# Patient Record
Sex: FEMALE | Race: WHITE | Employment: OTHER | URBAN - METROPOLITAN AREA
[De-identification: names, ages, dates, MRNs, and addresses within clinical notes are randomized per-mention and may not be internally consistent; named-entity substitution may affect disease eponyms.]

---

## 2017-01-19 ENCOUNTER — OFFICE VISIT (OUTPATIENT)
Dept: FAMILY MEDICINE CLINIC | Age: 82
End: 2017-01-19

## 2017-01-19 VITALS
BODY MASS INDEX: 26.98 KG/M2 | RESPIRATION RATE: 18 BRPM | HEART RATE: 60 BPM | HEIGHT: 64 IN | SYSTOLIC BLOOD PRESSURE: 137 MMHG | TEMPERATURE: 98.1 F | OXYGEN SATURATION: 94 % | WEIGHT: 158 LBS | DIASTOLIC BLOOD PRESSURE: 70 MMHG

## 2017-01-19 DIAGNOSIS — Z59.9 FINANCIAL PROBLEMS: ICD-10-CM

## 2017-01-19 DIAGNOSIS — E78.5 DYSLIPIDEMIA: ICD-10-CM

## 2017-01-19 DIAGNOSIS — I10 ESSENTIAL HYPERTENSION, BENIGN: Primary | ICD-10-CM

## 2017-01-19 DIAGNOSIS — J44.9 CHRONIC OBSTRUCTIVE PULMONARY DISEASE, UNSPECIFIED COPD TYPE (HCC): ICD-10-CM

## 2017-01-19 DIAGNOSIS — F43.9 STRESS AT HOME: ICD-10-CM

## 2017-01-19 SDOH — ECONOMIC STABILITY - INCOME SECURITY: PROBLEM RELATED TO HOUSING AND ECONOMIC CIRCUMSTANCES, UNSPECIFIED: Z59.9

## 2017-01-19 NOTE — PROGRESS NOTES
Angie Villar 80 y.o. female   Chief Complaint   Patient presents with    Follow-up    Hypertension    COPD    Cholesterol Problem         1. Have you been to the ER, urgent care clinic since your last visit? Hospitalized since your last visit? No    2. Have you seen or consulted any other health care providers outside of the [address] [place] [address] since your last visit? Include any pap smears or colon screening.  No

## 2017-01-19 NOTE — MR AVS SNAPSHOT
Visit Information Date & Time Provider Department Dept. Phone Encounter #  
 1/19/2017 12:30 PM Rocio Ca MD [address] [phone] [id number] Your Appointments 4/24/2017  1:15 PM  
Follow Up with Rocio Ca MD  
[address] (--) Appt Note: Follow Up  
 [address] [address]  
  
    
 10/10/2017  9:00 AM  
Follow Up with Ashley Sky MD  
[facility] ([facility]) Appt Note: 1 year follow up with EKG  
 [address] [facility] [address] [phone] [address] Upcoming Health Maintenance Date Due DTaP/Tdap/Td series (1 - Tdap) 11/23/1952 ZOSTER VACCINE AGE 60> 11/23/1991 GLAUCOMA SCREENING Q2Y 11/23/1996 Pneumococcal 65+ Low/Medium Risk (1 of 2 - PCV13) 11/23/1996 MEDICARE YEARLY EXAM 4/20/2016 INFLUENZA AGE 9 TO ADULT 8/1/2016 BREAST CANCER SCRN MAMMOGRAM 9/30/2016 Allergies as of 1/19/2017  Review Complete On: 1/19/2017 By: Rocio Ca MD  
  
 Severity Noted Reaction Type Reactions Iodine  10/02/2012   Intolerance Hives Current Immunizations  Reviewed on 4/20/2015 No immunizations on file. Not reviewed this visit You Were Diagnosed With   
  
 Codes Comments Essential hypertension, benign    -  Primary ICD-10-CM: I10 
ICD-9-CM: 401.1 Dyslipidemia     ICD-10-CM: E78.5 ICD-9-CM: 272.4 Chronic obstructive pulmonary disease, unspecified COPD type ([facility]     ICD-10-CM: J44.9 ICD-9-CM: 670 Vitals BP Pulse Temp Resp Height(growth percentile) Weight(growth percentile)  
 137/70 (BP 1 Location: Left arm, BP Patient Position: Sitting) 60 98.1 °F (36.7 °C) (Oral) 18 5' 4\" (1.626 m) 158 lb (71.7 kg) SpO2 BMI OB Status Smoking Status 94% 27.12 kg/m2 Postmenopausal Current Every Day Smoker BMI and BSA Data Body Mass Index Body Surface Area  
 27.12 kg/m 2 1.8 m 2 [facility] Your Updated Medication List  
  
   
This list is accurate as of: 1/19/17  3:15 PM.  Always use your most recent med list.  
  
  
  
  
 albuterol 90 mcg/actuation inhaler Commonly known as:  PROAIR HFA Take 2 Puffs by inhalation every four (4) hours as needed for Wheezing or Shortness of Breath. ALPRAZolam 0.5 mg tablet Commonly known as:  XANAX  
  
 amLODIPine 10 mg tablet Commonly known as:  Suzon Salle Take 1 Tab by mouth daily. aspirin 81 mg tablet Take 81 mg by mouth daily. atorvastatin 10 mg tablet Commonly known as:  LIPITOR Take 1 Tab by mouth daily. bipap machine kit 1 each by Does Not Apply route. Started using BiPap Machine 01-15-15 CALCIUM CITRATE + PO Take  by mouth once over twenty-four (24) hours. cholecalciferol (VITAMIN D3) 5,000 unit Tab tablet Commonly known as:  VITAMIN D3 Take 5,000 Int'l Units by mouth daily. Indications: VITAMIN D DEFICIENCY  
  
 COQ10 (LIPOSOMAL UBIQUINOL) PO Take 10 mg by mouth daily. FISH OIL PO Take 4,000 mg by mouth daily. fludrocortisone 0.1 mg tablet Commonly known as:  FLORINEF Take 1 tablet by mouth daily. furosemide 20 mg tablet Commonly known as:  LASIX Take 1 Tab by mouth daily as needed for Other (Leg Swelling). hydroCHLOROthiazide 12.5 mg capsule Commonly known as:  Wolm Drilling Take 1 Cap by mouth daily. HYDROcodone-acetaminophen 5-325 mg per tablet Commonly known as:  Reymundo Songster Take 1 Tab by mouth every four (4) hours as needed for Pain. Max Daily Amount: 6 Tabs. hydrocortisone 10 mg tablet Commonly known as:  CORTEF  
  
 L-Methylfolate-X49-Qejenlgdbf tablet Commonly known as:  Laveda Alu Take 1 tablet by mouth daily. lisinopril 20 mg tablet Commonly known as:  Aundra Yakelin Take 1 Tab by mouth daily. magnesium oxide 400 mg tablet Commonly known as:  MAG-OX Take 400 mg by mouth daily. metoprolol tartrate 50 mg tablet Commonly known as:  LOPRESSOR Take 1 Tab by mouth daily. Indications: HYPERTENSION  
  
 nystatin powder Commonly known as:  MYCOSTATIN Apply  to affected area four (4) times daily. OXcarbazepine 300 mg tablet Commonly known as:  TRILEPTAL Take 300 mg by mouth two (2) times a day. PARoxetine 30 mg tablet Commonly known as:  PAXIL Take 1 Tab by mouth daily. potassium chloride 20 mEq tablet Commonly known as:  KLOR-CON M20 Take 1 Tab by mouth two (2) times a day. SYNTHROID 112 mcg tablet Generic drug:  levothyroxine  
  
 umeclidinium-vilanterol 62.5-25 mcg/actuation inhaler Commonly known as:  Nupur Pander Take 1 Puff by inhalation daily. VOLTAREN 1 % Gel Generic drug:  diclofenac Elisa Petrin Commonly known as:  BARNEY ROLLING WALKER  
1 Device by Does Not Apply route daily. To-Do List   
 01/19/2017 Lab:  LIPID PANEL   
  
 01/19/2017 Lab:  METABOLIC PANEL, COMPREHENSIVE Patient Instructions Please contact our office if you have any questions about your visit today. Introducing [facility]! Dear Mert Oden: 
Thank you for requesting a Access Network account. Our records indicate that you already have an active Access Network account. You can access your account anytime at [email or web address] Did you know that you can access your hospital and ER discharge instructions at any time in Access Network? You can also review all of your test results from your hospital stay or ER visit. Additional Information If you have questions, please visit the Frequently Asked Questions section of the Monitor website at [email or web address] Remember, Monitor is NOT to be used for urgent needs. For medical emergencies, dial 911. Now available from your iPhone and Android! Please provide this summary of care documentation to your next provider. Your primary care clinician is listed as Stanley Coreas. If you have any questions after today's visit, please call [phone].
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
## 2017-03-29 ENCOUNTER — OFFICE VISIT (OUTPATIENT)
Dept: FAMILY MEDICINE CLINIC | Age: 82
End: 2017-03-29

## 2017-03-29 VITALS
SYSTOLIC BLOOD PRESSURE: 120 MMHG | OXYGEN SATURATION: 93 % | DIASTOLIC BLOOD PRESSURE: 61 MMHG | RESPIRATION RATE: 18 BRPM | BODY MASS INDEX: 28 KG/M2 | HEIGHT: 64 IN | WEIGHT: 164 LBS | HEART RATE: 67 BPM | TEMPERATURE: 98.5 F

## 2017-03-29 DIAGNOSIS — Z12.31 ENCOUNTER FOR SCREENING MAMMOGRAM FOR MALIGNANT NEOPLASM OF BREAST: ICD-10-CM

## 2017-03-29 DIAGNOSIS — Z00.00 MEDICARE ANNUAL WELLNESS VISIT, SUBSEQUENT: Primary | ICD-10-CM

## 2017-03-29 DIAGNOSIS — Z71.89 ACP (ADVANCE CARE PLANNING): ICD-10-CM

## 2017-03-29 DIAGNOSIS — Z13.39 SCREENING FOR ALCOHOLISM: ICD-10-CM

## 2017-03-29 DIAGNOSIS — J44.1 COPD EXACERBATION (HCC): ICD-10-CM

## 2017-03-29 RX ORDER — IPRATROPIUM BROMIDE AND ALBUTEROL SULFATE 2.5; .5 MG/3ML; MG/3ML
3 SOLUTION RESPIRATORY (INHALATION)
Qty: 3 ML | Refills: 0
Start: 2017-03-29 | End: 2017-03-29

## 2017-03-29 NOTE — ACP (ADVANCE CARE PLANNING)
Advance Care Planning (ACP) Provider Note - Comprehensive     Date of ACP Conversation: 03/29/17  Persons included in Conversation:  patient and family  Length of ACP Conversation in minutes:  16 minutes    Authorized Decision Maker (if patient is incapable of making informed decisions): This person is: Other Legally Authorized Decision Maker (e.g. Next of Kin)          General ACP for ALL Patients with Decision Making Capacity:   Importance of advance care planning, including choosing a healthcare agent to communicate patient's healthcare decisions if patient lost the ability to make decisions, such as after a sudden illness or accident  Understanding of the healthcare agent role was assessed and information provided  Exploration of values, goals, and preferences if recovery is not expected, even with continued medical treatment in the event of: Imminent death  Severe, permanent brain injury  \"In these circumstances, what matters most to you? \"  Care focused more on comfort or quality of life. \"What, if any, treatments would you want to avoid? \" mechanical ventilation, tube feeding    Review of Existing Advance Directive:  What is your understanding of your agent's willingness to honor your wishes, even if he/she may not agree with them? pt feels comfortable that they will honor her wishes    For Serious or Chronic Illness:  Understanding of medical condition      Interventions Provided:  Recommended completion of Advance Directive form after review of ACP materials and conversation with prospective healthcare agent

## 2017-03-29 NOTE — PROGRESS NOTES
Leonardo Speaker Jian 80 y.o. female   Chief Complaint   Patient presents with    Annual Wellness Visit         1. Have you been to the ER, urgent care clinic since your last visit? Hospitalized since your last visit? No    2. Have you seen or consulted any other health care providers outside of the [address] [place] [address] since your last visit? Include any pap smears or colon screening. No  This is a Subsequent Medicare Annual Wellness Visit providing Personalized Prevention Plan Services (PPPS) (Performed 12 months after initial AWV and PPPS )    I have reviewed the patient's medical history in detail and updated the computerized patient record. History     Past Medical History:   Diagnosis Date    Abuse 1998    Alcohol    Anxiety     Calculus of kidney 1988    Cardiac echocardiogram 07/31/2012    EF 60-65%. No WMA. Mild conc LVH. Gr 1 DDfx. RVSP 20-25 mmHg. Mild SAGAR. Mild TR,. Mild PAE.  Cardiovascular LE venous duplex 10/08/2012    No venous thrombosis or venous insufficiency in bilateral lower extremities.  Carotid duplex 10/09/2014    Mild < 50% bilateral ICA stenosis.       Contact dermatitis and other eczema, due to unspecified cause     COPD (chronic obstructive pulmonary disease) (HCC)     Degenerative joint disease     Depression     Hematuria     Hypercholesteremia     Hypertension     Hypothyroidism 1998    Kidney stone     Neuropathy 3/2007    Orthostatic hypotension     Pituitary adenoma ([facility] 2/1998    Seizures (HCC)     Severe obstructive sleep apnea 01-15-15     started using Bipap Machine    Stress     Stroke [facility]) 2/2006    TIA (transient ischemic attack)     Trauma       Past Surgical History:   Procedure Laterality Date    CYSTOSCOPY,INSERT URETERAL STENT  9/14/15    Dr. Pratibha Regan HX LITHOTRIPSY  9/14/15    Dr. Manoj Hernandez HX WISDOM TEETH EXTRACTION      x4     Current Outpatient Prescriptions Medication Sig Dispense Refill    PARoxetine (PAXIL) 30 mg tablet TAKE ONE TABLET BY MOUTH EVERY DAY 30 Tab 5    HYDROcodone-acetaminophen (NORCO) 5-325 mg per tablet Take 1 Tab by mouth every four (4) hours as needed for Pain. Max Daily Amount: 6 Tabs. 12 Tab 0    SYNTHROID 112 mcg tablet       ALPRAZolam (XANAX) 0.5 mg tablet       hydrochlorothiazide (MICROZIDE) 12.5 mg capsule Take 1 Cap by mouth daily. 30 Cap 3    furosemide (LASIX) 20 mg tablet Take 1 Tab by mouth daily as needed for Other (Leg Swelling). 90 Tab 3    potassium chloride (KLOR-CON M20) 20 mEq tablet Take 1 Tab by mouth two (2) times a day. 180 Tab 3    atorvastatin (LIPITOR) 10 mg tablet Take 1 Tab by mouth daily. 90 Tab 3    amLODIPine (NORVASC) 10 mg tablet Take 1 Tab by mouth daily. 90 Tab 3    metoprolol (LOPRESSOR) 50 mg tablet Take 1 Tab by mouth daily. Indications: HYPERTENSION (Patient taking differently: Take 25 mg by mouth daily. Indications: HYPERTENSION) 90 Tab 3    lisinopril (PRINIVIL, ZESTRIL) 20 mg tablet Take 1 Tab by mouth daily. 90 Tab 3    hydrocortisone (CORTEF) 10 mg tablet       VOLTAREN 1 % topical gel       umeclidinium-vilanterol (ANORO ELLIPTA) 62.5-25 mcg/actuation dsdv Take 1 Puff by inhalation daily. 2 Inhaler 0    albuterol (PROAIR HFA) 90 mcg/actuation inhaler Take 2 Puffs by inhalation every four (4) hours as needed for Wheezing or Shortness of Breath. 1 Inhaler 5    Walker (BARNEY ROLLING WALKER) misc 1 Device by Does Not Apply route daily. 1 Each 0    nystatin (MYCOSTATIN) powder Apply  to affected area four (4) times daily. 1 Bottle 0    bipap machine kit 1 each by Does Not Apply route. Started using BiPap Machine 01-15-15      L-Methylfolate-M47-Pjhhvnwhwq (CEREFOLIN NAC) tablet Take 1 tablet by mouth daily. 30 tablet 5    fludrocortisone (FLORINEF) 0.1 mg tablet Take 1 tablet by mouth daily. 30 tablet 5    oxcarbazepine (TRILEPTAL) 300 mg tablet Take 300 mg by mouth two (2) times a day.  Cholecalciferol, Vitamin D3, 5,000 unit Tab Take 5,000 Int'l Units by mouth daily. Indications: VITAMIN D DEFICIENCY      magnesium oxide (MAG-OX) 400 mg tablet Take 400 mg by mouth daily.  COQ10, LIPOSOMAL UBIQUINOL, PO Take 10 mg by mouth daily.  aspirin 81 mg tablet Take 81 mg by mouth daily.  DOCOSAHEXANOIC ACID/EPA (FISH OIL PO) Take 4,000 mg by mouth daily.  CALCIUM CITRATE/VITAMIN D3 (CALCIUM CITRATE + PO) Take  by mouth once over twenty-four (24) hours.        Allergies   Allergen Reactions    Iodine Hives     Family History   Problem Relation Age of Onset    Heart Disease Father     Hypertension Father     Hypertension Mother     Cancer Mother      skin    Elevated Lipids Sister     Heart Disease Sister     Cancer Maternal Grandmother      colon and stomach    Heart Disease Paternal Grandmother     Heart Attack Paternal Grandmother     Heart Attack Paternal Grandfather     Heart Disease Paternal Grandfather      Social History   Substance Use Topics    Smoking status: Current Every Day Smoker     Packs/day: 1.50     Years: 65.00     Types: Cigarettes    Smokeless tobacco: Never Used    Alcohol use No      Comment: quit 1998 due to Alcohol Abuse     Patient Active Problem List   Diagnosis Code    Essential hypertension, benign I10    Dyslipidemia E78.5    Tobacco abuse Z72.0    Hypothyroidism E03.9    COPD (chronic obstructive pulmonary disease) ([facility]) J44.9    Pituitary adenoma ([facility] D35.2    Nausea and vomiting R11.2    Acute upper respiratory infection J06.9    Nausea alone R11.0    Vasovagal reaction R55    Pancreatitis K85.90    XUAN (obstructive sleep apnea) G47.33    Flank pain R10.9    Kidney stone N20.0       Depression Risk Factor Screening:     PHQ 2 / 9, over the last two weeks 3/29/2017   Little interest or pleasure in doing things Not at all   Feeling down, depressed or hopeless Not at all   Total Score PHQ 2 0   Trouble falling or staying asleep, or sleeping too much -   Feeling tired or having little energy -   Poor appetite or overeating -   Feeling bad about yourself - or that you are a failure or have let yourself or your family down -   Trouble concentrating on things such as school, work, reading or watching TV -   Moving or speaking so slowly that other people could have noticed; or the opposite being so fidgety that others notice -   Thoughts of being better off dead, or hurting yourself in some way -   How difficult have these problems made it for you to do your work, take care of your home and get along with others -     Alcohol Risk Factor Screening: On any occasion during the past 3 months, have you had more than 3 drinks containing alcohol? No    Do you average more than 7 drinks per week? No      Functional Ability and Level of Safety:     Hearing Loss   mild    Activities of Daily Living   Self-care. Requires assistance with: ambulation and no ADLs    Fall Risk     Fall Risk Assessment, last 12 mths 3/29/2017   Able to walk? Yes   Fall in past 12 months? Yes   Fall with injury? Yes   Number of falls in past 12 months 2   Fall Risk Score 3     Abuse Screen   Patient is not abused    Review of Systems   A comprehensive review of systems was negative except for: Respiratory: positive for dyspnea on exertion    Physical Examination     Evaluation of Cognitive Function:  Mood/affect:  happy  Appearance: age appropriate and casually dressed  Family member/caregiver input: minimal    General:  Alert, cooperative, no distress, appears stated age. Head:  Normocephalic, without obvious abnormality, atraumatic. Eyes:  Conjunctivae/corneas clear. PERRL, EOMs intact. Fundi benign. Ears:  Normal TMs and external ear canals both ears. Nose: Nares normal. Septum midline. Mucosa normal. No drainage or sinus tenderness.    Throat: Lips, mucosa, and tongue normal. Teeth and gums normal.   Neck: Supple, symmetrical, trachea midline, no adenopathy, thyroid: no enlargement/tenderness/nodules, no carotid bruit and no JVD. Back:   Symmetric, no curvature. ROM normal. No CVA tenderness. Lungs:    B wheezes; improved with nebulizer tx. Chest wall:  No tenderness or deformity. Heart:  Regular rate and rhythm, S1, S2 normal, no murmur, click, rub or gallop. Abdomen:   Soft, non-tender. Bowel sounds normal. No masses,  No organomegaly. Extremities: Extremities normal, atraumatic, no cyanosis or edema. Pulses: 2+ and symmetric all extremities. Skin: Skin color, texture, turgor normal. No rashes or lesions. Lymph nodes: Cervical and supraclavicular nodes normal.   Neurologic: CNII-XII grossly intact. Normal reflexes throughout. Patient Care Team:  Bruce Eng MD as PCP - General (Family Practice)  Haider Suresh MD (Neurology)  Ander Dubon MD (Endocrinology)  Delfino Odom MD (Nephrology)  Dexter Castillo MD (Pulmonary Disease)  Sima Wheeler MD (Cardiology)    Advice/Referrals/Counseling   Education and counseling provided:  End-of-Life planning (with patient's consent)  Pneumococcal Vaccine  Influenza Vaccine  Screening Mammography      Assessment/Plan   Miguel Quintanilla was seen today for annual wellness visit. Diagnoses and all orders for this visit:    Medicare annual wellness visit, subsequent    Screening for alcoholism    Encounter for screening mammogram for malignant neoplasm of breast  -     FE MAMMO BI SCREENING INCL CAD; Future    ACP (advance care planning)    COPD exacerbation (HCC)  -     ALBUTEROL IPRATROP NON-COMP  -     ND PRESSURIZED/NONPRESSURIZED INHALATION TREATMENT  -     albuterol-ipratropium (DUO-NEB) 2.5 mg-0.5 mg/3 ml nebu; 3 mL by Nebulization route now for 1 dose. Rhett Garcia

## 2017-03-30 ENCOUNTER — HOSPITAL ENCOUNTER (OUTPATIENT)
Age: 82
Discharge: HOME | End: 2017-03-30
Attending: ORTHOPAEDIC SURGERY
Payer: MEDICARE

## 2017-03-30 DIAGNOSIS — M54.32 BILATERAL SCIATICA: ICD-10-CM

## 2017-03-30 DIAGNOSIS — M54.31 BILATERAL SCIATICA: ICD-10-CM

## 2017-03-30 PROCEDURE — 72148 MRI LUMBAR SPINE W/O DYE: CPT

## 2017-04-05 ENCOUNTER — HOSPITAL ENCOUNTER (OUTPATIENT)
Dept: MAMMOGRAPHY | Age: 82
Discharge: HOME | End: 2017-04-05
Attending: FAMILY MEDICINE
Payer: MEDICARE

## 2017-04-05 DIAGNOSIS — Z12.31 ENCOUNTER FOR SCREENING MAMMOGRAM FOR MALIGNANT NEOPLASM OF BREAST: ICD-10-CM

## 2017-04-05 PROCEDURE — 77067 SCR MAMMO BI INCL CAD: CPT

## 2017-04-07 ENCOUNTER — TELEPHONE (OUTPATIENT)
Dept: FAMILY MEDICINE CLINIC | Age: 82
End: 2017-04-07

## 2017-04-07 DIAGNOSIS — R92.8 ABNORMAL MAMMOGRAM: Primary | ICD-10-CM

## 2017-04-07 NOTE — TELEPHONE ENCOUNTER
----- Message from Ml Fan MD sent at 4/7/2017  2:44 PM EDT -----  Please ensure additional views are ordered for f/u of recent mammogram.

## 2017-04-11 ENCOUNTER — HOSPITAL ENCOUNTER (OUTPATIENT)
Dept: LAB | Age: 82
Discharge: HOME | End: 2017-04-11
Payer: MEDICARE

## 2017-04-11 DIAGNOSIS — E78.5 DYSLIPIDEMIA: ICD-10-CM

## 2017-04-11 DIAGNOSIS — I10 ESSENTIAL HYPERTENSION, BENIGN: ICD-10-CM

## 2017-04-11 LAB
ALBUMIN SERPL BCP-MCNC: 3.6 G/DL (ref 3.4–5)
ALBUMIN/GLOB SERPL: 1.2 {RATIO} (ref 0.8–1.7)
ALP SERPL-CCNC: 56 U/L (ref 45–117)
ALT SERPL-CCNC: 22 U/L (ref 13–56)
ANION GAP BLD CALC-SCNC: 4 MMOL/L (ref 3–18)
AST SERPL W P-5'-P-CCNC: 13 U/L (ref 15–37)
BILIRUB SERPL-MCNC: 0.3 MG/DL (ref 0.2–1)
BUN SERPL-MCNC: 18 MG/DL (ref 7–18)
BUN/CREAT SERPL: 15 (ref 12–20)
CALCIUM SERPL-MCNC: 9.3 MG/DL (ref 8.5–10.1)
CHLORIDE SERPL-SCNC: 101 MMOL/L (ref 100–108)
CHOLEST SERPL-MCNC: 164 MG/DL
CO2 SERPL-SCNC: 32 MMOL/L (ref 21–32)
CREAT SERPL-MCNC: 1.24 MG/DL (ref 0.6–1.3)
GLOBULIN SER CALC-MCNC: 3.1 G/DL (ref 2–4)
GLUCOSE SERPL-MCNC: 84 MG/DL (ref 74–99)
HDLC SERPL-MCNC: 48 MG/DL (ref 40–60)
HDLC SERPL: 3.4 {RATIO} (ref 0–5)
LDLC SERPL CALC-MCNC: 78.8 MG/DL (ref 0–100)
LIPID PROFILE,FLP: ABNORMAL
POTASSIUM SERPL-SCNC: 4.3 MMOL/L (ref 3.5–5.5)
PROT SERPL-MCNC: 6.7 G/DL (ref 6.4–8.2)
SODIUM SERPL-SCNC: 137 MMOL/L (ref 136–145)
TRIGL SERPL-MCNC: 186 MG/DL (ref ?–150)
VLDLC SERPL CALC-MCNC: 37.2 MG/DL

## 2017-04-11 PROCEDURE — 80053 COMPREHEN METABOLIC PANEL: CPT | Performed by: FAMILY MEDICINE

## 2017-04-11 PROCEDURE — 80061 LIPID PANEL: CPT | Performed by: FAMILY MEDICINE

## 2017-04-11 PROCEDURE — 36415 COLL VENOUS BLD VENIPUNCTURE: CPT | Performed by: FAMILY MEDICINE

## 2017-04-13 ENCOUNTER — HOSPITAL ENCOUNTER (OUTPATIENT)
Dept: ULTRASOUND IMAGING | Age: 82
Discharge: HOME | End: 2017-04-13
Attending: FAMILY MEDICINE

## 2017-04-13 ENCOUNTER — HOSPITAL ENCOUNTER (OUTPATIENT)
Dept: MAMMOGRAPHY | Age: 82
Discharge: HOME | End: 2017-04-13
Attending: FAMILY MEDICINE
Payer: MEDICARE

## 2017-04-13 DIAGNOSIS — N64.89 BREAST ASYMMETRY: ICD-10-CM

## 2017-04-13 DIAGNOSIS — R92.8 ABNORMAL MAMMOGRAM: ICD-10-CM

## 2017-04-13 PROCEDURE — 77065 DX MAMMO INCL CAD UNI: CPT

## 2017-04-24 ENCOUNTER — OFFICE VISIT (OUTPATIENT)
Dept: FAMILY MEDICINE CLINIC | Age: 82
End: 2017-04-24

## 2017-04-24 VITALS
BODY MASS INDEX: 28 KG/M2 | WEIGHT: 164 LBS | SYSTOLIC BLOOD PRESSURE: 122 MMHG | HEART RATE: 61 BPM | OXYGEN SATURATION: 96 % | HEIGHT: 64 IN | RESPIRATION RATE: 20 BRPM | TEMPERATURE: 97.5 F | DIASTOLIC BLOOD PRESSURE: 65 MMHG

## 2017-04-24 DIAGNOSIS — I10 ESSENTIAL HYPERTENSION, BENIGN: Primary | ICD-10-CM

## 2017-04-24 DIAGNOSIS — N28.1 BILATERAL RENAL CYSTS: ICD-10-CM

## 2017-04-24 DIAGNOSIS — E78.5 DYSLIPIDEMIA: ICD-10-CM

## 2017-04-24 DIAGNOSIS — J44.9 CHRONIC OBSTRUCTIVE PULMONARY DISEASE, UNSPECIFIED COPD TYPE (HCC): ICD-10-CM

## 2017-04-24 NOTE — MR AVS SNAPSHOT
Visit Information Date & Time Provider Department Dept. Phone Encounter #  
 4/24/2017  1:15 PM Dafne Hawkins MD [facility] [phone] [id number] Your Appointments 5/19/2017  8:30 AM  
SURGERY CONSULT with Xavier Greenwood MD  
[facility] ONE [address] [place] [address] Road) Appt Note: LOW  
 [address] [place] [address]  
[phone]  
  
   
 [address] [facility] [address]  
  
    
 7/25/2017  9:00 AM  
Follow Up with Dafne Hawkins MD  
[facility] (--) Appt Note: hany Gorman [address] [address]  
  
    
 10/10/2017  9:00 AM  
Follow Up with Juanita Li MD  
[facility] 1 ([facility]) Appt Note: 1 year follow up with EKG  
 [address] [address] [place] [id number] [phone] [address] [place] [address] [address] Upcoming Health Maintenance Date Due  
 GLAUCOMA SCREENING Q2Y 11/23/1996 Pneumococcal 65+ Low/Medium Risk (2 of 2 - PPSV23) 3/29/2018 MEDICARE YEARLY EXAM 3/30/2018 BREAST CANCER SCRN MAMMOGRAM 4/13/2018 DTaP/Tdap/Td series (2 - Td) 3/29/2027 Allergies as of 4/24/2017  Review Complete On: 4/24/2017 By: Dafne Hawkins MD  
  
 Severity Noted Reaction Type Reactions Iodine  10/02/2012   Intolerance Hives Current Immunizations  Reviewed on 4/20/2015 No immunizations on file. Not reviewed this visit Vitals BP Pulse Temp Resp Height(growth percentile) Weight(growth percentile) 122/65 61 97.5 °F (36.4 °C) (Oral) 20 5' 4\" (1.626 m) 164 lb (74.4 kg) SpO2 BMI OB Status Smoking Status 96% 28.15 kg/m2 Postmenopausal Current Every Day Smoker BMI and BSA Data  Body Mass Index Body Surface Area  
 28.15 kg/m 2 1.83 m 2  
  
  
 Preferred Pharmacy Pharmacy Name Phone Chuckie Flores [facility] Your Updated Medication List  
  
   
This list is accurate as of: 4/24/17  3:26 PM.  Always use your most recent med list.  
  
  
  
  
 albuterol 90 mcg/actuation inhaler Commonly known as:  PROAIR HFA Take 2 Puffs by inhalation every four (4) hours as needed for Wheezing or Shortness of Breath. ALPRAZolam 0.5 mg tablet Commonly known as:  XANAX  
  
 amLODIPine 10 mg tablet Commonly known as:  Jim Tammy Take 1 Tab by mouth daily. aspirin 81 mg tablet Take 81 mg by mouth daily. atorvastatin 10 mg tablet Commonly known as:  LIPITOR Take 1 Tab by mouth daily. bipap machine kit 1 each by Does Not Apply route. Started using BiPap Machine 01-15-15 CALCIUM CITRATE + PO Take  by mouth once over twenty-four (24) hours. cholecalciferol (VITAMIN D3) 5,000 unit Tab tablet Commonly known as:  VITAMIN D3 Take 5,000 Int'l Units by mouth daily. Indications: VITAMIN D DEFICIENCY  
  
 COQ10 (LIPOSOMAL UBIQUINOL) PO Take 10 mg by mouth daily. FISH OIL PO Take 4,000 mg by mouth daily. fludrocortisone 0.1 mg tablet Commonly known as:  FLORINEF Take 1 tablet by mouth daily. furosemide 20 mg tablet Commonly known as:  LASIX Take 1 Tab by mouth daily as needed for Other (Leg Swelling). hydroCHLOROthiazide 12.5 mg capsule Commonly known as:  Pat Ben Take 1 Cap by mouth daily. HYDROcodone-acetaminophen 5-325 mg per tablet Commonly known as:  Marce Schlichter Take 1 Tab by mouth every four (4) hours as needed for Pain. Max Daily Amount: 6 Tabs. hydrocortisone 10 mg tablet Commonly known as:  CORTEF  
  
 L-Methylfolate-S17-Ujhheglscv tablet Commonly known as:  Matthew Charles Take 1 tablet by mouth daily. lisinopril 20 mg tablet Commonly known as:  Dominique Chamorro Take 1 Tab by mouth daily. magnesium oxide 400 mg tablet Commonly known as:  MAG-OX Take 400 mg by mouth daily. metoprolol tartrate 50 mg tablet Commonly known as:  LOPRESSOR Take 1 Tab by mouth daily. Indications: HYPERTENSION  
  
 nystatin powder Commonly known as:  MYCOSTATIN Apply  to affected area four (4) times daily. OXcarbazepine 300 mg tablet Commonly known as:  TRILEPTAL Take 300 mg by mouth two (2) times a day. PARoxetine 30 mg tablet Commonly known as:  PAXIL TAKE ONE TABLET BY MOUTH EVERY DAY  
  
 potassium chloride 20 mEq tablet Commonly known as:  KLOR-CON M20 Take 1 Tab by mouth two (2) times a day. SYNTHROID 112 mcg tablet Generic drug:  levothyroxine  
  
 umeclidinium-vilanterol 62.5-25 mcg/actuation inhaler Commonly known as:  Maninder Ham Take 1 Puff by inhalation daily. VOLTAREN 1 % Gel Generic drug:  diclofenac Paulino Georgette Commonly known as:  BARNEY ROLLING WALKER  
1 Device by Does Not Apply route daily. Patient Instructions Please contact our office if you have any questions about your visit today. Introducing [facility] & HEALTH SERVICES! Dear Mirian Kramer: 
Thank you for requesting a [employer] account. Our records indicate that you already have an active [employer] account. You can access your account anytime at [email or web address] Did you know that you can access your hospital and ER discharge instructions at any time in [employer])? You can also review all of your test results from your hospital stay or ER visit. Additional Information If you have questions, please visit the Frequently Asked Questions section of the [employer] website at [email or web address] Remember, [employer] is NOT to be used for urgent needs. For medical emergencies, dial 911. Now available from your iPhone and Android! Please provide this summary of care documentation to your next provider. Your primary care clinician is listed as Albina Ennis. If you have any questions after today's visit, please call [phone].

## 2017-04-24 NOTE — PROGRESS NOTES
Marifer Villar 80 y.o. female   Chief Complaint   Patient presents with    Follow-up     3 month f/u    Hypertension    Cholesterol Problem    COPD    Labs     completed on 4-11-17         1. Have you been to the ER, urgent care clinic since your last visit? Hospitalized since your last visit? No    2. Have you seen or consulted any other health care providers outside of the [facility] since your last visit? Include any pap smears or colon screening.  No

## 2017-04-24 NOTE — PROGRESS NOTES
HISTORY OF PRESENT ILLNESS  Tawnya Tracy is a 80 y.o. female. Chief Complaint   Patient presents with    Follow-up     3 month f/u    Hypertension    Cholesterol Problem    COPD    Labs     completed on 4-11-17       HPI  Pt is here for follow up of Hypertension, COPD, and Cholesterol. Pt had labs on 4-11-17. Labs reviewed in detail with pt. Pt does not need medication refills today. New concerns today: pt has had an mri with her orthopedic surgeon. She was noted to have degen change in her lumbar spine and was referred to spine surg. Review of that imaging report reveals that pt did have renal cysts; a f/u ultrasound was recommended. ROS  Review of Systems   Constitutional: Negative. HENT: Negative. Respiratory: Negative. Cardiovascular: Negative. All other systems reviewed and are negative. Physical Exam  Physical Exam   Nursing note and vitals reviewed. Constitutional: She is oriented to person, place, and time. She appears well-developed and well-nourished. HENT:   Head: Normocephalic and atraumatic. Right Ear: External ear normal.   Left Ear: External ear normal.   Nose: Nose normal.   Eyes: Conjunctivae and EOM are normal.   Neck: Normal range of motion. Neck supple. No JVD present. Carotid bruit is not present. No thyromegaly present. Cardiovascular: Normal rate, regular rhythm, normal heart sounds and intact distal pulses. Exam reveals no gallop and no friction rub. No murmur heard. Pulmonary/Chest: Effort normal and breath sounds normal. She has no wheezes. She has no rhonchi. She has no rales. Abdominal: Soft. Bowel sounds are normal.   Musculoskeletal: Normal range of motion. Neurological: She is alert and oriented to person, place, and time. Coordination normal.   Skin: Skin is warm and dry. Psychiatric: She has a normal mood and affect.  Her behavior is normal. Judgment and thought content normal.     ASSESSMENT and Luciana Chen was seen today for follow-up, hypertension, cholesterol problem, copd and labs. Diagnoses and all orders for this visit:    Essential hypertension, benign  Stable, cont pres tx plan. Chronic obstructive pulmonary disease, unspecified [facility] type [facility]  Stable, cont pres tx plan. Dyslipidemia  Stable, cont pres tx plan. Bilateral renal cysts  -     US [employer];  Future    Follow-up Disposition: 3 months; sooner prn

## 2017-05-08 ENCOUNTER — HOSPITAL ENCOUNTER (OUTPATIENT)
Dept: ULTRASOUND IMAGING | Age: 82
Discharge: HOME | End: 2017-05-08
Attending: FAMILY MEDICINE
Payer: MEDICARE

## 2017-05-08 DIAGNOSIS — N28.1 BILATERAL RENAL CYSTS: ICD-10-CM

## 2017-05-08 DIAGNOSIS — I10 ESSENTIAL HYPERTENSION, BENIGN: ICD-10-CM

## 2017-05-08 PROCEDURE — 76770 US EXAM ABDO BACK WALL COMP: CPT

## 2017-05-09 RX ORDER — LISINOPRIL 20 MG/1
TABLET ORAL
Qty: 90 TAB | Refills: 0 | Status: SHIPPED | OUTPATIENT
Start: 2017-05-09 | End: 2017-07-25 | Stop reason: SDUPTHER

## 2017-05-16 NOTE — PROGRESS NOTES
Please notify pt: cysts were still seen in the ultrasound imaging. Please refer to urology for further discussion.

## 2017-05-19 ENCOUNTER — OFFICE VISIT (OUTPATIENT)
Dept: ORTHOPEDIC SURGERY | Age: 82
End: 2017-05-19

## 2017-05-19 VITALS
BODY MASS INDEX: 28.13 KG/M2 | DIASTOLIC BLOOD PRESSURE: 58 MMHG | SYSTOLIC BLOOD PRESSURE: 146 MMHG | WEIGHT: 164.8 LBS | RESPIRATION RATE: 16 BRPM | TEMPERATURE: 98.4 F | HEART RATE: 64 BPM | HEIGHT: 64 IN

## 2017-05-19 DIAGNOSIS — M48.061 LUMBAR SPINAL STENOSIS: Primary | ICD-10-CM

## 2017-05-19 RX ORDER — FLUTICASONE PROPIONATE 50 MCG
2 SPRAY, SUSPENSION (ML) NASAL DAILY
COMMUNITY
Start: 2017-04-10 | End: 2018-04-18

## 2017-05-19 NOTE — MR AVS SNAPSHOT
Visit Information Date & Time Provider Department Dept. Phone Encounter #  
 5/19/2017  8:30 AM Yvette Cisneros  [facility], [address] and [facility] [phone] [id number] Your Appointments 5/26/2017  7:30 AM  
PRE OP with Norberto Paz NP [address] (--) Appt Note: Pre op clearance for Dr. Mani Gorman 57 [facility] [address]  
[phone]  
  
   
 [address] [facility] [address] 6/12/2017 11:45 AM  
POST OP with Radha Flores NP  
[facility] ONE ([facility]) Appt Note: 2WK FU; SURG 5/31/17  
 [address] [facility] [address]  
[phone]  
  
   
 [address] [facility] [address] 7/18/2017 12:50 PM  
POST OP with Yvette Cisneros MD  
[facility]) Appt Note: 6WK FU; SURG 5/31/17  
 [address] [facility] [address]  
[phone]  
  
   
 [address]  
  
    
 7/25/2017  9:00 AM  
Follow Up with Deanna Oreilly MD  
[address] [place] (--) Appt Note: [address] [place] [address]  
  
    
 10/10/2017  9:00 AM  
Follow Up with Serena Perez MD  
[facility] ([facility]) Appt Note: 1 year follow up with EKG  
 [address] [facility] [address]  
[phone] [address] [facility] 111 [facility] [address] Upcoming Health Maintenance Date Due  
 GLAUCOMA SCREENING Q2Y 11/23/1996 INFLUENZA AGE 9 TO ADULT 8/1/2017 Pneumococcal 65+ Low/Medium Risk (2 of 2 - PPSV23) 3/29/2018 MEDICARE YEARLY EXAM 3/30/2018 BREAST CANCER SCRN MAMMOGRAM 4/13/2018 DTaP/Tdap/Td series (2 - Td) 3/29/2027 Allergies as of 5/19/2017  Review Complete On: 5/19/2017 By: Xavier Greenwood MD  
  
 Severity Noted Reaction Type Reactions Iodine  10/02/2012   Intolerance Hives Current Immunizations  Reviewed on 4/20/2015 No immunizations on file. Not reviewed this visit You Were Diagnosed With   
  
 Codes Comments Lumbar spinal stenosis    -  Primary ICD-10-CM: M48.06 
ICD-9-CM: 724.02 Vitals BP Pulse Temp Resp Height(growth percentile) Weight(growth percentile) 146/58 64 98.4 °F (36.9 °C) (Oral) 16 5' 4\" (1.626 m) 164 lb 12.8 oz (74.8 kg) BMI OB Status Smoking Status 28.29 kg/m2 Postmenopausal Current Every Day Smoker BMI and BSA Data Body Mass Index Body Surface Area  
 28.29 kg/m 2 1.84 m 2 Preferred Pharmacy Pharmacy Name Phone [facility] [id number] [facility] Your Updated Medication List  
  
   
This list is accurate as of: 5/19/17 10:04 AM.  Always use your most recent med list.  
  
  
  
  
 albuterol 90 mcg/actuation inhaler Commonly known as:  PROAIR HFA Take 2 Puffs by inhalation every four (4) hours as needed for Wheezing or Shortness of Breath. ALPRAZolam 0.5 mg tablet Commonly known as:  XANAX  
  
 amLODIPine 10 mg tablet Commonly known as:  Claudell Hesselbach Take 1 Tab by mouth daily. aspirin 81 mg tablet Take 81 mg by mouth daily. atorvastatin 10 mg tablet Commonly known as:  LIPITOR Take 1 Tab by mouth daily. bipap machine kit 1 each by Does Not Apply route. Started using BiPap Machine 01-15-15 CALCIUM CITRATE + PO Take  by mouth once over twenty-four (24) hours. cholecalciferol (VITAMIN D3) 5,000 unit Tab tablet Commonly known as:  VITAMIN D3 Take 5,000 Int'l Units by mouth daily. Indications: VITAMIN D DEFICIENCY  
  
 COQ10 (LIPOSOMAL UBIQUINOL) PO Take 10 mg by mouth daily. FISH OIL PO Take 4,000 mg by mouth daily. fludrocortisone 0.1 mg tablet Commonly known as:  FLORINEF Take 1 tablet by mouth daily. fluticasone 50 mcg/actuation nasal spray Commonly known as:  FLONASE  
  
 furosemide 20 mg tablet Commonly known as:  LASIX Take 1 Tab by mouth daily as needed for Other (Leg Swelling). hydroCHLOROthiazide 12.5 mg capsule Commonly known as:  Mandie Mata Take 1 Cap by mouth daily. HYDROcodone-acetaminophen 5-325 mg per tablet Commonly known as:  Fitzgerald Fryer Take 1 Tab by mouth every four (4) hours as needed for Pain. Max Daily Amount: 6 Tabs. hydrocortisone 10 mg tablet Commonly known as:  CORTEF  
  
 L-Methylfolate-Y19-Kgoinvjdyl tablet Commonly known as:  Vertie Gouty Take 1 tablet by mouth daily. lisinopril 20 mg tablet Commonly known as:  PRINIVIL, ZESTRIL  
TAKE ONE TABLET BY MOUTH EVERY DAY  
  
 magnesium oxide 400 mg tablet Commonly known as:  MAG-OX Take 400 mg by mouth daily. metoprolol tartrate 50 mg tablet Commonly known as:  LOPRESSOR Take 1 Tab by mouth daily. Indications: HYPERTENSION  
  
 nystatin powder Commonly known as:  MYCOSTATIN Apply  to affected area four (4) times daily. OXcarbazepine 300 mg tablet Commonly known as:  TRILEPTAL Take 300 mg by mouth two (2) times a day. PARoxetine 30 mg tablet Commonly known as:  PAXIL TAKE ONE TABLET BY MOUTH EVERY DAY  
  
 potassium chloride 20 mEq tablet Commonly known as:  KLOR-CON M20 Take 1 Tab by mouth two (2) times a day. SYNTHROID 112 mcg tablet Generic drug:  levothyroxine  
  
 umeclidinium-vilanterol 62.5-25 mcg/actuation inhaler Commonly known as:  Arpita Cumber Take 1 Puff by inhalation daily. VOLTAREN 1 % Gel Generic drug:  diclofenac Clay Martinezlstone Commonly known as:  BARNEY ROLLING WALKER  
1 Device by Does Not Apply route daily. Introducing [facility] HEALTH SERVICES! Dear Melanie Mcgill: Thank you for requesting a [employer] account. Our records indicate that you already have an active [employer] account. You can access your account anytime at [email or web address] Did you know that you can access your hospital and ER discharge instructions at any time in [employer]? You can also review all of your test results from your hospital stay or ER visit. Additional Information If you have questions, please visit the Frequently Asked Questions section of the [employer] website at [email or web address] Remember, [employer] is NOT to be used for urgent needs. For medical emergencies, dial 911. Now available from your iPhone and Android! Please provide this summary of care documentation to your next provider. Your primary care clinician is listed as eHrson Cornejo. If you have any questions after today's visit, please call [phone].

## 2017-05-19 NOTE — PROGRESS NOTES
[address] [facility]   Pre-Surgical Worksheet    Patient: Sara Cervantes                         MRN: [id number]     Age:  80 y.o.,      Sex: female    YOB: 1931           MONY: May 19, 2017  PCP: Maureen Choi MD    Allergies   Allergen Reactions    Iodine Hives         ICD-10-CM ICD-9-CM    1. Lumbar spinal stenosis M48.06 724.02        Surgery: Left L4/5 Lami Disc. Pain Assessment   Pain Assessment  5/19/2017   Location of Pain Back   Severity of Pain 7   Quality of Pain Aching   Frequency of Pain Constant   Aggravating Factors Standing;Walking;Bending   Aggravating Factors Comment sometimes sitting   Relieving Factors Nothing       Visit Vitals    /58    Pulse 64    Temp 98.4 °F (36.9 °C) (Oral)    Resp 16    Ht 5' 4\" (1.626 m)    Wt 164 lb 12.8 oz (74.8 kg)    BMI 28.29 kg/m2       ADL Limits: Patient states pain can get up to a 9. Needs a assistance getting dressed at times, walks with assistive device. Spine Surgery?: No: ? When ? Lorenso Frankel Where? .    Spinal Injections?: Yes  When 2017. Where [facility]. Physical Therapy?: Yes  When 2015. Where? .    NSAID's?: No: ? Pain Medications?: Yes  Type: Norco.    In Pain Management: NO, Where: ?    Current Outpatient Prescriptions   Medication Sig    lisinopril (PRINIVIL, ZESTRIL) 20 mg tablet TAKE ONE TABLET BY MOUTH EVERY DAY    PARoxetine (PAXIL) 30 mg tablet TAKE ONE TABLET BY MOUTH EVERY DAY    HYDROcodone-acetaminophen (NORCO) 5-325 mg per tablet Take 1 Tab by mouth every four (4) hours as needed for Pain. Max Daily Amount: 6 Tabs.  SYNTHROID 112 mcg tablet     hydrochlorothiazide (MICROZIDE) 12.5 mg capsule Take 1 Cap by mouth daily.  potassium chloride (KLOR-CON M20) 20 mEq tablet Take 1 Tab by mouth two (2) times a day.  atorvastatin (LIPITOR) 10 mg tablet Take 1 Tab by mouth daily.  amLODIPine (NORVASC) 10 mg tablet Take 1 Tab by mouth daily.     metoprolol (LOPRESSOR) 50 mg tablet Take 1 Tab by mouth daily. Indications: HYPERTENSION (Patient taking differently: Take 25 mg by mouth daily. Indications: HYPERTENSION)    hydrocortisone (CORTEF) 10 mg tablet     albuterol (PROAIR HFA) 90 mcg/actuation inhaler Take 2 Puffs by inhalation every four (4) hours as needed for Wheezing or Shortness of Breath.  Walker (BARNEY ROLLING WALKER) misc 1 Device by Does Not Apply route daily.  L-Methylfolate-N42-Qzxxctfzgx (CEREFOLIN NAC) tablet Take 1 tablet by mouth daily.  fludrocortisone (FLORINEF) 0.1 mg tablet Take 1 tablet by mouth daily.  oxcarbazepine (TRILEPTAL) 300 mg tablet Take 300 mg by mouth two (2) times a day.  Cholecalciferol, Vitamin D3, 5,000 unit Tab Take 5,000 Int'l Units by mouth daily. Indications: VITAMIN D DEFICIENCY    magnesium oxide (MAG-OX) 400 mg tablet Take 400 mg by mouth daily.  COQ10, LIPOSOMAL UBIQUINOL, PO Take 10 mg by mouth daily.  aspirin 81 mg tablet Take 81 mg by mouth daily.  DOCOSAHEXANOIC ACID/EPA (FISH OIL PO) Take 4,000 mg by mouth daily.  CALCIUM CITRATE/VITAMIN D3 (CALCIUM CITRATE + PO) Take  by mouth once over twenty-four (24) hours.  fluticasone (FLONASE) 50 mcg/actuation nasal spray     ALPRAZolam (XANAX) 0.5 mg tablet     furosemide (LASIX) 20 mg tablet Take 1 Tab by mouth daily as needed for Other (Leg Swelling).  VOLTAREN 1 % topical gel     umeclidinium-vilanterol (ANORO ELLIPTA) 62.5-25 mcg/actuation dsdv Take 1 Puff by inhalation daily.  nystatin (MYCOSTATIN) powder Apply  to affected area four (4) times daily.  bipap machine kit 1 each by Does Not Apply route. Started using BiPap Machine 01-15-15     No current facility-administered medications for this visit. Past Medical History:   Diagnosis Date    Abuse 1998    Alcohol    Anxiety     Calculus of kidney 1988    Cardiac echocardiogram 07/31/2012    EF 60-65%. No WMA. Mild conc LVH. Gr 1 DDfx. RVSP 20-25 mmHg. Mild SAGAR. Mild TR,. Mild PAE.      Cardiovascular LE venous duplex 10/08/2012    No venous thrombosis or venous insufficiency in bilateral lower extremities.  Carotid duplex 10/09/2014    Mild < 50% bilateral ICA stenosis.       Contact dermatitis and other eczema, due to unspecified cause     COPD (chronic obstructive pulmonary disease) [facility]     Degenerative joint disease     Depression     Hematuria     Hypercholesteremia     Hypertension     Hypothyroidism 1998    Kidney stone     Neuropathy 3/2007    Orthostatic hypotension     Pituitary adenoma ([facility]) 2/1998    Seizures ([facility])     Severe obstructive sleep apnea 01-15-15     started using Bipap Machine    Stress     Stroke [facility]) 2/2006    TIA (transient ischemic attack)     Trauma        Past Surgical History:   Procedure Laterality Date    CYSTOSCOPY,INSERT URETERAL STENT  9/14/15    Dr. Yenny Hood HX LITHOTRIPSY  9/14/15    Dr. Fannie Dotson HX WISDOM TEETH EXTRACTION      x4       Social History     Social History    Marital status:      Spouse name: N/A    Number of children: N/A    Years of education: N/A     Social History Main Topics    Smoking status: Current Every Day Smoker     Packs/day: 1.50     Years: 65.00     Types: Cigarettes    Smokeless tobacco: Never Used    Alcohol use No      Comment: quit 1998 due to Alcohol Abuse    Drug use: No    Sexual activity: No     Other Topics Concern    None     Social History Narrative

## 2017-05-19 NOTE — PROGRESS NOTES
[address]  [address], [address]  [address]  Phone: [phone]  Fax: [phone]  INITIAL CONSULTATION  Patient: Tawnya Tracy                MRN: [id number]       SSN: [id number]  YOB: 1931        AGE: 80 y.o. SEX: female  Body mass index is 28.29 kg/(m^2). PCP: Maninder Martinez MD  17    Chief Complaint   Patient presents with    Back Pain     back pain eval         HISTORY OF PRESENT ILLNESS, RADIOGRAPHS, and PLAN:         HISTORY OF PRESENT ILLNESS:  Ms. Mckenna Melchor is seen today at the request of Dr. Julee Burton and Dr. Donivan Dubin. Ms. Mckenna Melchor is an 51-year-old female in generally good health with a history of hypertension, hypercholesterolemia, and thyroid disease. She has had a year of progressive pain in her left leg unresponsive to multiple conservative treatments, injections, therapy, medications, and activity modification. She has an increasing poor quality of life. She presents looking for surgical intervention on referral from Dr. Antonio Meyer. She gets pain in her left leg and buttock radiating down her left lateral to anterior leg down to the foot. Her physical exam is significant for a positive straight leg raise. She has worse pain with standing. RADIOGRAPHS:  MRI demonstrates left side lateral recess foraminal stenosis with a combination of facet hypertrophy at what appears to be a focal foraminal disc herniation at L4-5 on the left. The patient has degenerative changes at other segments and no gross instability. Her exam is otherwise benign without long tract signs and normal strength, sensation, and reflexes. ASSESSMENT/PLAN: I discussed the matter at length with the patient. The patient is actually quite vibrant and healthy for 85. Her mother recently  at 8 years of age.   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 She has failed conservative treatments. I would recommend a unilateral decompression of this segment. I do not think any stabilization is necessary. I discussed the risks, benefits, complications, and alternatives to surgery. To her and her daughters, they wish to proceed with a simple decompression once the appropriate approvals and clearances take place. cc: Asif Maciel M.D. Lory Olivares M.D. Past Medical History:   Diagnosis Date    Abuse 1998    Alcohol    Anxiety     Calculus of kidney 1988    Cardiac echocardiogram 07/31/2012    EF 60-65%. No WMA. Mild conc LVH. Gr 1 DDfx. RVSP 20-25 mmHg. Mild SAGAR. Mild TR,. Mild PAE.  Cardiovascular LE venous duplex 10/08/2012    No venous thrombosis or venous insufficiency in bilateral lower extremities.  Carotid duplex 10/09/2014    Mild < 50% bilateral ICA stenosis.       Contact dermatitis and other eczema, due to unspecified cause     COPD [facility] obstructive [facility] disease[facility])     Degenerative joint disease     Depression     Hematuria     Hypercholesteremia     Hypertension     Hypothyroidism 1998    Kidney stone     Neuropathy 3/2007    Orthostatic hypotension     Pituitary adenoma ([facility]) 2/1998    Seizures ([facility])     Severe obstructive sleep apnea 01-15-15     started using Bipap Machine    Stress     Stroke [facility]) 2/2006    TIA (transient ischemic attack)     Trauma        Family History   Problem Relation Age of Onset    Heart Disease Father     Hypertension Father     Hypertension Mother     Cancer Mother      skin    Elevated Lipids Sister     Heart Disease Sister     Cancer Maternal Grandmother      colon and stomach    Heart Disease Paternal Grandmother     Heart Attack Paternal Grandmother     Heart Attack Paternal Grandfather     Heart Disease Paternal Grandfather        Current Outpatient Prescriptions   Medication Sig Dispense Refill    lisinopril (PRINIVIL, ZESTRIL) 20 mg tablet TAKE ONE TABLET BY MOUTH EVERY DAY 90 Tab 0    PARoxetine (PAXIL) 30 mg tablet TAKE ONE TABLET BY MOUTH EVERY DAY 30 Tab 5    HYDROcodone-acetaminophen (NORCO) 5-325 mg per tablet Take 1 Tab by mouth every four (4) hours as needed for Pain. Max Daily Amount: 6 Tabs. 12 Tab 0    SYNTHROID 112 mcg tablet       hydrochlorothiazide (MICROZIDE) 12.5 mg capsule Take 1 Cap by mouth daily. 30 Cap 3    potassium chloride (KLOR-CON M20) 20 mEq tablet Take 1 Tab by mouth two (2) times a day. 180 Tab 3    atorvastatin (LIPITOR) 10 mg tablet Take 1 Tab by mouth daily. 90 Tab 3    amLODIPine (NORVASC) 10 mg tablet Take 1 Tab by mouth daily. 90 Tab 3    metoprolol (LOPRESSOR) 50 mg tablet Take 1 Tab by mouth daily. Indications: HYPERTENSION (Patient taking differently: Take 25 mg by mouth daily. Indications: HYPERTENSION) 90 Tab 3    hydrocortisone (CORTEF) 10 mg tablet       albuterol (PROAIR HFA) 90 mcg/actuation inhaler Take 2 Puffs by inhalation every four (4) hours as needed for Wheezing or Shortness of Breath. 1 Inhaler 5    Walker (BARNEY ROLLING WALKER) misc 1 Device by Does Not Apply route daily. 1 Each 0    L-Methylfolate-X03-Cooowsvskp (CEREFOLIN NAC) tablet Take 1 tablet by mouth daily. 30 tablet 5    fludrocortisone (FLORINEF) 0.1 mg tablet Take 1 tablet by mouth daily. 30 tablet 5    oxcarbazepine (TRILEPTAL) 300 mg tablet Take 300 mg by mouth two (2) times a day.  Cholecalciferol, Vitamin D3, 5,000 unit Tab Take 5,000 Int'l Units by mouth daily. Indications: VITAMIN D DEFICIENCY      magnesium oxide (MAG-OX) 400 mg tablet Take 400 mg by mouth daily.  COQ10, LIPOSOMAL UBIQUINOL, PO Take 10 mg by mouth daily.  aspirin 81 mg tablet Take 81 mg by mouth daily.  DOCOSAHEXANOIC ACID/EPA (FISH OIL PO) Take 4,000 mg by mouth daily.       CALCIUM CITRATE/VITAMIN D3 (CALCIUM CITRATE + PO) Take  by mouth once over twenty-four (24) hours.  fluticasone (FLONASE) 50 mcg/actuation nasal spray       ALPRAZolam (XANAX) 0.5 mg tablet       furosemide (LASIX) 20 mg tablet Take 1 Tab by mouth daily as needed for Other (Leg Swelling). 90 Tab 3    VOLTAREN 1 % topical gel       umeclidinium-vilanterol (ANORO ELLIPTA) 62.5-25 mcg/actuation dsdv Take 1 Puff by inhalation daily. 2 Inhaler 0    nystatin (MYCOSTATIN) powder Apply  to affected area four (4) times daily. 1 Bottle 0    bipap machine kit 1 each by Does Not Apply route. Started using BiPap Machine 01-15-15         Allergies   Allergen Reactions    Iodine Hives       Past Surgical History:   Procedure Laterality Date    CYSTOSCOPY,INSERT URETERAL STENT  9/14/15    Dr. Courtney Vee HX LITHOTRIPSY  9/14/15    Dr. Sloan Valdez HX WISDOM TEETH EXTRACTION      x4       Past Medical History:   Diagnosis Date    Abuse 1998    Alcohol    Anxiety     Calculus of kidney 1988    Cardiac echocardiogram 07/31/2012    EF 60-65%. No WMA. Mild conc LVH. Gr 1 DDfx. RVSP 20-25 mmHg. Mild SAGAR. Mild TR,. Mild PAE.  Cardiovascular LE venous duplex 10/08/2012    No venous thrombosis or venous insufficiency in bilateral lower extremities.  Carotid duplex 10/09/2014    Mild < 50% bilateral ICA stenosis.       Contact dermatitis and other eczema, due to unspecified cause     COPD (chronic obstructive pulmonary disease) (HCC)     Degenerative joint disease     Depression     Hematuria     Hypercholesteremia     Hypertension     Hypothyroidism 1998    Kidney stone     Neuropathy 3/2007    Orthostatic hypotension     Pituitary adenoma ([facility] 2/1998    Seizures ([facility])     Severe obstructive sleep apnea 01-15-15     started using Bipap Machine    Stress     Stroke [facility]) 2/2006    TIA (transient ischemic attack)     Trauma        Social History     Social History    Marital status:  Spouse name: N/A    Number of children: N/A    Years of education: N/A     Occupational History    Not on file. Social History Main Topics    Smoking status: Current Every Day Smoker     Packs/day: 1.50     Years: 65.00     Types: Cigarettes    Smokeless tobacco: Never Used    Alcohol use No      Comment: quit 1998 due to Alcohol Abuse    Drug use: No    Sexual activity: No     Other Topics Concern    Not on file     Social History Narrative       WORK STATUS: retired. REVIEW OF SYSTEMS:   CONSTITUTIONAL SYMPTOMS:  Negative. EYES:  Negative. EARS, NOSE, THROAT AND MOUTH:  Negative. CARDIOVASCULAR:  Negative. RESPIRATORY:  Negative. GENITOURINARY: Negative. GASTROINTESTINAL:  Negative. INTEGUMENTARY (SKIN AND/OR BREAST):  Negative. MUSCULOSKELETAL: Per HPI.   ENDOCRINE/RHEUMATOLOGIC:  Negative. NEUROLOGICAL:  Per HPI. HEMATOLOGIC/LYMPHATIC:  Negative. ALLERGIC/IMMUNOLOGIC:  Negative. PSYCHIATRIC:  Negative. PHYSICAL EXAMINATION:   Visit Vitals    /58    Pulse 64    Temp 98.4 °F (36.9 °C) (Oral)    Resp 16    Ht 5' 4\" (1.626 m)    Wt 164 lb 12.8 oz (74.8 kg)    BMI 28.29 kg/m2    PAIN SCALE: 7/10    CONSTITUTIONAL: The patient is in no apparent distress and is alert and oriented x 3. HEENT: Normocephalic. Hearing grossly intact. NECK: Supple and symmetric. no tenderness, or masses were felt. RESPIRATORY: No labored breathing. CARDIOVASCULAR: The carotid pulses were normal. Peripheral pulses were 2+. CHEST: Normal AP diameter and normal contour without any kyphoscoliosis. LYMPHATIC: No lymphadenopathy was appreciated in the neck, axillae or groin. SKIN:  Negative for scars, rashes, lesions, or ulcers on the right upper, right lower, left upper, left lower and trunk. NEUROLOGICAL: Alert and oriented x 3. Ambulation with quad cane. FWB.   EXTREMITIES:  See musculoskeletal.  MUSCULOSKELETAL:   Head and Neck:  Negative for misalignment, asymmetry, crepitation, defects, tenderness masses or effusions.  Left Upper Extremity: Inspection, percussion and palpation preformed. Mckeons sign is negative.  Right Upper Extremity: Inspection, percussion and palpation preformed. Mckeons sign is negative.  Spine, Ribs and Pelvis: Pain in LT buttock that radiates into LLE. Inspection, percussion and palpation preformed. Negative for misalignment, asymmetry, crepitation, defects, tenderness masses or effusions.  Left Lower Extremity: Radiating anterior pain. Increased pain with standing. Inspection, percussion and palpation preformed. Negative straight leg raise.  Right Lower Extremity: Inspection, percussion and palpation preformed. Positive  straight leg raise. SPINE EXAM:     Lumbar spine: No rash, ecchymosis, or gross obliquity. No focal atrophy is noted. ASSESSMENT    ICD-10-CM ICD-9-CM    1. Lumbar spinal stenosis M48.06 724.02        Written by Albina Chacon, as dictated by Yamel Stanley MD.    I, Dr. Yamel Stanley MD, confirm that all documentation is accurate.

## 2017-05-22 ENCOUNTER — TELEPHONE (OUTPATIENT)
Dept: FAMILY MEDICINE CLINIC | Age: 82
End: 2017-05-22

## 2017-05-22 DIAGNOSIS — N28.1 RENAL CYST, RIGHT: Primary | ICD-10-CM

## 2017-05-22 NOTE — TELEPHONE ENCOUNTER
----- Message from Lianna Mares MD sent at 5/16/2017  6:50 PM EDT -----  Please notify pt: cysts were still seen in the ultrasound imaging. Please refer to urology for further discussion.

## 2017-05-25 ENCOUNTER — HOSPITAL ENCOUNTER (OUTPATIENT)
Dept: PREADMISSION TESTING | Age: 82
Discharge: HOME | End: 2017-05-25
Payer: MEDICARE

## 2017-05-25 DIAGNOSIS — M51.04 HNP (HERNIATED NUCLEUS PULPOSUS WITH MYELOPATHY), THORACIC: ICD-10-CM

## 2017-05-25 DIAGNOSIS — N32.0 STENOSIS (ACQUIRED) OF BLADDER NECK OR VESICOURETHRAL ORIFICE: ICD-10-CM

## 2017-05-25 LAB
ALBUMIN SERPL BCP-MCNC: 3.7 G/DL (ref 3.4–5)
ALBUMIN/GLOB SERPL: 1.2 {RATIO} (ref 0.8–1.7)
ALP SERPL-CCNC: 60 U/L (ref 45–117)
ALT SERPL-CCNC: 23 U/L (ref 13–56)
ANION GAP BLD CALC-SCNC: 9 MMOL/L (ref 3–18)
AST SERPL W P-5'-P-CCNC: 17 U/L (ref 15–37)
ATRIAL RATE: 66 BPM
BASOPHILS # BLD AUTO: 0 K/UL (ref 0–0.06)
BASOPHILS # BLD: 1 % (ref 0–2)
BILIRUB SERPL-MCNC: 0.5 MG/DL (ref 0.2–1)
BUN SERPL-MCNC: 17 MG/DL (ref 7–18)
BUN/CREAT SERPL: 17 (ref 12–20)
CALCIUM SERPL-MCNC: 9.4 MG/DL (ref 8.5–10.1)
CALCULATED P AXIS, ECG09: 15 DEGREES
CALCULATED R AXIS, ECG10: -74 DEGREES
CALCULATED T AXIS, ECG11: 2 DEGREES
CHLORIDE SERPL-SCNC: 96 MMOL/L (ref 100–108)
CO2 SERPL-SCNC: 27 MMOL/L (ref 21–32)
CREAT SERPL-MCNC: 0.98 MG/DL (ref 0.6–1.3)
DIAGNOSIS, 93000: NORMAL
DIFFERENTIAL METHOD BLD: ABNORMAL
EOSINOPHIL # BLD: 0.3 K/UL (ref 0–0.4)
EOSINOPHIL NFR BLD: 4 % (ref 0–5)
ERYTHROCYTE [DISTWIDTH] IN BLOOD BY AUTOMATED COUNT: 12.8 % (ref 11.6–14.5)
GLOBULIN SER CALC-MCNC: 3.2 G/DL (ref 2–4)
GLUCOSE SERPL-MCNC: 87 MG/DL (ref 74–99)
HCT VFR BLD AUTO: 41.8 % (ref 35–45)
HGB BLD-MCNC: 14.4 G/DL (ref 12–16)
LYMPHOCYTES # BLD AUTO: 46 % (ref 21–52)
LYMPHOCYTES # BLD: 3.1 K/UL (ref 0.9–3.6)
MCH RBC QN AUTO: 33.8 PG (ref 24–34)
MCHC RBC AUTO-ENTMCNC: 34.4 G/DL (ref 31–37)
MCV RBC AUTO: 98.1 FL (ref 74–97)
MONOCYTES # BLD: 0.4 K/UL (ref 0.05–1.2)
MONOCYTES NFR BLD AUTO: 6 % (ref 3–10)
NEUTS SEG # BLD: 3 K/UL (ref 1.8–8)
NEUTS SEG NFR BLD AUTO: 43 % (ref 40–73)
P-R INTERVAL, ECG05: 166 MS
PLATELET # BLD AUTO: 265 K/UL (ref 135–420)
PMV BLD AUTO: 10.2 FL (ref 9.2–11.8)
POTASSIUM SERPL-SCNC: 4.1 MMOL/L (ref 3.5–5.5)
PROT SERPL-MCNC: 6.9 G/DL (ref 6.4–8.2)
Q-T INTERVAL, ECG07: 466 MS
QRS DURATION, ECG06: 134 MS
QTC CALCULATION (BEZET), ECG08: 488 MS
RBC # BLD AUTO: 4.26 M/UL (ref 4.2–5.3)
SODIUM SERPL-SCNC: 132 MMOL/L (ref 136–145)
VENTRICULAR RATE, ECG03: 66 BPM
WBC # BLD AUTO: 6.9 K/UL (ref 4.6–13.2)

## 2017-05-25 PROCEDURE — 93005 ELECTROCARDIOGRAM TRACING: CPT

## 2017-05-25 PROCEDURE — 80053 COMPREHEN METABOLIC PANEL: CPT | Performed by: ORTHOPAEDIC SURGERY

## 2017-05-25 PROCEDURE — 36415 COLL VENOUS BLD VENIPUNCTURE: CPT | Performed by: ORTHOPAEDIC SURGERY

## 2017-05-25 PROCEDURE — 85025 COMPLETE CBC W/AUTO DIFF WBC: CPT | Performed by: ORTHOPAEDIC SURGERY

## 2017-05-26 ENCOUNTER — OFFICE VISIT (OUTPATIENT)
Dept: FAMILY MEDICINE CLINIC | Age: 82
End: 2017-05-26

## 2017-05-26 VITALS
WEIGHT: 165.5 LBS | HEART RATE: 66 BPM | SYSTOLIC BLOOD PRESSURE: 134 MMHG | HEIGHT: 64 IN | DIASTOLIC BLOOD PRESSURE: 76 MMHG | BODY MASS INDEX: 28.25 KG/M2 | RESPIRATION RATE: 16 BRPM | TEMPERATURE: 98 F

## 2017-05-26 DIAGNOSIS — J44.1 CHRONIC OBSTRUCTIVE PULMONARY DISEASE WITH ACUTE EXACERBATION (HCC): ICD-10-CM

## 2017-05-26 DIAGNOSIS — R05.9 COUGH: ICD-10-CM

## 2017-05-26 DIAGNOSIS — R06.2 WHEEZING: ICD-10-CM

## 2017-05-26 DIAGNOSIS — L98.9 SKIN LESIONS: ICD-10-CM

## 2017-05-26 DIAGNOSIS — Z01.818 PREOP EXAMINATION: Primary | ICD-10-CM

## 2017-05-26 DIAGNOSIS — R94.31 ABNORMAL ECG: ICD-10-CM

## 2017-05-26 RX ORDER — ALBUTEROL SULFATE 90 UG/1
2 AEROSOL, METERED RESPIRATORY (INHALATION)
Qty: 1 INHALER | Refills: 5 | Status: SHIPPED | OUTPATIENT
Start: 2017-05-26 | End: 2020-05-18 | Stop reason: SDUPTHER

## 2017-05-26 RX ORDER — BENZONATATE 200 MG/1
200 CAPSULE ORAL
Qty: 30 CAP | Refills: 0 | Status: SHIPPED | OUTPATIENT
Start: 2017-05-26 | End: 2017-06-02

## 2017-05-26 NOTE — MR AVS SNAPSHOT
Visit Information Date & Time Provider Department Dept. Phone Encounter #  
 5/26/2017  7:30 AM Kilo Chan NP [address] [id number] Follow-up Instructions Return if symptoms worsen or fail to improve. Your Appointments 6/12/2017 11:30 AM  
POST OP with Eunice Quevedo NP  
[facility] ([facility]) Appt Note: 2WK FU; SURG 5/31/17; surg 5-31  
 [address] [place] [address]  
[phone]  
  
   
 [address] [place] [address] 7/18/2017 12:50 PM  
POST OP with Katrin Fortune MD  
[facility]) Appt Note: 6WK FU; SURG 5/31/17  
 [address] [place] [address]  
[phone]  
  
   
 [address]  
  
    
 7/25/2017  9:00 AM  
Follow Up with Cassie Gudino MD  
[address] [place] [address] (--) Appt Note: fu  
 [address] [facility] [phone]  
  
   
 [address] [place] [address] Road [address]  
  
    
 10/10/2017  9:00 AM  
Follow Up with Santiago Delgado MD  
[facility] ([facility]) Appt Note: 1 year follow up with EKG  
 [address] [facility] [place] [id number] [phone] [address] [place] [address] [address] Upcoming Health Maintenance Date Due  
 GLAUCOMA SCREENING Q2Y 11/23/1996 INFLUENZA AGE 9 TO ADULT 8/1/2017 Pneumococcal 65+ Low/Medium Risk (2 of 2 - PPSV23) 3/29/2018 MEDICARE YEARLY EXAM 3/30/2018 BREAST CANCER SCRN MAMMOGRAM 4/13/2018 DTaP/Tdap/Td series (2 - Td) 3/29/2027 Allergies as of 5/26/2017  Review Complete On: 5/19/2017 By: Katrin Fortune MD  
  
 Severity Noted Reaction Type Reactions Iodine  10/02/2012   Intolerance Hives Current Immunizations  Reviewed on 4/20/2015 No immunizations on file. Not reviewed this visit You Were Diagnosed With   
  
 Codes Comments Preop examination    -  Primary ICD-10-CM: O25.583 ICD-9-CM: V72.84 Skin lesions     ICD-10-CM: L98.9 ICD-9-CM: 101. 9 Wheezing     ICD-10-CM: R06.2 ICD-9-CM: 786.07 Cough     ICD-10-CM: R05 ICD-9-CM: 958. 2 Chronic obstructive pulmonary disease with acute exacerbation (HCC)     ICD-10-CM: J44.1 ICD-9-CM: 491.21 Vitals BP Pulse Temp Resp Height(growth percentile) Weight(growth percentile) 134/76 (BP 1 Location: Left arm, BP Patient Position: Sitting) 66 98 °F (36.7 °C) (Oral) 16 5' 4\" (1.626 m) 165 lb 8 oz (75.1 kg) BMI OB Status Smoking Status 28.41 kg/m2 Postmenopausal Current Every Day Smoker BMI and BSA Data Body Mass Index Body Surface Area  
 28.41 kg/m 2 1.84 m 2 Preferred Pharmacy [facility] Your Updated Medication List  
  
   
This list is accurate as of: 5/26/17  8:42 AM.  Always use your most recent med list.  
  
  
  
  
 albuterol 90 mcg/actuation inhaler Commonly known as:  PROAIR HFA Take 2 Puffs by inhalation every four (4) hours as needed for Wheezing or Shortness of Breath. ALPRAZolam 0.5 mg tablet Commonly known as:  XANAX  
  
 amLODIPine 10 mg tablet Commonly known as:  Marylou Sweetie TAKE ONE TABLET BY MOUTH EVERY DAY  
  
 aspirin 81 mg tablet Take 81 mg by mouth daily. atorvastatin 10 mg tablet Commonly known as:  LIPITOR Take 1 Tab by mouth daily. benzonatate 200 mg capsule Commonly known as:  TESSALON Take 1 Cap by mouth three (3) times daily as needed for Cough for up to 7 days. bipap machine kit 1 each by Does Not Apply route. Started using BiPap Machine 01-15-15  CALCIUM CITRATE + PO  
 Take  by mouth once over twenty-four (24) hours. cholecalciferol (VITAMIN D3) 5,000 unit Tab tablet Commonly known as:  VITAMIN D3 Take 5,000 Int'l Units by mouth daily. Indications: VITAMIN D DEFICIENCY  
  
 COQ10 (LIPOSOMAL UBIQUINOL) PO Take 10 mg by mouth daily. FISH OIL PO Take 4,000 mg by mouth daily. fludrocortisone 0.1 mg tablet Commonly known as:  FLORINEF Take 1 tablet by mouth daily. fluticasone 50 mcg/actuation nasal spray Commonly known as:  FLONASE  
  
 furosemide 20 mg tablet Commonly known as:  LASIX Take 1 Tab by mouth daily as needed for Other (Leg Swelling). hydroCHLOROthiazide 12.5 mg capsule Commonly known as:  Keisha  Take 1 Cap by mouth daily. HYDROcodone-acetaminophen 5-325 mg per tablet Commonly known as:  Simmie Blonder Take 1 Tab by mouth every four (4) hours as needed for Pain. Max Daily Amount: 6 Tabs. hydrocortisone 10 mg tablet Commonly known as:  CORTEF  
  
 L-Methylfolate-T61-Cdhnncsxsn tablet Commonly known as:  Magda Meckel Take 1 tablet by mouth daily. lisinopril 20 mg tablet Commonly known as:  PRINIVIL, ZESTRIL  
TAKE ONE TABLET BY MOUTH EVERY DAY  
  
 magnesium oxide 400 mg tablet Commonly known as:  MAG-OX Take 400 mg by mouth daily. metoprolol tartrate 50 mg tablet Commonly known as:  LOPRESSOR  
TAKE ONE TABLET BY MOUTH EVERY DAY FOR HYPERTENSION  
  
 nystatin powder Commonly known as:  MYCOSTATIN Apply  to affected area four (4) times daily. OXcarbazepine 300 mg tablet Commonly known as:  TRILEPTAL Take 300 mg by mouth two (2) times a day. PARoxetine 30 mg tablet Commonly known as:  PAXIL TAKE ONE TABLET BY MOUTH EVERY DAY  
  
 potassium chloride 20 mEq tablet Commonly known as:  K-DUR, KLOR-CON  
TAKE ONE TABLET BY MOUTH 2 TIMES A DAY  
  
 SYNTHROID 112 mcg tablet Generic drug:  levothyroxine umeclidinium-vilanterol 62.5-25 mcg/actuation inhaler Commonly known as:  Mitchel Nasime Take 1 Puff by inhalation daily. VOLTAREN 1 % Gel Generic drug:  diclofenac Thana Oh Commonly known as:  BARNEY ROLLING WALKER  
1 Device by Does Not Apply route daily. Prescriptions Sent to Pharmacy Refills  
 benzonatate (TESSALON) 200 mg capsule 0 Sig: Take 1 Cap by mouth three (3) times daily as needed for Cough for up to 7 days. Class: Normal  
 Pharmacy: [address], [place] Ph #: [phone] Route: Oral  
 umeclidinium-vilanterol (ANORO ELLIPTA) 62.5-25 mcg/actuation inhaler 0 Sig: Take 1 Puff by inhalation daily. Class: Normal  
 Pharmacy: [address], [place] Ph #: [phone] Route: Inhalation  
 albuterol (PROAIR HFA) 90 mcg/actuation inhaler 5 Sig: Take 2 Puffs by inhalation every four (4) hours as needed for Wheezing or Shortness of Breath. Class: Normal  
 Pharmacy: [address], [place] Ph #: [phone] Route: Inhalation Follow-up Instructions Return if symptoms worsen or fail to improve. Patient Instructions Please contact our office if you have any questions about your visit today. [facility]! Dear Susana Alvarez: 
Thank you for requesting a [employer] account. Our records indicate that you already have an active [employer]it account. You can access your account anytime at [email or web address] Did you know that you can access your hospital and ER discharge instructions at any time in [employer]it? You can also review all of your test results from your hospital stay or ER visit. Additional Information If you have questions, please visit the Frequently Asked Questions section of the [employer]it website at [email or web address] Remember, MyChart is NOT to be used for urgent needs. For medical emergencies, dial 911. Now available from your iPhone and Android! Please provide this summary of care documentation to your next provider. Your primary care clinician is listed as Christiane Reynolds. If you have any questions after today's visit, please call [phone].

## 2017-05-26 NOTE — PROGRESS NOTES
MICHAEL Hernandez is a 80 y.o. female  Chief Complaint   Patient presents with    Pre-op Exam     spinal stenosis, 5/31/17, Dr. Brandin Gupta     Daughter present and states that they have noticed patient with increased episodes of cough and wheezing in the last 24 hours. Patient reports she is out of her inhalers and she does have a history of COPD. Reports continual smoking. Reports an increase of clear nasal drainage. Admits to eyes watering with clear drainage in the last two days. Denies crusting or redness of eye. Reports she did have labs and an EKG but denies having a chest xray. Admits to dull achy back pain with pain that radiates down leg with current intensity of 7/10. Past Medical History  Past Medical History:   Diagnosis Date    Abuse 1998    Alcohol    Anxiety     Calculus of kidney 1988    Cardiac echocardiogram 07/31/2012    EF 60-65%. No WMA. Mild conc LVH. Gr 1 DDfx. RVSP 20-25 mmHg. Mild SAGAR. Mild TR,. Mild PAE.  Cardiovascular LE venous duplex 10/08/2012    No venous thrombosis or venous insufficiency in bilateral lower extremities.  Carotid duplex 10/09/2014    Mild < 50% bilateral ICA stenosis.       Contact dermatitis and other eczema, due to unspecified cause     COPD (chronic obstructive pulmonary disease) (HCC)     Degenerative joint disease     Depression     Hematuria     Hypercholesteremia     Hypertension     Hypothyroidism 1998    Kidney stone     Neuropathy 3/2007    Orthostatic hypotension     Pituitary adenoma ([address] 2/1998    Seizures ([address]     none recently    Severe obstructive sleep apnea 01-15-15     started using Bipap Machine    Stress     Stroke ([address] 2/2006    no residual    TIA (transient ischemic attack)     Trauma        Surgical History  Past Surgical History:   Procedure Laterality Date    CYSTOSCOPY,INSERT URETERAL STENT  9/14/15    Dr. Mitch Garrett HX LITHOTRIPSY  9/14/15 Dr. Mary Victor HX WISDOM TEETH EXTRACTION      x4        Medications  Current Outpatient Prescriptions   Medication Sig Dispense Refill    benzonatate (TESSALON) 200 mg capsule Take 1 Cap by mouth three (3) times daily as needed for Cough for up to 7 days. 30 Cap 0    umeclidinium-vilanterol (ANORO ELLIPTA) 62.5-25 mcg/actuation inhaler Take 1 Puff by inhalation daily. 2 Inhaler 0    albuterol (PROAIR HFA) 90 mcg/actuation inhaler Take 2 Puffs by inhalation every four (4) hours as needed for Wheezing or Shortness of Breath. 1 Inhaler 5    potassium chloride (K-DUR, KLOR-CON) 20 mEq tablet TAKE ONE TABLET BY MOUTH 2 TIMES A  Tab 3    metoprolol tartrate (LOPRESSOR) 50 mg tablet TAKE ONE TABLET BY MOUTH EVERY DAY FOR HYPERTENSION 90 Tab 3    amLODIPine (NORVASC) 10 mg tablet TAKE ONE TABLET BY MOUTH EVERY DAY 90 Tab 3    fluticasone (FLONASE) 50 mcg/actuation nasal spray       lisinopril (PRINIVIL, ZESTRIL) 20 mg tablet TAKE ONE TABLET BY MOUTH EVERY DAY 90 Tab 0    PARoxetine (PAXIL) 30 mg tablet TAKE ONE TABLET BY MOUTH EVERY DAY 30 Tab 5    HYDROcodone-acetaminophen (NORCO) 5-325 mg per tablet Take 1 Tab by mouth every four (4) hours as needed for Pain. Max Daily Amount: 6 Tabs. 12 Tab 0    SYNTHROID 112 mcg tablet       ALPRAZolam (XANAX) 0.5 mg tablet       hydrochlorothiazide (MICROZIDE) 12.5 mg capsule Take 1 Cap by mouth daily. 30 Cap 3    furosemide (LASIX) 20 mg tablet Take 1 Tab by mouth daily as needed for Other (Leg Swelling). 90 Tab 3    atorvastatin (LIPITOR) 10 mg tablet Take 1 Tab by mouth daily. 90 Tab 3    hydrocortisone (CORTEF) 10 mg tablet       VOLTAREN 1 % topical gel       Walker (BARNEY ROLLING WALKER) misc 1 Device by Does Not Apply route daily. 1 Each 0    nystatin (MYCOSTATIN) powder Apply  to affected area four (4) times daily. 1 Bottle 0    bipap machine kit 1 each by Does Not Apply route.  Started using BiPap Machine 01-15-15      L-Methylfolate-B66-Aucwgguhjp (CEREFOLIN NAC) tablet Take 1 tablet by mouth daily. 30 tablet 5    fludrocortisone (FLORINEF) 0.1 mg tablet Take 1 tablet by mouth daily. 30 tablet 5    oxcarbazepine (TRILEPTAL) 300 mg tablet Take 300 mg by mouth two (2) times a day.  Cholecalciferol, Vitamin D3, 5,000 unit Tab Take 5,000 Int'l Units by mouth daily. Indications: VITAMIN D DEFICIENCY      magnesium oxide (MAG-OX) 400 mg tablet Take 400 mg by mouth daily.  COQ10, LIPOSOMAL UBIQUINOL, PO Take 10 mg by mouth daily.  aspirin 81 mg tablet Take 81 mg by mouth daily.  DOCOSAHEXANOIC ACID/EPA (FISH OIL PO) Take 4,000 mg by mouth daily.  CALCIUM CITRATE/VITAMIN D3 (CALCIUM CITRATE + PO) Take  by mouth once over twenty-four (24) hours. Allergies  Allergies   Allergen Reactions    Iodine Hives       Family History  Family History   Problem Relation Age of Onset    Heart Disease Father     Hypertension Father     Hypertension Mother     Cancer Mother      skin    Elevated Lipids Sister     Heart Disease Sister     Cancer Maternal Grandmother      colon and stomach    Heart Disease Paternal Grandmother     Heart Attack Paternal Grandmother     Heart Attack Paternal Grandfather     Heart Disease Paternal Grandfather        Social History  Social History     Social History    Marital status: UNKNOWN     Spouse name: N/A    Number of children: N/A    Years of education: N/A     Occupational History    Not on file.      Social History Main Topics    Smoking status: Current Every Day Smoker     Packs/day: 1.50     Years: 65.00     Types: Cigarettes    Smokeless tobacco: Never Used    Alcohol use No      Comment: quit 1998 due to Alcohol Abuse    Drug use: No    Sexual activity: No     Other Topics Concern    Not on file     Social History Narrative       Problem List  Patient Active Problem List   Diagnosis Code    Essential hypertension, benign I10    Dyslipidemia E78.5    Tobacco abuse Z72.0    Hypothyroidism E03.9    COPD (chronic obstructive pulmonary disease) ([facility]) J44.9    Pituitary adenoma ([facility]) D35.2    Nausea and vomiting R11.2    Acute upper respiratory infection J06.9    Nausea alone R11.0    Vasovagal reaction R55    Pancreatitis K85.90    XUAN (obstructive sleep apnea) G47.33    Flank pain R10.9    Kidney stone N20.0    Lumbar spinal stenosis M48.06       Review of Systems  Review of Systems   Constitutional: Negative for chills, diaphoresis and fever. HENT: Positive for congestion. Negative for sore throat. Eyes: Positive for blurred vision (history of macular degeneration). Negative for pain. Respiratory: Positive for cough and wheezing. Negative for shortness of breath. Cardiovascular: Negative for chest pain, palpitations, orthopnea and leg swelling. Gastrointestinal: Negative for abdominal pain, blood in stool, constipation, diarrhea, heartburn, nausea and vomiting. Genitourinary: Negative for dysuria, frequency, hematuria and urgency. Musculoskeletal: Positive for back pain. Negative for falls, myalgias and neck pain. Skin: Negative for itching and rash. Neurological: Negative for dizziness, tingling and headaches. Vital Signs  Vitals:    05/26/17 0750   BP: 134/76   Pulse: 66   Resp: 16   Temp: 98 °F (36.7 °C)   TempSrc: Oral   Weight: 165 lb 8 oz (75.1 kg)   Height: 5' 4\" (1.626 m)   PainSc:   7   PainLoc: Leg       Physical Exam  Physical Exam   Constitutional: She is oriented to person, place, and time. HENT:   Right Ear: External ear normal.   Left Ear: External ear normal.   Nose: Mucosal edema and rhinorrhea present. Right sinus exhibits no maxillary sinus tenderness and no frontal sinus tenderness. Left sinus exhibits no maxillary sinus tenderness and no frontal sinus tenderness. Mouth/Throat: Oropharyngeal exudate (clear drainage) present. Eyes: Pupils are equal, round, and reactive to light.  Right eye exhibits discharge (clear). Neck: Normal range of motion. Neck supple. Pulmonary/Chest: Effort normal. No respiratory distress. She has wheezes (lungs clear - one episode of audible wheezing during coughing episode. ). Abdominal: Soft. Bowel sounds are normal. She exhibits no distension. There is no tenderness. Neurological: She is alert and oriented to person, place, and time. No cranial nerve deficit. Skin:   Purple and red bruise in multiple stages of healing to left arm. Dry skin to both arms and legs bilaterally. Top or right hand with 2cm dark lesion non-tender. 2.2 cm dark lesion also noted on right hand to 3rd digit non-tender. Psychiatric: She has a normal mood and affect. Her behavior is normal.       Diagnostics  Orders Placed This Encounter    REFERRAL TO DERMATOLOGY     Referral Priority:   Routine     Referral Type:   Consultation     Referral Reason:   Specialty Services Required    REFERRAL TO PULMONARY DISEASE     Referral Priority:   Routine     Referral Type:   Consultation     Referral Reason:   Specialty Services Required     Referred to Provider:   Aram Betts MD     Requested Specialty:   Pulmonary Disease    REFERRAL TO CARDIOLOGY     Referral Priority:   Routine     Referral Type:   Consultation     Referral Reason:   Specialty Services Required     Referred to Provider:   Julia Hu MD     Requested Specialty:   Cardiology    benzonatate (TESSALON) 200 mg capsule     Sig: Take 1 Cap by mouth three (3) times daily as needed for Cough for up to 7 days. Dispense:  30 Cap     Refill:  0    umeclidinium-vilanterol (ANORO ELLIPTA) 62.5-25 mcg/actuation inhaler     Sig: Take 1 Puff by inhalation daily.      Dispense:  2 Inhaler     Refill:  0     Order Specific Question:   Expiration Date     Answer:   7/30/2016     Comments:   x 2     Order Specific Question:   Lot#     Answer:   [id number]     Comments:   x 2     Order Specific Question:        Answer:   [id number] [facility] [employer] Comments:   x 2    albuterol (PROAIR HFA) 90 mcg/actuation inhaler     Sig: Take 2 Puffs by inhalation every four (4) hours as needed for Wheezing or Shortness of Breath. Dispense:  1 Inhaler     Refill:  5       Results  Results for orders placed or performed during the hospital encounter of 05/25/17   CBC WITH AUTOMATED DIFF   Result Value Ref Range    WBC 6.9 4.6 - 13.2 K/uL    RBC 4.26 4.20 - 5.30 M/uL    HGB 14.4 12.0 - 16.0 g/dL    HCT 41.8 35.0 - 45.0 %    MCV 98.1 (H) 74.0 - 97.0 FL    MCH 33.8 24.0 - 34.0 PG    MCHC 34.4 31.0 - 37.0 g/dL    RDW 12.8 11.6 - 14.5 %    PLATELET 717 704 - 756 K/uL    MPV 10.2 9.2 - 11.8 FL    NEUTROPHILS 43 40 - 73 %    LYMPHOCYTES 46 21 - 52 %    MONOCYTES 6 3 - 10 %    EOSINOPHILS 4 0 - 5 %    BASOPHILS 1 0 - 2 %    ABS. NEUTROPHILS 3.0 1.8 - 8.0 K/UL    ABS. LYMPHOCYTES 3.1 0.9 - 3.6 K/UL    ABS. MONOCYTES 0.4 0.05 - 1.2 K/UL    ABS. EOSINOPHILS 0.3 0.0 - 0.4 K/UL    ABS. BASOPHILS 0.0 0.0 - 0.06 K/UL    DF AUTOMATED     METABOLIC PANEL, COMPREHENSIVE   Result Value Ref Range    Sodium 132 (L) 136 - 145 mmol/L    Potassium 4.1 3.5 - 5.5 mmol/L    Chloride 96 (L) 100 - 108 mmol/L    CO2 27 21 - 32 mmol/L    Anion gap 9 3.0 - 18 mmol/L    Glucose 87 74 - 99 mg/dL    BUN 17 7.0 - 18 MG/DL    Creatinine 0.98 0.6 - 1.3 MG/DL    BUN/Creatinine ratio 17 12 - 20      GFR est AA >60 >60 ml/min/1.73m2    GFR est non-AA 54 (L) >60 ml/min/1.73m2    Calcium 9.4 8.5 - 10.1 MG/DL    Bilirubin, total 0.5 0.2 - 1.0 MG/DL    ALT (SGPT) 23 13 - 56 U/L    AST (SGOT) 17 15 - 37 U/L    Alk.  phosphatase 60 45 - 117 U/L    Protein, total 6.9 6.4 - 8.2 g/dL    Albumin 3.7 3.4 - 5.0 g/dL    Globulin 3.2 2.0 - 4.0 g/dL    A-G Ratio 1.2 0.8 - 1.7     EKG, 12 LEAD, INITIAL   Result Value Ref Range    Ventricular Rate 66 BPM    Atrial Rate 66 BPM    P-R Interval 166 ms    QRS Duration 134 ms    Q-T Interval 466 ms    QTC Calculation (Bezet) 488 ms    Calculated P Axis 15 degrees    Calculated R Axis -74 degrees    Calculated T Axis 2 degrees    Diagnosis       Sinus rhythm with premature atrial complexes with aberrant conduction  Right bundle branch block  Left anterior fascicular block  Bifascicular block  Abnormal ECG  When compared with ECG of 12-NOV-2015 17:27,  Nonspecific T wave abnormality no longer evident in Lateral leads  Confirmed by Tisa Lundborg, MD, Ninetta Olszewski ([id number]) on 5/25/2017 5:01:27 PM           Assessment and Romana Bragg was seen today for pre-op exam.    Diagnoses and all orders for this visit:    Preop examination  -     REFERRAL TO PULMONARY DISEASE  -     REFERRAL TO CARDIOLOGY    Skin lesions  -     REFERRAL TO DERMATOLOGY    Wheezing  -     REFERRAL TO PULMONARY DISEASE    Cough  -     REFERRAL TO PULMONARY DISEASE    Chronic obstructive pulmonary disease with acute exacerbation (HCC)  -     umeclidinium-vilanterol (ANORO ELLIPTA) 62.5-25 mcg/actuation inhaler; Take 1 Puff by inhalation daily. -     albuterol (PROAIR HFA) 90 mcg/actuation inhaler; Take 2 Puffs by inhalation every four (4) hours as needed for Wheezing or Shortness of Breath.  -     REFERRAL TO PULMONARY DISEASE    Abnormal ECG  -     REFERRAL TO CARDIOLOGY    Other orders  -     benzonatate (TESSALON) 200 mg capsule; Take 1 Cap by mouth three (3) times daily as needed for Cough for up to 7 days. Patient with new onset respiratory symptoms with continual smoking. Patient has a history of COPD and has a pulmonologist. No recent chest xray. EKG noted with abnormal findings. Patient with history of vasovagal reaction and hypertension. Patient will need cardiology and pulmonary clearance prior to surgery. After care summary printed and reviewed with patient. Plan reviewed with patient and daughter and both are in agreement with plan. Questions answered. Patient verbalized understanding of plan and is in agreement with plan. Patient to follow up as needed or earlier if symptoms worsen with PCP.   BERNADETTE Faye

## 2017-05-26 NOTE — PROGRESS NOTES
Chief Complaint   Patient presents with    Pre-op Exam     spinal stenosis, 5/31/17, Dr. Sylvain Platt Maintenance reviewed

## 2017-05-30 ENCOUNTER — TELEPHONE (OUTPATIENT)
Dept: FAMILY MEDICINE CLINIC | Age: 82
End: 2017-05-30

## 2017-05-30 NOTE — TELEPHONE ENCOUNTER
Dr. Rojelio Saez office needs pre-op clearance faxed to their office. Pt's surgery is 5/31/17. F# [id number].

## 2017-06-01 ENCOUNTER — OFFICE VISIT (OUTPATIENT)
Dept: CARDIOLOGY CLINIC | Age: 82
End: 2017-06-01

## 2017-06-01 VITALS
SYSTOLIC BLOOD PRESSURE: 130 MMHG | BODY MASS INDEX: 28.34 KG/M2 | DIASTOLIC BLOOD PRESSURE: 80 MMHG | HEIGHT: 64 IN | OXYGEN SATURATION: 93 % | HEART RATE: 68 BPM | WEIGHT: 166 LBS

## 2017-06-01 DIAGNOSIS — J44.9 CHRONIC OBSTRUCTIVE PULMONARY DISEASE, UNSPECIFIED COPD TYPE (HCC): ICD-10-CM

## 2017-06-01 DIAGNOSIS — R94.31 ABNORMAL EKG: Primary | ICD-10-CM

## 2017-06-01 DIAGNOSIS — I45.2 RBBB (RIGHT BUNDLE BRANCH BLOCK WITH LEFT ANTERIOR FASCICULAR BLOCK): ICD-10-CM

## 2017-06-01 DIAGNOSIS — E78.5 DYSLIPIDEMIA: ICD-10-CM

## 2017-06-01 DIAGNOSIS — I10 ESSENTIAL HYPERTENSION, BENIGN: ICD-10-CM

## 2017-06-01 DIAGNOSIS — Z72.0 TOBACCO ABUSE: ICD-10-CM

## 2017-06-01 DIAGNOSIS — R06.09 DOE (DYSPNEA ON EXERTION): ICD-10-CM

## 2017-06-01 NOTE — PROGRESS NOTES
HISTORY OF PRESENT ILLNESS  Seven Gramajo is a 80 y.o. female. HPI    Patient presents for an add-on office visit. Patient was previously seen by Dr. Jose Morocho. She does not have a prior history of coronary artery disease. However, she does have multiple cardiac risk factors including ongoing tobacco use, hypertension, dyslipidemia. She was last seen in our office approximately 8 months ago. 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 She was referred back here for preoperative cardiac risk stratification prior to undergoing a low back surgery which involves a laminectomy of L4/L5. Patient admits that she gets short of breath with nearly any activity and as result she is not active at all. She states she sits on the couch most of the day and at most will walk from one room to the next very slowly. She complains of chronic leg swelling, primarily because she is sedentary most of the day. She has never had any chest pain or pressure. She denies any palpitations, dizziness, or maggei syncope. No orthopnea, no PND. Past Medical History:   Diagnosis Date    Abuse 1998    Alcohol    Anxiety     Calculus of kidney 1988    Cardiac echocardiogram 07/31/2012    EF 60-65%. No WMA. Mild conc LVH. Gr 1 DDfx. RVSP 20-25 mmHg. Mild SAGAR. Mild TR,. Mild PAE.  Cardiovascular LE venous duplex 10/08/2012    No venous thrombosis or venous insufficiency in bilateral lower extremities.  Carotid duplex 10/09/2014    Mild < 50% bilateral ICA stenosis.       Contact dermatitis and other eczema, due to unspecified cause     COPD (chronic obstructive pulmonary disease) (HCC)     Degenerative joint disease     Depression     Hematuria     Hypercholesteremia     Hypertension     Hypothyroidism 1998    Kidney stone     Neuropathy 3/2007    Orthostatic hypotension     Pituitary adenoma ([address] 2/1998    Seizures ([address]     none recently    Severe obstructive sleep apnea 01-15-15     started using Bipap Machine    Stress     [facility] 2/2006    no residual    TIA (transient ischemic attack)     Trauma      Current Outpatient Prescriptions   Medication Sig Dispense Refill    benzonatate (TESSALON) 200 mg capsule Take 1 Cap by mouth three (3) times daily as needed for Cough for up to 7 days. 30 Cap 0    umeclidinium-vilanterol (ANORO ELLIPTA) 62.5-25 mcg/actuation inhaler Take 1 Puff by inhalation daily. 2 Inhaler 0    albuterol (PROAIR HFA) 90 mcg/actuation inhaler Take 2 Puffs by inhalation every four (4) hours as needed for Wheezing or Shortness of Breath. 1 Inhaler 5    potassium chloride (K-DUR, KLOR-CON) 20 mEq tablet TAKE ONE TABLET BY MOUTH 2 TIMES A  Tab 3    metoprolol tartrate (LOPRESSOR) 50 mg tablet TAKE ONE TABLET BY MOUTH EVERY DAY FOR HYPERTENSION 90 Tab 3    amLODIPine (NORVASC) 10 mg tablet TAKE ONE TABLET BY MOUTH EVERY DAY 90 Tab 3    fluticasone (FLONASE) 50 mcg/actuation nasal spray       lisinopril (PRINIVIL, ZESTRIL) 20 mg tablet TAKE ONE TABLET BY MOUTH EVERY DAY 90 Tab 0    PARoxetine (PAXIL) 30 mg tablet TAKE ONE TABLET BY MOUTH EVERY DAY 30 Tab 5    HYDROcodone-acetaminophen (NORCO) 5-325 mg per tablet Take 1 Tab by mouth every four (4) hours as needed for Pain. Max Daily Amount: 6 Tabs. 12 Tab 0    SYNTHROID 112 mcg tablet       ALPRAZolam (XANAX) 0.5 mg tablet       hydrochlorothiazide (MICROZIDE) 12.5 mg capsule Take 1 Cap by mouth daily. 30 Cap 3    furosemide (LASIX) 20 mg tablet Take 1 Tab by mouth daily as needed for Other (Leg Swelling). 90 Tab 3    atorvastatin (LIPITOR) 10 mg tablet Take 1 Tab by mouth daily. 90 Tab 3    hydrocortisone (CORTEF) 10 mg tablet       VOLTAREN 1 % topical gel       Walker (BARNEY ROLLING WALKER) misc 1 Device by Does Not Apply route daily. 1 Each 0    nystatin (MYCOSTATIN) powder Apply  to affected area four (4) times daily.  1 Bottle 0    bipap machine kit 1 each by Does Not Apply route. Started using BiPap Machine 01-15-15      L-Methylfolate-K89-Qehiqeomqt (CEREFOLIN NAC) tablet Take 1 tablet by mouth daily. 30 tablet 5    fludrocortisone (FLORINEF) 0.1 mg tablet Take 1 tablet by mouth daily. 30 tablet 5    oxcarbazepine (TRILEPTAL) 300 mg tablet Take 300 mg by mouth two (2) times a day.  Cholecalciferol, Vitamin D3, 5,000 unit Tab Take 5,000 Int'l Units by mouth daily. Indications: VITAMIN D DEFICIENCY      magnesium oxide (MAG-OX) 400 mg tablet Take 400 mg by mouth daily.  COQ10, LIPOSOMAL UBIQUINOL, PO Take 10 mg by mouth daily.  aspirin 81 mg tablet Take 81 mg by mouth daily.  DOCOSAHEXANOIC ACID/EPA (FISH OIL PO) Take 4,000 mg by mouth daily.  CALCIUM CITRATE/VITAMIN D3 (CALCIUM CITRATE + PO) Take  by mouth once over twenty-four (24) hours. Allergies   Allergen Reactions    Iodine Hives      Social History   Substance Use Topics    Smoking status: Current Every Day Smoker     Packs/day: 1.50     Years: 65.00     Types: Cigarettes    Smokeless tobacco: Never Used    Alcohol use No      Comment: quit 1998 due to Alcohol Abuse           Review of Systems   Constitutional: Negative for chills, fever and weight loss. HENT: Negative for nosebleeds. Eyes: Negative for blurred vision and double vision. Respiratory: Positive for shortness of breath. Negative for cough and wheezing. Cardiovascular: Positive for leg swelling. Negative for chest pain, palpitations, orthopnea, claudication and PND. Gastrointestinal: Negative for abdominal pain, heartburn, nausea and vomiting. Genitourinary: Negative for dysuria and hematuria. Musculoskeletal: Negative for falls and myalgias. Skin: Negative for rash. Neurological: Negative for dizziness, focal weakness and headaches. Endo/Heme/Allergies: Does not bruise/bleed easily. Psychiatric/Behavioral: Negative for substance abuse.      Visit Vitals    /80 (BP 1 Location: Left arm, BP Patient Position: Sitting)    Pulse 68    Ht 5' 4\" (1.626 m)    Wt 75.3 kg (166 lb)    SpO2 93%    BMI 28.49 kg/m2         Physical Exam   Constitutional: She is oriented to person, place, and time. She appears well-developed and well-nourished. HENT:   Head: Normocephalic and atraumatic. Eyes: Conjunctivae are normal.   Neck: Neck supple. No JVD present. Carotid bruit is not present. Cardiovascular: Normal rate, regular rhythm, S1 normal, S2 normal and normal pulses. Occasional extrasystoles are present. Exam reveals distant heart sounds. Exam reveals no gallop. No murmur heard. Pulmonary/Chest: Effort normal. She has decreased breath sounds. She has no wheezes. She has no rales. Abdominal: Soft. Bowel sounds are normal. There is no tenderness. Musculoskeletal: She exhibits edema ( Trace bilateral lower extremity). Neurological: She is alert and oriented to person, place, and time. Skin: Skin is warm and dry. EKG: Normal sinus rhythm, right bundle branch block, left anterior fascicular block, occasional atrial premature contractions, nonspecific ST-T abnormality. No change compared to the previous EKGs. ASSESSMENT and PLAN  Encounter Diagnoses   Name Primary?  Abnormal EKG Yes    GARCIA (dyspnea on exertion)     Tobacco abuse     Chronic obstructive pulmonary disease, unspecified COPD type (HCC)     Essential hypertension, benign     Dyslipidemia     RBBB (right bundle branch block with left anterior fascicular block)      Abnormal EKG. 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 Her functional status is very poor because of her chronic lung disease. I have recommended a pharmacologic nuclear stress is for further risk stratification prior to undergoing her back surgery. Bifascicular block.   Patient has an underlying right bundle branch block with left intraventricular block which has been seen on multiple previous EKGs over the years. No symptoms concerning for high-grade AV block. Dyspnea on exertion. This is likely from her underlying COPD and ongoing tobacco use, however, a stress test will be performed as described above to ensure this is not an anginal equivalent. Essential hypertension. Patient's blood pressure appears well-controlled on the current medical regimen, which I would continue perioperatively. Dyslipidemia. Patient remains on atorvastatin. I would also continue this perioperatively. Tobacco abuse. Patient has at least a 65-pack-year history and is still smoking over a pack of cigarettes a day. She was strongly encouraged to consider smoking cessation, however this point she is not willing to do so. Patient can follow-up with Dr. Mirela Ruiz as previously scheduled.

## 2017-06-01 NOTE — PROGRESS NOTES
1. Have you been to the ER, urgent care clinic since your last visit? Hospitalized since your last visit? no  2. Have you seen or consulted any other health care providers outside of the [address] [place] [address] since your last visit? Include any pap smears or colon screening.   no

## 2017-06-01 NOTE — MR AVS SNAPSHOT
Visit Information Date & Time Provider Department Dept. Phone Encounter #  
 6/1/2017 10:40 AM Mike Andrews MD [facility] [address] [id number] Your Appointments 6/12/2017 11:30 AM  
POST OP with Lindalou Schilder, NP  
[facility] ([address] [place] [address] Road) Appt Note: 2WK FU; SURG 5/31/17; surg 5-31  
 [address] [facility] [address]  
[phone]  
  
   
 [address] [place] [address] 6/19/2017  1:30 PM  
Nurse Visit with LOY WU [address] ([address]) Appt Note: [facility] @230P W/LZ  
 [address] [facility], [address] [address]  
[phone]  
  
   
 [address] [place] [address], [address] [place] [address] Drive [address]  
  
    
 6/19/2017  2:30 PM  
Office Visit with Balbina Henson NP [address] ([address]) Appt Note: [facility] PER NP Rosa Hemphill PT [email or web address]  
 [address] [place] [address], [address] [address] [phone]  
  
   
 [address] [place] [address], [address] [facility],[address]  [address] [address]  
  
    
 7/18/2017 12:50 PM  
POST OP with Bayron Oquendo MD  
[address] [facility], [address] [facility] [address] [facility]) Appt Note: 6WK FU; SURG 5/31/17  
 [address] [place] [address]  
[phone]  
  
   
 [address]  
  
    
 7/25/2017  9:00 AM  
Follow Up with MD Chriss Lomas 70 (--) Appt Note: hany Gorman [address] [address]  
  
    
 10/10/2017  9:00 AM  
Follow Up with Jagruti Hogan MD  
[facility] ([address]) Appt Note: 1 year follow up with EKG  
 [address] [place] [address] [address] [address] [address] [phone] [address] 6/20/2017 11:30 AM  
Any with Randi Lackey MD  
[facility] ([address]) Appt Note: Epof DR. Campos dx: N28.1 new pt of  ref by Dr. Abundio Nance [phone] notes in [address] [place] [address]  
[address] [place] [address] Upcoming Health Maintenance Date Due  
 GLAUCOMA SCREENING Q2Y 11/23/1996 INFLUENZA AGE 9 TO ADULT 8/1/2017 Pneumococcal 65+ Low/Medium Risk (2 of 2 - PPSV23) 3/29/2018 MEDICARE YEARLY EXAM 3/30/2018 BREAST CANCER SCRN MAMMOGRAM 4/13/2018 DTaP/Tdap/Td series (2 - Td) 3/29/2027 Allergies as of 6/1/2017  Review Complete On: 6/1/2017 By: Elijah Muhammad Severity Noted Reaction Type Reactions Iodine  10/02/2012   Intolerance Hives Current Immunizations  Reviewed on 4/20/2015 No immunizations on file. Not reviewed this visit You Were Diagnosed With   
  
 Codes Comments Abnormal EKG    -  Primary ICD-10-CM: R94.31 
ICD-9-CM: 794.31   
 GARCIA (dyspnea on exertion)     ICD-10-CM: R06.09 
ICD-9-CM: 786.09 Vitals BP Pulse Height(growth percentile) Weight(growth percentile) SpO2 BMI  
 130/80 (BP 1 Location: Left arm, BP Patient Position: Sitting) 68 5' 4\" (1.626 m) 166 lb (75.3 kg) 93% 28.49 kg/m2 OB Status Smoking Status Postmenopausal Current Every Day Smoker Vitals History BMI and BSA Data Body Mass Index Body Surface Area  
 28.49 kg/m 2 1.84 m 2 Preferred Pharmacy Pharmacy Name Phone [employer] [facility], [facility] [address] [facility] Your Updated Medication List  
  
   
This list is accurate as of: 6/1/17 11:53 AM.  Always use your most recent med list.  
  
  
  
  
 albuterol 90 mcg/actuation inhaler Commonly known as:  PROAIR HFA Take 2 Puffs by inhalation every four (4) hours as needed for Wheezing or Shortness of Breath. ALPRAZolam 0.5 mg tablet Commonly known as:  XANAX  
  
 amLODIPine 10 mg tablet Commonly known as:  Shackelford Cradle TAKE ONE TABLET BY MOUTH EVERY DAY  
  
 aspirin 81 mg tablet Take 81 mg by mouth daily. atorvastatin 10 mg tablet Commonly known as:  LIPITOR Take 1 Tab by mouth daily. benzonatate 200 mg capsule Commonly known as:  TESSALON Take 1 Cap by mouth three (3) times daily as needed for Cough for up to 7 days. bipap machine kit 1 each by Does Not Apply route. Started using BiPap Machine 01-15-15 CALCIUM CITRATE + PO Take  by mouth once over twenty-four (24) hours. cholecalciferol (VITAMIN D3) 5,000 unit Tab tablet Commonly known as:  VITAMIN D3 Take 5,000 Int'l Units by mouth daily. Indications: VITAMIN D DEFICIENCY  
  
 COQ10 (LIPOSOMAL UBIQUINOL) PO Take 10 mg by mouth daily. FISH OIL PO Take 4,000 mg by mouth daily. fludrocortisone 0.1 mg tablet Commonly known as:  FLORINEF Take 1 tablet by mouth daily. fluticasone 50 mcg/actuation nasal spray Commonly known as:  FLONASE  
  
 furosemide 20 mg tablet Commonly known as:  LASIX Take 1 Tab by mouth daily as needed for Other (Leg Swelling). hydroCHLOROthiazide 12.5 mg capsule Commonly known as:  Velton Shadow Take 1 Cap by mouth daily. HYDROcodone-acetaminophen 5-325 mg per tablet Commonly known as:  Lori Cruise Take 1 Tab by mouth every four (4) hours as needed for Pain. Max Daily Amount: 6 Tabs. hydrocortisone 10 mg tablet Commonly known as:  CORTEF  
  
 L-Methylfolate-U38-Qephkkjvho tablet Commonly known as:  Massiel Cunas Take 1 tablet by mouth daily. lisinopril 20 mg tablet Commonly known as:  PRINIVIL, ZESTRIL  
TAKE ONE TABLET BY MOUTH EVERY DAY  
  
 magnesium oxide 400 mg tablet Commonly known as:  MAG-OX Take 400 mg by mouth daily. metoprolol tartrate 50 mg tablet Commonly known as:  LOPRESSOR  
 TAKE ONE TABLET BY MOUTH EVERY DAY FOR HYPERTENSION  
  
 nystatin powder Commonly known as:  MYCOSTATIN Apply  to affected area four (4) times daily. OXcarbazepine 300 mg tablet Commonly known as:  TRILEPTAL Take 300 mg by mouth two (2) times a day. PARoxetine 30 mg tablet Commonly known as:  PAXIL TAKE ONE TABLET BY MOUTH EVERY DAY  
  
 potassium chloride 20 mEq tablet Commonly known as:  K-DUR, KLOR-CON  
TAKE ONE TABLET BY MOUTH 2 TIMES A DAY  
  
 SYNTHROID 112 mcg tablet Generic drug:  levothyroxine  
  
 umeclidinium-vilanterol 62.5-25 mcg/actuation inhaler Commonly known as:  Jude [place] Take 1 Puff by inhalation daily. VOLTAREN 1 % Gel Generic drug:  diclofenac Fabiola Peoples Commonly known as:  BARNEY ROLLING WALKER  
1 Device by Does Not Apply route daily. We Performed the Following AMB POC EKG ROUTINE W/ 12 LEADS, INTER & REP [52251 CPT(R)] To-Do List   
 06/01/2017 ECG:  CARDIAC SPECT REST AND STRESS   
  
 06/01/2017 ECG:  NUCLEAR STRESS TEST   
  
 06/05/2017 9:15 AM  
  Appointment with [address] [place] [address] at [facility] NON-INVASIVE [address] [place] [address] ([phone]) This is a 2-part test which takes approximately 4 hours to complete. Please see part 2 of exam below for full instructions 06/05/2017 9:45 AM  
  Appointment with [facility] NUC CARD/TREADMILL ROOM at [facility] NON-INVASIVE CARD ([phone]) 1-No eating or coffee after midnight  2-Do not take diabetic meds (bring with) 3-Please take all other meds unless specified by cardiology [facility] [place] [facility]! Dear Umm Zuniga: 
Thank you for requesting a [employer] account. Our records indicate that you already have an active [employer] account. You can access your account anytime at [email or web address] Did you know that you can access your hospital and ER discharge instructions at any time in [employer]?   You can also review all of your test results from your hospital stay or ER visit. Additional Information If you have questions, please visit the Frequently Asked Questions section of the [employer] website at [email or web address] Remember, [employer] is NOT to be used for urgent needs. For medical emergencies, dial 911. Now available from your iPhone and Android! Please provide this summary of care documentation to your next provider. Your primary care clinician is listed as Jimmy Tinajero. If you have any questions after today's visit, please call [phone].

## 2017-06-05 ENCOUNTER — HOSPITAL ENCOUNTER (OUTPATIENT)
Dept: NON INVASIVE DIAGNOSTICS | Age: 82
Discharge: HOME | End: 2017-06-05
Attending: INTERNAL MEDICINE
Payer: MEDICARE

## 2017-06-05 DIAGNOSIS — R94.31 ABNORMAL EKG: ICD-10-CM

## 2017-06-05 DIAGNOSIS — R06.09 DOE (DYSPNEA ON EXERTION): ICD-10-CM

## 2017-06-05 DIAGNOSIS — J44.9 CHRONIC OBSTRUCTIVE PULMONARY DISEASE, UNSPECIFIED COPD TYPE (HCC): Primary | ICD-10-CM

## 2017-06-05 LAB
ATTENDING PHYSICIAN, CST07: NORMAL
DIAGNOSIS, 93000: NORMAL
DUKE TM SCORE RESULT, CST14: NORMAL
DUKE TREADMILL SCORE, CST13: NORMAL
ECG INTERP BEFORE EX, CST11: NORMAL
ECG INTERP DURING EX, CST12: NORMAL
FUNCTIONAL CAPACITY, CST17: NORMAL
KNOWN CARDIAC CONDITION, CST08: NORMAL
MAX. DIASTOLIC BP, CST04: 84 MMHG
MAX. HEART RATE, CST05: 85 BPM
MAX. SYSTOLIC BP, CST03: 168 MMHG
OVERALL BP RESPONSE TO EXERCISE, CST16: NORMAL
OVERALL HR RESPONSE TO EXERCISE, CST15: NORMAL
PEAK EX METS, CST10: 1 METS
PROTOCOL NAME, CST01: NORMAL
TEST INDICATION, CST09: NORMAL

## 2017-06-05 PROCEDURE — 74011250636 HC RX REV CODE- 250/636: Performed by: INTERNAL MEDICINE

## 2017-06-05 PROCEDURE — 78452 HT MUSCLE IMAGE SPECT MULT: CPT | Performed by: INTERNAL MEDICINE

## 2017-06-05 PROCEDURE — A9500 TC99M SESTAMIBI: HCPCS

## 2017-06-05 PROCEDURE — 93017 CV STRESS TEST TRACING ONLY: CPT | Performed by: INTERNAL MEDICINE

## 2017-06-05 RX ORDER — SODIUM CHLORIDE 9 MG/ML
250 INJECTION, SOLUTION INTRAVENOUS ONCE
Status: COMPLETED | OUTPATIENT
Start: 2017-06-05 | End: 2017-06-05

## 2017-06-05 RX ADMIN — REGADENOSON 0.4 MG: 0.08 INJECTION, SOLUTION INTRAVENOUS at 10:30

## 2017-06-05 RX ADMIN — SODIUM CHLORIDE 250 ML/HR: 900 INJECTION, SOLUTION INTRAVENOUS at 10:25

## 2017-06-05 NOTE — PROGRESS NOTES
Patient was given 10.1 mCi of Sestamibi for the Resting pictures. Patient received 0.4 mg of Lexiscan for the exercise portion of the Stress test. Patient was then given 33 mCi of Sestamibi for the Stress pictures. Armband was removed and disposed of before the patient left.

## 2017-06-05 NOTE — PROGRESS NOTES
Verbal Order with read back per  Dayne Hobbs  For PFT smart panel. AMB POC PFT complete w/ bronchodilator  AMB POC PFT complete w/o bronchodilator     Linda DOUG Gomez will co-sign the orders.

## 2017-06-06 NOTE — PROGRESS NOTES
Per your last note \"    Abnormal EKG. 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 Her functional status is very poor because of her chronic lung disease. I have recommended a pharmacologic nuclear stress is for further risk stratification prior to undergoing her back surgery.

## 2017-06-06 NOTE — PROCEDURES
[address] STRESS    Name:  Lemond Rubinstein  MR#:  [id number]  :  [id number]  Account #:  [de-identified]  Date of Adm:  2017  Date of Service:  2017      PROCEDURES PERFORMED: Pharmacologic nuclear scan. Baseline electrocardiogram shows sinus rhythm with right bundle  branch block and left anterior fascicular block. The patient has an IV in the left antecubital space. She received  Lexiscan per protocol. She tolerated the procedure well. No chest pain  was noted. She received resting dose of sestamibi 10.1 mCi at 9:05  a.m. She received stress dose of sestamibi 33.0 mCi at 10:20 a.m. TREADMILL FINDINGS  1. ST segment changes: None. 2. Chest pain: None. 3. Dysrhythmia: Occasional PVCs. 4. Both heart rate and blood pressure response are normal.    TREADMILL CONCLUSION: This is a nondiagnostic pharmacologic  stress test from an electrocardiographic standpoint due to right bundle  branch block pattern and left anterior fascicular block pattern. MYOCARDIAL NUCLEAR PERFUSION STUDY FINDINGS  1. Perfusion imaging shows no evidence of significant ongoing  ischemia or prior infarction. 2. Gated SPECT imaging shows small left ventricular chamber size  and hyperdynamic LV function with estimated ejection fraction greater  than 70%. No obvious regional wall motion abnormalities noted. CONCLUSIONS  1. Nondiagnostic pharmacologic stress test from an  electrocardiographic standpoint due to underlying right bundle branch  block pattern and left anterior fascicular block. 2. Normal perfusion study. 3. Perfusion imaging shows no evidence of significant ongoing  ischemia or prior infarction. 4. Gated SPECT imaging shows small left ventricular chamber size  and hyperdynamic LV function with estimated ejection fraction greater  than 70%. No obvious regional wall motion abnormalities noted. 5. This is a low risk finding.         MD Araceli Maya / JEEVAN  D:  06/05/2017   13:13  T:  06/05/2017   21:49  Job #:  [id number]

## 2017-06-19 ENCOUNTER — OFFICE VISIT (OUTPATIENT)
Dept: PULMONOLOGY | Age: 82
End: 2017-06-19

## 2017-06-19 VITALS
DIASTOLIC BLOOD PRESSURE: 70 MMHG | OXYGEN SATURATION: 93 % | RESPIRATION RATE: 24 BRPM | TEMPERATURE: 99 F | HEART RATE: 70 BPM | BODY MASS INDEX: 28 KG/M2 | WEIGHT: 164 LBS | HEIGHT: 64 IN | SYSTOLIC BLOOD PRESSURE: 120 MMHG

## 2017-06-19 DIAGNOSIS — G47.33 OSA TREATED WITH BIPAP: Primary | ICD-10-CM

## 2017-06-19 DIAGNOSIS — Z01.811 PRE-OP CHEST EXAM: ICD-10-CM

## 2017-06-19 DIAGNOSIS — J44.9 CHRONIC OBSTRUCTIVE PULMONARY DISEASE, UNSPECIFIED COPD TYPE (HCC): ICD-10-CM

## 2017-06-19 DIAGNOSIS — R06.02 SOB (SHORTNESS OF BREATH): ICD-10-CM

## 2017-06-19 NOTE — PROGRESS NOTES
[facility]  Pulmonary, Critical Care, and Sleep Medicine      Pulmonary Office Pre op Pulmonary Clearance    Name: Claudine Pratt     : 1931     Date: 2017        Subjective:   17    Patient is a 80 y.o. female is here for follow up for pre op pulmonary clearance for spinal decompression surgery under general anesthesia with Dr Christel Drummond, last scheduled surgery was cancelled awaits clearance from cardiology and pulmonology. She was seen by Cardiology. Dr Araceli Cai for abnormal ECG, had nuclear stress test done. She was last in our office by Dr Adia Varela 2015 for COPD, XUAN-on BIPAP QHS and continue tobacco use. She report no recent ED or hospitalization. Chief Complaint   Patient presents with    COPD      Interval history:  Frequency of exacerbations: SOB and wheezing every day \"nothing new. \" She use Albuterol inhaler every day and PRN. Symptoms reviewed- Cough- productive, intermittent, no hemoptysis. SOB with activity,at rest, no nocturnal worsening,   Activities of daily living limited due to SOB and chronic back pain. She denies chest pain, orthopnea or PND. She report chronic LE edema. She report compliant with BIPAP qhs and  Anoro ellipta every day. She lives at home with 2 grown daughter who are also smokers. Past Medical History:   Diagnosis Date    Abuse     Alcohol    Anxiety     Calculus of kidney     Cardiac echocardiogram 2012    EF 60-65%. No WMA. Mild conc LVH. Gr 1 DDfx. RVSP 20-25 mmHg. Mild SAGAR. Mild TR,. Mild PAE.  Cardiovascular LE venous duplex 10/08/2012    No venous thrombosis or venous insufficiency in bilateral lower extremities.  Carotid duplex 10/09/2014    Mild < 50% bilateral ICA stenosis.       Chronic lung disease     Contact dermatitis and other eczema, due to unspecified cause     COPD (chronic obstructive pulmonary disease) (HCC)     Degenerative joint disease     Depression     Hematuria     Hypercholesteremia     Hypertension     Hypothyroidism 1998    Kidney stone     Neuropathy 3/2007    Orthostatic hypotension     Pituitary adenoma ([facility] 2/1998    Seizures ([facility]     none recently    Severe obstructive sleep apnea 01-15-15     started using Bipap Machine    Stress     Stroke ([facility] 2/2006    no residual    TIA (transient ischemic attack)     Trauma      Social History     Social History    Marital status:      Spouse name: N/A    Number of children: N/A    Years of education: N/A     Occupational History    Not on file. Social History Main Topics    Smoking status: Current Every Day Smoker     Packs/day: 1.50     Years: 65.00     Types: Cigarettes    Smokeless tobacco: Never Used    Alcohol use No      Comment: quit 1998 due to Alcohol Abuse    Drug use: No    Sexual activity: No     Other Topics Concern    Not on file     Social History Narrative      Past Surgical History:   Procedure Laterality Date    CYSTOSCOPY,INSERT URETERAL STENT  9/14/15    Dr. Pugh Portal    HX APPENDECTOMY      HX Nelsy José Miguel HX LITHOTRIPSY  9/14/15    Dr. Sandra Lopez HX WISDOM TEETH EXTRACTION      x4        Allergies   Allergen Reactions    Iodine Hives       Current Outpatient Prescriptions   Medication Sig Dispense Refill    hydroCHLOROthiazide (MICROZIDE) 12.5 mg capsule TAKE ONE CAPSULE BY MOUTH EVERY DAY 30 Cap 5    umeclidinium-vilanterol (ANORO ELLIPTA) 62.5-25 mcg/actuation inhaler Take 1 Puff by inhalation daily. 2 Inhaler 0    albuterol (PROAIR HFA) 90 mcg/actuation inhaler Take 2 Puffs by inhalation every four (4) hours as needed for Wheezing or Shortness of Breath.  1 Inhaler 5    potassium chloride (K-DUR, KLOR-CON) 20 mEq tablet TAKE ONE TABLET BY MOUTH 2 TIMES A  Tab 3    metoprolol tartrate (LOPRESSOR) 50 mg tablet TAKE ONE TABLET BY MOUTH EVERY DAY FOR HYPERTENSION 90 Tab 3    amLODIPine (NORVASC) 10 mg tablet TAKE ONE TABLET BY MOUTH EVERY DAY 90 Tab 3    fluticasone (FLONASE) 50 mcg/actuation nasal spray       lisinopril (PRINIVIL, ZESTRIL) 20 mg tablet TAKE ONE TABLET BY MOUTH EVERY DAY 90 Tab 0    PARoxetine (PAXIL) 30 mg tablet TAKE ONE TABLET BY MOUTH EVERY DAY 30 Tab 5    SYNTHROID 112 mcg tablet       atorvastatin (LIPITOR) 10 mg tablet Take 1 Tab by mouth daily. 90 Tab 3    hydrocortisone (CORTEF) 10 mg tablet       Walker (BARNEY ROLLING WALKER) misc 1 Device by Does Not Apply route daily. 1 Each 0    nystatin (MYCOSTATIN) powder Apply  to affected area four (4) times daily. 1 Bottle 0    bipap machine kit 1 each by Does Not Apply route. Started using BiPap Machine 01-15-15      L-Methylfolate-I89-Wwvzoadgto (CEREFOLIN NAC) tablet Take 1 tablet by mouth daily. 30 tablet 5    fludrocortisone (FLORINEF) 0.1 mg tablet Take 1 tablet by mouth daily. 30 tablet 5    oxcarbazepine (TRILEPTAL) 300 mg tablet Take 300 mg by mouth two (2) times a day.  Cholecalciferol, Vitamin D3, 5,000 unit Tab Take 5,000 Int'l Units by mouth daily. Indications: VITAMIN D DEFICIENCY      magnesium oxide (MAG-OX) 400 mg tablet Take 400 mg by mouth daily.  COQ10, LIPOSOMAL UBIQUINOL, PO Take 10 mg by mouth daily.  aspirin 81 mg tablet Take 81 mg by mouth daily.  DOCOSAHEXANOIC ACID/EPA (FISH OIL PO) Take 4,000 mg by mouth daily.  CALCIUM CITRATE/VITAMIN D3 (CALCIUM CITRATE + PO) Take  by mouth once over twenty-four (24) hours.  HYDROcodone-acetaminophen (NORCO) 5-325 mg per tablet Take 1 Tab by mouth every four (4) hours as needed for Pain. Max Daily Amount: 6 Tabs. 12 Tab 0    ALPRAZolam (XANAX) 0.5 mg tablet       furosemide (LASIX) 20 mg tablet Take 1 Tab by mouth daily as needed for Other (Leg Swelling).  90 Tab 3    VOLTAREN 1 % topical gel        Patient Active Problem List   Diagnosis Code    Essential hypertension, benign I10    Dyslipidemia E78.5    Tobacco abuse Z72.0    Hypothyroidism E03.9    COPD (chronic obstructive pulmonary disease) (HCC) J44.9    Pituitary adenoma ([facility]) D35.2    Nausea and vomiting R11.2    Acute upper respiratory infection J06.9    Nausea alone R11.0    Vasovagal reaction R55    Pancreatitis K85.90    XUAN (obstructive sleep apnea) G47.33    Flank pain R10.9    Kidney stone N20.0    Lumbar spinal stenosis M48.06    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        Review of Systems:  Constitutional: No fever, no chills, no weight loss, no night sweats   HEENT: No epistaxis, no nasal drainage, no difficulty in swallowing, no redness in eyes  Respiratory: as above  Cardiovascular: no chest pain, no palpitations, chronic leg edema, no syncope  Gastrointestinal: no abd pain, no vomiting, no diarrhea, no bleeding symptoms  Genitourinary: No urinary symptoms or hematuria  Integument/breast: No ulcers or rashes  Musculoskeletal:Neg  Neurological: No focal weakness, no seizures, no headaches  Behvioral/Psych: No anxiety, no depression     Objective:     Visit Vitals    /70 (BP 1 Location: Left arm, BP Patient Position: At rest)    Pulse 70    Temp 99 °F (37.2 °C) (Oral)    Resp 24    Ht 5' 4\" (1.626 m)    Wt 74.4 kg (164 lb)    SpO2 93%    BMI 28.15 kg/m2        Physical Exam:   General: Appear comfortable, no acute distress, BMI 28  HEENT: Pupils reactive, sclera anicteric, EOM intact, Extensive tobacco staining including dentures. Neck: No adenopathy or thyroid swelling, no JVD, supple  CVS: S1S2, no murmurs  RS: Mod AE bilaterally, no tactile fremitus or egophony, no accessory muscle use, bilateral rhonchi/crackles, no wheezing.   Abd: Soft, non tender, no hepatosplenomegaly  Neuro: Non focal, awake, alert  Extrm: Mild bilateral ankle edema, no  clubbing or cyanosis  Skin: No rash, warm and dry    Data review:     Hospital Outpatient Visit on 06/05/2017   Component Date Value Ref Range Status    Test indication 06/05/2017 ABN EKG, GARCIA   Preliminary    Overall HR response to exercise 06/05/2017 appropriate   Preliminary    Overall BP response to exercise 06/05/2017 normal resting BP - appropriate response   Preliminary    Max. Systolic BP 26/79/4323 105  mmHg Preliminary    Max. Diastolic BP 93/50/3975 84  mmHg Preliminary    Max. Heart rate 06/05/2017 85  BPM Preliminary    Peak Ex METs 06/05/2017 1.0  METS Preliminary    Protocol name 06/05/2017 [address] Outpatient Visit on 05/25/2017   Component Date Value Ref Range Status    WBC 05/25/2017 6.9  4.6 - 13.2 K/uL Final    RBC 05/25/2017 4.26  4.20 - 5.30 M/uL Final    HGB 05/25/2017 14.4  12.0 - 16.0 g/dL Final    HCT 05/25/2017 41.8  35.0 - 45.0 % Final    MCV 05/25/2017 98.1* 74.0 - 97.0 FL Final    MCH 05/25/2017 33.8  24.0 - 34.0 PG Final    MCHC 05/25/2017 34.4  31.0 - 37.0 g/dL Final    RDW 05/25/2017 12.8  11.6 - 14.5 % Final    PLATELET 98/87/6452 036  135 - 420 K/uL Final    MPV 05/25/2017 10.2  9.2 - 11.8 FL Final    NEUTROPHILS 05/25/2017 43  40 - 73 % Final    LYMPHOCYTES 05/25/2017 46  21 - 52 % Final    MONOCYTES 05/25/2017 6  3 - 10 % Final    EOSINOPHILS 05/25/2017 4  0 - 5 % Final    BASOPHILS 05/25/2017 1  0 - 2 % Final    ABS. NEUTROPHILS 05/25/2017 3.0  1.8 - 8.0 K/UL Final    ABS. LYMPHOCYTES 05/25/2017 3.1  0.9 - 3.6 K/UL Final    ABS. MONOCYTES 05/25/2017 0.4  0.05 - 1.2 K/UL Final    ABS. EOSINOPHILS 05/25/2017 0.3  0.0 - 0.4 K/UL Final    ABS.  BASOPHILS 05/25/2017 0.0  0.0 - 0.06 K/UL Final    DF 05/25/2017 AUTOMATED    Final    Sodium 05/25/2017 132* 136 - 145 mmol/L Final    Potassium 05/25/2017 4.1  3.5 - 5.5 mmol/L Final    Chloride 05/25/2017 96* 100 - 108 mmol/L Final    CO2 05/25/2017 27  21 - 32 mmol/L Final    Anion gap 05/25/2017 9  3.0 - 18 mmol/L Final    Glucose 05/25/2017 87  74 - 99 mg/dL Final    BUN 05/25/2017 17  7.0 - 18 MG/DL Final    Creatinine 05/25/2017 0.98  0.6 - 1.3 MG/DL Final    BUN/Creatinine ratio 05/25/2017 17  12 - 20   Final    GFR est AA 05/25/2017 >60  >60 ml/min/1.73m2 Final    GFR est non-AA 05/25/2017 54* >60 ml/min/1.73m2 Final    Comment: (NOTE)  Estimated GFR is calculated using the Modification of Diet in Renal   Disease (MDRD) Study equation, reported for both  Americans   (GFRAA) and non- Americans (GFRNA), and normalized to 1.73m2   body surface area. The physician must decide which value applies to   the patient. The MDRD study equation should only be used in   individuals age 25 or older. It has not been validated for the   following: pregnant women, patients with serious comorbid conditions,   or on certain medications, or persons with extremes of body size,   muscle mass, or nutritional status.  Calcium 05/25/2017 9.4  8.5 - 10.1 MG/DL Final    Bilirubin, total 05/25/2017 0.5  0.2 - 1.0 MG/DL Final    ALT (SGPT) 05/25/2017 23  13 - 56 U/L Final    AST (SGOT) 05/25/2017 17  15 - 37 U/L Final    Alk.  phosphatase 05/25/2017 60  45 - 117 U/L Final    Protein, total 05/25/2017 6.9  6.4 - 8.2 g/dL Final    Albumin 05/25/2017 3.7  3.4 - 5.0 g/dL Final    Globulin 05/25/2017 3.2  2.0 - 4.0 g/dL Final    A-G Ratio 05/25/2017 1.2  0.8 - 1.7   Final    Ventricular Rate 05/25/2017 66  BPM Final    Atrial Rate 05/25/2017 66  BPM Final    P-R Interval 05/25/2017 166  ms Final    QRS Duration 05/25/2017 134  ms Final    Q-T Interval 05/25/2017 466  ms Final    QTC Calculation (Bezet) 05/25/2017 488  ms Final    Calculated P Axis 05/25/2017 15  degrees Final    Calculated R Axis 05/25/2017 -74  degrees Final    Calculated T Axis 05/25/2017 2  degrees Final    Diagnosis 05/25/2017    Final                    Value:Sinus rhythm with premature atrial complexes with aberrant conduction  Right bundle branch block  Left anterior fascicular block  Bifascicular block  Abnormal ECG  When compared with ECG of 12-NOV-2015 17:27,  Nonspecific T wave abnormality no longer evident in Lateral leads  Confirmed by Shakira Manzanares MD, Talisha Crockett ([id number]) on 5/25/2017 5:01:27 PM     Hospital Outpatient Visit on 04/11/2017   Component Date Value Ref Range Status    Sodium 04/11/2017 137  136 - 145 mmol/L Final    Potassium 04/11/2017 4.3  3.5 - 5.5 mmol/L Final    Chloride 04/11/2017 101  100 - 108 mmol/L Final    CO2 04/11/2017 32  21 - 32 mmol/L Final    Anion gap 04/11/2017 4  3.0 - 18 mmol/L Final    Glucose 04/11/2017 84  74 - 99 mg/dL Final    BUN 04/11/2017 18  7.0 - 18 MG/DL Final    Creatinine 04/11/2017 1.24  0.6 - 1.3 MG/DL Final    BUN/Creatinine ratio 04/11/2017 15  12 - 20   Final    GFR est AA 04/11/2017 50* >60 ml/min/1.73m2 Final    GFR est non-AA 04/11/2017 41* >60 ml/min/1.73m2 Final    Comment: (NOTE)  Estimated GFR is calculated using the Modification of Diet in Renal   Disease (MDRD) Study equation, reported for both  Americans   (GFRAA) and non- Americans (GFRNA), and normalized to 1.73m2   body surface area. The physician must decide which value applies to   the patient. The MDRD study equation should only be used in   individuals age 25 or older. It has not been validated for the   following: pregnant women, patients with serious comorbid conditions,   or on certain medications, or persons with extremes of body size,   muscle mass, or nutritional status.  Calcium 04/11/2017 9.3  8.5 - 10.1 MG/DL Final    Bilirubin, total 04/11/2017 0.3  0.2 - 1.0 MG/DL Final    ALT (SGPT) 04/11/2017 22  13 - 56 U/L Final    AST (SGOT) 04/11/2017 13* 15 - 37 U/L Final    Alk.  phosphatase 04/11/2017 56  45 - 117 U/L Final    Protein, total 04/11/2017 6.7  6.4 - 8.2 g/dL Final    Albumin 04/11/2017 3.6  3.4 - 5.0 g/dL Final    Globulin 04/11/2017 3.1  2.0 - 4.0 g/dL Final    A-G Ratio 04/11/2017 1.2  0.8 - 1.7   Final    LIPID PROFILE 04/11/2017        Final    Cholesterol, total 04/11/2017 164  <200 MG/DL Final    Triglyceride 04/11/2017 186* <150 MG/DL Final    Comment: The drugs N-acetylcysteine (NAC) and  Metamiszole have been found to cause falsely  low results in this chemical assay. Please  be sure to submit blood samples obtained  BEFORE administration of either of these  drugs to assure correct results.  HDL Cholesterol 04/11/2017 48  40 - 60 MG/DL Final    LDL, calculated 04/11/2017 78.8  0 - 100 MG/DL Final    VLDL, calculated 04/11/2017 37.2  MG/DL Final    CHOL/HDL Ratio 04/11/2017 3.4  0 - 5.0   Final       PFT's:  Date FEV1/FVC FEV1 Best FVC best TLC RV VC DLCO   9/17/15 137 73 54       6/19/17 99 102 104                               Imaging:  I have personally reviewed the patients radiographs and have reviewed the reports:  XR Results (most recent):    Results from [place] encounter on 10/10/16   XR HUMERUS LT   Narrative Humerus left    CPT code: 72167    Indication: Fall, pain. Technique: 2 views of left humerus obtained. Comparison: Shoulder x-rays 3/1/2013. Findings:    No evidence of displaced fracture involving the humerus. Glenohumeral joint  appears properly aligned. No gas or radiopaque foreign bodies within soft  tissues. No gross abnormalities involving the elbow joint. If pain is centered  at the elbow or shoulders, recommend dedicated imaging. No focal osseous  lesions. Impression IMPRESSION:    No evidence of displaced fracture or other gross abnormalities involving the  humerus. CT Results (most recent):    Results from Hospital Encounter encounter on 10/10/16   CT HEAD WO CONT   Narrative CT OF THE BRAIN    CPT CODE: 78848    COMPARISON: July 22, 2014 MR, CT head March 1, 2013. INDICATIONS: Fall. TECHNIQUE: Axial sections through the brain at 5 mm collimation without IV  contrast are obtained.     FINDINGS:      The ventricles and CSF spaces are enlarged consistent with volumetric  contraction of aging. There is no midline shift. .  The brain parenchyma is of generally normal attenuation. Gray-white  differentiation is satisfactory. .  There is a moderate burden of presumed small vessel ischemic change resulting in  decreased attenuation in the white matter particularly in the periventricular  region. . Lacunar infarct are noted in the basal ganglia bilaterally. There is no hemorrhage. .  Pituitary adenoma is again noted. .  There are no pathologic fluid collections. There are no pathologic calcifications evident. .    The visible portions of the paranasal sinuses are clear. Impression IMPRESSION:     No acute intracranial process. .      All CT scans at this facility are performed using dose optimization technique as  appropriate to the performed exam, to include automated exposure control,  adjustment of the mA and/or kV according to patient's size (Including  appropriate matching for site-specific examinations), or use of iterative  reconstruction technique. Results for orders placed or performed in visit on 06/01/17   AMB POC EKG ROUTINE W/ 12 LEADS, INTER & REP    Impression    See progress note.    Results for orders placed or performed during the hospital encounter of 05/25/17   EKG, 12 LEAD, INITIAL   Result Value Ref Range    Ventricular Rate 66 BPM    Atrial Rate 66 BPM    P-R Interval 166 ms    QRS Duration 134 ms    Q-T Interval 466 ms    QTC Calculation (Bezet) 488 ms    Calculated P Axis 15 degrees    Calculated R Axis -74 degrees    Calculated T Axis 2 degrees    Diagnosis       Sinus rhythm with premature atrial complexes with aberrant conduction  Right bundle branch block  Left anterior fascicular block  Bifascicular block  Abnormal ECG  When compared with ECG of 12-NOV-2015 17:27,  Nonspecific T wave abnormality no longer evident in Lateral leads  Confirmed by Joce Morales MD, Parish Paul ([id number]) on 5/25/2017 5:01:27 PM        US Results (most recent):    Results from [facility] Encounter encounter on 05/08/17   US RETROPERITONEUM COMP   Narrative EXAMINATION: Ultrasound retroperitoneum    COMPARISON: 11/4/2015    TECHNIQUE: Grayscale and color sonographic images of the retroperitoneum  obtained. FINDINGS:    Right kidney: 9.7 cm length. 4.6 x 4.2 cm cyst in the lower pole. No suspicious  parenchymal lesions. No hydronephrosis. Left kidney: 9.8 cm length. A 1.3 x 1.4 cm cyst in the medial kidney. No  suspicious parenchymal lesions or hydronephrosis. Bladder: 145 cc prevoid volume and 83 cc postvoid volume, without obvious focal  abnormality. Spleen: 7.0 cm length. No focal abdomen only. Impression IMPRESSION:    1. Large right renal cyst measuring 4.6 x 4.2 cm, previously 3.2 cm diameter. 1.3 cm probable left renal cyst, not clearly visualized on prior. No  hydronephrosis. 2. Increased bladder postvoid residual.          No results found for: DDIME, DDMR, HDDQN, DDIMSQ          Ms. Villar has a reminder for a \"due or due soon\" health maintenance. I have asked that she contact her primary care provider for follow-up on this health maintenance. IMPRESSION:   · Pre op pulmonary clearance for spinal decompression surgery by Dr Cornel Erickson. · COPD, FEV1 102 on last PFT 6/19/2017. · XUAN-on BIPAP QHS  · Tobacco abuse, current smoker 1 1/2 PPD. RECOMMENDATIONS:   · PFT done today, prelim result d/w pt and daughter. · Will need CXR done, script given-[facility]. · Continue on Anoro Ellipta 1 puff every day . · Continue on Albuterol (Pro Air) 2 puffs q4 hrs PRN  · Continue BIPAP QHS. · Warning signs of respiratory distress were reviewed with the patient. Call 911 for emergency or worsening s/s. Both pt and daughter states understanding. · Re instructed on smoking cessation-pt not ready. · Pulmonary toilette, rinse/garle after inhaler use. · Reviewed all medications and discussed concerns, answered questions.   · Pre op pulmonary clearance pending-await CXR. · Follow-up with Dr Ojeda Postin with 6 min walk and after CXR was done, or come back sooner should new symptoms or problems arise.        Manuel Nur, NP

## 2017-06-19 NOTE — PATIENT INSTRUCTIONS

## 2017-06-22 ENCOUNTER — HOSPITAL ENCOUNTER (OUTPATIENT)
Dept: GENERAL RADIOLOGY | Age: 82
Discharge: HOME | End: 2017-06-22
Payer: MEDICARE

## 2017-06-22 DIAGNOSIS — R06.02 SOB (SHORTNESS OF BREATH): ICD-10-CM

## 2017-06-22 DIAGNOSIS — J44.9 CHRONIC OBSTRUCTIVE PULMONARY DISEASE, UNSPECIFIED COPD TYPE (HCC): ICD-10-CM

## 2017-06-22 DIAGNOSIS — Z01.811 PRE-OP CHEST EXAM: ICD-10-CM

## 2017-06-22 PROCEDURE — 71020 XR CHEST PA LAT: CPT

## 2017-06-29 ENCOUNTER — TELEPHONE (OUTPATIENT)
Dept: CARDIOLOGY CLINIC | Age: 82
End: 2017-06-29

## 2017-07-25 ENCOUNTER — OFFICE VISIT (OUTPATIENT)
Dept: FAMILY MEDICINE CLINIC | Age: 82
End: 2017-07-25

## 2017-07-25 VITALS
TEMPERATURE: 97.5 F | RESPIRATION RATE: 22 BRPM | DIASTOLIC BLOOD PRESSURE: 73 MMHG | HEIGHT: 64 IN | HEART RATE: 60 BPM | BODY MASS INDEX: 28.34 KG/M2 | WEIGHT: 166 LBS | SYSTOLIC BLOOD PRESSURE: 156 MMHG | OXYGEN SATURATION: 95 %

## 2017-07-25 DIAGNOSIS — E78.5 DYSLIPIDEMIA: ICD-10-CM

## 2017-07-25 DIAGNOSIS — B35.4 TINEA CORPORIS: ICD-10-CM

## 2017-07-25 DIAGNOSIS — J44.9 CHRONIC OBSTRUCTIVE PULMONARY DISEASE, UNSPECIFIED COPD TYPE (HCC): ICD-10-CM

## 2017-07-25 DIAGNOSIS — I10 ESSENTIAL HYPERTENSION, BENIGN: Primary | ICD-10-CM

## 2017-07-25 DIAGNOSIS — F41.1 ANXIETY STATE: ICD-10-CM

## 2017-07-25 DIAGNOSIS — F32.A DEPRESSION, UNSPECIFIED DEPRESSION TYPE: ICD-10-CM

## 2017-07-25 RX ORDER — NYSTATIN 100000 [USP'U]/G
POWDER TOPICAL 4 TIMES DAILY
Qty: 1 BOTTLE | Refills: 5 | Status: SHIPPED | OUTPATIENT
Start: 2017-07-25 | End: 2019-10-28 | Stop reason: ALTCHOICE

## 2017-07-25 RX ORDER — LISINOPRIL 20 MG/1
TABLET ORAL
Qty: 30 TAB | Refills: 5 | Status: SHIPPED | OUTPATIENT
Start: 2017-07-25 | End: 2018-01-25 | Stop reason: SDUPTHER

## 2017-07-25 RX ORDER — PAROXETINE 30 MG/1
TABLET, FILM COATED ORAL
Qty: 30 TAB | Refills: 5 | Status: SHIPPED | OUTPATIENT
Start: 2017-07-25 | End: 2018-01-25 | Stop reason: SDUPTHER

## 2017-07-25 NOTE — PROGRESS NOTES
Elena Villar 80 y.o. female   Chief Complaint   Patient presents with    Follow-up     3 month f/u    Hypertension    Cholesterol Problem    Medication Refill         1. Have you been to the ER, urgent care clinic since your last visit? Hospitalized since your last visit? No    2. Have you seen or consulted any other health care providers outside of the [address] [place] [address] since your last visit? Include any pap smears or colon screening.  No

## 2017-07-25 NOTE — MR AVS SNAPSHOT
Visit Information Date & Time Provider Department Dept. Phone Encounter #  
 7/25/2017  9:00 Abhijeet Fernando MD [address] [id number] Your Appointments 10/10/2017  9:00 AM  
Follow Up with Rd Downs MD  
[facility] 1 ([address]) Appt Note: 1 year follow up with EKG  
 [id number] [address] [address] [phone] [address]  
  
    
 10/25/2017  9:00 AM  
Follow Up with Jack Woodward MD  
[facility] (--) Appt Note: hany  
 [address] [address]  
  
    
  
 6/21/2018 10:15 AM  
Any with Rosey Cali MD  
[facility] ([address]) Appt Note: 1 year hany, us here at the office [address] [facility] [address] [place] [id number]  
[address] [facility] 301 [facility] 3[address] [facility] [address] Upcoming Health Maintenance Date Due  
 GLAUCOMA SCREENING Q2Y 11/23/1996 INFLUENZA AGE 9 TO ADULT 8/1/2017 Pneumococcal 65+ Low/Medium Risk (2 of 2 - PPSV23) 3/29/2018 MEDICARE YEARLY EXAM 3/30/2018 BREAST CANCER SCRN MAMMOGRAM 4/13/2018 DTaP/Tdap/Td series (2 - Td) 3/29/2027 Allergies as of 7/25/2017  Review Complete On: 7/25/2017 By: Jack Woodward MD  
  
 Severity Noted Reaction Type Reactions Iodine  10/02/2012   Intolerance Hives Current Immunizations  Reviewed on 4/20/2015 No immunizations on file. Not reviewed this visit You Were Diagnosed With   
  
 Codes Comments Essential hypertension, benign    -  Primary ICD-10-CM: I10 
ICD-9-CM: 401.1 Dyslipidemia     ICD-10-CM: E78.5 ICD-9-CM: 272.4 Anxiety state     ICD-10-CM: F41.1 ICD-9-CM: 300.00 Depression, unspecified depression type     ICD-10-CM: F32.9 ICD-9-CM: 582 Chronic obstructive pulmonary disease, unspecified COPD type ([facility] 75.)     ICD-10-CM: J44.9 ICD-9-CM: 476 Tinea corporis     ICD-10-CM: B35.4 ICD-9-CM: 110.5 Vitals BP Pulse Temp Resp Height(growth percentile) Weight(growth percentile) 156/73 60 97.5 °F (36.4 °C) (Oral) 22 5' 4\" (1.626 m) 166 lb (75.3 kg) SpO2 BMI OB Status Smoking Status 95% 28.49 kg/m2 Postmenopausal Current Every Day Smoker BMI and BSA Data Body Mass Index Body Surface Area  
 28.49 kg/m 2 1.84 m 2 Preferred Pharmacy Pharmacy Name Phone [facility], [place] [id number] [facility] Your Updated Medication List  
  
   
This list is accurate as of: 7/25/17  9:43 AM.  Always use your most recent med list.  
  
  
  
  
 albuterol 90 mcg/actuation inhaler Commonly known as:  PROAIR HFA Take 2 Puffs by inhalation every four (4) hours as needed for Wheezing or Shortness of Breath. ALPRAZolam 0.5 mg tablet Commonly known as:  XANAX  
  
 amLODIPine 10 mg tablet Commonly known as:  [place] Bars TAKE ONE TABLET BY MOUTH EVERY DAY  
  
 aspirin 81 mg tablet Take 81 mg by mouth daily. atorvastatin 10 mg tablet Commonly known as:  LIPITOR  
TAKE ONE TABLET BY MOUTH EVERY DAY  
  
 bipap machine kit 1 each by Does Not Apply route. Started using BiPap Machine 01-15-15 CALCIUM CITRATE + PO Take  by mouth once over twenty-four (24) hours. cholecalciferol (VITAMIN D3) 5,000 unit Tab tablet Commonly known as:  VITAMIN D3 Take 5,000 Int'l Units by mouth daily. Indications: VITAMIN D DEFICIENCY  
  
 COQ10 (LIPOSOMAL UBIQUINOL) PO Take 10 mg by mouth daily. FISH OIL PO Take 4,000 mg by mouth daily. fludrocortisone 0.1 mg tablet Commonly known as:  FLORINEF Take 1 tablet by mouth daily. fluticasone 50 mcg/actuation nasal spray Commonly known as:  FLONASE  
  
 furosemide 20 mg tablet Commonly known as:  LASIX Take 1 Tab by mouth daily as needed for Other (Leg Swelling). hydroCHLOROthiazide 12.5 mg capsule Commonly known as:  Noretta Seema TAKE ONE CAPSULE BY MOUTH EVERY DAY  
  
 HYDROcodone-acetaminophen 5-325 mg per tablet Commonly known as:  Ashtyn Elder Take 1 Tab by mouth every four (4) hours as needed for Pain. Max Daily Amount: 6 Tabs. hydrocortisone 10 mg tablet Commonly known as:  CORTEF  
  
 L-Methylfolate-X21-Tkylxwyjey tablet Commonly known as:  Rachel Trung Take 1 tablet by mouth daily. lisinopril 20 mg tablet Commonly known as:  PRINIVIL, ZESTRIL  
TAKE ONE TABLET BY MOUTH EVERY DAY  
  
 magnesium oxide 400 mg tablet Commonly known as:  MAG-OX Take 400 mg by mouth daily. metoprolol tartrate 50 mg tablet Commonly known as:  LOPRESSOR  
TAKE ONE TABLET BY MOUTH EVERY DAY FOR HYPERTENSION  
  
 nystatin powder Commonly known as:  MYCOSTATIN Apply  to affected area four (4) times daily. OXcarbazepine 300 mg tablet Commonly known as:  TRILEPTAL Take 300 mg by mouth two (2) times a day. PARoxetine 30 mg tablet Commonly known as:  PAXIL TAKE ONE TABLET BY MOUTH EVERY DAY  
  
 potassium chloride 20 mEq tablet Commonly known as:  K-DUR, KLOR-CON  
TAKE ONE TABLET BY MOUTH 2 TIMES A DAY  
  
 SYNTHROID 112 mcg tablet Generic drug:  levothyroxine  
  
 umeclidinium-vilanterol 62.5-25 mcg/actuation inhaler Commonly known as:  Shila Masood Take 1 Puff by inhalation daily. VOLTAREN 1 % Gel Generic drug:  diclofenac Denton Baird Commonly known as:  BARNEY ROLLING WALKER  
1 Device by Does Not Apply route daily. Prescriptions Sent to Pharmacy Refills  
 lisinopril (PRINIVIL, ZESTRIL) 20 mg tablet 5 Sig: TAKE ONE TABLET BY MOUTH EVERY DAY Class: Normal  
 Pharmacy: [address], [place] Ph #: [phone]  PARoxetine (PAXIL) 30 mg tablet 5  
 Sig: TAKE ONE TABLET BY MOUTH EVERY DAY Class: Normal  
 Pharmacy: [id number]  Ph #: [phone]  
 nystatin (MYCOSTATIN) powder 5 Sig: Apply  to affected area four (4) times daily. Class: Normal  
 Pharmacy: [facility] Ph #: [phone] Route: Topical  
  
To-Do List   
 07/25/2017 Lab:  METABOLIC PANEL, COMPREHENSIVE Patient Instructions Please contact our office if you have any questions about your visit today. Introducing \[facility]\"" & [facility] SERVICES! Dear Kurtis Ennis: 
Thank you for requesting a [employer] account. Our records indicate that you already have an active [employer] account. You can access your account anytime at [email or web address] Did you know that you can access your hospital and ER discharge instructions at any time in [employer]? You can also review all of your test results from your hospital stay or ER visit. Additional Information If you have questions, please visit the Frequently Asked Questions section of the [employer] website at [email or web address] Remember, [employer] is NOT to be used for urgent needs. For medical emergencies, dial 911. Now available from your iPhone and Android! Please provide this summary of care documentation to your next provider. Your primary care clinician is listed as Chanelle American. If you have any questions after today's visit, please call [phone].

## 2017-07-25 NOTE — PROGRESS NOTES
HISTORY OF PRESENT ILLNESS  Olga Cotton is a 80 y.o. female. Chief Complaint   Patient presents with    Follow-up     3 month f/u    Hypertension    Cholesterol Problem    Medication Refill       HPI  Pt is here for follow up of Hypertension, and Cholesterol. Pt reports that she just took her meds less than an hour ago and was rushing to get here this morning. She has had normal blood pressure readings at her many specialist visits recently. Pt had been scheduled for surgery with Dr Norma Hamilton. It was postponed for cardiac and pulm clearance. She has been seen and cleared. She was seen by derm and dx with seb keratoses. Pt was also seen by neuro and told to increase use of her cpap. Pt reports that her pain has decreased recently. Pt does need medication refills today. Pt requests electronic refills. New concerns today: none      ROS  Review of Systems   Constitutional: Negative. HENT: Negative. Respiratory: Negative. Cardiovascular: Negative. All other systems reviewed and are negative. Physical Exam  Physical Exam   Nursing note and vitals reviewed. Constitutional: She is oriented to person, place, and time. She appears well-developed and well-nourished. HENT:   Head: Normocephalic and atraumatic. Right Ear: External ear normal.   Left Ear: External ear normal.   Nose: Nose normal.   Eyes: Conjunctivae and EOM are normal.   Neck: Normal range of motion. Neck supple. No JVD present. Carotid bruit is not present. No thyromegaly present. Cardiovascular: Normal rate, regular rhythm, normal heart sounds and intact distal pulses. Exam reveals no gallop and no friction rub. No murmur heard. Pulmonary/Chest: Effort normal and breath sounds normal. She has no wheezes. She has no rhonchi. She has no rales. Abdominal: Soft. Bowel sounds are normal.   Musculoskeletal: Normal range of motion. Neurological: She is alert and oriented to person, place, and time. Coordination normal.   Skin: Skin is warm and dry. Psychiatric: She has a normal mood and affect. Her behavior is normal. Judgment and thought content normal.     ASSESSMENT and PLAN  Diagnoses and all orders for this visit:    1. Essential hypertension, benign  -     lisinopril (PRINIVIL, ZESTRIL) 20 mg tablet; TAKE ONE TABLET BY MOUTH EVERY DAY  -     METABOLIC PANEL, COMPREHENSIVE; Future  Stable, cont pres tx plan. 2. Dyslipidemia  -     METABOLIC PANEL, COMPREHENSIVE; Future    3. Anxiety state  -     PARoxetine (PAXIL) 30 mg tablet; TAKE ONE TABLET BY MOUTH EVERY DAY  Stable, cont pres tx plan. 4. Depression, unspecified depression type  -     PARoxetine (PAXIL) 30 mg tablet; TAKE ONE TABLET BY MOUTH EVERY DAY  Stable, cont pres tx plan. 5. Chronic obstructive pulmonary disease, unspecified COPD type ([facility]  Assessment & Plan: This condition is managed by Specialist.  Key COPD Medications             umeclidinium-vilanterol (ANORO ELLIPTA) 62.5-25 mcg/actuation inhaler  (Taking) Take 1 Puff by inhalation daily. albuterol (PROAIR HFA) 90 mcg/actuation inhaler  (Taking) Take 2 Puffs by inhalation every four (4) hours as needed for Wheezing or Shortness of Breath. hydrocortisone (CORTEF) 10 mg tablet  (Taking)         Lab Results   Component Value Date/Time    WBC 6.9 05/25/2017 11:09 AM    HGB 14.4 05/25/2017 11:09 AM    HCT 41.8 05/25/2017 11:09 AM    PLATELET 535 88/79/7162 11:09 AM         6. Tinea corporis  -     nystatin (MYCOSTATIN) powder; Apply  to affected area four (4) times daily.     Follow-up Disposition: 3 months; sooner prn

## 2017-07-26 NOTE — ASSESSMENT & PLAN NOTE
This condition is managed by Specialist.  Key COPD Medications             umeclidinium-vilanterol (ANORO ELLIPTA) 62.5-25 mcg/actuation inhaler  (Taking) Take 1 Puff by inhalation daily. albuterol (PROAIR HFA) 90 mcg/actuation inhaler  (Taking) Take 2 Puffs by inhalation every four (4) hours as needed for Wheezing or Shortness of Breath.     hydrocortisone (CORTEF) 10 mg tablet  (Taking)         Lab Results   Component Value Date/Time    WBC 6.9 05/25/2017 11:09 AM    HGB 14.4 05/25/2017 11:09 AM    HCT 41.8 05/25/2017 11:09 AM    PLATELET 445 58/22/1090 11:09 AM

## 2017-09-25 DIAGNOSIS — J44.1 CHRONIC OBSTRUCTIVE PULMONARY DISEASE WITH ACUTE EXACERBATION (HCC): ICD-10-CM

## 2017-10-25 ENCOUNTER — OFFICE VISIT (OUTPATIENT)
Dept: FAMILY MEDICINE CLINIC | Age: 82
End: 2017-10-25

## 2017-10-25 VITALS
HEIGHT: 64 IN | SYSTOLIC BLOOD PRESSURE: 124 MMHG | RESPIRATION RATE: 20 BRPM | OXYGEN SATURATION: 94 % | BODY MASS INDEX: 27.66 KG/M2 | DIASTOLIC BLOOD PRESSURE: 71 MMHG | WEIGHT: 162 LBS | TEMPERATURE: 98.3 F | HEART RATE: 61 BPM

## 2017-10-25 DIAGNOSIS — E78.5 DYSLIPIDEMIA: ICD-10-CM

## 2017-10-25 DIAGNOSIS — I10 ESSENTIAL HYPERTENSION, BENIGN: Primary | ICD-10-CM

## 2017-10-25 DIAGNOSIS — E03.8 OTHER SPECIFIED HYPOTHYROIDISM: ICD-10-CM

## 2017-10-25 DIAGNOSIS — Z28.21 INFLUENZA VACCINATION DECLINED: ICD-10-CM

## 2017-10-25 DIAGNOSIS — J44.9 CHRONIC OBSTRUCTIVE PULMONARY DISEASE, UNSPECIFIED COPD TYPE (HCC): ICD-10-CM

## 2017-10-25 NOTE — PROGRESS NOTES
Latasha Villar 80 y.o. female   Chief Complaint   Patient presents with    Follow Up Chronic Condition     3 month    Hypertension    Cholesterol Problem         1. Have you been to the ER, urgent care clinic since your last visit? Hospitalized since your last visit? No    2. Have you seen or consulted any other health care providers outside of the [address] [place] [address] since your last visit? Include any pap smears or colon screening.  No

## 2018-01-25 ENCOUNTER — OFFICE VISIT (OUTPATIENT)
Dept: FAMILY MEDICINE CLINIC | Age: 83
End: 2018-01-25

## 2018-01-25 VITALS
RESPIRATION RATE: 20 BRPM | OXYGEN SATURATION: 93 % | WEIGHT: 152 LBS | HEIGHT: 64 IN | BODY MASS INDEX: 25.95 KG/M2 | DIASTOLIC BLOOD PRESSURE: 65 MMHG | HEART RATE: 68 BPM | TEMPERATURE: 98.2 F | SYSTOLIC BLOOD PRESSURE: 123 MMHG

## 2018-01-25 DIAGNOSIS — I10 ESSENTIAL HYPERTENSION, BENIGN: Primary | ICD-10-CM

## 2018-01-25 DIAGNOSIS — E87.6 HYPOKALEMIA: ICD-10-CM

## 2018-01-25 DIAGNOSIS — F32.A DEPRESSION, UNSPECIFIED DEPRESSION TYPE: ICD-10-CM

## 2018-01-25 DIAGNOSIS — F41.1 ANXIETY STATE: ICD-10-CM

## 2018-01-25 DIAGNOSIS — E78.2 MIXED HYPERLIPIDEMIA: ICD-10-CM

## 2018-01-25 DIAGNOSIS — R26.9 GAIT DISORDER: ICD-10-CM

## 2018-01-25 RX ORDER — METOPROLOL TARTRATE 50 MG/1
TABLET ORAL
Qty: 90 TAB | Refills: 3 | Status: SHIPPED | OUTPATIENT
Start: 2018-01-25 | End: 2018-03-22

## 2018-01-25 RX ORDER — POTASSIUM CHLORIDE 20 MEQ/1
TABLET, EXTENDED RELEASE ORAL
Qty: 180 TAB | Refills: 3 | Status: SHIPPED | OUTPATIENT
Start: 2018-01-25 | End: 2018-08-07 | Stop reason: SDUPTHER

## 2018-01-25 RX ORDER — AMLODIPINE BESYLATE 10 MG/1
TABLET ORAL
Qty: 90 TAB | Refills: 3 | Status: SHIPPED | OUTPATIENT
Start: 2018-01-25 | End: 2018-06-26 | Stop reason: SDUPTHER

## 2018-01-25 RX ORDER — ATORVASTATIN CALCIUM 10 MG/1
TABLET, FILM COATED ORAL
Qty: 90 TAB | Refills: 3 | Status: SHIPPED | OUTPATIENT
Start: 2018-01-25 | End: 2018-08-07 | Stop reason: SDUPTHER

## 2018-01-25 RX ORDER — PAROXETINE 30 MG/1
TABLET, FILM COATED ORAL
Qty: 30 TAB | Refills: 5 | Status: SHIPPED | OUTPATIENT
Start: 2018-01-25 | End: 2018-08-07 | Stop reason: SDUPTHER

## 2018-01-25 RX ORDER — LISINOPRIL 20 MG/1
TABLET ORAL
Qty: 30 TAB | Refills: 5 | Status: SHIPPED | OUTPATIENT
Start: 2018-01-25 | End: 2018-08-07 | Stop reason: SDUPTHER

## 2018-01-25 NOTE — PROGRESS NOTES
HISTORY OF PRESENT ILLNESS  Bulmaro Hardy is a 80 y.o. female. Chief Complaint   Patient presents with    Follow-up    Hypertension    Cholesterol Problem    Thyroid Problem     care per Endocrinology       HPI  Patient is here for a follow up of Htn, and lipids. Pt reports that her moods remain good. Pt had her f/u appt with sleep med. She reports that she is using the machine although she does not think she was dx with sleep apnea. She is having some issues with keeping the mask in place at night. Pt has seen endo. Everything is going well with her thyroid issue. Her next visit with him is in 1 year. Patient does need medication refills today. Patient requests electronic refills. New concerns today: pt is having some trouble walking. She some slight trouble getting out of her chair. However, there is a bit of a delay when she is ready to start walking. Once she is going, she feels ok. She keeps a cane or walker nearby just to feel more secure. Review of Systems   Constitutional: Negative. HENT: Negative. Respiratory: Negative. Cardiovascular: Negative. Neurological:        Delay when starting to walk   All other systems reviewed and are negative. Physical Exam  Physical Exam   Nursing note and vitals reviewed. Constitutional: She is oriented to person, place, and time. She appears well-developed and well-nourished. HENT:   Head: Normocephalic and atraumatic. Right Ear: External ear normal.   Left Ear: External ear normal.   Nose: Nose normal.   Eyes: Conjunctivae and EOM are normal.   Neck: Normal range of motion. Neck supple. No JVD present. Carotid bruit is not present. No thyromegaly present. Cardiovascular: Normal rate, regular rhythm, normal heart sounds and intact distal pulses. Exam reveals no gallop and no friction rub. No murmur heard. Pulmonary/Chest: Effort normal and breath sounds normal. She has no wheezes. She has no rhonchi.  She has no rales. Abdominal: Soft. Bowel sounds are normal.   Musculoskeletal: Normal range of motion. Neurological: She is alert and oriented to person, place, and time. Coordination normal.   Skin: Skin is warm and dry. Psychiatric: She has a normal mood and affect. Her behavior is normal. Judgment and thought content normal.     ASSESSMENT and PLAN  Diagnoses and all orders for this visit:    1. Essential hypertension, benign  -     lisinopril (PRINIVIL, ZESTRIL) 20 mg tablet; TAKE ONE TABLET BY MOUTH EVERY DAY  -     potassium chloride (K-DUR, KLOR-CON) 20 mEq tablet; TAKE ONE TABLET BY MOUTH 2 TIMES A DAY  -     metoprolol tartrate (LOPRESSOR) 50 mg tablet; TAKE ONE TABLET BY MOUTH EVERY DAY FOR HYPERTENSION  -     amLODIPine (NORVASC) 10 mg tablet; TAKE ONE TABLET BY MOUTH EVERY DAY  Stable, cont pres tx plan. 2. Anxiety state  -     PARoxetine (PAXIL) 30 mg tablet; TAKE ONE TABLET BY MOUTH EVERY DAY  Stable, cont pres tx plan. 3. Depression, unspecified depression type  -     PARoxetine (PAXIL) 30 mg tablet; TAKE ONE TABLET BY MOUTH EVERY DAY  Stable, cont pres tx plan. 4. Mixed hyperlipidemia  -     atorvastatin (LIPITOR) 10 mg tablet; TAKE ONE TABLET BY MOUTH EVERY DAY    5. Hypokalemia  -     potassium chloride (K-DUR, KLOR-CON) 20 mEq tablet; TAKE ONE TABLET BY MOUTH 2 TIMES A DAY    6. Gait disorder  Concerned about parkinson's type issues. Pt advised to discuss her difficulty initiating her walk with neuro at her upcoming appt.      Follow-up Disposition: 3 months; sooner prn

## 2018-01-25 NOTE — PROGRESS NOTES
Aneta Villar 80 y.o. female   Chief Complaint   Patient presents with    Follow-up    Hypertension    Cholesterol Problem    Thyroid Problem     care per Endocrinology         1. Have you been to the ER, urgent care clinic since your last visit? Hospitalized since your last visit? No    2. Have you seen or consulted any other health care providers outside of the [address] [place] [address] since your last visit? Include any pap smears or colon screening.  No

## 2018-03-09 ENCOUNTER — APPOINTMENT (OUTPATIENT)
Dept: GENERAL RADIOLOGY | Age: 83
DRG: 682 | End: 2018-03-09
Attending: EMERGENCY MEDICINE
Payer: MEDICARE

## 2018-03-09 ENCOUNTER — HOSPITAL ENCOUNTER (INPATIENT)
Age: 83
LOS: 3 days | Discharge: HOME HEALTH | DRG: 682 | End: 2018-03-13
Attending: EMERGENCY MEDICINE | Admitting: INTERNAL MEDICINE
Payer: MEDICARE

## 2018-03-09 ENCOUNTER — APPOINTMENT (OUTPATIENT)
Dept: CT IMAGING | Age: 83
DRG: 682 | End: 2018-03-09
Attending: EMERGENCY MEDICINE
Payer: MEDICARE

## 2018-03-09 DIAGNOSIS — E87.6 HYPOKALEMIA: ICD-10-CM

## 2018-03-09 DIAGNOSIS — E86.0 DEHYDRATION: Primary | ICD-10-CM

## 2018-03-09 PROBLEM — R79.89 ELEVATED LACTIC ACID LEVEL: Status: ACTIVE | Noted: 2018-03-09

## 2018-03-09 PROBLEM — N17.9 ACUTE RENAL FAILURE (HCC): Status: ACTIVE | Noted: 2018-03-09

## 2018-03-09 PROBLEM — K52.9 GASTROENTERITIS: Status: ACTIVE | Noted: 2018-03-09

## 2018-03-09 PROBLEM — R06.2 WHEEZING: Status: ACTIVE | Noted: 2018-03-09

## 2018-03-09 LAB
ALBUMIN SERPL-MCNC: 3.6 G/DL (ref 3.4–5)
ALBUMIN/GLOB SERPL: 0.8 {RATIO} (ref 0.8–1.7)
ALP SERPL-CCNC: 70 U/L (ref 45–117)
ALT SERPL-CCNC: 20 U/L (ref 13–56)
ANION GAP SERPL CALC-SCNC: 11 MMOL/L (ref 3–18)
APPEARANCE UR: CLEAR
AST SERPL-CCNC: 52 U/L (ref 15–37)
ATRIAL RATE: 73 BPM
BACTERIA SPEC CULT: NORMAL
BASOPHILS # BLD: 0.1 K/UL (ref 0–0.06)
BASOPHILS NFR BLD: 1 % (ref 0–2)
BILIRUB SERPL-MCNC: 0.5 MG/DL (ref 0.2–1)
BILIRUB UR QL: NEGATIVE
BUN SERPL-MCNC: 24 MG/DL (ref 7–18)
BUN/CREAT SERPL: 12 (ref 12–20)
CALCIUM SERPL-MCNC: 9.8 MG/DL (ref 8.5–10.1)
CALCULATED P AXIS, ECG09: 106 DEGREES
CALCULATED R AXIS, ECG10: -78 DEGREES
CALCULATED T AXIS, ECG11: 55 DEGREES
CHLORIDE SERPL-SCNC: 95 MMOL/L (ref 100–108)
CK MB CFR SERPL CALC: 0.5 % (ref 0–4)
CK MB SERPL-MCNC: 12.8 NG/ML (ref 5–25)
CK SERPL-CCNC: 2592 U/L (ref 26–192)
CO2 SERPL-SCNC: 29 MMOL/L (ref 21–32)
COLOR UR: YELLOW
CREAT SERPL-MCNC: 2.01 MG/DL (ref 0.6–1.3)
DIAGNOSIS, 93000: NORMAL
DIFFERENTIAL METHOD BLD: ABNORMAL
EOSINOPHIL # BLD: 0 K/UL (ref 0–0.4)
EOSINOPHIL NFR BLD: 0 % (ref 0–5)
ERYTHROCYTE [DISTWIDTH] IN BLOOD BY AUTOMATED COUNT: 13.1 % (ref 11.6–14.5)
FLUAV AG NPH QL IA: NEGATIVE
FLUBV AG NOSE QL IA: NEGATIVE
GLOBULIN SER CALC-MCNC: 4.3 G/DL (ref 2–4)
GLUCOSE SERPL-MCNC: 100 MG/DL (ref 74–99)
GLUCOSE UR STRIP.AUTO-MCNC: NEGATIVE MG/DL
HCT VFR BLD AUTO: 44.9 % (ref 35–45)
HGB BLD-MCNC: 15 G/DL (ref 12–16)
HGB UR QL STRIP: NEGATIVE
KETONES UR QL STRIP.AUTO: NEGATIVE MG/DL
LACTATE BLD-SCNC: 1.7 MMOL/L (ref 0.4–2)
LACTATE BLD-SCNC: 3.5 MMOL/L (ref 0.4–2)
LEUKOCYTE ESTERASE UR QL STRIP.AUTO: NEGATIVE
LYMPHOCYTES # BLD: 1.3 K/UL (ref 0.9–3.6)
LYMPHOCYTES NFR BLD: 14 % (ref 21–52)
MAGNESIUM SERPL-MCNC: 2.1 MG/DL (ref 1.6–2.6)
MCH RBC QN AUTO: 31.3 PG (ref 24–34)
MCHC RBC AUTO-ENTMCNC: 33.4 G/DL (ref 31–37)
MCV RBC AUTO: 93.7 FL (ref 74–97)
MONOCYTES # BLD: 0.6 K/UL (ref 0.05–1.2)
MONOCYTES NFR BLD: 7 % (ref 3–10)
NEUTS SEG # BLD: 7.4 K/UL (ref 1.8–8)
NEUTS SEG NFR BLD: 78 % (ref 40–73)
NITRITE UR QL STRIP.AUTO: NEGATIVE
P-R INTERVAL, ECG05: 172 MS
PH UR STRIP: 7 [PH] (ref 5–8)
PLATELET # BLD AUTO: 271 K/UL (ref 135–420)
PMV BLD AUTO: 10.2 FL (ref 9.2–11.8)
POTASSIUM SERPL-SCNC: 3.1 MMOL/L (ref 3.5–5.5)
PROT SERPL-MCNC: 7.9 G/DL (ref 6.4–8.2)
PROT UR STRIP-MCNC: NEGATIVE MG/DL
Q-T INTERVAL, ECG07: 498 MS
QRS DURATION, ECG06: 126 MS
QTC CALCULATION (BEZET), ECG08: 548 MS
RBC # BLD AUTO: 4.79 M/UL (ref 4.2–5.3)
SERVICE CMNT-IMP: NORMAL
SODIUM SERPL-SCNC: 135 MMOL/L (ref 136–145)
SP GR UR REFRACTOMETRY: 1.01 (ref 1–1.03)
TROPONIN I SERPL-MCNC: 0.1 NG/ML (ref 0–0.06)
UROBILINOGEN UR QL STRIP.AUTO: 0.2 EU/DL (ref 0.2–1)
VENTRICULAR RATE, ECG03: 73 BPM
WBC # BLD AUTO: 9.3 K/UL (ref 4.6–13.2)

## 2018-03-09 PROCEDURE — 70450 CT HEAD/BRAIN W/O DYE: CPT

## 2018-03-09 PROCEDURE — 83605 ASSAY OF LACTIC ACID: CPT

## 2018-03-09 PROCEDURE — 74011250637 HC RX REV CODE- 250/637: Performed by: INTERNAL MEDICINE

## 2018-03-09 PROCEDURE — 96375 TX/PRO/DX INJ NEW DRUG ADDON: CPT

## 2018-03-09 PROCEDURE — 81003 URINALYSIS AUTO W/O SCOPE: CPT | Performed by: EMERGENCY MEDICINE

## 2018-03-09 PROCEDURE — 85025 COMPLETE CBC W/AUTO DIFF WBC: CPT | Performed by: EMERGENCY MEDICINE

## 2018-03-09 PROCEDURE — 99218 HC RM OBSERVATION: CPT

## 2018-03-09 PROCEDURE — 87804 INFLUENZA ASSAY W/OPTIC: CPT | Performed by: EMERGENCY MEDICINE

## 2018-03-09 PROCEDURE — 74011250636 HC RX REV CODE- 250/636: Performed by: EMERGENCY MEDICINE

## 2018-03-09 PROCEDURE — 87040 BLOOD CULTURE FOR BACTERIA: CPT | Performed by: EMERGENCY MEDICINE

## 2018-03-09 PROCEDURE — 80053 COMPREHEN METABOLIC PANEL: CPT | Performed by: EMERGENCY MEDICINE

## 2018-03-09 PROCEDURE — 82550 ASSAY OF CK (CPK): CPT | Performed by: EMERGENCY MEDICINE

## 2018-03-09 PROCEDURE — 74011250636 HC RX REV CODE- 250/636: Performed by: INTERNAL MEDICINE

## 2018-03-09 PROCEDURE — 99285 EMERGENCY DEPT VISIT HI MDM: CPT

## 2018-03-09 PROCEDURE — 94640 AIRWAY INHALATION TREATMENT: CPT

## 2018-03-09 PROCEDURE — 96365 THER/PROPH/DIAG IV INF INIT: CPT

## 2018-03-09 PROCEDURE — 74011250637 HC RX REV CODE- 250/637: Performed by: EMERGENCY MEDICINE

## 2018-03-09 PROCEDURE — 93005 ELECTROCARDIOGRAM TRACING: CPT

## 2018-03-09 PROCEDURE — 87493 C DIFF AMPLIFIED PROBE: CPT | Performed by: INTERNAL MEDICINE

## 2018-03-09 PROCEDURE — 96361 HYDRATE IV INFUSION ADD-ON: CPT

## 2018-03-09 PROCEDURE — 74011000250 HC RX REV CODE- 250: Performed by: EMERGENCY MEDICINE

## 2018-03-09 PROCEDURE — 83735 ASSAY OF MAGNESIUM: CPT | Performed by: EMERGENCY MEDICINE

## 2018-03-09 PROCEDURE — 87045 FECES CULTURE AEROBIC BACT: CPT | Performed by: EMERGENCY MEDICINE

## 2018-03-09 PROCEDURE — 71045 X-RAY EXAM CHEST 1 VIEW: CPT

## 2018-03-09 PROCEDURE — 87177 OVA AND PARASITES SMEARS: CPT | Performed by: EMERGENCY MEDICINE

## 2018-03-09 RX ORDER — SODIUM CHLORIDE 9 MG/ML
100 INJECTION, SOLUTION INTRAVENOUS CONTINUOUS
Status: DISCONTINUED | OUTPATIENT
Start: 2018-03-09 | End: 2018-03-12

## 2018-03-09 RX ORDER — FLUDROCORTISONE ACETATE 0.1 MG/1
0.1 TABLET ORAL DAILY
Status: DISCONTINUED | OUTPATIENT
Start: 2018-03-10 | End: 2018-03-13 | Stop reason: HOSPADM

## 2018-03-09 RX ORDER — HYDROCORTISONE 5 MG/1
10 TABLET ORAL
Status: DISCONTINUED | OUTPATIENT
Start: 2018-03-10 | End: 2018-03-09

## 2018-03-09 RX ORDER — LEVOTHYROXINE SODIUM 112 UG/1
112 TABLET ORAL
Status: DISCONTINUED | OUTPATIENT
Start: 2018-03-10 | End: 2018-03-13 | Stop reason: HOSPADM

## 2018-03-09 RX ORDER — BUDESONIDE AND FORMOTEROL FUMARATE DIHYDRATE 80; 4.5 UG/1; UG/1
2 AEROSOL RESPIRATORY (INHALATION)
Status: DISCONTINUED | OUTPATIENT
Start: 2018-03-09 | End: 2018-03-13 | Stop reason: HOSPADM

## 2018-03-09 RX ORDER — ONDANSETRON 2 MG/ML
4 INJECTION INTRAMUSCULAR; INTRAVENOUS
Status: DISCONTINUED | OUTPATIENT
Start: 2018-03-09 | End: 2018-03-13 | Stop reason: HOSPADM

## 2018-03-09 RX ORDER — SODIUM CHLORIDE 0.9 % (FLUSH) 0.9 %
5-10 SYRINGE (ML) INJECTION AS NEEDED
Status: DISCONTINUED | OUTPATIENT
Start: 2018-03-09 | End: 2018-03-09

## 2018-03-09 RX ORDER — ALPRAZOLAM 0.5 MG/1
0.5 TABLET ORAL
Status: DISCONTINUED | OUTPATIENT
Start: 2018-03-09 | End: 2018-03-13 | Stop reason: HOSPADM

## 2018-03-09 RX ORDER — POTASSIUM CHLORIDE 7.45 MG/ML
10 INJECTION INTRAVENOUS ONCE
Status: DISCONTINUED | OUTPATIENT
Start: 2018-03-09 | End: 2018-03-09 | Stop reason: ALTCHOICE

## 2018-03-09 RX ORDER — POTASSIUM CHLORIDE 20 MEQ/1
40 TABLET, EXTENDED RELEASE ORAL
Status: COMPLETED | OUTPATIENT
Start: 2018-03-09 | End: 2018-03-09

## 2018-03-09 RX ORDER — NALOXONE HYDROCHLORIDE 0.4 MG/ML
0.4 INJECTION, SOLUTION INTRAMUSCULAR; INTRAVENOUS; SUBCUTANEOUS AS NEEDED
Status: DISCONTINUED | OUTPATIENT
Start: 2018-03-09 | End: 2018-03-13 | Stop reason: HOSPADM

## 2018-03-09 RX ORDER — METOPROLOL TARTRATE 50 MG/1
50 TABLET ORAL 2 TIMES DAILY
Status: DISCONTINUED | OUTPATIENT
Start: 2018-03-10 | End: 2018-03-13 | Stop reason: HOSPADM

## 2018-03-09 RX ORDER — OXYCODONE AND ACETAMINOPHEN 5; 325 MG/1; MG/1
1 TABLET ORAL
Status: DISCONTINUED | OUTPATIENT
Start: 2018-03-09 | End: 2018-03-13 | Stop reason: HOSPADM

## 2018-03-09 RX ORDER — POTASSIUM CHLORIDE 20 MEQ/1
20 TABLET, EXTENDED RELEASE ORAL DAILY
Status: DISCONTINUED | OUTPATIENT
Start: 2018-03-09 | End: 2018-03-12

## 2018-03-09 RX ORDER — OXCARBAZEPINE 300 MG/1
300 TABLET, FILM COATED ORAL 2 TIMES DAILY
Status: DISCONTINUED | OUTPATIENT
Start: 2018-03-09 | End: 2018-03-13 | Stop reason: HOSPADM

## 2018-03-09 RX ORDER — ACETAMINOPHEN 325 MG/1
650 TABLET ORAL
Status: DISCONTINUED | OUTPATIENT
Start: 2018-03-09 | End: 2018-03-13 | Stop reason: HOSPADM

## 2018-03-09 RX ORDER — IPRATROPIUM BROMIDE AND ALBUTEROL SULFATE 2.5; .5 MG/3ML; MG/3ML
3 SOLUTION RESPIRATORY (INHALATION)
Status: DISCONTINUED | OUTPATIENT
Start: 2018-03-09 | End: 2018-03-13 | Stop reason: HOSPADM

## 2018-03-09 RX ORDER — DOCUSATE SODIUM 100 MG/1
100 CAPSULE, LIQUID FILLED ORAL
Status: DISCONTINUED | OUTPATIENT
Start: 2018-03-09 | End: 2018-03-13 | Stop reason: HOSPADM

## 2018-03-09 RX ORDER — HEPARIN SODIUM 5000 [USP'U]/ML
5000 INJECTION, SOLUTION INTRAVENOUS; SUBCUTANEOUS EVERY 8 HOURS
Status: DISCONTINUED | OUTPATIENT
Start: 2018-03-09 | End: 2018-03-13 | Stop reason: HOSPADM

## 2018-03-09 RX ORDER — GUAIFENESIN 100 MG/5ML
81 LIQUID (ML) ORAL DAILY
Status: DISCONTINUED | OUTPATIENT
Start: 2018-03-10 | End: 2018-03-13 | Stop reason: HOSPADM

## 2018-03-09 RX ORDER — LEVOFLOXACIN 5 MG/ML
750 INJECTION, SOLUTION INTRAVENOUS EVERY 24 HOURS
Status: DISCONTINUED | OUTPATIENT
Start: 2018-03-09 | End: 2018-03-09

## 2018-03-09 RX ORDER — ATORVASTATIN CALCIUM 10 MG/1
10 TABLET, FILM COATED ORAL
Status: DISCONTINUED | OUTPATIENT
Start: 2018-03-09 | End: 2018-03-13 | Stop reason: HOSPADM

## 2018-03-09 RX ADMIN — POTASSIUM CHLORIDE 40 MEQ: 20 TABLET, EXTENDED RELEASE ORAL at 11:47

## 2018-03-09 RX ADMIN — SODIUM CHLORIDE 4680 ML: 900 INJECTION, SOLUTION INTRAVENOUS at 10:40

## 2018-03-09 RX ADMIN — HEPARIN SODIUM 5000 UNITS: 5000 INJECTION, SOLUTION INTRAVENOUS; SUBCUTANEOUS at 16:25

## 2018-03-09 RX ADMIN — METHYLPREDNISOLONE SODIUM SUCCINATE 125 MG: 125 INJECTION, POWDER, FOR SOLUTION INTRAMUSCULAR; INTRAVENOUS at 16:19

## 2018-03-09 RX ADMIN — OXCARBAZEPINE 300 MG: 300 TABLET ORAL at 18:18

## 2018-03-09 RX ADMIN — POTASSIUM CHLORIDE 20 MEQ: 20 TABLET, EXTENDED RELEASE ORAL at 16:25

## 2018-03-09 RX ADMIN — SODIUM CHLORIDE 100 ML/HR: 900 INJECTION, SOLUTION INTRAVENOUS at 16:00

## 2018-03-09 RX ADMIN — BUDESONIDE AND FORMOTEROL FUMARATE DIHYDRATE 2 PUFF: 80; 4.5 AEROSOL RESPIRATORY (INHALATION) at 19:59

## 2018-03-09 RX ADMIN — ATORVASTATIN CALCIUM 10 MG: 10 TABLET, FILM COATED ORAL at 23:15

## 2018-03-09 RX ADMIN — METHYLPREDNISOLONE SODIUM SUCCINATE 40 MG: 40 INJECTION, POWDER, FOR SOLUTION INTRAMUSCULAR; INTRAVENOUS at 23:15

## 2018-03-09 RX ADMIN — WATER 2 G: 1 INJECTION INTRAMUSCULAR; INTRAVENOUS; SUBCUTANEOUS at 10:41

## 2018-03-09 RX ADMIN — LEVOFLOXACIN 750 MG: 5 INJECTION, SOLUTION INTRAVENOUS at 10:41

## 2018-03-09 NOTE — H&P
XR   History & Physical    Patient: Rylan Strickland MRN: [id number]  CSN: [id number]    YOB: 1931  Age: 80 y.o. Sex: female      DOA: 3/9/2018    Chief Complaint:   Chief Complaint   Patient presents with    Altered mental status          HPI:     Rylan Strickland is a 80 y.o.  female who has PMH of  COPD, HTN, ETOH abuse, Stroke, Seizures, and heavy smoking presents to ER with AMS, Lethargy, generalized weakness and multiple diarrheal episodes and productive cough. As per family, Pt lost control of her bowels twice at home and one more time in  The hospital.  Pt was also found to have elevated lactic acid which resolved once pt received sepsis protocol in ER with IVF and Abx. Pt was also found to have ARF and had productive cough but CXR and UA were both -ve. Pt states that she feels very weak yet better since she was admitted. Pt also c/o subjective fever of 100 at home but had none in the hospital.    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 Hypotensive on admission but AAO x 3, mildly demented. Has heavy wheezing and decrease BS      Past Medical History:   Diagnosis Date    Abuse 1998    Alcohol    Anxiety     Calculus of kidney 1988    Cardiac echocardiogram 07/31/2012    EF 60-65%. No WMA. Mild conc LVH. Gr 1 DDfx. RVSP 20-25 mmHg. Mild SAGAR. Mild TR,. Mild PAE.  Cardiovascular LE venous duplex 10/08/2012    No venous thrombosis or venous insufficiency in bilateral lower extremities.  Carotid duplex 10/09/2014    Mild < 50% bilateral ICA stenosis.       Chronic lung disease     Contact dermatitis and other eczema, due to unspecified cause     COPD (chronic obstructive pulmonary disease) (HCC)     Degenerative joint disease     Depression     Hematuria     Hypercholesteremia     Hypertension     Hypothyroidism 1998    Kidney stone     Neuropathy 3/2007    Orthostatic hypotension     [facility] 2/1998    Seizures ([facility]     none recently    Severe obstructive sleep apnea 01-15-15     started using Bipap Machine    Stress     Stroke ([facility] 2/2006    no residual    TIA (transient ischemic attack)     Trauma        Past Surgical History:   Procedure Laterality Date    CYSTOSCOPY,INSERT URETERAL STENT  9/14/15    Dr. Kiersten Khan    HX APPENDECTOMY      HX INTRAOCULAR LENS PROSTHESIS      HX LITHOTRIPSY  9/14/15    Dr. Radha Shelby    HX TUBAL LIGATION      HX WISDOM TEETH EXTRACTION      x4       Family History   Problem Relation Age of Onset    Heart Disease Father     Hypertension Father     Hypertension Mother     Cancer Mother      skin    Elevated Lipids Sister     Heart Disease Sister     Cancer Maternal Grandmother      colon and stomach    Heart Disease Paternal Grandmother     Heart Attack Paternal Grandmother     Heart Attack Paternal Grandfather     Heart Disease Paternal Grandfather        Social History     Social History    Marital status:      Spouse name: N/A    Number of children: N/A    Years of education: N/A     Social History Main Topics    Smoking status: Current Every Day Smoker     Packs/day: 1.50     Years: 65.00     Types: Cigarettes    Smokeless tobacco: Never Used    Alcohol use No      Comment: quit 1998 due to Alcohol Abuse    Drug use: No    Sexual activity: No     Other Topics Concern    None     Social History Narrative       Prior to Admission medications    Medication Sig Start Date End Date Taking?  Authorizing Provider   lisinopril (PRINIVIL, ZESTRIL) 20 mg tablet TAKE ONE TABLET BY MOUTH EVERY DAY 1/25/18   Kierra Sotelo MD   PARoxetine (PAXIL) 30 mg tablet TAKE ONE TABLET BY MOUTH EVERY DAY 1/25/18   Kierra Sotelo MD   atorvastatin (LIPITOR) 10 mg tablet TAKE ONE TABLET BY MOUTH EVERY DAY 1/25/18   Kierra Sotelo MD   potassium chloride (K-DUR, KLOR-CON) 20 mEq tablet TAKE ONE TABLET BY MOUTH 2 TIMES A DAY 1/25/18   Janet Celestin MD   metoprolol tartrate (LOPRESSOR) 50 mg tablet TAKE ONE TABLET BY MOUTH EVERY DAY FOR HYPERTENSION 1/25/18   Janet Celestin MD   amLODIPine (NORVASC) 10 mg tablet TAKE ONE TABLET BY MOUTH EVERY DAY 1/25/18   Janet Celestin MD   hydroCHLOROthiazide (MICROZIDE) 12.5 mg capsule TAKE ONE CAPSULE BY MOUTH EVERY DAY 1/18/18   Janet Celestin MD   umeclidinium-vilanterol (ANORO ELLIPTA) 62.5-25 mcg/actuation inhaler Take 1 Puff by inhalation daily. 9/25/17   Rosy Tai MD   nystatin (MYCOSTATIN) powder Apply  to affected area four (4) times daily. 7/25/17   Janet Celestin MD   albuterol (PROAIR HFA) 90 mcg/actuation inhaler Take 2 Puffs by inhalation every four (4) hours as needed for Wheezing or Shortness of Breath. 5/26/17   Teresa Madison NP   fluticasone Dorothy Merl) 50 mcg/actuation nasal spray  4/10/17   Historical Provider   HYDROcodone-acetaminophen (NORCO) 5-325 mg per tablet Take 1 Tab by mouth every four (4) hours as needed for Pain. Max Daily Amount: 6 Tabs. 10/10/16   Verito Cedeño PA-C   SYNTHROID 112 mcg tablet  7/19/16   Historical Provider   ALPRAZolam Remonia Anabel) 0.5 mg tablet  9/6/16   Historical Provider   furosemide (LASIX) 20 mg tablet Take 1 Tab by mouth daily as needed for Other (Leg Swelling). 4/12/16   Janet Celestin MD   hydrocortisone (CORTEF) 10 mg tablet  10/26/15   Historical Provider   VOLTAREN 1 % topical gel  9/2/15   Historical Provider   Walker (BARNEY Araujo) misc 1 Device by Does Not Apply route daily. 8/31/15   JENNIFER Haas   bipap machine kit 1 each by Does Not Apply route. Started using BiPap Machine 01-15-15    Historical Provider   L-Methylfolate-U25-Howajwsycf (CEREFOLIN NAC) tablet Take 1 tablet by mouth daily. 10/30/14   Janet Celestin MD   fludrocortisone (FLORINEF) 0.1 mg tablet Take 1 tablet by mouth daily.  10/30/14   Janet Celestin MD   oxcarbazepine (TRILEPTAL) 300 mg tablet Take 300 mg by mouth two (2) times a day.    Historical Provider   Cholecalciferol, Vitamin D3, 5,000 unit Tab Take 5,000 Int'l Units by mouth daily. Indications: VITAMIN D DEFICIENCY    Historical Provider   magnesium oxide (MAG-OX) 400 mg tablet Take 400 mg by mouth daily. Historical Provider   COQ10, LIPOSOMAL UBIQUINOL, PO Take 10 mg by mouth daily. Historical Provider   aspirin 81 mg tablet Take 81 mg by mouth daily. Historical Provider   DOCOSAHEXANOIC ACID/EPA (FISH OIL PO) Take 4,000 mg by mouth daily. Historical Provider   CALCIUM CITRATE/VITAMIN D3 (CALCIUM CITRATE + PO) Take  by mouth once over twenty-four (24) hours. Historical Provider       Allergies   Allergen Reactions    Iodine Hives         Review of Systems  GENERAL: Patient alert, awake and oriented times 3, able to communicate full sentences and not in distress. Generally weak and feels sick  HEENT: No change in vision, no earache, tinnitus, sore throat or sinus congestion. NECK: No pain or stiffness. PULMONARY: +ve shortness of breath, cough and wheezing. Cardiovascular:+ve for orthopnea, no CP  GASTROINTESTINAL: No abdominal pain, nausea,or vomiting, +ve  diarrhea, No melena or bright red blood per rectum. GENITOURINARY: No urinary frequency, urgency, hesitancy or dysuria. MUSCULOSKELETAL: No joint or muscle pain, no back pain, no recent trauma. DERMATOLOGIC: No rash, no itching, no lesions. ENDOCRINE: No polyuria, polydipsia, no heat or cold intolerance. No recent change in weight. HEMATOLOGICAL: No anemia or easy bruising or bleeding. NEUROLOGIC: No headache, seizures, numbness, tingling or weakness.        Physical Exam:     Physical Exam:  Visit Vitals    /68 (BP 1 Location: Left arm, BP Patient Position: At rest)    Pulse 93    Temp 97.5 °F (36.4 °C)    Resp 20    Ht 5' 4.17\" (1.63 m)    Wt 68 kg (150 lb)    SpO2 94%    BMI 25.61 kg/m2    O2 Flow Rate (L/min): 3 l/min O2 Device: Nasal cannula    Temp (24hrs), Av.7 °F (37.1 °C), Min:97.5 °F (36.4 °C), Max:100 °F (37.8 °C)    03/09 1901 - 03/10 0700  In: -   Out: 2417 [Urine:1650]   03/08 0701 - 03/09 1900  In: 600 [P.O.:200; I.V.:400]  Out: 1250 [Urine:1250]    General:  Alert, cooperative, no distress, appears stated age. Head: Normocephalic, without obvious abnormality, atraumatic. Eyes:  Conjunctivae/corneas clear. PERRL, EOMs intact. Nose: Nares normal. No drainage or sinus tenderness. Neck: Supple, symmetrical, trachea midline, no adenopathy, thyroid: no enlargement, no carotid bruit and no JVD. Lungs:   decrease BS B/L with heavy wheezing    Heart:  Regular rate and rhythm, S1, S2 normal.     Abdomen: Soft, non-tender. Bowel sounds normal.    Extremities: Extremities normal, atraumatic, no cyanosis or edema. Pulses: 2+ and symmetric all extremities. Skin:  No rashes or lesions   Neurologic: AAOx3, No focal motor or sensory deficit. Labs Reviewed: All lab results for the last 24 hours reviewed. CXR and EKG    Procedures/imaging: see electronic medical records for all procedures/Xrays and details which were not copied into this note but were reviewed prior to creation of Plan      Assessment/Plan     Principal Problem:    Gastroenteritis (3/9/2018)    Active Problems:    Essential hypertension, benign (10/2/2012)      Dyslipidemia (10/2/2012)      Hypothyroidism (10/2/2012)      COPD (chronic obstructive pulmonary disease) ([facility] (2/25/2013)      Dehydration (3/9/2018)      Acute renal failure (HCC) (3/9/2018)      Elevated lactic acid level (3/9/2018)      Wheezing (3/9/2018)      Panhypopituitarism ([address] (3/10/2018)    Pt will be admitted for generalized weakness and Lethargy 2 ry to  Severe diarrhea/ GE  R/o c-diff >> Pt was not on abx lately and lives at home   COPD exacerbation >> CXR is clear >> Hx of COPD and heavy smoking 1PPD  ARF mostly 2 ry to dehydration  Hypotension 2 ry to dehydration.  Pt is on heavy blood Pressure meds  Hx of Pituitary gland Tumor (inoperable) on fludrocortisone and Hydrocortisone 10 mg daily for years.     GE and dehydration >> will continue IVF and monitor for overload  ARF mostly 2 ry to dehydration >> will hold Lisinopril and Lasix>> IVF  Stool Cx and C-diff    Hypotension >> Holding BP meds for now and monitoring   On Lasix 20, BB , Lisinopril, HCTZ and CCB   Will start BB in AM >> hold if Hypotension    COPD exacerbation >> heavy wheezing >> will start Solumedrol and hold Hydrocortisone PO  Duoneb abd Spiriva  Will add advair  Will start Zithromax in AM  Pt received sepsis Protocol in ER [facility], cefepime and Levaquin    Mild elevation of troponin with ARF >> will get another set  IVF     Hypothyroidism>> Will continue Synthroid and get TSH    Hx of Pituitary Tumor and Hypopituitarism>> will continue Fludrocortisone and hold Hydrocortisone for now>> on solumdrol    Tobacco Abuse >> Pt refused the patch >> might consider quitting after dc    DVT/GI Prophylaxis: Hep SQ         Plan of care is discussed in details with Patient/Family at bedside and agreed upon    Bang Up MD  3/10/2018 3:22 PM

## 2018-03-09 NOTE — IP AVS SNAPSHOT
[address] [address] [facility] Patient: R[facility] MRN: [id number] PSX:29/09/0437 A check alma indicates which time of day the medication should be taken. My Medications START taking these medications Instructions Each Dose to Equal  
 Morning Noon Evening Bedtime  
 methylPREDNISolone 4 mg tablet Commonly known as:  MEDROL (NIRAJ) Your last dose was: Your next dose is: As directed CONTINUE taking these medications Instructions Each Dose to Equal  
 Morning Noon Evening Bedtime  
 albuterol 90 mcg/actuation inhaler Commonly known as:  PROAIR HFA Your last dose was: Your next dose is: Take 2 Puffs by inhalation every four (4) hours as needed for Wheezing or Shortness of Breath. 2 Puff ALPRAZolam 0.5 mg tablet Commonly known as:  Janice Del Valle Your last dose was: Your next dose is:    
   
   
      
   
   
   
  
 amLODIPine 10 mg tablet Commonly known as:  Ramya Taylor Your last dose was: Your next dose is: TAKE ONE TABLET BY MOUTH EVERY DAY  
     
   
   
   
  
 aspirin 81 mg tablet Your last dose was: Your next dose is: Take 81 mg by mouth daily. 81 mg  
    
   
   
   
  
 atorvastatin 10 mg tablet Commonly known as:  LIPITOR Your last dose was: Your next dose is: TAKE ONE TABLET BY MOUTH EVERY DAY  
     
   
   
   
  
 bipap machine kit Your last dose was: Your next dose is:    
   
   
 1 each by Does Not Apply route. Started using BiPap Machine 01-15-15  
 1 Each CALCIUM CITRATE + PO Your last dose was: Your next dose is: Take  by mouth once over twenty-four (24) hours. cholecalciferol (VITAMIN D3) 5,000 unit Tab tablet Commonly known as:  VITAMIN D3 Your last dose was: Your next dose is: Take 5,000 Int'l Units by mouth daily. Indications: VITAMIN D DEFICIENCY  
 5000 Int'l Units COQ10 (LIPOSOMAL UBIQUINOL) PO Your last dose was: Your next dose is: Take 10 mg by mouth daily. 10 mg FISH OIL PO Your last dose was: Your next dose is: Take 4,000 mg by mouth daily. 4000 mg  
    
   
   
   
  
 fludrocortisone 0.1 mg tablet Commonly known as:  FLORINEF Your last dose was: Your next dose is: Take 1 tablet by mouth daily. 0.1 mg  
    
   
   
   
  
 fluticasone 50 mcg/actuation nasal spray Commonly known as:  Briana Kenny Your last dose was: Your next dose is:    
   
   
      
   
   
   
  
 furosemide 20 mg tablet Commonly known as:  LASIX Your last dose was: Your next dose is: Take 1 Tab by mouth daily as needed for Other (Leg Swelling). 20 mg  
    
   
   
   
  
 hydroCHLOROthiazide 12.5 mg capsule Commonly known as:  Clarke Pelaez Your last dose was: Your next dose is: TAKE ONE CAPSULE BY MOUTH EVERY DAY  
     
   
   
   
  
 HYDROcodone-acetaminophen 5-325 mg per tablet Commonly known as:  Robbie Casper Your last dose was: Your next dose is: Take 1 Tab by mouth every four (4) hours as needed for Pain. Max Daily Amount: 6 Tabs. 1 Tab  
    
   
   
   
  
 hydrocortisone 10 mg tablet Commonly known as:  CORTEF Your last dose was: Your next dose is: L-Methylfolate-S43-Elbyllejeq tablet Commonly known as:  Saeid Phillips Your last dose was: Your next dose is: Take 1 tablet by mouth daily. 1 Tab lisinopril 20 mg tablet Commonly known as:  Sima Brands Your last dose was: Your next dose is: TAKE ONE TABLET BY MOUTH EVERY DAY  
     
   
   
   
  
 magnesium oxide 400 mg tablet Commonly known as:  MAG-OX Your last dose was: Your next dose is: Take 400 mg by mouth daily. 400 mg  
    
   
   
   
  
 metoprolol tartrate 50 mg tablet Commonly known as:  LOPRESSOR Your last dose was: Your next dose is: TAKE ONE TABLET BY MOUTH EVERY DAY FOR HYPERTENSION  
     
   
   
   
  
 nystatin powder Commonly known as:  MYCOSTATIN Your last dose was: Your next dose is:    
   
   
 Apply  to affected area four (4) times daily. OXcarbazepine 300 mg tablet Commonly known as:  TRILEPTAL Your last dose was: Your next dose is: Take 300 mg by mouth two (2) times a day. 300 mg PARoxetine 30 mg tablet Commonly known as:  PAXIL Your last dose was: Your next dose is: TAKE ONE TABLET BY MOUTH EVERY DAY  
     
   
   
   
  
 potassium chloride 20 mEq tablet Commonly known as:  K-DUR, KLOR-CON Your last dose was: Your next dose is: TAKE ONE TABLET BY MOUTH 2 TIMES A DAY  
     
   
   
   
  
 SYNTHROID 112 mcg tablet Generic drug:  levothyroxine Your last dose was: Your next dose is: Take 112 mcg by mouth Daily (before breakfast). 112 mcg  
    
   
   
   
  
 umeclidinium-vilanterol 62.5-25 mcg/actuation inhaler Commonly known as:  Akosua Shyann Your last dose was: Your next dose is: Take 1 Puff by inhalation daily. 1 Puff VOLTAREN 1 % Gel Generic drug:  diclofenac Your last dose was: Your next dose is:    
   
   
      
   
   
   
  
 Milagros Michel Commonly known as:  2600 Qwiki Your last dose was: Your next dose is:    
   
   
 1 Device by Does Not Apply route daily. 1 Device Where to Get Your Medications These medications were sent to [facility] [address] [place] [address], [address] [place] [id number] Phone:  [phone]  
  methylPREDNISolone 4 mg tablet

## 2018-03-09 NOTE — ED PROVIDER NOTES
EMERGENCY DEPARTMENT HISTORY AND PHYSICAL EXAM    11:56 AM      Date: 3/9/2018  Patient Name: Jaimie Whitley    History of Presenting Illness     Chief Complaint   Patient presents with    Altered mental status         History Provided By: Patient and Patient's Daughter    Chief Complaint: AMS  Duration:  Hours  Timing:  Acute  Location: n/a  Quality: n/a  Severity: Mild  Modifying Factors: n/a  Associated Symptoms: All other sx denied. No other complaints at this time. Additional History (Context):      10:01 AM Jaimie Whitley is a 80 y.o. female with a h/o COPD, HTN, EtOH abuse, Stroke, Seizures, and Smoking 1.5PPD presents to the ED via EMS with c/o AMS onset several hours ago. Per daughter stated the pt was slouched over on the bed this morning, and that the pt had bladder incontinence this morning. Pt denied n/v/d, CP, SOB, fever, urinary sx's, numbness, weakness, or cough. Per daughter noticed     Clara Morris MD        Past History     Past Medical History:  Past Medical History:   Diagnosis Date    Abuse 1998    Alcohol    Anxiety     Calculus of kidney 1988    Cardiac echocardiogram 07/31/2012    EF 60-65%. No WMA. Mild conc LVH. Gr 1 DDfx. RVSP 20-25 mmHg. Mild SAGAR. Mild TR,. Mild PAE.  Cardiovascular LE venous duplex 10/08/2012    No venous thrombosis or venous insufficiency in bilateral lower extremities.  Carotid duplex 10/09/2014    Mild < 50% bilateral ICA stenosis.       Chronic lung disease     Contact dermatitis and other eczema, due to unspecified cause     COPD (chronic obstructive pulmonary disease) (HCC)     Degenerative joint disease     Depression     Hematuria     Hypercholesteremia     Hypertension     Hypothyroidism 1998    Kidney stone     Neuropathy 3/2007    Orthostatic hypotension     Pituitary adenoma ([facility] [address] 2/1998    Seizures ([facility] [address]     none recently    Severe obstructive sleep apnea 01-15-15     started using [employer]     Stroke [facility]) 2/2006    no residual    TIA (transient ischemic attack)     Trauma        Past Surgical History:  Past Surgical History:   Procedure Laterality Date    CYSTOSCOPY,INSERT URETERAL STENT  9/14/15    Dr. Whitney Ward    HX APPENDECTOMY      HX INTRAOCULAR LENS PROSTHESIS      HX LITHOTRIPSY  9/14/15    Dr. Mariam Houston    HX TUBAL LIGATION      HX WISDOM TEETH EXTRACTION      x4       Family History:  Family History   Problem Relation Age of Onset    Heart Disease Father     Hypertension Father     Hypertension Mother     Cancer Mother      skin    Elevated Lipids Sister     Heart Disease Sister     Cancer Maternal Grandmother      colon and stomach    Heart Disease Paternal Grandmother     Heart Attack Paternal Grandmother     Heart Attack Paternal Grandfather     Heart Disease Paternal Grandfather        Social History:  Social History   Substance Use Topics    Smoking status: Current Every Day Smoker     Packs/day: 1.50     Years: 65.00     Types: Cigarettes    Smokeless tobacco: Never Used    Alcohol use No      Comment: quit 1998 due to Alcohol Abuse       Allergies: Allergies   Allergen Reactions    Iodine Hives         Review of Systems     Review of Systems   Constitutional: Negative for fever. HENT: Negative for congestion. Respiratory: Negative for cough and shortness of breath. Cardiovascular: Negative for chest pain. Gastrointestinal: Negative for abdominal pain and vomiting. Musculoskeletal: Negative for back pain. Skin: Negative for rash. Neurological: Positive for dizziness, syncope and light-headedness. Physical Exam     Visit Vitals    /69    Pulse (!) 122    Temp 100 °F (37.8 °C)    Resp 22    Ht 5' 4.17\" (1.63 m)    Wt 68 kg (150 lb)    SpO2 92%    BMI 25.61 kg/m2       Physical Exam   Constitutional: She appears well-nourished. She appears lethargic. Non-toxic appearance. She has a sickly appearance. She appears ill. No distress. HENT:   Head: Normocephalic and atraumatic. Mouth/Throat: Oropharynx is clear and moist. No oropharyngeal exudate. Eyes: Conjunctivae and EOM are normal. Pupils are equal, round, and reactive to light. No scleral icterus. Neck: Trachea normal and normal range of motion. Neck supple. No hepatojugular reflux and no JVD present. No tracheal deviation present. No thyromegaly present. Cardiovascular: Normal rate, regular rhythm, S1 normal, S2 normal, normal heart sounds, intact distal pulses and normal pulses. Exam reveals no gallop, no S3 and no S4. No murmur heard. Pulses:       Radial pulses are 2+ on the right side, and 2+ on the left side. Dorsalis pedis pulses are 2+ on the right side, and 2+ on the left side. Pulmonary/Chest: Effort normal and breath sounds normal. No accessory muscle usage. Tachypnea noted. No respiratory distress. She has no decreased breath sounds. She has no wheezes. She has no rhonchi. She has no rales. Abdominal: Soft. Normal appearance and bowel sounds are normal. She exhibits no distension and no mass. There is no hepatosplenomegaly. There is no tenderness. There is no rigidity, no rebound, no guarding, no CVA tenderness, no tenderness at McBurney's point and negative Montiel's sign. Musculoskeletal: Normal range of motion. Strength 2/5 throughout    Lymphadenopathy:        Head (right side): No submental, no submandibular, no preauricular and no occipital adenopathy present. Head (left side): No submental, no submandibular, no preauricular and no occipital adenopathy present. She has no cervical adenopathy. Right: No supraclavicular adenopathy present. Left: No supraclavicular adenopathy present. Neurological: She has normal reflexes. She appears lethargic. She is not disoriented. She displays no atrophy. No cranial nerve deficit or sensory deficit. She exhibits normal muscle tone.  Coordination and gait abnormal. GCS eye subscore is 4. GCS verbal subscore is 4. GCS motor subscore is 6. Skin: Skin is warm, dry and intact. No rash noted. She is not diaphoretic. Psychiatric: She has a normal mood and affect. Her speech is normal and behavior is normal. Judgment and thought content normal. Cognition and memory are normal.   Nursing note and vitals reviewed. Diagnostic Study Results     Labs -  Recent Results (from the past 12 hour(s))   EKG, 12 LEAD, INITIAL    Collection Time: 03/09/18  9:57 AM   Result Value Ref Range    Ventricular Rate 73 BPM    Atrial Rate 73 BPM    P-R Interval 172 ms    QRS Duration 126 ms    Q-T Interval 498 ms    QTC Calculation (Bezet) 548 ms    Calculated P Axis 106 degrees    Calculated R Axis -78 degrees    Calculated T Axis 55 degrees    Diagnosis       Normal sinus rhythm  Right bundle branch block  Left anterior fascicular block  Bifascicular block  Abnormal ECG  When compared with ECG of 05-JUN-2017 10:00,  QT has lengthened     METABOLIC PANEL, COMPREHENSIVE    Collection Time: 03/09/18 10:00 AM   Result Value Ref Range    Sodium 135 (L) 136 - 145 mmol/L    Potassium 3.1 (L) 3.5 - 5.5 mmol/L    Chloride 95 (L) 100 - 108 mmol/L    CO2 29 21 - 32 mmol/L    Anion gap 11 3.0 - 18 mmol/L    Glucose 100 (H) 74 - 99 mg/dL    BUN 24 (H) 7.0 - 18 MG/DL    Creatinine 2.01 (H) 0.6 - 1.3 MG/DL    BUN/Creatinine ratio 12 12 - 20      GFR est AA 28 (L) >60 ml/min/1.73m2    GFR est non-AA 23 (L) >60 ml/min/1.73m2    Calcium 9.8 8.5 - 10.1 MG/DL    Bilirubin, total 0.5 0.2 - 1.0 MG/DL    ALT (SGPT) 20 13 - 56 U/L    AST (SGOT) 52 (H) 15 - 37 U/L    Alk.  phosphatase 70 45 - 117 U/L    Protein, total 7.9 6.4 - 8.2 g/dL    Albumin 3.6 3.4 - 5.0 g/dL    Globulin 4.3 (H) 2.0 - 4.0 g/dL    A-G Ratio 0.8 0.8 - 1.7     CBC WITH AUTOMATED DIFF    Collection Time: 03/09/18 10:00 AM   Result Value Ref Range    WBC 9.3 4.6 - 13.2 K/uL    RBC 4.79 4.20 - 5.30 M/uL    HGB 15.0 12.0 - 16.0 g/dL    HCT 44.9 35.0 - 45.0 %    MCV 93.7 74.0 - 97.0 FL    MCH 31.3 24.0 - 34.0 PG    MCHC 33.4 31.0 - 37.0 g/dL    RDW 13.1 11.6 - 14.5 %    PLATELET 131 524 - 762 K/uL    MPV 10.2 9.2 - 11.8 FL    NEUTROPHILS 78 (H) 40 - 73 %    LYMPHOCYTES 14 (L) 21 - 52 %    MONOCYTES 7 3 - 10 %    EOSINOPHILS 0 0 - 5 %    BASOPHILS 1 0 - 2 %    ABS. NEUTROPHILS 7.4 1.8 - 8.0 K/UL    ABS. LYMPHOCYTES 1.3 0.9 - 3.6 K/UL    ABS. MONOCYTES 0.6 0.05 - 1.2 K/UL    ABS. EOSINOPHILS 0.0 0.0 - 0.4 K/UL    ABS.  BASOPHILS 0.1 (H) 0.0 - 0.06 K/UL    DF AUTOMATED     MAGNESIUM    Collection Time: 03/09/18 10:00 AM   Result Value Ref Range    Magnesium 2.1 1.6 - 2.6 mg/dL   CARDIAC PANEL,(CK, CKMB & TROPONIN)    Collection Time: 03/09/18 10:00 AM   Result Value Ref Range    CK 2592 (H) 26 - 192 U/L    CK - MB 12.8 (H) <3.6 ng/ml    CK-MB Index 0.5 0.0 - 4.0 %    Troponin-I, Qt. 0.10 (H) 0.00 - 0.06 NG/ML   POC LACTIC ACID    Collection Time: 03/09/18 10:10 AM   Result Value Ref Range    Lactic Acid (POC) 3.5 (HH) 0.4 - 2.0 mmol/L   URINALYSIS W/ RFLX MICROSCOPIC    Collection Time: 03/09/18 10:30 AM   Result Value Ref Range    Color YELLOW      Appearance CLEAR      Specific gravity 1.015 1.005 - 1.030      pH (UA) 7.0 5.0 - 8.0      Protein NEGATIVE  NEG mg/dL    Glucose NEGATIVE  NEG mg/dL    Ketone NEGATIVE  NEG mg/dL    Bilirubin NEGATIVE  NEG      Blood NEGATIVE  NEG      Urobilinogen 0.2 0.2 - 1.0 EU/dL    Nitrites NEGATIVE  NEG      Leukocyte Esterase NEGATIVE  NEG     INFLUENZA A & B AG (RAPID TEST)    Collection Time: 03/09/18 12:50 PM   Result Value Ref Range    Influenza A Antigen NEGATIVE  NEG      Influenza B Antigen NEGATIVE  NEG     POC LACTIC ACID    Collection Time: 03/09/18  1:03 PM   Result Value Ref Range    Lactic Acid (POC) 1.7 0.4 - 2.0 mmol/L       Radiologic Studies -   CT HEAD WO CONT   Final Result   IMPRESSION:       No significant interval change with moderate to severe sequela of chronic  microvascular ischemic disease and remote bilateral basal ganglia infarcts. Similar mild generalized volume loss   XR CHEST PORT   Final Result   IMPRESSION:  No acute findings         Medical Decision Making   I am the first provider for this patient. I reviewed the vital signs, available nursing notes, past medical history, past surgical history, family history and social history. Vital Signs-Reviewed the patient's vital signs. Pulse Oximetry Analysis -  87 on room air (Interpretation)    Records Reviewed: Nursing Notes (Time of Review: 10:11 AM)    ED Course: Progress Notes, Reevaluation, and Consults:    11:31 AM  Pharmacy states their is a shortage on potassium. Will d/c iv potassium. Labs essentially normal with the exception of lactic acid of 3.5 potassium of 3.1 and creatinine of 2.01 this is chronic. Lipase NL. Chest X-Ray showed No acute process. EKG showed NSR with rate of 73 bpm. With no ST elevations or depression and non specific T wave changes. CT showed No significant interval change with moderate to severe sequela of chronic  microvascular ischemic disease and remote bilateral basal ganglia infarcts. Similar mild generalized volume loss. 11:33 AM 3/9/2018     Consult:  Discussed care with Dr. Joy Cleaning. Standard discussion; including history of patients chief complaint, available diagnostic results, and treatment course. He accepts the pt.  1:00 PM    Provider Notes (Medical Decision Making):   MDM  Number of Diagnoses or Management Options  Dehydration:   Hypokalemia:   Diagnosis management comments: Sepsis   ACS  Infection   Neoplasm           1. Hospitalization Decision Time:  The decision to hospitalize the patient was made by Dr. Brandon Billingsley at 1:00PM on 3/9/2018    2.  Aspirin: Aspirin was given      Medications   sodium chloride (NS) flush 5-10 mL (not administered)   levoFLOXacin (LEVAQUIN) 750 mg in D5W IVPB (0 mg IntraVENous IV Completed 3/9/18 1211)   cefepime (MAXIPIME) 2 g in sterile water (preservative free) 10 mL IV syringe (2 g IntraVENous Given 3/9/18 1041)   sodium chloride 0.9 % bolus infusion 4,680 mL (0 mL/kg × 156 kg (Order-Specific) IntraVENous IV Completed 3/9/18 1355)   potassium chloride (K-DUR, KLOR-CON) SR tablet 40 mEq (40 mEq Oral Given 3/9/18 1147)         Diagnosis     Clinical Impression:   1. Dehydration    2. Hypokalemia        Disposition: admit    Patient's Medications   Start Taking    No medications on file   Continue Taking    ALBUTEROL (PROAIR HFA) 90 MCG/ACTUATION INHALER    Take 2 Puffs by inhalation every four (4) hours as needed for Wheezing or Shortness of Breath. ALPRAZOLAM (XANAX) 0.5 MG TABLET        AMLODIPINE (NORVASC) 10 MG TABLET    TAKE ONE TABLET BY MOUTH EVERY DAY    ASPIRIN 81 MG TABLET    Take 81 mg by mouth daily. ATORVASTATIN (LIPITOR) 10 MG TABLET    TAKE ONE TABLET BY MOUTH EVERY DAY    BIPAP MACHINE KIT    1 each by Does Not Apply route. Started using BiPap Machine 01-15-15    CALCIUM CITRATE/VITAMIN D3 (CALCIUM CITRATE + PO)    Take  by mouth once over twenty-four (24) hours. CHOLECALCIFEROL, VITAMIN D3, 5,000 UNIT TAB    Take 5,000 Int'l Units by mouth daily. Indications: VITAMIN D DEFICIENCY    COQ10, LIPOSOMAL UBIQUINOL, PO    Take 10 mg by mouth daily. DOCOSAHEXANOIC ACID/EPA (FISH OIL PO)    Take 4,000 mg by mouth daily. FLUDROCORTISONE (FLORINEF) 0.1 MG TABLET    Take 1 tablet by mouth daily. FLUTICASONE (FLONASE) 50 MCG/ACTUATION NASAL SPRAY        FUROSEMIDE (LASIX) 20 MG TABLET    Take 1 Tab by mouth daily as needed for Other (Leg Swelling). HYDROCHLOROTHIAZIDE (MICROZIDE) 12.5 MG CAPSULE    TAKE ONE CAPSULE BY MOUTH EVERY DAY    HYDROCODONE-ACETAMINOPHEN (NORCO) 5-325 MG PER TABLET    Take 1 Tab by mouth every four (4) hours as needed for Pain. Max Daily Amount: 6 Tabs. HYDROCORTISONE (CORTEF) 10 MG TABLET        L-METHYLFOLATE-P10-PTMFYTLFEU (CEREFOLIN NAC) TABLET    Take 1 tablet by mouth daily.     LISINOPRIL (PRINIVIL, ZESTRIL) 20 MG TABLET TAKE ONE TABLET BY MOUTH EVERY DAY    MAGNESIUM OXIDE (MAG-OX) 400 MG TABLET    Take 400 mg by mouth daily. METOPROLOL TARTRATE (LOPRESSOR) 50 MG TABLET    TAKE ONE TABLET BY MOUTH EVERY DAY FOR HYPERTENSION    NYSTATIN (MYCOSTATIN) POWDER    Apply  to affected area four (4) times daily. OXCARBAZEPINE (TRILEPTAL) 300 MG TABLET    Take 300 mg by mouth two (2) times a day. PAROXETINE (PAXIL) 30 MG TABLET    TAKE ONE TABLET BY MOUTH EVERY DAY    POTASSIUM CHLORIDE (K-DUR, KLOR-CON) 20 MEQ TABLET    TAKE ONE TABLET BY MOUTH 2 TIMES A DAY    SYNTHROID 112 MCG TABLET        UMECLIDINIUM-VILANTEROL (ANORO ELLIPTA) 62.5-25 MCG/ACTUATION INHALER    Take 1 Puff by inhalation daily. VOLTAREN 1 % TOPICAL GEL        WALKER (BARNEY ROLLING WALKER) MISC    1 Device by Does Not Apply route daily. These Medications have changed    No medications on file   Stop Taking    No medications on file       _______________________________    Attestations:  [address] [place] [address] acting as a scribe for and in the presence of Meghann Silva DO      March 09, 2018 at 2:13 PM       Provider Attestation:      I personally performed the services described in the documentation, reviewed the documentation, as recorded by the scribe in my presence, and it accurately and completely records my words and actions.  March 09, 2018 at 2:13 PM - Meghann Silva DO    _______________________________

## 2018-03-09 NOTE — ROUTINE PROCESS
TRANSFER - OUT REPORT:    Verbal report given to RN Radha Nurse (name) on Rylan Strickland  being transferred to [facility] Room 213(unit) for routine progression of care       Report consisted of patients Situation, Background, Assessment and   Recommendations(SBAR). Information from the following report(s) SBAR was reviewed with the receiving nurse. Lines:   Peripheral IV 03/09/18 Right Antecubital (Active)   Site Assessment Clean, dry, & intact 3/9/2018 10:08 AM   Phlebitis Assessment 0 3/9/2018 10:08 AM   Infiltration Assessment 0 3/9/2018 10:08 AM   Dressing Status Clean, dry, & intact 3/9/2018 10:08 AM   Hub Color/Line Status Flushed;Patent 3/9/2018 10:08 AM   Action Taken Blood drawn 3/9/2018 10:08 AM       Peripheral IV 03/09/18 Left Antecubital (Active)   Site Assessment Clean, dry, & intact 3/9/2018 10:09 AM   Phlebitis Assessment 0 3/9/2018 10:09 AM   Infiltration Assessment 0 3/9/2018 10:09 AM   Dressing Status Clean, dry, & intact 3/9/2018 10:09 AM   Hub Color/Line Status Patent; Flushed 3/9/2018 10:09 AM   Action Taken Blood drawn 3/9/2018 10:09 AM        Opportunity for questions and clarification was provided.       Patient transported with:   Monitor

## 2018-03-09 NOTE — IP AVS SNAPSHOT
[address] [address] Karrie Hidalgo Patient: Griselda Garfinkel MRN: [id number] About your hospitalization You were admitted on:  March 9, 2018 You last received care in the:  [address] [place] [address] You were discharged on:  March 13, 2018 Why you were hospitalized Your primary diagnosis was:  Gastroenteritis Your diagnoses also included:  Dehydration, Essential Hypertension, Benign, Dyslipidemia, Hypothyroidism, Copd (Chronic Obstructive Pulmonary Disease) (Hcc), Acute Renal Failure (Hcc), Elevated Lactic Acid Level, Wheezing, Panhypopituitarism (Hcc) Follow-up Information Follow up With Details Comments Contact Info [facility] Grayson Dudley  On 3/20/2018 @2:00pm Vita  [facility] 
[phone] Your Scheduled Appointments Tuesday March 20, 2018  2:00 PM EDT TRANSITIONAL CARE MANAGEMENT with [id number] Grayson Dudley DO [address] (--)  
 [place] [address] [address] [address] [phone] Wednesday April 25, 2018 10:00 AM EDT Follow Up with Elizabeth Velasquez MD  
[address] (--)  
 [place] [id number] [address] [address] [phone] Discharge Orders None A check alma indicates which time of day the medication should be taken. My Medications START taking these medications Instructions Each Dose to Equal  
 Morning Noon Evening Bedtime  
 methylPREDNISolone 4 mg tablet Commonly known as:  MEDROL (NIRAJ) Your last dose was: Your next dose is: As directed CONTINUE taking these medications Instructions Each Dose to Equal  
 Morning Noon Evening Bedtime  
 albuterol 90 mcg/actuation inhaler Commonly known as:  PROAIR HFA Your last dose was: Your next dose is: Take 2 Puffs by inhalation every four (4) hours as needed for Wheezing or Shortness of Breath. 2 Puff ALPRAZolam 0.5 mg tablet Commonly known as:  Renuka Marking Your last dose was: Your next dose is:    
   
   
      
   
   
   
  
 amLODIPine 10 mg tablet Commonly known as:  Maryam Prow Your last dose was: Your next dose is: TAKE ONE TABLET BY MOUTH EVERY DAY  
     
   
   
   
  
 aspirin 81 mg tablet Your last dose was: Your next dose is: Take 81 mg by mouth daily. 81 mg  
    
   
   
   
  
 atorvastatin 10 mg tablet Commonly known as:  LIPITOR Your last dose was: Your next dose is: TAKE ONE TABLET BY MOUTH EVERY DAY  
     
   
   
   
  
 bipap machine kit Your last dose was: Your next dose is:    
   
   
 1 each by Does Not Apply route. Started using BiPap Machine 01-15-15  
 1 Each CALCIUM CITRATE + PO Your last dose was: Your next dose is: Take  by mouth once over twenty-four (24) hours. cholecalciferol (VITAMIN D3) 5,000 unit Tab tablet Commonly known as:  VITAMIN D3 Your last dose was: Your next dose is: Take 5,000 Int'l Units by mouth daily. Indications: VITAMIN D DEFICIENCY  
 5000 Int'l Units COQ10 (LIPOSOMAL UBIQUINOL) PO Your last dose was: Your next dose is: Take 10 mg by mouth daily. 10 mg FISH OIL PO Your last dose was: Your next dose is: Take 4,000 mg by mouth daily. 4000 mg  
    
   
   
   
  
 fludrocortisone 0.1 mg tablet Commonly known as:  FLORINEF Your last dose was: Your next dose is: Take 1 tablet by mouth daily. 0.1 mg  
    
   
   
   
  
 fluticasone 50 mcg/actuation nasal spray Commonly known as:  Francine Chris Your last dose was: Your next dose is:    
   
   
      
   
   
   
  
 furosemide 20 mg tablet Commonly known as:  LASIX Your last dose was: Your next dose is: Take 1 Tab by mouth daily as needed for Other (Leg Swelling). 20 mg  
    
   
   
   
  
 hydroCHLOROthiazide 12.5 mg capsule Commonly known as:  Cale Griffith Your last dose was: Your next dose is: TAKE ONE CAPSULE BY MOUTH EVERY DAY  
     
   
   
   
  
 HYDROcodone-acetaminophen 5-325 mg per tablet Commonly known as:  Shiraz Staff Your last dose was: Your next dose is: Take 1 Tab by mouth every four (4) hours as needed for Pain. Max Daily Amount: 6 Tabs. 1 Tab  
    
   
   
   
  
 hydrocortisone 10 mg tablet Commonly known as:  CORTEF Your last dose was: Your next dose is: L-Methylfolate-A32-Uijzsjlsir tablet Commonly known as:  Carol Eliasin Your last dose was: Your next dose is: Take 1 tablet by mouth daily. 1 Tab  
    
   
   
   
  
 lisinopril 20 mg tablet Commonly known as:  Henny Lopez Your last dose was: Your next dose is: TAKE ONE TABLET BY MOUTH EVERY DAY  
     
   
   
   
  
 magnesium oxide 400 mg tablet Commonly known as:  MAG-OX Your last dose was: Your next dose is: Take 400 mg by mouth daily. 400 mg  
    
   
   
   
  
 metoprolol tartrate 50 mg tablet Commonly known as:  LOPRESSOR Your last dose was: Your next dose is: TAKE ONE TABLET BY MOUTH EVERY DAY FOR HYPERTENSION  
     
   
   
   
  
 nystatin powder Commonly known as:  MYCOSTATIN Your last dose was: Your next dose is:    
   
   
 Apply  to affected area four (4) times daily. OXcarbazepine 300 mg tablet Commonly known as:  TRILEPTAL  
   
 Your last dose was: Your next dose is: Take 300 mg by mouth two (2) times a day. 300 mg PARoxetine 30 mg tablet Commonly known as:  PAXIL Your last dose was: Your next dose is: TAKE ONE TABLET BY MOUTH EVERY DAY  
     
   
   
   
  
 potassium chloride 20 mEq tablet Commonly known as:  K-DURZOILAOR-CON Your last dose was: Your next dose is: TAKE ONE TABLET BY MOUTH 2 TIMES A DAY  
     
   
   
   
  
 SYNTHROID 112 mcg tablet Generic drug:  levothyroxine Your last dose was: Your next dose is: Take 112 mcg by mouth Daily (before breakfast). 112 mcg  
    
   
   
   
  
 umeclidinium-vilanterol 62.5-25 mcg/actuation inhaler Commonly known as:  Kyra Velasquez Your last dose was: Your next dose is: Take 1 Puff by inhalation daily. 1 Puff VOLTAREN 1 % Gel Generic drug:  diclofenac Your last dose was: Your next dose is:    
   
   
      
   
   
   
  
 Nader Clutter Commonly known as:  Collibra Your last dose was: Your next dose is:    
   
   
 1 Device by Does Not Apply route daily. 1 Device Where to Get Your Medications These medications were sent to [address], [address] [place] [address] Phone:  [phone]  
  methylPREDNISolone 4 mg tablet Discharge Instructions DISCHARGE SUMMARY from Nurse PATIENT INSTRUCTIONS: 
 
 
F-face looks uneven A-arms unable to move or move unevenly S-speech slurred or non-existent T-time-call 911 as soon as signs and symptoms begin-DO NOT go Back to bed or wait to see if you get better-TIME IS BRAIN. Warning Signs of HEART ATTACK Call 911 if you have these symptoms: 
? Chest discomfort. Most heart attacks involve discomfort in the center of the chest that lasts more than a few minutes, or that goes away and comes back. It can feel like uncomfortable pressure, squeezing, fullness, or pain. ? Discomfort in other areas of the upper body. Symptoms can include pain or discomfort in one or both arms, the back, neck, jaw, or stomach. ? Shortness of breath with or without chest discomfort. ? Other signs may include breaking out in a cold sweat, nausea, or lightheadedness. Don't wait more than five minutes to call [address]! Fast action can save your life. Calling 911 is almost always the fastest way to get lifesaving treatment. Emergency Medical Services staff can begin treatment when they arrive  up to an hour sooner than if someone gets to the hospital by car. The discharge information has been reviewed with the patient and caregiver. The patient and caregiver verbalized understanding. Discharge medications reviewed with the patient and caregiver and appropriate educational materials and side effects teaching were provided. ___________________________________________________________________________________________________________________________________ Asthma Attack: Care Instructions Your Care Instructions During an asthma attack, the airways swell and narrow. This makes it hard to breathe. Severe asthma attacks can be life-threatening, but you can help prevent them by keeping your asthma under control and treating symptoms before they get bad. Symptoms include being short of breath, having chest tightness, coughing, and wheezing. Noting and treating these symptoms can also help you avoid future trips to the emergency room. The doctor has checked you carefully, but problems can develop later. If you notice any problems or new symptoms, get medical treatment right away. Follow-up care is a key part of your treatment and safety. Be sure to make and go to all appointments, and call your doctor if you are having problems. It's also a good idea to know your test results and keep a list of the medicines you take. How can you care for yourself at home? · Follow your asthma action plan to prevent and treat attacks. If you don't have an asthma action plan, work with your doctor to create one. · Take your asthma medicines exactly as prescribed. Talk to your doctor right away if you have any questions about how to take them. ¨ Use your quick-relief medicine when you have symptoms of an attack. Quick-relief medicine is usually an albuterol inhaler. Some people need to use quick-relief medicine before they exercise. ¨ Take your controller medicine every day, not just when you have symptoms. Controller medicine is usually an inhaled corticosteroid. The goal is to prevent problems before they occur. Don't use your controller medicine to treat an attack that has already started. It doesn't work fast enough to help. ¨ If your doctor prescribed corticosteroid pills to use during an attack, take them exactly as prescribed. It may take hours for the pills to work, but they may make the episode shorter and help you breathe better. ¨ Keep your quick-relief medicine with you at all times. · Talk to your doctor before using other medicines. Some medicines, such as aspirin, can cause asthma attacks in some people. · If you have a peak flow meter, use it to check how well you are breathing. This can help you predict when an asthma attack is going to occur. Then you can take medicine to prevent the asthma attack or make it less severe. · Do not smoke or allow others to smoke around you. Avoid smoky places. Smoking makes asthma worse. If you need help quitting, talk to your doctor about stop-smoking programs and medicines. These can increase your chances of quitting for good. · Learn what triggers an asthma attack for you, and avoid the triggers when you can. Common triggers include colds, smoke, air pollution, dust, pollen, mold, pets, cockroaches, stress, and cold air. · Avoid colds and the flu. Get a pneumococcal vaccine shot. If you have had one before, ask your doctor if you need a second dose. Get a flu vaccine every fall. If you must be around people with colds or the flu, wash your hands often. When should you call for help? Call 911 anytime you think you may need emergency care. For example, call if: 
? · You have severe trouble breathing. ?Call your doctor now or seek immediate medical care if: 
? · Your symptoms do not get better after you have followed your asthma action plan. ? · You have new or worse trouble breathing. ? · Your coughing and wheezing get worse. ? · You cough up dark brown or bloody mucus (sputum). ? · You have a new or higher fever. ? Watch closely for changes in your health, and be sure to contact your doctor if: 
? · You need to use quick-relief medicine on more than 2 days a week (unless it is just for exercise). ? · You cough more deeply or more often, especially if you notice more mucus or a change in the color of your mucus. ? · You are not getting better as expected. Where can you learn more? Go to http://casey-sameer.info/. Enter P279 in the search box to learn more about \"Asthma Attack: Care Instructions. \" Current as of: May 12, 2017 Content Version: 11.4 © 7640-6406 [employer]. Care instructions adapted under license by [employer] (which disclaims liability or warranty for this information).  If you have questions about a medical condition or this instruction, always ask your healthcare professional. [place] [address] any warranty or liability for your use of this information. Patient edwin removed and shredded ACO Transitions of Care Introducing [employer] [address] offers a voluntary care coordination program to provide high quality service and care to [facility] fee-for-service beneficiaries. Bolivar Krishnan was designed to help you enhance your health and well-being through the following services: ? Transitions of Care  support for individuals who are transitioning from one care setting to another (example: Hospital to home). ? Chronic and Complex Care Coordination  support for individuals and caregivers of those with serious or chronic illnesses or with more than one chronic (ongoing) condition and those who take a number of different medications. If you meet specific medical criteria, a Critical access hospital [address] may call you directly to coordinate your care with your primary care physician and your other care providers. For questions about the [facility] programs, please, contact your physicians office. For general questions or additional information about Accountable Care Organizations: 
Please visit www.medicare.gov/acos. html or call 1-800-MEDICARE (6-624.469.9614) TTY users should call 9-884.881.4585. WireImage Announcement We are excited to announce that we are making your provider's discharge notes available to you in WireImage. You will see these notes when they are completed and signed by the physician that discharged you from your recent hospital stay. If you have any questions or concerns about any information you see in mycujoohart, please call the Health Information Department where you were seen or reach out to your Primary Care Provider for more information about your plan of care. Introducing [facility]! Dear Latasha Melo: 
Thank you for requesting a [employer] account. Our records indicate that you already have an active [employer] account. You can access your account anytime at [email or web address] Did you know that you can access your hospital and ER discharge instructions at any time in [employer]? You can also review all of your test results from your hospital stay or ER visit. Additional Information If you have questions, please visit the Frequently Asked Questions section of the [employer] website at [email or web address] Remember, [employer] is NOT to be used for urgent needs. For medical emergencies, dial 911. Now available from your iPhone and Android! Unresulted Labs-Please follow up with your PCP about these lab tests Order Current Status O+P EXAM, FORMALIN ONLY In process CULTURE, BLOOD Preliminary result CULTURE, BLOOD Preliminary result Providers Seen During Your Hospitalization Provider Specialty Primary office phone Ammy Hussein DO Emergency Medicine [phone] Shanthi Vaughn MD Hospitalist [phone] Carrie Worthington MD Infectious Diseases [phone] Nuha Case MD Family Practice [phone] Your Primary Care Physician (PCP) Primary Care Physician Office Phone Office Fax [address]34 [address], [address] [address] [phone] You are allergic to the following Allergen Reactions Iodine Hives Recent Documentation Height Weight BMI OB Status Smoking Status 1.63 m 72.3 kg 27.21 kg/m2 Postmenopausal Current Every Day Smoker Emergency Contacts Name Discharge Info Relation Home Work Mobile [address] CAREGIVER [3] Daughter [21] [phone] Earl Edwards  Child [2] [phone] Carola Garcia  Child [2] [phone] Patient Belongings The following personal items are in your possession at time of discharge: Visual Aid: None                  Other Valuables: None Discharge Instructions Attachments/References OXYGEN THERAPY (ENGLISH) Patient Handouts Oxygen Therapy: Care Instructions Your Care Instructions Oxygen therapy helps you get more oxygen into your lungs and bloodstream. You may use it if you have a disease that makes it hard to breathe, such as COPD, pulmonary fibrosis (scarring of the lungs), or heart failure. Oxygen therapy can make it easier for you to breathe and can reduce your heart's workload. Some people need extra oxygen all the time. Others need it from time to time throughout the day or overnight. A doctor will prescribe how much oxygen you need and how often to use it. To breathe the oxygen, most people use a nasal cannula (say \"NADYA-yuh-suly\"). This is a thin tube with two prongs that fit just inside your nose. People who need a lot of oxygen may need to use a mask that fits over the nose and mouth. Follow-up care is a key part of your treatment and safety. Be sure to make and go to all appointments, and call your doctor if you are having problems. It's also a good idea to know your test results and keep a list of the medicines you take. How can you care for yourself at home? To help yourself · Using oxygen may dry out your nose or lips. Use water-based lubricants on your lips or nostrils. Do not use an oil-based product like petroleum jelly. · If you use a nasal cannula, the tubing may rub under your nostrils and around your ears. To keep your skin from getting sore, tuck some gauze under the tubing. Use a water-based lotion on rubbed areas. · Do not use alcohol or take drugs that relax you, because they will slow your breathing rate. · Keep track of how much oxygen is in the tank, and reorder before it runs out. If a holiday is coming up or you expect bad weather, order in advance or make your regular order larger. · You may need extra oxygen when you travel to high altitudes or travel by plane. Ask your doctor about this. · If you are getting oxygen directly to your windpipe through an opening in your neck, your doctor will teach you how to care for the equipment. To make sure oxygen is flowing · Check the flow by holding your mask or cannula up to your ear and listening for the \"hiss\" of airflow. · If you have a nasal cannula, dip the prongs in a glass of water. If you see bubbles, oxygen is coming through. · Check your pressure gauge or contents indicator. · If you use an oxygen concentrator, make sure it is turned on and plugged in. If you use a cylinder, make sure the valve is open. · Look for kinks, blockages, or water in the tubing. Be sure the tubing is connected to the oxygen source. · Do not change your oxygen flow rate. Your doctor sets this at the correct level. Higher flow rates usually do not help and can increase the risk of harmful carbon dioxide buildup in the blood. To be safe · Do not leave cords or tubing running across an area where you or someone else may trip on it. · Do not let oxygen containers get hot. Store them in a cool place where there is airflow. Do not leave them in a car trunk or a hot vehicle. · Keep oxygen containers upright. Make sure they do not fall over and get damaged. Try securing the tanks in a sturdy container or securing them with a rope or a chain. · Watch for signs of oxygen leaks. If you hear a loud hissing from your container or if it empties too fast, stay away from the container. Open windows right away and call the company that brought the oxygen system to your home. · Do not use oxygen around anything that could spark or easily cause a fire. ¨ Do not smoke or let others smoke while you are using oxygen. Put up \"no smoking\" signs in your home.  
¨ Do not use oxygen near open flames, such as candles, fireplaces, gas stoves, or hot water heaters. Do not use it near electric razors, hair dryers, heating pads, or anything that may spark. ¨ Keep a working fire extinguisher in your home where it is easy to get to. ¨ If a fire starts, turn off the oxygen right away and leave the house. ¨ If you have an oxygen concentrator, do not use it if the cord looks damaged. Do not use an extension cord to plug it in. Do not plug it into an outlet that has other appliances plugged into it. To care for the equipment · Follow the directions that come with the equipment for using and caring for it. · Wash your cannula or mask with a liquid soap and warm water 1 or 2 times a week. Replace them every 2 to 4 weeks. · If you have a cold, change the nasal prongs when your cold symptoms are done. · If you have an oxygen concentrator, unplug the unit and wipe down the cabinet with a damp cloth daily. Clean the air filter at least 2 times a week. Where can you learn more? Go to http://casey-sameer.info/. Enter E117 in the search box to learn more about \"Oxygen Therapy: Care Instructions. \" Current as of: May 12, 2017 Content Version: 11.4 © 9426-9384 Healthwise, [employer]'s[employer]His Place. Care instructions adapted under license by [employer] (which disclaims liability or warranty for this information). If you have questions about a medical condition or this instruction, always ask your healthcare professional. [place] [address] any warranty or liability for your use of this information. Please provide this summary of care documentation to your next provider. Signatures-by signing, you are acknowledging that this After Visit Summary has been reviewed with you and you have received a copy. Patient Signature:  ____________________________________________________________  Date:  ____________________________________________________________  
  
Merleen Spire    
    
 Provider Signature:  ____________________________________________________________ Date:  ____________________________________________________________

## 2018-03-09 NOTE — ED NOTES
Verbal shift change report given to RN Ghanshyam Lopez.. (oncoming nurse) by RN Hyun Glover. (offgoing nurse). Report included the following information SBAR.

## 2018-03-10 PROBLEM — E23.0 PANHYPOPITUITARISM (HCC): Status: ACTIVE | Noted: 2018-03-10

## 2018-03-10 LAB
ANION GAP SERPL CALC-SCNC: 10 MMOL/L (ref 3–18)
BUN SERPL-MCNC: 18 MG/DL (ref 7–18)
BUN/CREAT SERPL: 15 (ref 12–20)
CALCIUM SERPL-MCNC: 8.1 MG/DL (ref 8.5–10.1)
CHLORIDE SERPL-SCNC: 111 MMOL/L (ref 100–108)
CO2 SERPL-SCNC: 19 MMOL/L (ref 21–32)
CREAT SERPL-MCNC: 1.19 MG/DL (ref 0.6–1.3)
ERYTHROCYTE [DISTWIDTH] IN BLOOD BY AUTOMATED COUNT: 13.6 % (ref 11.6–14.5)
GLUCOSE SERPL-MCNC: 134 MG/DL (ref 74–99)
HCT VFR BLD AUTO: 40.4 % (ref 35–45)
HGB BLD-MCNC: 13.8 G/DL (ref 12–16)
MAGNESIUM SERPL-MCNC: 2.2 MG/DL (ref 1.6–2.6)
MCH RBC QN AUTO: 31.7 PG (ref 24–34)
MCHC RBC AUTO-ENTMCNC: 34.2 G/DL (ref 31–37)
MCV RBC AUTO: 92.7 FL (ref 74–97)
PHOSPHATE SERPL-MCNC: 2.6 MG/DL (ref 2.5–4.9)
PLATELET # BLD AUTO: 218 K/UL (ref 135–420)
PMV BLD AUTO: 10.2 FL (ref 9.2–11.8)
POTASSIUM SERPL-SCNC: 4 MMOL/L (ref 3.5–5.5)
RBC # BLD AUTO: 4.36 M/UL (ref 4.2–5.3)
SODIUM SERPL-SCNC: 140 MMOL/L (ref 136–145)
TROPONIN I SERPL-MCNC: 0.09 NG/ML (ref 0–0.04)
TSH SERPL DL<=0.05 MIU/L-ACNC: 0.02 UIU/ML (ref 0.36–3.74)
WBC # BLD AUTO: 9 K/UL (ref 4.6–13.2)

## 2018-03-10 PROCEDURE — 80048 BASIC METABOLIC PNL TOTAL CA: CPT | Performed by: INTERNAL MEDICINE

## 2018-03-10 PROCEDURE — 94640 AIRWAY INHALATION TREATMENT: CPT

## 2018-03-10 PROCEDURE — 83735 ASSAY OF MAGNESIUM: CPT | Performed by: INTERNAL MEDICINE

## 2018-03-10 PROCEDURE — 85027 COMPLETE CBC AUTOMATED: CPT | Performed by: INTERNAL MEDICINE

## 2018-03-10 PROCEDURE — 84484 ASSAY OF TROPONIN QUANT: CPT | Performed by: INTERNAL MEDICINE

## 2018-03-10 PROCEDURE — 36415 COLL VENOUS BLD VENIPUNCTURE: CPT | Performed by: INTERNAL MEDICINE

## 2018-03-10 PROCEDURE — 99218 HC RM OBSERVATION: CPT

## 2018-03-10 PROCEDURE — 84443 ASSAY THYROID STIM HORMONE: CPT | Performed by: INTERNAL MEDICINE

## 2018-03-10 PROCEDURE — 65660000000 HC RM CCU STEPDOWN

## 2018-03-10 PROCEDURE — 74011250637 HC RX REV CODE- 250/637: Performed by: INTERNAL MEDICINE

## 2018-03-10 PROCEDURE — 84100 ASSAY OF PHOSPHORUS: CPT | Performed by: INTERNAL MEDICINE

## 2018-03-10 PROCEDURE — 74011250636 HC RX REV CODE- 250/636: Performed by: INTERNAL MEDICINE

## 2018-03-10 RX ORDER — LOPERAMIDE HYDROCHLORIDE 2 MG/1
2 CAPSULE ORAL
Status: DISCONTINUED | OUTPATIENT
Start: 2018-03-10 | End: 2018-03-13 | Stop reason: HOSPADM

## 2018-03-10 RX ADMIN — ATORVASTATIN CALCIUM 10 MG: 10 TABLET, FILM COATED ORAL at 23:13

## 2018-03-10 RX ADMIN — SODIUM CHLORIDE 100 ML/HR: 900 INJECTION, SOLUTION INTRAVENOUS at 02:45

## 2018-03-10 RX ADMIN — OXCARBAZEPINE 300 MG: 300 TABLET ORAL at 09:29

## 2018-03-10 RX ADMIN — HEPARIN SODIUM 5000 UNITS: 5000 INJECTION, SOLUTION INTRAVENOUS; SUBCUTANEOUS at 02:47

## 2018-03-10 RX ADMIN — SODIUM CHLORIDE 100 ML/HR: 900 INJECTION, SOLUTION INTRAVENOUS at 12:03

## 2018-03-10 RX ADMIN — HEPARIN SODIUM 5000 UNITS: 5000 INJECTION, SOLUTION INTRAVENOUS; SUBCUTANEOUS at 18:27

## 2018-03-10 RX ADMIN — METHYLPREDNISOLONE SODIUM SUCCINATE 40 MG: 40 INJECTION, POWDER, FOR SOLUTION INTRAMUSCULAR; INTRAVENOUS at 23:13

## 2018-03-10 RX ADMIN — BUDESONIDE AND FORMOTEROL FUMARATE DIHYDRATE 2 PUFF: 80; 4.5 AEROSOL RESPIRATORY (INHALATION) at 07:15

## 2018-03-10 RX ADMIN — LEVOTHYROXINE SODIUM 112 MCG: 112 TABLET ORAL at 06:07

## 2018-03-10 RX ADMIN — METHYLPREDNISOLONE SODIUM SUCCINATE 40 MG: 40 INJECTION, POWDER, FOR SOLUTION INTRAMUSCULAR; INTRAVENOUS at 18:27

## 2018-03-10 RX ADMIN — METOPROLOL TARTRATE 50 MG: 50 TABLET ORAL at 09:28

## 2018-03-10 RX ADMIN — BUDESONIDE AND FORMOTEROL FUMARATE DIHYDRATE 2 PUFF: 80; 4.5 AEROSOL RESPIRATORY (INHALATION) at 21:00

## 2018-03-10 RX ADMIN — TIOTROPIUM BROMIDE 18 MCG: 18 CAPSULE ORAL; RESPIRATORY (INHALATION) at 07:15

## 2018-03-10 RX ADMIN — SODIUM CHLORIDE 500 MG: 900 INJECTION, SOLUTION INTRAVENOUS at 09:00

## 2018-03-10 RX ADMIN — METHYLPREDNISOLONE SODIUM SUCCINATE 40 MG: 40 INJECTION, POWDER, FOR SOLUTION INTRAMUSCULAR; INTRAVENOUS at 06:07

## 2018-03-10 RX ADMIN — POTASSIUM CHLORIDE 20 MEQ: 20 TABLET, EXTENDED RELEASE ORAL at 09:28

## 2018-03-10 RX ADMIN — HEPARIN SODIUM 5000 UNITS: 5000 INJECTION, SOLUTION INTRAVENOUS; SUBCUTANEOUS at 09:29

## 2018-03-10 RX ADMIN — METHYLPREDNISOLONE SODIUM SUCCINATE 40 MG: 40 INJECTION, POWDER, FOR SOLUTION INTRAMUSCULAR; INTRAVENOUS at 11:31

## 2018-03-10 RX ADMIN — METOPROLOL TARTRATE 50 MG: 50 TABLET ORAL at 18:27

## 2018-03-10 RX ADMIN — ASPIRIN 81 MG 81 MG: 81 TABLET ORAL at 09:28

## 2018-03-10 RX ADMIN — FLUDROCORTISONE ACETATE 100 MCG: 0.1 TABLET ORAL at 09:29

## 2018-03-10 RX ADMIN — PAROXETINE 30 MG: 10 TABLET, FILM COATED ORAL at 09:28

## 2018-03-10 RX ADMIN — OXCARBAZEPINE 300 MG: 300 TABLET ORAL at 18:27

## 2018-03-10 NOTE — PROGRESS NOTES
Admit Date: 3/9/2018  Date of Service: 3/10/2018    Reason for follow-up: COPD exacerbation      Assessment:         AMS with generalized weakness: significantly improved following IV fluid hydration     COPD exacerbation with underlying bronchitis: still on O2 NC at 3L, solumedrol and duo nebs; CXR without pneumonia    Diarrhea:  C.diff negative; still with ongoing episodes; suspect viral process. 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, and stress dose steroids at present    ARF: resolved with IV fluids  HTN: currently contolled with mild hypotension    Plan:   Continue Spiriva, Symbicort,  IV solumedrol , and Duo nebs  Continue lopressor, florinef, ASA, lipitor  IV fluid hydration  Hold colace, will give immodium for diarrhea  CBC, BMP in am  Slowly add back home BP medications as BP stabilizes    Current Antibtiocs:   Azithromycin 3/9- present    Completed cefepime and levofloxacin in ED on 3/9    Lines:   peropheral    I spent 45 minutes with the patient in face-to-face consultation, of which greater than 50% was spent in counseling and coordination of care as described above. Case discussed with:  [x]Patient  [x]Family  [x]Nursing  []Case Management  DVT Prophylaxis:  []Lovenox  [x]Hep SQ  []SCDs  []Coumadin   []On Heparin gtt  I have independently examined the patient and reviewed all lab studies and imgaing as well as review of nursing notes and physican notes from the past 24 hours. The plan of care has been discussed with the patient and all questions are answered. Justine Younger D.O. Pager [phone]      Allergies   Allergen Reactions    Iodine Hives           Subjective:      Pt seen and examined. Daughter at bedside. Feeling much better today. She is still somewhat SOB. Less nauseated bu has early satiety. Loose stool which is uncontrolled. No fevers or chills. Asking for a cigarette. No chest pain.        Objective:        Visit Vitals    /64 (BP 1 Location: Left arm, BP Patient Position: At rest)    Pulse 65    Temp 98 °F (36.7 °C)    Resp 18    Ht 5' 4.17\" (1.63 m)    Wt 66.6 kg (146 lb 14.4 oz)    SpO2 92%    BMI 25.08 kg/m2     Temp (24hrs), Av °F (36.7 °C), Min:97.5 °F (36.4 °C), Max:98.3 °F (36.8 °C)        General:   awake alert and oriented, non-toxic   Skin:   no rashes or skin lesions noted on limited exam, dry and warm   HEENT:  No scleral icterus or pallor; oral mucosa moist, lips moist   Lymph Nodes:   not assessed today   Lungs:   non, labored; occasional wheeze with diminished breath sounds at bases   Heart:  RRR, s1 and s2; no murmurs rubs or gallops; no edema, + pedal pulses   Abdomen:  soft, non-distended, active bowel sounds, non-tender; dark liqud stool pooling in perineal area   Genitourinary:  deferred   Extremities:   average muscle tone; no contractures, no joint effusions   Neurologic:  No gross focal motor or sensory abnormalities; CN 2-12 intact; Follows commands. Psychiatric:   appropriate and interactive. Labs: Results:   Chemistry Recent Labs      03/10/18   0340  18   1000   GLU  134*  100*   NA  140  135*   K  4.0  3.1*   CL  111*  95*   CO2  19*  29   BUN  18  24*   CREA  1.19  2.01*   CA  8.1*  9.8   AGAP  10  11   BUCR  15  12   AP   --   70   TP   --   7.9   ALB   --   3.6   GLOB   --   4.3*   AGRAT   --   0.8      CBC w/Diff Recent Labs      03/10/18   0340  18   1000   WBC  9.0  9.3   RBC  4.36  4.79   HGB  13.8  15.0   HCT  40.4  44.9   PLT  218  271   GRANS   --   78*   LYMPH   --   14*   EOS   --   0        No results found for: SDES Lab Results   Component Value Date/Time    Culture result:  2018 06:00 PM     Toxigenic C. difficile NEGATIVE                         C. difficile target DNA sequences are not detected.     Culture result: CULTURE IN PROGRESS,FURTHER UPDATES TO FOLLOW 2018 10:30 AM    Culture result: NO GROWTH AFTER 20 HOURS 2018 10:10 AM    Culture result: NO GROWTH AFTER 20 HOURS 03/09/2018 10:00 AM    Culture result: >100,000  COLONIES/mL  ESCHERICHIA COLI   07/28/2015 12:30 PM          Imaging:     None to review today

## 2018-03-11 LAB
ANION GAP SERPL CALC-SCNC: 6 MMOL/L (ref 3–18)
BACTERIA SPEC CULT: NORMAL
BUN SERPL-MCNC: 17 MG/DL (ref 7–18)
BUN/CREAT SERPL: 17 (ref 12–20)
CALCIUM SERPL-MCNC: 8.5 MG/DL (ref 8.5–10.1)
CHLORIDE SERPL-SCNC: 114 MMOL/L (ref 100–108)
CO2 SERPL-SCNC: 22 MMOL/L (ref 21–32)
CREAT SERPL-MCNC: 1.02 MG/DL (ref 0.6–1.3)
ERYTHROCYTE [DISTWIDTH] IN BLOOD BY AUTOMATED COUNT: 14.3 % (ref 11.6–14.5)
GLUCOSE SERPL-MCNC: 136 MG/DL (ref 74–99)
HCT VFR BLD AUTO: 34.9 % (ref 35–45)
HGB BLD-MCNC: 11.8 G/DL (ref 12–16)
MCH RBC QN AUTO: 31.9 PG (ref 24–34)
MCHC RBC AUTO-ENTMCNC: 33.8 G/DL (ref 31–37)
MCV RBC AUTO: 94.3 FL (ref 74–97)
PLATELET # BLD AUTO: 228 K/UL (ref 135–420)
PMV BLD AUTO: 10.5 FL (ref 9.2–11.8)
POTASSIUM SERPL-SCNC: 4.4 MMOL/L (ref 3.5–5.5)
RBC # BLD AUTO: 3.7 M/UL (ref 4.2–5.3)
SERVICE CMNT-IMP: NORMAL
SODIUM SERPL-SCNC: 142 MMOL/L (ref 136–145)
WBC # BLD AUTO: 12.5 K/UL (ref 4.6–13.2)

## 2018-03-11 PROCEDURE — 94640 AIRWAY INHALATION TREATMENT: CPT

## 2018-03-11 PROCEDURE — 36415 COLL VENOUS BLD VENIPUNCTURE: CPT | Performed by: INTERNAL MEDICINE

## 2018-03-11 PROCEDURE — 74011250637 HC RX REV CODE- 250/637: Performed by: INTERNAL MEDICINE

## 2018-03-11 PROCEDURE — 74011636637 HC RX REV CODE- 636/637: Performed by: INTERNAL MEDICINE

## 2018-03-11 PROCEDURE — 85027 COMPLETE CBC AUTOMATED: CPT | Performed by: INTERNAL MEDICINE

## 2018-03-11 PROCEDURE — 74011250636 HC RX REV CODE- 250/636: Performed by: INTERNAL MEDICINE

## 2018-03-11 PROCEDURE — 65660000000 HC RM CCU STEPDOWN

## 2018-03-11 PROCEDURE — 80048 BASIC METABOLIC PNL TOTAL CA: CPT | Performed by: INTERNAL MEDICINE

## 2018-03-11 RX ORDER — TRIAMTERENE/HYDROCHLOROTHIAZID 37.5-25 MG
1 TABLET ORAL DAILY
Status: DISCONTINUED | OUTPATIENT
Start: 2018-03-12 | End: 2018-03-13 | Stop reason: HOSPADM

## 2018-03-11 RX ORDER — PREDNISONE 20 MG/1
60 TABLET ORAL 2 TIMES DAILY WITH MEALS
Status: DISCONTINUED | OUTPATIENT
Start: 2018-03-11 | End: 2018-03-12

## 2018-03-11 RX ORDER — AMLODIPINE BESYLATE 10 MG/1
10 TABLET ORAL DAILY
Status: DISCONTINUED | OUTPATIENT
Start: 2018-03-12 | End: 2018-03-13 | Stop reason: HOSPADM

## 2018-03-11 RX ORDER — SODIUM CHLORIDE 9 MG/ML
75 INJECTION, SOLUTION INTRAVENOUS CONTINUOUS
Status: DISCONTINUED | OUTPATIENT
Start: 2018-03-11 | End: 2018-03-12

## 2018-03-11 RX ADMIN — METHYLPREDNISOLONE SODIUM SUCCINATE 40 MG: 40 INJECTION, POWDER, FOR SOLUTION INTRAMUSCULAR; INTRAVENOUS at 12:01

## 2018-03-11 RX ADMIN — OXCARBAZEPINE 300 MG: 300 TABLET ORAL at 17:32

## 2018-03-11 RX ADMIN — FLUDROCORTISONE ACETATE 100 MCG: 0.1 TABLET ORAL at 09:53

## 2018-03-11 RX ADMIN — METOPROLOL TARTRATE 50 MG: 50 TABLET ORAL at 17:32

## 2018-03-11 RX ADMIN — HEPARIN SODIUM 5000 UNITS: 5000 INJECTION, SOLUTION INTRAVENOUS; SUBCUTANEOUS at 09:54

## 2018-03-11 RX ADMIN — ASPIRIN 81 MG 81 MG: 81 TABLET ORAL at 09:54

## 2018-03-11 RX ADMIN — METOPROLOL TARTRATE 50 MG: 50 TABLET ORAL at 09:54

## 2018-03-11 RX ADMIN — POTASSIUM CHLORIDE 20 MEQ: 20 TABLET, EXTENDED RELEASE ORAL at 09:54

## 2018-03-11 RX ADMIN — LEVOTHYROXINE SODIUM 112 MCG: 112 TABLET ORAL at 05:33

## 2018-03-11 RX ADMIN — BUDESONIDE AND FORMOTEROL FUMARATE DIHYDRATE 2 PUFF: 80; 4.5 AEROSOL RESPIRATORY (INHALATION) at 19:39

## 2018-03-11 RX ADMIN — BUDESONIDE AND FORMOTEROL FUMARATE DIHYDRATE 2 PUFF: 80; 4.5 AEROSOL RESPIRATORY (INHALATION) at 08:00

## 2018-03-11 RX ADMIN — PREDNISONE 60 MG: 20 TABLET ORAL at 17:32

## 2018-03-11 RX ADMIN — HEPARIN SODIUM 5000 UNITS: 5000 INJECTION, SOLUTION INTRAVENOUS; SUBCUTANEOUS at 17:31

## 2018-03-11 RX ADMIN — SODIUM CHLORIDE 75 ML/HR: 900 INJECTION, SOLUTION INTRAVENOUS at 15:59

## 2018-03-11 RX ADMIN — ONDANSETRON 4 MG: 2 INJECTION INTRAMUSCULAR; INTRAVENOUS at 17:34

## 2018-03-11 RX ADMIN — SODIUM CHLORIDE 500 MG: 900 INJECTION, SOLUTION INTRAVENOUS at 09:57

## 2018-03-11 RX ADMIN — TIOTROPIUM BROMIDE 18 MCG: 18 CAPSULE ORAL; RESPIRATORY (INHALATION) at 09:00

## 2018-03-11 RX ADMIN — OXCARBAZEPINE 300 MG: 300 TABLET ORAL at 09:53

## 2018-03-11 RX ADMIN — PAROXETINE 30 MG: 10 TABLET, FILM COATED ORAL at 09:53

## 2018-03-11 RX ADMIN — METHYLPREDNISOLONE SODIUM SUCCINATE 40 MG: 40 INJECTION, POWDER, FOR SOLUTION INTRAMUSCULAR; INTRAVENOUS at 05:33

## 2018-03-11 NOTE — ROUTINE PROCESS
Bedside and Verbal shift change report given to Racheal Chambers RN(oncoming nurse) by Christopher Ramirez RN (offgoing nurse). Report included the following information SBAR, Kardex, MAR and Recent Results. 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  ICD-9-CM: 253.2  3/10/2018 Unknown        Dehydration ICD-10-CM: E86.0  ICD-9-CM: 276.51  3/9/2018 Yes        Acute renal failure ([facility] ICD-10-CM: N17.9  ICD-9-CM: 584.9  3/9/2018 Yes        Elevated lactic acid level ICD-10-CM: R79.89  ICD-9-CM: 276.2  3/9/2018 Yes        Wheezing ICD-10-CM: R06.2  ICD-9-CM: 786.07  3/9/2018 Unknown        * (Principal)Gastroenteritis ICD-10-CM: K52.9  ICD-9-CM: 558.9  3/9/2018 Yes        COPD (chronic obstructive pulmonary disease) ([facility] ICD-10-CM: J44.9  ICD-9-CM: 120  2/25/2013 Yes        Essential hypertension, benign ICD-10-CM: I10  ICD-9-CM: 401.1  10/2/2012 Yes        Dyslipidemia ICD-10-CM: E78.5  ICD-9-CM: 272.4  10/2/2012 Yes        Hypothyroidism ICD-10-CM: E03.9  ICD-9-CM: 244.9  10/2/2012 Yes              BACKGROUND:    Past Medical History:   Past Medical History:   Diagnosis Date    Abuse 1998    Alcohol    Anxiety     Calculus of kidney 1988    Cardiac echocardiogram 07/31/2012    EF 60-65%. No WMA. Mild conc LVH. Gr 1 DDfx. RVSP 20-25 mmHg. Mild SAGAR. Mild TR,. Mild PAE.  Cardiovascular LE venous duplex 10/08/2012    No venous thrombosis or venous insufficiency in bilateral lower extremities.  Carotid duplex 10/09/2014    Mild < 50% bilateral ICA stenosis.       Chronic lung disease     Contact dermatitis and other eczema, due to unspecified cause     COPD (chronic obstructive pulmonary disease) (HCC)     Degenerative joint disease     Depression     Hematuria     Hypercholesteremia     Hypertension  Hypothyroidism 1998    Kidney stone     Neuropathy 3/2007    Orthostatic hypotension     Pituitary adenoma ([facility] 2/1998    Seizures (HCC)     none recently    Severe obstructive sleep apnea 01-15-15     started using Bipap Machine    Stress     Stroke ([facility] 2/2006    no residual    TIA (transient ischemic attack)     Trauma          Patient taking anticoagulants yes     ASSESSMENT:    Changes in Assessment Throughout Shift: no     Patient has Central Line: no Reasons    Patient has Patiño Cath: no Reasons       Last Vitals:     Vitals:    03/10/18 2035 03/10/18 2339 03/11/18 0426 03/11/18 0803   BP: 102/57 112/69 117/59 117/69   Pulse: (!) 57 (!) 55 60 (!) 57   Resp: 18 20 17 18   Temp: 98.7 °F (37.1 °C) 98.2 °F (36.8 °C) 98.1 °F (36.7 °C) 97 °F (36.1 °C)   SpO2: 93% 93% 94% 91%   Weight:   68.9 kg (151 lb 14.4 oz)    Height:            IV and DRAINS (will only show if present)   Peripheral IV 03/09/18 Right Antecubital-Site Assessment: Clean, dry, & intact  Peripheral IV 03/09/18 Left Antecubital-Site Assessment: Clean, dry, & intact     WOUND (if present)   Wound Type:  none   Dressing present Dressing Present : No   Wound Concerns/Notes:  none     PAIN    Pain Assessment    Pain Intensity 1: 0 (03/11/18 0426)    Pain Location 1: Abdomen         Patient Stated Pain Goal: 0  o Interventions for Pain:  none  o Intervention effective: na  o Time of last intervention: na  o Reassessment Completed: na      Last 3 Weights:  Last 3 Recorded Weights in this Encounter    03/09/18 0956 03/10/18 0429 03/11/18 0426   Weight: 68 kg (150 lb) 66.6 kg (146 lb 14.4 oz) 68.9 kg (151 lb 14.4 oz)     Weight change: 0.86 kg (1 lb 14.4 oz)     INTAKE/OUPUT    Current Shift:      Last three shifts: 03/09 1901 - 03/11 0700  In: -   Out: 2850 [Urine:2850]     LAB RESULTS     Recent Labs      03/11/18   0222  03/10/18   0340  03/09/18   1000   WBC  12.5  9.0  9.3   HGB  11.8*  13.8  15.0   HCT  34.9*  40.4  44.9 PLT  228  218  271        Recent Labs      03/11/18   0222  03/10/18   0340  03/09/18   1000   NA  142  140  135*   K  4.4  4.0  3.1*   GLU  136*  134*  100*   BUN  17  18  24*   CREA  1.02  1.19  2.01*   CA  8.5  8.1*  9.8   MG   --   2.2  2.1       RECOMMENDATIONS AND DISCHARGE PLANNING     1. Pending tests/procedures/ Plan of Care or Other Needs: unknown     2. Discharge plan for patient and Needs/Barriers: unknown    3. Estimated Discharge Date: unknown Posted on Whiteboard in Patients Room: yes      4. The patient's care plan was reviewed with the oncoming nurse. \"HEALS\" SAFETY CHECK      Fall Risk    Total Score: 3    Safety Measures: Safety Measures: Bed/Chair-Wheels locked, Bed in low position, Call light within reach    A safety check occurred in the patient's room between off going nurse and oncoming nurse listed above. The safety check included the below items  Area Items   H  High Alert Medications - Verify all high alert medication drips (heparin, PCA, etc.)   E  Equipment - Suction is set up for ALL patients (with shashank)  - Red plugs utilized for all equipment (IV pumps, etc.)  - WOWs wiped down at end of shift.  - Room stocked with oxygen, suction, and other unit-specific supplies   A  Alarms - Bed alarm is set for fall risk patients  - Ensure chair alarm is in place and activated if patient is up in a chair   L  Lines - Check IV for any infiltration  - Patiño bag is empty if patient has a Patiño   - Tubing and IV bags are labeled   S  Safety   - Room is clean, patient is clean, and equipment is clean. - Hallways are clear from equipment besides carts. - Fall bracelet on for fall risk patients  - Ensure room is clear and free of clutter  - Suction is set up for ALL patients (with shashank)  - Hallways are clear from equipment besides carts.    - Isolation precautions followed, supplies available outside room, sign posted     Brionna Spears RN

## 2018-03-11 NOTE — PROGRESS NOTES
Admit Date: 3/9/2018  Date of Service: 3/11/2018    Reason for follow-up: COPD exacerbation      Assessment:         AMS with generalized weakness: significantly improved following IV fluid hydration     COPD exacerbation with underlying bronchitis: still on O2 NC at 3L, solumedrol and duo nebs; CXR without pneumonia    Diarrhea:  C.diff negative; still with ongoing episodes; suspect viral process. 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, and stress dose steroids at present    ARF: resolved with IV fluids  HTN: currently contolled with mild hypotension    Plan:   Continue Spiriva, Symbicort,  IV solumedrol , and Duo nebs  Continue lopressor, florinef, ASA, lipitor  IV fluid hydration  Hold colace, will give immodium for diarrhea  CBC, BMP in am  Slowly add back home BP medications as BP stabilizes  Bladder scan and straight cath if residual is >500  Restart Norvasc and HCTZ  Change solumedrol to po prednisone 60mg bid    Plan to d/c in am if voiding and tolerating BP medications    Current Antibtiocs:   Azithromycin 3/9- present    Completed cefepime and levofloxacin in ED on 3/9    Lines:   peropheral    I spent 45 minutes with the patient in face-to-face consultation, of which greater than 50% was spent in counseling and coordination of care as described above. Case discussed with:  [x]Patient  [x]Family  [x]Nursing  []Case Management  DVT Prophylaxis:  []Lovenox  [x]Hep SQ  []SCDs  []Coumadin   []On Heparin gtt  I have independently examined the patient and reviewed all lab studies and imgaing as well as review of nursing notes and physican notes from the past 24 hours. The plan of care has been discussed with the patient and all questions are answered. Maeve Grove D.O. Pager [phone]      Allergies   Allergen Reactions    Iodine Hives           Subjective:      Pt seen and examined. Daughter at bedside. Feeling much better today. No further nausea, No SOB, No diarrhea.  Tolerating diet. Per nursing has not voided since espinal cath was removed. Objective:        Visit Vitals    /59 (BP 1 Location: Right arm, BP Patient Position: At rest)    Pulse 93    Temp 98.2 °F (36.8 °C)    Resp 18    Ht 5' 4.17\" (1.63 m)    Wt 68.9 kg (151 lb 14.4 oz)    SpO2 94%    BMI 25.93 kg/m2     Temp (24hrs), Av.1 °F (36.7 °C), Min:97 °F (36.1 °C), Max:98.7 °F (37.1 °C)        General:   awake alert and oriented, non-toxic   Skin:   no rashes or skin lesions noted on limited exam, dry and warm   HEENT:  No scleral icterus or pallor; oral mucosa moist, lips moist   Lymph Nodes:   not assessed today   Lungs:   non, labored; occasional wheeze with diminished breath sounds at bases   Heart:  RRR, s1 and s2; no murmurs rubs or gallops; no edema, + pedal pulses   Abdomen:  soft, non-distended, active bowel sounds, non-tender; dark liqud stool pooling in perineal area   Genitourinary:  deferred   Extremities:   average muscle tone; no contractures, no joint effusions   Neurologic:  No gross focal motor or sensory abnormalities; CN 2-12 intact; Follows commands. Psychiatric:   appropriate and interactive.        Labs: Results:   Chemistry Recent Labs      03/11/18   0222  03/10/18   0340  03/09/18   1000   GLU  136*  134*  100*   NA  142  140  135*   K  4.4  4.0  3.1*   CL  114*  111*  95*   CO2  22  19*  29   BUN  17  18  24*   CREA  1.02  1.19  2.01*   CA  8.5  8.1*  9.8   AGAP  6  10  11   BUCR  17  15  12   AP   --    --   70   TP   --    --   7.9   ALB   --    --   3.6   GLOB   --    --   4.3*   AGRAT   --    --   0.8      CBC w/Diff Recent Labs      03/11/18   0222  03/10/18   0340  03/09/18   1000   WBC  12.5  9.0  9.3   RBC  3.70*  4.36  4.79   HGB  11.8*  13.8  15.0   HCT  34.9*  40.4  44.9   PLT  228  218  271   GRANS   --    --   78*   LYMPH   --    --   14*   EOS   --    --   0        No results found for: SDES Lab Results   Component Value Date/Time    Culture result:  2018 06:00 PM     Toxigenic C. difficile NEGATIVE                         C. difficile target DNA sequences are not detected.     Culture result:  03/09/2018 11:30 AM     NO AEROMONAS,SALMONELLA,SHIGELLA,E. COLI 0:157 OR CAMPYLOBACTER ISOLATED    Culture result: NO GROWTH 2 DAYS 03/09/2018 11:10 AM    Culture result: NO GROWTH 2 DAYS 03/09/2018 11:00 AM    Culture result: >100,000  COLONIES/mL  ESCHERICHIA COLI   07/28/2015 12:30 PM          Imaging:     None to review today

## 2018-03-12 LAB
ANION GAP SERPL CALC-SCNC: 4 MMOL/L (ref 3–18)
BUN SERPL-MCNC: 17 MG/DL (ref 7–18)
BUN/CREAT SERPL: 17 (ref 12–20)
CALCIUM SERPL-MCNC: 8.1 MG/DL (ref 8.5–10.1)
CHLORIDE SERPL-SCNC: 110 MMOL/L (ref 100–108)
CO2 SERPL-SCNC: 25 MMOL/L (ref 21–32)
CREAT SERPL-MCNC: 0.98 MG/DL (ref 0.6–1.3)
ERYTHROCYTE [DISTWIDTH] IN BLOOD BY AUTOMATED COUNT: 14.6 % (ref 11.6–14.5)
GLUCOSE SERPL-MCNC: 123 MG/DL (ref 74–99)
HCT VFR BLD AUTO: 36 % (ref 35–45)
HGB BLD-MCNC: 11.8 G/DL (ref 12–16)
MCH RBC QN AUTO: 31.4 PG (ref 24–34)
MCHC RBC AUTO-ENTMCNC: 32.8 G/DL (ref 31–37)
MCV RBC AUTO: 95.7 FL (ref 74–97)
PLATELET # BLD AUTO: 224 K/UL (ref 135–420)
PMV BLD AUTO: 10.2 FL (ref 9.2–11.8)
POTASSIUM SERPL-SCNC: 5.2 MMOL/L (ref 3.5–5.5)
RBC # BLD AUTO: 3.76 M/UL (ref 4.2–5.3)
SODIUM SERPL-SCNC: 139 MMOL/L (ref 136–145)
WBC # BLD AUTO: 10.5 K/UL (ref 4.6–13.2)

## 2018-03-12 PROCEDURE — 65660000000 HC RM CCU STEPDOWN

## 2018-03-12 PROCEDURE — 74011636637 HC RX REV CODE- 636/637: Performed by: HOSPITALIST

## 2018-03-12 PROCEDURE — 74011250637 HC RX REV CODE- 250/637: Performed by: INTERNAL MEDICINE

## 2018-03-12 PROCEDURE — 85027 COMPLETE CBC AUTOMATED: CPT | Performed by: INTERNAL MEDICINE

## 2018-03-12 PROCEDURE — 36415 COLL VENOUS BLD VENIPUNCTURE: CPT | Performed by: INTERNAL MEDICINE

## 2018-03-12 PROCEDURE — 74011250636 HC RX REV CODE- 250/636: Performed by: INTERNAL MEDICINE

## 2018-03-12 PROCEDURE — 94640 AIRWAY INHALATION TREATMENT: CPT

## 2018-03-12 PROCEDURE — 74011636637 HC RX REV CODE- 636/637: Performed by: INTERNAL MEDICINE

## 2018-03-12 PROCEDURE — 74011000250 HC RX REV CODE- 250: Performed by: INTERNAL MEDICINE

## 2018-03-12 PROCEDURE — 80048 BASIC METABOLIC PNL TOTAL CA: CPT | Performed by: INTERNAL MEDICINE

## 2018-03-12 RX ADMIN — HEPARIN SODIUM 5000 UNITS: 5000 INJECTION, SOLUTION INTRAVENOUS; SUBCUTANEOUS at 08:55

## 2018-03-12 RX ADMIN — FLUDROCORTISONE ACETATE 100 MCG: 0.1 TABLET ORAL at 08:55

## 2018-03-12 RX ADMIN — OXCARBAZEPINE 300 MG: 300 TABLET ORAL at 18:30

## 2018-03-12 RX ADMIN — PREDNISONE 60 MG: 20 TABLET ORAL at 07:10

## 2018-03-12 RX ADMIN — SODIUM CHLORIDE 75 ML/HR: 900 INJECTION, SOLUTION INTRAVENOUS at 05:30

## 2018-03-12 RX ADMIN — LEVOTHYROXINE SODIUM 112 MCG: 112 TABLET ORAL at 06:54

## 2018-03-12 RX ADMIN — OXCARBAZEPINE 300 MG: 300 TABLET ORAL at 08:55

## 2018-03-12 RX ADMIN — PAROXETINE 30 MG: 10 TABLET, FILM COATED ORAL at 08:55

## 2018-03-12 RX ADMIN — BUDESONIDE AND FORMOTEROL FUMARATE DIHYDRATE 2 PUFF: 80; 4.5 AEROSOL RESPIRATORY (INHALATION) at 21:59

## 2018-03-12 RX ADMIN — HEPARIN SODIUM 5000 UNITS: 5000 INJECTION, SOLUTION INTRAVENOUS; SUBCUTANEOUS at 00:05

## 2018-03-12 RX ADMIN — HEPARIN SODIUM 5000 UNITS: 5000 INJECTION, SOLUTION INTRAVENOUS; SUBCUTANEOUS at 18:30

## 2018-03-12 RX ADMIN — POTASSIUM CHLORIDE 20 MEQ: 20 TABLET, EXTENDED RELEASE ORAL at 08:54

## 2018-03-12 RX ADMIN — ATORVASTATIN CALCIUM 10 MG: 10 TABLET, FILM COATED ORAL at 21:59

## 2018-03-12 RX ADMIN — PREDNISONE 30 MG: 20 TABLET ORAL at 18:30

## 2018-03-12 RX ADMIN — AMLODIPINE BESYLATE 10 MG: 10 TABLET ORAL at 08:54

## 2018-03-12 RX ADMIN — BUDESONIDE AND FORMOTEROL FUMARATE DIHYDRATE 2 PUFF: 80; 4.5 AEROSOL RESPIRATORY (INHALATION) at 08:08

## 2018-03-12 RX ADMIN — ASPIRIN 81 MG 81 MG: 81 TABLET ORAL at 08:54

## 2018-03-12 RX ADMIN — SODIUM CHLORIDE 500 MG: 900 INJECTION, SOLUTION INTRAVENOUS at 08:55

## 2018-03-12 RX ADMIN — METOPROLOL TARTRATE 50 MG: 50 TABLET ORAL at 09:00

## 2018-03-12 RX ADMIN — IPRATROPIUM BROMIDE AND ALBUTEROL SULFATE 3 ML: .5; 3 SOLUTION RESPIRATORY (INHALATION) at 07:12

## 2018-03-12 RX ADMIN — TIOTROPIUM BROMIDE 18 MCG: 18 CAPSULE ORAL; RESPIRATORY (INHALATION) at 08:08

## 2018-03-12 RX ADMIN — TRIAMTERENE AND HYDROCHLOROTHIAZIDE 1 TABLET: 37.5; 25 TABLET ORAL at 08:54

## 2018-03-12 RX ADMIN — ATORVASTATIN CALCIUM 10 MG: 10 TABLET, FILM COATED ORAL at 00:05

## 2018-03-12 NOTE — CDMP QUERY
Please clarify if this patient is being treated/managed for:    =>   acute metabolic encephalopathy in setting of AMS with CT head ordered    =>Other Explanation of clinical findings  =>Unable to Determine (no explanation of clinical findings)    The medical record reflects the following:    Risk: advanced age;   Dehydration;   ARF;  Gastroenteritis;  COPD  Exacerbation. Clinical Indicators:  pt brought to ER  via EMS with c/o AMS onset several hours ago. Per daughter stated the pt was slouched over on the bed this morning, and that the pt had bladder incontinence this morning. Per EMS family called for  Unresponsive  States  She was last seen normal  Last pm    Treatment:   CT head;        Thank you,   Thank you,    Deborah Barragan RN   CCDS    x [phone]

## 2018-03-12 NOTE — ROUTINE PROCESS
Patient alert, lungs sounds moist, expiratory wheezes noted. Sats 90 on 3L. O2 placed on 4l L, nebulizer treatment given. Continue to monitor.

## 2018-03-12 NOTE — PROGRESS NOTES
Problem: Falls - Risk of  Goal: *Absence of Falls  Document Janett Fall Risk and appropriate interventions in the flowsheet.    Outcome: Progressing Towards Goal  Fall Risk Interventions:  Mobility Interventions: Patient to call before getting OOB    Mentation Interventions: Door open when patient unattended    Medication Interventions: Evaluate medications/consider consulting pharmacy    Elimination Interventions: Call light in reach

## 2018-03-12 NOTE — ROUTINE PROCESS
Bedside and Verbal shift change report given to Merline Bellman RN(oncoming nurse) by Cuco Burton RN (offgoing nurse). Report included the following information SBAR, Kardex, MAR and Recent Results. 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  ICD-9-CM: 253.2  3/10/2018 Unknown        Dehydration ICD-10-CM: E86.0  ICD-9-CM: 276.51  3/9/2018 Yes        Acute renal failure ([facility] ICD-10-CM: N17.9  ICD-9-CM: 584.9  3/9/2018 Yes        Elevated lactic acid level ICD-10-CM: R79.89  ICD-9-CM: 276.2  3/9/2018 Yes        Wheezing ICD-10-CM: R06.2  ICD-9-CM: 786.07  3/9/2018 Unknown        * (Principal)Gastroenteritis ICD-10-CM: K52.9  ICD-9-CM: 558.9  3/9/2018 Yes        COPD (chronic obstructive pulmonary disease) ([facility] ICD-10-CM: J44.9  ICD-9-CM: 965  2/25/2013 Yes        Essential hypertension, benign ICD-10-CM: I10  ICD-9-CM: 401.1  10/2/2012 Yes        Dyslipidemia ICD-10-CM: E78.5  ICD-9-CM: 272.4  10/2/2012 Yes        Hypothyroidism ICD-10-CM: E03.9  ICD-9-CM: 244.9  10/2/2012 Yes              BACKGROUND:    Past Medical History:   Past Medical History:   Diagnosis Date    Abuse 1998    Alcohol    Anxiety     Calculus of kidney 1988    Cardiac echocardiogram 07/31/2012    EF 60-65%. No WMA. Mild conc LVH. Gr 1 DDfx. RVSP 20-25 mmHg. Mild SAGAR. Mild TR,. Mild PAE.  Cardiovascular LE venous duplex 10/08/2012    No venous thrombosis or venous insufficiency in bilateral lower extremities.  Carotid duplex 10/09/2014    Mild < 50% bilateral ICA stenosis.       Chronic lung disease     Contact dermatitis and other eczema, due to unspecified cause     COPD (chronic obstructive pulmonary disease) (HCC)     Degenerative joint disease     Depression     Hematuria     Hypercholesteremia     Hypertension  Hypothyroidism 1998    Kidney stone     Neuropathy 3/2007    Orthostatic hypotension     Pituitary adenoma ([facility] 2/1998    Seizures (HCC)     none recently    Severe obstructive sleep apnea 01-15-15     started using Bipap Machine    Stress     Stroke ([facility] 2/2006    no residual    TIA (transient ischemic attack)     Trauma          Patient taking anticoagulants yes     ASSESSMENT:    Changes in Assessment Throughout Shift: no     Patient has Central Line: no Reasons    Patient has Patiño Cath: no Reasons       Last Vitals:     Vitals:    03/11/18 1942 03/11/18 2021 03/12/18 0009 03/12/18 0348   BP:  106/61 123/64 103/61   Pulse:  (!) 55 (!) 58 (!) 54   Resp:  16 16 16   Temp:  98.4 °F (36.9 °C) 97.9 °F (36.6 °C) 98.4 °F (36.9 °C)   SpO2: 94% 95% 91% 91%   Weight:    70.5 kg (155 lb 8 oz)   Height:            IV and DRAINS (will only show if present)   Peripheral IV 03/09/18 Right Antecubital-Site Assessment: Clean, dry, & intact  Peripheral IV 03/09/18 Left Antecubital-Site Assessment: Clean, dry, & intact     WOUND (if present)   Wound Type:  none   Dressing present Dressing Present : No   Wound Concerns/Notes:  none     PAIN    Pain Assessment    Pain Intensity 1: 0 (03/12/18 0401)    Pain Location 1: Abdomen         Patient Stated Pain Goal: 0  o Interventions for Pain:  none  o Intervention effective: na  o Time of last intervention: na  o Reassessment Completed: na      Last 3 Weights:  Last 3 Recorded Weights in this Encounter    03/10/18 0429 03/11/18 0426 03/12/18 0348   Weight: 66.6 kg (146 lb 14.4 oz) 68.9 kg (151 lb 14.4 oz) 70.5 kg (155 lb 8 oz)     Weight change: 1.634 kg (3 lb 9.7 oz)     INTAKE/OUPUT    Current Shift:      Last three shifts: 03/10 1901 - 03/12 0700  In: 1175 [P.O.:500;  I.V.:675]  Out: 850 [Urine:850]     LAB RESULTS     Recent Labs      03/12/18   0305  03/11/18   0222  03/10/18   0340   WBC  10.5  12.5  9.0   HGB  11.8*  11.8*  13.8   HCT  36.0  34.9*  40.4 PLT  224  228  218        Recent Labs      03/12/18   0305  03/11/18   0222  03/10/18   0340  03/09/18   1000   NA  139  142  140  135*   K  5.2  4.4  4.0  3.1*   GLU  123*  136*  134*  100*   BUN  17  17  18  24*   CREA  0.98  1.02  1.19  2.01*   CA  8.1*  8.5  8.1*  9.8   MG   --    --   2.2  2.1       RECOMMENDATIONS AND DISCHARGE PLANNING     1. Pending tests/procedures/ Plan of Care or Other Needs: unknown     2. Discharge plan for patient and Needs/Barriers: unknown    3. Estimated Discharge Date: unknown Posted on Whiteboard in Patients Room: yes      4. The patient's care plan was reviewed with the oncoming nurse. \"HEALS\" SAFETY CHECK      Fall Risk    Total Score: 3    Safety Measures: Safety Measures: Bed/Chair-Wheels locked, Bed in low position, Call light within reach, Fall prevention (comment), Gripper socks    A safety check occurred in the patient's room between off going nurse and oncoming nurse listed above. The safety check included the below items  Area Items   H  High Alert Medications - Verify all high alert medication drips (heparin, PCA, etc.)   E  Equipment - Suction is set up for ALL patients (with shashank)  - Red plugs utilized for all equipment (IV pumps, etc.)  - WOWs wiped down at end of shift.  - Room stocked with oxygen, suction, and other unit-specific supplies   A  Alarms - Bed alarm is set for fall risk patients  - Ensure chair alarm is in place and activated if patient is up in a chair   L  Lines - Check IV for any infiltration  - Patiño bag is empty if patient has a Patiño   - Tubing and IV bags are labeled   S  Safety   - Room is clean, patient is clean, and equipment is clean. - Hallways are clear from equipment besides carts. - Fall bracelet on for fall risk patients  - Ensure room is clear and free of clutter  - Suction is set up for ALL patients (with shashank)  - Hallways are clear from equipment besides carts.    - Isolation precautions followed, supplies available outside room, sign posted     Malvin Martins RN

## 2018-03-12 NOTE — HOME CARE
Rounded on this \"[facility] pt\". Explained to pt [facility] services offered and qualification for [place] services, pt states she would be possibly open to [facility] or [facility] visit, pt states her 2 daughters (Albina Nugent) lives with her and help her; pt states she has a cane,RW,Rollator and CPAP at home, notified  ([address] [place] [address]) that [facility] pt \" was rounded upon, [facility] available if needed and will need orders for [place] if needed prior to d/c; left pt with a brochure on [facility] services offered . LAYLA FREIRE.

## 2018-03-12 NOTE — ROUTINE PROCESS
Bedside and Verbal shift change report given to Oleg Govea RN(oncoming nurse) by Breanna Caldwell (offgoing nurse). Report included the following information SBAR, Kardex, MAR and Recent Results. SITUATION:    Code Status: Full Code  Reason for Admission: Dehydration   Dehydration    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 ICD-10-CM: J44.9  ICD-9-CM: 102  2/25/2013 Yes        Essential hypertension, benign ICD-10-CM: I10  ICD-9-CM: 401.1  10/2/2012 Yes        Dyslipidemia ICD-10-CM: E78.5  ICD-9-CM: 272.4  10/2/2012 Yes        Hypothyroidism ICD-10-CM: E03.9  ICD-9-CM: 244.9  10/2/2012 Yes              BACKGROUND:    Past Medical History:   Past Medical History:   Diagnosis Date    Abuse 1998    Alcohol    Anxiety     Calculus of kidney 1988    Cardiac echocardiogram 07/31/2012    EF 60-65%. No WMA. Mild conc LVH. Gr 1 DDfx. RVSP 20-25 mmHg. Mild SAGAR. Mild TR,. Mild PAE.  Cardiovascular LE venous duplex 10/08/2012    No venous thrombosis or venous insufficiency in bilateral lower extremities.  Carotid duplex 10/09/2014    Mild < 50% bilateral ICA stenosis.       Chronic lung disease     Contact dermatitis and other eczema, due to unspecified cause     COPD (chronic obstructive pulmonary disease) (HCC)     Degenerative joint disease     Depression     Hematuria     Hypercholesteremia     Hypertension     Hypothyroidism 1998    Kidney stone     Neuropathy 3/2007    Orthostatic hypotension     [facility] adenoma ([facility] 2/1998    Seizures (HCC)     none recently    Severe obstructive sleep apnea 01-15-15     started using Bipap Machine    Stress     Stroke ([facility] 2/2006    no residual    TIA (transient ischemic attack)     Trauma          Patient taking anticoagulants yes     ASSESSMENT:    Changes in Assessment Throughout Shift: no     Patient has Central Line: no Reasons    Patient has Patiño Cath: no Reasons       Last Vitals:     Vitals:    03/12/18 0730 03/12/18 1111 03/12/18 1613 03/12/18 1917   BP: 142/72 117/65 124/60 126/64   Pulse: 62 (!) 53 (!) 57 60   Resp: 22 18 16 18   Temp: 97.5 °F (36.4 °C) 98.2 °F (36.8 °C) 99 °F (37.2 °C) 98.7 °F (37.1 °C)   SpO2: 90% (!) 89% 91% 94%   Weight:       Height:            IV and DRAINS (will only show if present)   Peripheral IV 03/09/18 Right Antecubital-Site Assessment: Clean, dry, & intact  Peripheral IV 03/09/18 Left Antecubital-Site Assessment: Clean, dry, & intact     WOUND (if present)   Wound Type:  none   Dressing present Dressing Present : No   Wound Concerns/Notes:  none     PAIN    Pain Assessment    Pain Intensity 1: 0 (03/12/18 1616)    Pain Location 1: Abdomen         Patient Stated Pain Goal: 0  o Interventions for Pain:  none  o Intervention effective: na  o Time of last intervention: na  o Reassessment Completed: na      Last 3 Weights:  Last 3 Recorded Weights in this Encounter    03/10/18 0429 03/11/18 0426 03/12/18 0348   Weight: 66.6 kg (146 lb 14.4 oz) 68.9 kg (151 lb 14.4 oz) 70.5 kg (155 lb 8 oz)     Weight change: 1.634 kg (3 lb 9.7 oz)     INTAKE/OUPUT    Current Shift:      Last three shifts: 03/11 0701 - 03/12 1900  In: 2135 [P.O.:1460; I.V.:675]  Out: 451 [Urine:451]     LAB RESULTS     Recent Labs      03/12/18   0305  03/11/18   0222  03/10/18   0340   WBC  10.5  12.5  9.0   HGB  11.8*  11.8*  13.8   HCT  36.0  34.9*  40.4 PLT  224  228  218        Recent Labs      03/12/18   0305  03/11/18   0222  03/10/18   0340   NA  139  142  140   K  5.2  4.4  4.0   GLU  123*  136*  134*   BUN  17  17  18   CREA  0.98  1.02  1.19   CA  8.1*  8.5  8.1*   MG   --    --   2.2       RECOMMENDATIONS AND DISCHARGE PLANNING     1. Pending tests/procedures/ Plan of Care or Other Needs: unknown     2. Discharge plan for patient and Needs/Barriers: unknown    3. Estimated Discharge Date: unknown Posted on Whiteboard in Patients Room: yes      4. The patient's care plan was reviewed with the oncoming nurse. \"HEALS\" SAFETY CHECK      Fall Risk    Total Score: 3    Safety Measures: Safety Measures: Bed/Chair-Wheels locked, Bed in low position, Call light within reach, Fall prevention (comment), Gripper socks    A safety check occurred in the patient's room between off going nurse and oncoming nurse listed above. The safety check included the below items  Area Items   H  High Alert Medications - Verify all high alert medication drips (heparin, PCA, etc.)   E  Equipment - Suction is set up for ALL patients (with shashank)  - Red plugs utilized for all equipment (IV pumps, etc.)  - WOWs wiped down at end of shift.  - Room stocked with oxygen, suction, and other unit-specific supplies   A  Alarms - Bed alarm is set for fall risk patients  - Ensure chair alarm is in place and activated if patient is up in a chair   L  Lines - Check IV for any infiltration  - Patiño bag is empty if patient has a Patiño   - Tubing and IV bags are labeled   S  Safety   - Room is clean, patient is clean, and equipment is clean. - Hallways are clear from equipment besides carts. - Fall bracelet on for fall risk patients  - Ensure room is clear and free of clutter  - Suction is set up for ALL patients (with shashank)  - Hallways are clear from equipment besides carts.    - Isolation precautions followed, supplies available outside room, sign posted     Elene Soulier

## 2018-03-12 NOTE — PROGRESS NOTES
Care Management Interventions  PCP Verified by CM: Yes (DR. Katerin Greer)  Palliative Care Criteria Met (RRAT>21 & CHF Dx)?: No  Mode of Transport at Discharge: Other (see comment) (FAMILY WILL TRANSPORT THIS PT HOME)  Transition of Care Consult (CM Consult): Discharge Planning  Current Support Network: Own Home, Lives with Caregiver (PT'S TWO ADULT DAUGHTERS LIVE IN THE HOME WITH THIS PT )  Confirm Follow Up Transport: Family  Plan discussed with Pt/Family/Caregiver: Yes (THE PLAN IS FOR THIS PT TO RETURN HOME WITH FAMILY SUPPORT WHICH IS TO INCLUDE TWO ADULT DAUGHTERS WHO LIVE IN [address] [place] [address])   Resource Information Provided?: No  Discharge Location  Discharge Placement: Home with family assistance  Pt is a 80year old female who is alert and oriented, sitting in sitting chair in her room participating in this interview. Pt and family report no needs at this time, pt's Maricel Knight, who reports supportive caregivers lives with this pt.

## 2018-03-12 NOTE — PROGRESS NOTES
Admit Date: 3/9/2018  Date of Service: 3/12/2018    Reason for follow-up: COPD exacerbation      Assessment:           Metabolic encephalopathy in the setting of acute dehydration with normal Head CT    COPD exacerbation with underlying bronchitis: still on O2 NC at 3L, oral Prednisone and duo nebs; CXR without pneumonia, DC ABX    Diarrhea:  C.diff negative; still with ongoing episodes; suspect viral process. 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, and stress dose steroids at present    ARF: resolved with IV fluids    HTN: currently contolled     Plan:   Continue Spiriva, Symbicort, and Duo nebs  Continue lopressor, florinef, ASA, lipitor  Discontinue IV fluid hydration, encourage oral intake. Decrease Prednisone to 30mg po bid. Wean Supplemental O2  Plan to d/c in am if voiding and tolerating BP medications    Current Antibtiocs:   DC azithromycin today. I spent 20 minutes with the patient in face-to-face consultation, of which greater than 50% was spent in counseling and coordination of care as described above. Case discussed with:  [x]Patient  []Family  [x]Nursing  []Case Management  DVT Prophylaxis:  []Lovenox  [x]Hep SQ  []SCDs  []Coumadin   []On Heparin gtt  I have independently examined the patient and reviewed all lab studies and imgaing as well as review of nursing notes and physican notes from the past 24 hours. The plan of care has been discussed with the patient and all questions are answered. Allergies   Allergen Reactions    Iodine Hives           Subjective:      Pt seen and examined. Has been OOB to chair, Still mildly hypoxemic possibly related to volume overload. Tolerating diet well.      Objective:        Visit Vitals    /60 (BP 1 Location: Left arm, BP Patient Position: At rest)    Pulse (!) 57    Temp 99 °F (37.2 °C)    Resp 16    Ht 5' 4.17\" (1.63 m)    Wt 70.5 kg (155 lb 8 oz)    SpO2 91%    BMI 26.55 kg/m2     Temp (24hrs), Av.2 °F (36.8 °C), Min:97.5 °F (36.4 °C), Max:99 °F (37.2 °C)        General:   awake alert and oriented, non-toxic   Skin:   no rashes or skin lesions noted on limited exam, dry and warm   HEENT:  No scleral icterus or pallor; oral mucosa moist, lips moist   Lymph Nodes:   not assessed today   Lungs:   non, labored; occasional wheeze with diminished breath sounds at bases   Heart:  RRR, s1 and s2; no murmurs rubs or gallops; no edema, + pedal pulses   Abdomen:  soft, non-distended, active bowel sounds, non-tender; dark liqud stool pooling in perineal area   Genitourinary:  deferred   Extremities:   average muscle tone; no contractures, no joint effusions   Neurologic:  No gross focal motor or sensory abnormalities; CN 2-12 intact; Follows commands. Psychiatric:   appropriate and interactive.        Labs: Results:   Chemistry Recent Labs      03/12/18   0305  03/11/18   0222  03/10/18   0340   GLU  123*  136*  134*   NA  139  142  140   K  5.2  4.4  4.0   CL  110*  114*  111*   CO2  25  22  19*   BUN  17  17  18   CREA  0.98  1.02  1.19   CA  8.1*  8.5  8.1*   AGAP  4  6  10   BUCR  17  17  15      CBC w/Diff Recent Labs      03/12/18   0305 03/11/18 0222  03/10/18   0340   WBC  10.5  12.5  9.0   RBC  3.76*  3.70*  4.36   HGB  11.8*  11.8*  13.8   HCT  36.0  34.9*  40.4   PLT  224  228  218            Imaging:     None to review today

## 2018-03-12 NOTE — ROUTINE PROCESS
Bedside and Verbal shift change report given to PIA Thompson RN(oncoming nurse) by Mary Ann Sood (offgoing nurse). Report included the following information SBAR, Kardex, MAR and Recent Results. SITUATION:    Code Status: Full Code  Reason for Admission: Dehydration   Dehydration    [facility] day: 1   Problem List:       Hospital Problems  Date Reviewed: 1/25/2018          Codes Class Noted POA    [facility]popituitarism ([facility] ICD-10-CM: E23.0  ICD-9-CM: 253.2  3/10/2018 Unknown        Dehydration ICD-10-CM: E86.0  ICD-9-CM: 276.51  3/9/2018 Yes        Acute renal failure ([facility] ICD-10-CM: N17.9  ICD-9-CM: 584.9  3/9/2018 Yes        Elevated lactic acid level ICD-10-CM: R79.89  ICD-9-CM: 276.2  3/9/2018 Yes        Wheezing ICD-10-CM: R06.2  ICD-9-CM: 786.07  3/9/2018 Unknown        * (Principal)Gastroenteritis ICD-10-CM: K52.9  ICD-9-CM: 558.9  3/9/2018 Yes        COPD (chronic obstructive pulmonary disease) ([facility] ICD-10-CM: J44.9  ICD-9-CM: 407  2/25/2013 Yes        Essential hypertension, benign ICD-10-CM: I10  ICD-9-CM: 401.1  10/2/2012 Yes        Dyslipidemia ICD-10-CM: E78.5  ICD-9-CM: 272.4  10/2/2012 Yes        Hypothyroidism ICD-10-CM: E03.9  ICD-9-CM: 244.9  10/2/2012 Yes              BACKGROUND:    Past Medical History:   Past Medical History:   Diagnosis Date    Abuse 1998    Alcohol    Anxiety     Calculus of kidney 1988    Cardiac echocardiogram 07/31/2012    EF 60-65%. No WMA. Mild conc LVH. Gr 1 DDfx. RVSP 20-25 mmHg. Mild SAGAR. Mild TR,. Mild PAE.  Cardiovascular LE venous duplex 10/08/2012    No venous thrombosis or venous insufficiency in bilateral lower extremities.  Carotid duplex 10/09/2014    Mild < 50% bilateral ICA stenosis.       Chronic lung disease     Contact dermatitis and other eczema, due to unspecified cause     COPD (chronic obstructive pulmonary disease) (HCC)     Degenerative joint disease     Depression     Hematuria     Hypercholesteremia     Hypertension  Hypothyroidism 1998    Kidney stone     Neuropathy 3/2007    Orthostatic hypotension     [facility] 2/1998    Seizures ([facility]     none recently    Severe obstructive sleep apnea 01-15-15     started using Bipap Machine    Stress     Stroke ([facility] 2/2006    no residual    TIA (transient ischemic attack)     Trauma          Patient taking anticoagulants yes     ASSESSMENT:    Changes in Assessment Throughout Shift: no     Patient has Central Line: no Reasons    Patient has Patiño Cath: no Reasons       Last Vitals:     Vitals:    03/11/18 0803 03/11/18 1148 03/11/18 1942 03/11/18 2021   BP: 117/69 125/59  106/61   Pulse: (!) 57 93  (!) 55   Resp: 18 18 16   Temp: 97 °F (36.1 °C) 98.2 °F (36.8 °C)  98.4 °F (36.9 °C)   SpO2: 91% 94% 94% 95%   Weight:       Height:            IV and DRAINS (will only show if present)   Peripheral IV 03/09/18 Right Antecubital-Site Assessment: Clean, dry, & intact  Peripheral IV 03/09/18 Left Antecubital-Site Assessment: Clean, dry, & intact     WOUND (if present)   Wound Type:  none   Dressing present Dressing Present : No   Wound Concerns/Notes:  none     PAIN    Pain Assessment    Pain Intensity 1: 0 (03/11/18 1559)    Pain Location 1: Abdomen         Patient Stated Pain Goal: 0  o Interventions for Pain:  none  o Intervention effective: na  o Time of last intervention: na  o Reassessment Completed: na      Last 3 Weights:  Last 3 Recorded Weights in this Encounter    03/09/18 0956 03/10/18 0429 03/11/18 0426   Weight: 68 kg (150 lb) 66.6 kg (146 lb 14.4 oz) 68.9 kg (151 lb 14.4 oz)     Weight change: 0.86 kg (1 lb 14.4 oz)     INTAKE/OUPUT    Current Shift: 03/11 1901 - 03/12 0700  In: -   Out: 300 [Urine:300]    Last three shifts: 03/10 0701 - 03/11 1900  In: 500 [P.O.:500]  Out: 550 [Urine:550]     LAB RESULTS     Recent Labs      03/11/18   0222  03/10/18   0340  03/09/18   1000   WBC  12.5  9.0  9.3   HGB  11.8*  13.8  15.0   HCT  34.9*  40.4 44.9   PLT  228  218  271        Recent Labs      03/11/18   0222  03/10/18   0340  03/09/18   1000   NA  142  140  135*   K  4.4  4.0  3.1*   GLU  136*  134*  100*   BUN  17  18  24*   CREA  1.02  1.19  2.01*   CA  8.5  8.1*  9.8   MG   --   2.2  2.1       RECOMMENDATIONS AND DISCHARGE PLANNING     1. Pending tests/procedures/ Plan of Care or Other Needs: unknown     2. Discharge plan for patient and Needs/Barriers: unknown    3. Estimated Discharge Date: unknown Posted on Whiteboard in Patients Room: yes      4. The patient's care plan was reviewed with the oncoming nurse. \"HEALS\" SAFETY CHECK      Fall Risk    Total Score: 3    Safety Measures: Safety Measures: Bed/Chair-Wheels locked, Bed in low position, Call light within reach, Fall prevention (comment), Gripper socks    A safety check occurred in the patient's room between off going nurse and oncoming nurse listed above. The safety check included the below items  Area Items   H  High Alert Medications - Verify all high alert medication drips (heparin, PCA, etc.)   E  Equipment - Suction is set up for ALL patients (with shashank)  - Red plugs utilized for all equipment (IV pumps, etc.)  - WOWs wiped down at end of shift.  - Room stocked with oxygen, suction, and other unit-specific supplies   A  Alarms - Bed alarm is set for fall risk patients  - Ensure chair alarm is in place and activated if patient is up in a chair   L  Lines - Check IV for any infiltration  - Patiño bag is empty if patient has a Patiño   - Tubing and IV bags are labeled   S  Safety   - Room is clean, patient is clean, and equipment is clean. - Hallways are clear from equipment besides carts. - Fall bracelet on for fall risk patients  - Ensure room is clear and free of clutter  - Suction is set up for ALL patients (with shashank)  - Hallways are clear from equipment besides carts.    - Isolation precautions followed, supplies available outside room, sign posted     Constantin Buckner

## 2018-03-13 ENCOUNTER — HOME HEALTH ADMISSION (OUTPATIENT)
Dept: HOME HEALTH SERVICES | Facility: HOME HEALTH | Age: 83
End: 2018-03-13
Payer: MEDICARE

## 2018-03-13 VITALS
RESPIRATION RATE: 18 BRPM | HEART RATE: 62 BPM | WEIGHT: 159.4 LBS | BODY MASS INDEX: 27.21 KG/M2 | OXYGEN SATURATION: 90 % | SYSTOLIC BLOOD PRESSURE: 137 MMHG | DIASTOLIC BLOOD PRESSURE: 62 MMHG | TEMPERATURE: 97.6 F | HEIGHT: 64 IN

## 2018-03-13 PROCEDURE — 74011636637 HC RX REV CODE- 636/637: Performed by: HOSPITALIST

## 2018-03-13 PROCEDURE — 74011250637 HC RX REV CODE- 250/637: Performed by: INTERNAL MEDICINE

## 2018-03-13 PROCEDURE — 94640 AIRWAY INHALATION TREATMENT: CPT

## 2018-03-13 PROCEDURE — 74011250636 HC RX REV CODE- 250/636: Performed by: INTERNAL MEDICINE

## 2018-03-13 RX ORDER — METHYLPREDNISOLONE 4 MG/1
TABLET ORAL
Qty: 1 DOSE PACK | Refills: 0 | Status: SHIPPED | OUTPATIENT
Start: 2018-03-13 | End: 2018-03-22

## 2018-03-13 RX ADMIN — AMLODIPINE BESYLATE 10 MG: 10 TABLET ORAL at 10:09

## 2018-03-13 RX ADMIN — HEPARIN SODIUM 5000 UNITS: 5000 INJECTION, SOLUTION INTRAVENOUS; SUBCUTANEOUS at 10:09

## 2018-03-13 RX ADMIN — HEPARIN SODIUM 5000 UNITS: 5000 INJECTION, SOLUTION INTRAVENOUS; SUBCUTANEOUS at 01:00

## 2018-03-13 RX ADMIN — BUDESONIDE AND FORMOTEROL FUMARATE DIHYDRATE 2 PUFF: 80; 4.5 AEROSOL RESPIRATORY (INHALATION) at 09:24

## 2018-03-13 RX ADMIN — TIOTROPIUM BROMIDE 18 MCG: 18 CAPSULE ORAL; RESPIRATORY (INHALATION) at 09:31

## 2018-03-13 RX ADMIN — TRIAMTERENE AND HYDROCHLOROTHIAZIDE 1 TABLET: 37.5; 25 TABLET ORAL at 10:09

## 2018-03-13 RX ADMIN — ASPIRIN 81 MG 81 MG: 81 TABLET ORAL at 10:08

## 2018-03-13 RX ADMIN — METOPROLOL TARTRATE 50 MG: 50 TABLET ORAL at 10:08

## 2018-03-13 RX ADMIN — PAROXETINE 30 MG: 10 TABLET, FILM COATED ORAL at 10:09

## 2018-03-13 RX ADMIN — PREDNISONE 30 MG: 20 TABLET ORAL at 10:08

## 2018-03-13 RX ADMIN — OXCARBAZEPINE 300 MG: 300 TABLET ORAL at 10:09

## 2018-03-13 RX ADMIN — FLUDROCORTISONE ACETATE 100 MCG: 0.1 TABLET ORAL at 10:09

## 2018-03-13 RX ADMIN — LEVOTHYROXINE SODIUM 112 MCG: 112 TABLET ORAL at 05:41

## 2018-03-13 NOTE — PROGRESS NOTES
Care Management Interventions  PCP Verified by CM: Yes (DR. Kennedi North)  Palliative Care Criteria Met (RRAT>21 & CHF Dx)?: No  Mode of Transport at Discharge: Other (see comment) (FAMILY WILL TRANSPORT THIS PT HOME)  Transition of Care Consult (CM Consult): [address]: Yes  Current Support Network: Own Home, Lives with Caregiver (PT'S TWO ADULT DAUGHTERS LIVE IN THE HOME WITH THIS PT )  Confirm Follow Up Transport: Family  Plan discussed with Pt/Family/Caregiver: Yes (THE PLAN IS FOR THIS PT TO RETURN HOME WITH FAMILY SUPPORT WHICH IS TO INCLUDE TWO ADULT DAUGHTERS WHO LIVE IN THE HOME)  Freedom of Choice Offered: Yes  [place] Resource Information Provided?: No  Discharge Location  Discharge Placement: Home with home health  Pt received her 02 for home and daughter, Elissa Moore, will transport this pt home.

## 2018-03-13 NOTE — HOME CARE
Received  referral for  for disease and medication education,Telehealth for COPD,PT/OT; DME: O2 ordered from DME rep Shirley at [address] states that pt will be provided with portable O2 concentrator for discharge today and arrangements will be set up for delivery of O2 at pt's home, pt's daughter will transport pt home today, also spoke to pt's daughter Funmilayo Pittman) about [facility] being set up for pt; [address] will follow. ALYLA FREIRE.  0:23LX- Discharge noted for today,[facility] will follow. LAYLA FREIRE.

## 2018-03-13 NOTE — PROGRESS NOTES
conducted an initial consultation and Spiritual Assessment for WILMAN MANLEY [facility], who is a 80 y.o.,female. Patients Primary Language is: [place]. According to the patients EMR Adventist Affiliation is: Katherine Gonzalez.     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    Lumbar spinal stenosis 05/19/2017    Flank pain 08/20/2015    Kidney stone 08/20/2015    XUAN (obstructive sleep apnea) 04/01/2015    Pancreatitis 01/21/2015    Nausea and vomiting 12/27/2014    Acute upper respiratory infection 12/27/2014    Nausea alone 12/27/2014    Vasovagal reaction 12/27/2014    [facility] 02/25/2013    Pituitary adenoma ([facility] 02/25/2013    Essential hypertension, benign 10/02/2012    Dyslipidemia 10/02/2012    Tobacco abuse 10/02/2012    Hypothyroidism 10/02/2012        The  provided the following Interventions:  Initiated a relationship of care and support. Provided information about Spiritual Care Services. Chart reviewed. The following outcomes where achieved:  Patient expressed gratitude for 's visit. Assessment:  Patient does not have any Protestant/cultural needs that will affect patients preferences in health care. There are no spiritual or Protestant issues which require intervention at this time. Plan:  Chaplains will continue to follow and will provide pastoral care on an as needed/requested basis.  recommends bedside caregivers page  on duty if patient shows signs of acute spiritual or emotional distress.     [address]  [phone])

## 2018-03-13 NOTE — PROGRESS NOTES
The plan for this pt is to be discharged to home today, with home 02, pt will be followed by [facility], who has been notified of this [address] of this pt who is a [facility] pt. Pt placed in the referral que and Liaison contacted.

## 2018-03-13 NOTE — DISCHARGE INSTRUCTIONS
DISCHARGE SUMMARY from Nurse    PATIENT INSTRUCTIONS:    After general anesthesia or intravenous sedation, for 24 hours or while taking prescription Narcotics:  · Limit your activities  · Do not drive and operate hazardous machinery  · Do not make important personal or business decisions  · Do  not drink alcoholic beverages  · If you have not urinated within 8 hours after discharge, please contact your surgeon on call. Report the following to your surgeon:  · Excessive pain, swelling, redness or odor of or around the surgical area  · Temperature over 100.5  · Nausea and vomiting lasting longer than 4 hours or if unable to take medications  · Any signs of decreased circulation or nerve impairment to extremity: change in color, persistent  numbness, tingling, coldness or increase pain  · Any questions    What to do at Home:  Recommended activity: Ambulate in house and PT/OT Eval and Treat,     If you experience any of the following symptoms shortness of breath, chest pain, please follow up with Primary Care Provider. *  Please give a list of your current medications to your Primary Care Provider. *  Please update this list whenever your medications are discontinued, doses are      changed, or new medications (including over-the-counter products) are added. *  Please carry medication information at all times in case of emergency situations. These are general instructions for a healthy lifestyle:    No smoking/ No tobacco products/ Avoid exposure to second hand smoke  Surgeon General's Warning:  Quitting smoking now greatly reduces serious risk to your health.     Obesity, smoking, and sedentary lifestyle greatly increases your risk for illness    A healthy diet, regular physical exercise & weight monitoring are important for maintaining a healthy lifestyle    You may be retaining fluid if you have a history of heart failure or if you experience any of the following symptoms:  Weight gain of 3 pounds or more overnight or 5 pounds in a week, increased swelling in our hands or feet or shortness of breath while lying flat in bed. Please call your doctor as soon as you notice any of these symptoms; do not wait until your next office visit. Recognize signs and symptoms of STROKE:    F-face looks uneven    A-arms unable to move or move unevenly    S-speech slurred or non-existent    T-time-call 911 as soon as signs and symptoms begin-DO NOT go       Back to bed or wait to see if you get better-TIME IS BRAIN. Warning Signs of HEART ATTACK     Call 911 if you have these symptoms:   Chest discomfort. Most heart attacks involve discomfort in the center of the chest that lasts more than a few minutes, or that goes away and comes back. It can feel like uncomfortable pressure, squeezing, fullness, or pain.  Discomfort in other areas of the upper body. Symptoms can include pain or discomfort in one or both arms, the back, neck, jaw, or stomach.  Shortness of breath with or without chest discomfort.  Other signs may include breaking out in a cold sweat, nausea, or lightheadedness. Don't wait more than five minutes to call 911 - MINUTES MATTER! Fast action can save your life. Calling 911 is almost always the fastest way to get lifesaving treatment. Emergency Medical Services staff can begin treatment when they arrive -- up to an hour sooner than if someone gets to the hospital by car. The discharge information has been reviewed with the patient and caregiver. The patient and caregiver verbalized understanding. Discharge medications reviewed with the patient and caregiver and appropriate educational materials and side effects teaching were provided. ___________________________________________________________________________________________________________________________________     Asthma Attack: Care Instructions  Your Care Instructions    During an asthma attack, the airways swell and narrow.  This makes it hard to breathe. Severe asthma attacks can be life-threatening, but you can help prevent them by keeping your asthma under control and treating symptoms before they get bad. Symptoms include being short of breath, having chest tightness, coughing, and wheezing. Noting and treating these symptoms can also help you avoid future trips to the emergency room. The doctor has checked you carefully, but problems can develop later. If you notice any problems or new symptoms, get medical treatment right away. Follow-up care is a key part of your treatment and safety. Be sure to make and go to all appointments, and call your doctor if you are having problems. It's also a good idea to know your test results and keep a list of the medicines you take. How can you care for yourself at home? · Follow your asthma action plan to prevent and treat attacks. If you don't have an asthma action plan, work with your doctor to create one. · Take your asthma medicines exactly as prescribed. Talk to your doctor right away if you have any questions about how to take them. ¨ Use your quick-relief medicine when you have symptoms of an attack. Quick-relief medicine is usually an albuterol inhaler. Some people need to use quick-relief medicine before they exercise. ¨ Take your controller medicine every day, not just when you have symptoms. Controller medicine is usually an inhaled corticosteroid. The goal is to prevent problems before they occur. Don't use your controller medicine to treat an attack that has already started. It doesn't work fast enough to help. ¨ If your doctor prescribed corticosteroid pills to use during an attack, take them exactly as prescribed. It may take hours for the pills to work, but they may make the episode shorter and help you breathe better. ¨ Keep your quick-relief medicine with you at all times. · Talk to your doctor before using other medicines.  Some medicines, such as aspirin, can cause asthma attacks in some people. · If you have a peak flow meter, use it to check how well you are breathing. This can help you predict when an asthma attack is going to occur. Then you can take medicine to prevent the asthma attack or make it less severe. · Do not smoke or allow others to smoke around you. Avoid smoky places. Smoking makes asthma worse. If you need help quitting, talk to your doctor about stop-smoking programs and medicines. These can increase your chances of quitting for good. · Learn what triggers an asthma attack for you, and avoid the triggers when you can. Common triggers include colds, smoke, air pollution, dust, pollen, mold, pets, cockroaches, stress, and cold air. · Avoid colds and the flu. Get a pneumococcal vaccine shot. If you have had one before, ask your doctor if you need a second dose. Get a flu vaccine every fall. If you must be around people with colds or the flu, wash your hands often. When should you call for help? Call 911 anytime you think you may need emergency care. For example, call if:  ? · You have severe trouble breathing. ?Call your doctor now or seek immediate medical care if:  ? · Your symptoms do not get better after you have followed your asthma action plan. ? · You have new or worse trouble breathing. ? · Your coughing and wheezing get worse. ? · You cough up dark brown or bloody mucus (sputum). ? · You have a new or higher fever. ? Watch closely for changes in your health, and be sure to contact your doctor if:  ? · You need to use quick-relief medicine on more than 2 days a week (unless it is just for exercise). ? · You cough more deeply or more often, especially if you notice more mucus or a change in the color of your mucus. ? · You are not getting better as expected. Where can you learn more? Go to http://casey-sameer.info/. Enter Y847 in the search box to learn more about \"Asthma Attack: Care Instructions. \"  Current as of:  May 12, 2017  Content Version: 11.4  © 6825-0423 [employer]. Care instructions adapted under license by [employer] (which disclaims liability or warranty for this information). If you have questions about a medical condition or this instruction, always ask your healthcare professional. [address] any warranty or liability for your use of this information.   Patient armband removed and shredded

## 2018-03-13 NOTE — DISCHARGE SUMMARY
Physician Discharge Summary       Patient: Aga Serrano MRN: [id number]  SSN: [id number]    YOB: 1931  Age: 80 y.o. 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  ICD-9-CM: 253.2  3/10/2018 - Present Unknown        Dehydration ICD-10-CM: E86.0  ICD-9-CM: 276.51  3/9/2018 - Present Yes        Acute renal failure ([facility] ICD-10-CM: N17.9  ICD-9-CM: 584.9  3/9/2018 - Present Yes        Elevated lactic acid level ICD-10-CM: R79.89  ICD-9-CM: 276.2  3/9/2018 - Present Yes        Wheezing ICD-10-CM: R06.2  ICD-9-CM: 786.07  3/9/2018 - Present Unknown        * (Principal)Gastroenteritis ICD-10-CM: K52.9  ICD-9-CM: 558.9  3/9/2018 - Present Yes        COPD (chronic obstructive pulmonary disease) ([facility] ICD-10-CM: J44.9  ICD-9-CM: 568  2/25/2013 - Present Yes        Essential hypertension, benign ICD-10-CM: I10  ICD-9-CM: 401.1  10/2/2012 - Present Yes        Dyslipidemia ICD-10-CM: E78.5  ICD-9-CM: 272.4  10/2/2012 - Present Yes        Hypothyroidism ICD-10-CM: E03.9  ICD-9-CM: 244.9  10/2/2012 - Present Yes              * Hospital Course: The patient is a pleasant 81 yo female admitted on 3/10/2018 with altered mentation and lethargy, persistent diarrhea and dehydration. She was admitted to the medical unit and was aggressively rehydrated. Her mentation returned to baseline with rehydration, her diarrhea has since subsided. Her lactic acid was elevated and responded appropriately to fluid resuscitation in the ED. Stool for C. Diff was sent and was negative. Blood and stool cultures are negative.   No source of sepsis was identified. Pt was also found to have ARF requiring supplemental O2, CXR was unremarkable for pneumonia. The patient remains on supplemental O2 to maintain sats greater than 92%. She was placed on oral steroids for her COPD and bronchitis however she continues to wheeze and require supplemental O2. Home oxygen has been obtained for her. Procedures: None  * No surgery found *      Consults: None    Significant Diagnostic Studies: None    Discharge Exam:  General appearance: alert, cooperative, no distress, appears stated age  Neck: supple, symmetrical, trachea midline, no adenopathy, thyroid: not enlarged, symmetric, no tenderness/mass/nodules, no carotid bruit and no JVD  Lungs: Coarse breath sounds with expiratory wheezes. Heart: regular rate and rhythm, S1, S2 normal, no murmur, click, rub or gallop  Abdomen: soft, non-tender.  Bowel sounds normal. No masses,  no organomegaly  Skin: Skin color, texture, turgor normal. No rashes or lesions  Neurologic: Grossly normal    * Discharge Condition: good  * Disposition: Home    Discharge Medications:  Current Discharge Medication List      START taking these medications    Details   methylPREDNISolone (MEDROL, NIRAJ,) 4 mg tablet As directed  Qty: 1 Dose Pack, Refills: 0         CONTINUE these medications which have NOT CHANGED    Details   lisinopril (PRINIVIL, ZESTRIL) 20 mg tablet TAKE ONE TABLET BY MOUTH EVERY DAY  Qty: 30 Tab, Refills: 5    Associated Diagnoses: Essential hypertension, benign      PARoxetine (PAXIL) 30 mg tablet TAKE ONE TABLET BY MOUTH EVERY DAY  Qty: 30 Tab, Refills: 5    Associated Diagnoses: Anxiety state; Depression, unspecified depression type      atorvastatin (LIPITOR) 10 mg tablet TAKE ONE TABLET BY MOUTH EVERY DAY  Qty: 90 Tab, Refills: 3    Associated Diagnoses: Mixed hyperlipidemia      potassium chloride (K-DUR, KLOR-CON) 20 mEq tablet TAKE ONE TABLET BY MOUTH 2 TIMES A DAY  Qty: 180 Tab, Refills: 3    Associated Diagnoses: Essential hypertension, benign; Hypokalemia      metoprolol tartrate (LOPRESSOR) 50 mg tablet TAKE ONE TABLET BY MOUTH EVERY DAY FOR HYPERTENSION  Qty: 90 Tab, Refills: 3    Associated Diagnoses: Essential hypertension, benign      amLODIPine (NORVASC) 10 mg tablet TAKE ONE TABLET BY MOUTH EVERY DAY  Qty: 90 Tab, Refills: 3    Associated Diagnoses: Essential hypertension, benign      hydroCHLOROthiazide (MICROZIDE) 12.5 mg capsule TAKE ONE CAPSULE BY MOUTH EVERY DAY  Qty: 30 Cap, Refills: 5    Associated Diagnoses: Essential hypertension, benign      umeclidinium-vilanterol (ANORO ELLIPTA) 62.5-25 mcg/actuation inhaler Take 1 Puff by inhalation daily. Qty: 2 Inhaler, Refills: 0    Associated Diagnoses: Chronic obstructive pulmonary disease with acute exacerbation (HCC)      nystatin (MYCOSTATIN) powder Apply  to affected area four (4) times daily. Qty: 1 Bottle, Refills: 5    Associated Diagnoses: Tinea corporis      albuterol (PROAIR HFA) 90 mcg/actuation inhaler Take 2 Puffs by inhalation every four (4) hours as needed for Wheezing or Shortness of Breath. Qty: 1 Inhaler, Refills: 5    Associated Diagnoses: Chronic obstructive pulmonary disease with acute exacerbation (HCC)      fluticasone (FLONASE) 50 mcg/actuation nasal spray       HYDROcodone-acetaminophen (NORCO) 5-325 mg per tablet Take 1 Tab by mouth every four (4) hours as needed for Pain. Max Daily Amount: 6 Tabs. Qty: 12 Tab, Refills: 0      SYNTHROID 112 mcg tablet Take 112 mcg by mouth Daily (before breakfast). ALPRAZolam (XANAX) 0.5 mg tablet       furosemide (LASIX) 20 mg tablet Take 1 Tab by mouth daily as needed for Other (Leg Swelling). Qty: 90 Tab, Refills: 3    Associated Diagnoses: Bilateral lower extremity edema      hydrocortisone (CORTEF) 10 mg tablet       VOLTAREN 1 % topical gel       Walker (BARNEY ROLLING WALKER) misc 1 Device by Does Not Apply route daily.   Qty: 1 Each, Refills: 0      bipap machine kit 1 each by Does Not Apply route. Started using BiPap Machine 01-15-15      L-Methylfolate-I30-Febxcfpfrd (CEREFOLIN NAC) tablet Take 1 tablet by mouth daily. Qty: 30 tablet, Refills: 5      fludrocortisone (FLORINEF) 0.1 mg tablet Take 1 tablet by mouth daily. Qty: 30 tablet, Refills: 5      oxcarbazepine (TRILEPTAL) 300 mg tablet Take 300 mg by mouth two (2) times a day. Cholecalciferol, Vitamin D3, 5,000 unit Tab Take 5,000 Int'l Units by mouth daily. Indications: VITAMIN D DEFICIENCY      magnesium oxide (MAG-OX) 400 mg tablet Take 400 mg by mouth daily. COQ10, LIPOSOMAL UBIQUINOL, PO Take 10 mg by mouth daily. aspirin 81 mg tablet Take 81 mg by mouth daily. DOCOSAHEXANOIC ACID/EPA (FISH OIL PO) Take 4,000 mg by mouth daily. CALCIUM CITRATE/VITAMIN D3 (CALCIUM CITRATE + PO) Take  by mouth once over twenty-four (24) hours. * Follow-up Care/Patient Instructions:   Activity: Activity as tolerated  Diet: Cardiac Diet  Wound Care: None needed    Follow-up Information     Follow up With Details Comments Contact Info    Jennifer Morales MD   [address] [place] [address] [id number] [phone]            Signed:  Nuha aCse MD  3/13/2018  9:44 AM

## 2018-03-13 NOTE — ROUTINE PROCESS
Bedside and Verbal shift change report given to Cherise Sheldon RN (oncoming nurse) by Lonnie Alfonso RN   (offgoing nurse). Report included the following information SBAR, Kardex, Intake/Output and MAR.

## 2018-03-14 ENCOUNTER — HOME CARE VISIT (OUTPATIENT)
Dept: HOME HEALTH SERVICES | Facility: HOME HEALTH | Age: 83
End: 2018-03-14

## 2018-03-14 ENCOUNTER — PATIENT OUTREACH (OUTPATIENT)
Dept: FAMILY MEDICINE CLINIC | Age: 83
End: 2018-03-14

## 2018-03-14 ENCOUNTER — HOME CARE VISIT (OUTPATIENT)
Dept: SCHEDULING | Facility: HOME HEALTH | Age: 83
End: 2018-03-14
Payer: MEDICARE

## 2018-03-14 LAB
O+P STL CONC: NORMAL
O+P STL MICRO: NORMAL

## 2018-03-14 PROCEDURE — 400013 HH SOC

## 2018-03-14 PROCEDURE — 3331090002 HH PPS REVENUE DEBIT

## 2018-03-14 PROCEDURE — G0299 HHS/HOSPICE OF RN EA 15 MIN: HCPCS

## 2018-03-14 PROCEDURE — 3331090001 HH PPS REVENUE CREDIT

## 2018-03-14 RX ORDER — GLUCOSAM/CHONDRO/HERB 149/HYAL 750-100 MG
4 TABLET ORAL DAILY
COMMUNITY
End: 2018-03-29 | Stop reason: SDUPTHER

## 2018-03-14 RX ORDER — ACETAMINOPHEN 160 MG/5ML
SUSPENSION, ORAL (FINAL DOSE FORM) ORAL DAILY
COMMUNITY
End: 2018-03-29

## 2018-03-14 NOTE — Clinical Note
[facility] 3/9-3/13 hypoxemia/COPD/dehydration PADMA ([id number]) 3/20 at 2 PM Please see note Patient last seen by pulm 6/27/18 for pre op clearance;please encourage to   follow up with pulm as well

## 2018-03-14 NOTE — PROGRESS NOTES
Hospital Discharge Follow-Up      Date/Time:  3/14/2018 12:13 PM     Patient was admitted to [facility] on 3/9/18 and discharged on 3/13/18 for gastroenteritis. The physician discharge summary was available at the time of outreach. Nurse Navigator(NN) contacted the patient  by telephone to perform post hospital discharge assessment within 1 business day(s) of discharge. Spoke to Lina Kelly, daughter (listed on PHI). Verified name and  with patient as identifiers. Provided introduction to self, and explanation of the Nurses Navigator role. Inpatient RRAT score: 10    Top challenges to be reviewed with the provider:    -smoker; last cigarette 3/8/18; discuss importance of smoking cessation and danger of smoking when O2 in use (daughter who patient live with also smoke)  -cough with inability to expectorate mucus; daughter reported you can hear mucus in chest  -not following cardiac diet; diet consists of mostly sweets as per daughter    Method of communication with provider :chart routing      The daughter  reported: \"nasally cough\" but unable to cough up mucus, O2 3 LPM continuous, clear yellow urine    The daughter denied: wheezing, CP or tightness, dizziness, N/V, fever, chills, abdominal pain, edema, dysuria    Writer communicated patient should not spoke while oxygen in use as oxygen is flammable and may cause fire. Also advised daughters not to smoke around patient. Reviewed discharge instructions and red flags with  daughter who verbalizes understanding. Daughter given an opportunity to ask questions and does not have any further questions or concerns at this time. The daughter agrees to contact the PCP office for questions related to their healthcare. NN provided contact information for future reference. Summary of patients top problems:  1. COPD/hypoxemia; current smoker; last cigarette 3/8  2. Poor diet; consist mainly of sweets as per daughter  3.  Exposure to second hand smoke; lives with family members who smoke    Home Health orders at discharge: PT, OT, [address]: [facility]  Date of initial visit: 3/14/18    Durable Medical Equipment ordered/company: oxygen  Durable Medical Equipment received: [employer]    Barriers to care? ineffective coping, lack of knowledge about disease, level of motivation    Medication:   Medication reconciliation was performed with daughter, who verbalizes understanding of administration of home medications. There were no barriers to obtaining medications identified at this time. New medications at discharge include:  Medrol dose vick  Does the patient have new prescription(s) to last until follow up with prescribing provider: yes  Prescription Medication total: 26 (pharmacy consult for polypharm needed?) no   Medication changes (dose adjustments or discontinued meds): None    Advance Care Planning:   Does patient have an Advance Directive:  not on file     PCP/Specialist follow up:   Future Appointments  Date Time Provider Marcelo Flores   3/20/2018 2:00 PM Gonzalo Ricketts DO [facility] None   4/25/2018 10:00 AM Dunia Tellez MD [facility] None   6/22/2018 10:30 AM Marie Saravia MD [id number]          Goals        COPD     Smoking cessation. Plan: Discuss/educate on the effect of smoking regarding COPD. Post ED     Supportive resources in place to maintain patient in the community (ie., home health, equipment, DME, refer to, etc.)            3/14:[facility] to scheduled to see patient today         Post Hospitalization     Attends follow-up appointments as directed. Plan: Verify aware of upcoming PCP appt and advise to schedule appt with pulmonology. 3/14Taylordeejay Frausto (daughter) aware of PCP appt and agreed patient should follow up with pulmonology.

## 2018-03-15 ENCOUNTER — HOME CARE VISIT (OUTPATIENT)
Dept: SCHEDULING | Facility: HOME HEALTH | Age: 83
End: 2018-03-15
Payer: MEDICARE

## 2018-03-15 LAB
BACTERIA SPEC CULT: NORMAL
BACTERIA SPEC CULT: NORMAL
SERVICE CMNT-IMP: NORMAL
SERVICE CMNT-IMP: NORMAL

## 2018-03-15 PROCEDURE — 3331090002 HH PPS REVENUE DEBIT

## 2018-03-15 PROCEDURE — G0151 HHCP-SERV OF PT,EA 15 MIN: HCPCS

## 2018-03-15 PROCEDURE — 3331090001 HH PPS REVENUE CREDIT

## 2018-03-16 ENCOUNTER — HOME CARE VISIT (OUTPATIENT)
Dept: HOME HEALTH SERVICES | Facility: HOME HEALTH | Age: 83
End: 2018-03-16
Payer: MEDICARE

## 2018-03-16 VITALS
HEART RATE: 57 BPM | WEIGHT: 154 LBS | RESPIRATION RATE: 18 BRPM | DIASTOLIC BLOOD PRESSURE: 70 MMHG | OXYGEN SATURATION: 97 % | TEMPERATURE: 97.6 F | HEIGHT: 64 IN | BODY MASS INDEX: 26.29 KG/M2 | SYSTOLIC BLOOD PRESSURE: 160 MMHG

## 2018-03-16 PROCEDURE — 3331090001 HH PPS REVENUE CREDIT

## 2018-03-16 PROCEDURE — 3331090002 HH PPS REVENUE DEBIT

## 2018-03-17 ENCOUNTER — HOME CARE VISIT (OUTPATIENT)
Dept: SCHEDULING | Facility: HOME HEALTH | Age: 83
End: 2018-03-17
Payer: MEDICARE

## 2018-03-17 PROCEDURE — 3331090002 HH PPS REVENUE DEBIT

## 2018-03-17 PROCEDURE — 3331090001 HH PPS REVENUE CREDIT

## 2018-03-17 PROCEDURE — G0299 HHS/HOSPICE OF RN EA 15 MIN: HCPCS

## 2018-03-18 ENCOUNTER — HOSPITAL ENCOUNTER (INPATIENT)
Age: 83
LOS: 4 days | Discharge: HOME HEALTH | DRG: 871 | End: 2018-03-22
Attending: EMERGENCY MEDICINE | Admitting: INTERNAL MEDICINE
Payer: MEDICARE

## 2018-03-18 ENCOUNTER — APPOINTMENT (OUTPATIENT)
Dept: CT IMAGING | Age: 83
DRG: 871 | End: 2018-03-18
Attending: EMERGENCY MEDICINE
Payer: MEDICARE

## 2018-03-18 ENCOUNTER — APPOINTMENT (OUTPATIENT)
Dept: GENERAL RADIOLOGY | Age: 83
DRG: 871 | End: 2018-03-18
Attending: EMERGENCY MEDICINE
Payer: MEDICARE

## 2018-03-18 ENCOUNTER — HOME CARE VISIT (OUTPATIENT)
Dept: HOME HEALTH SERVICES | Facility: HOME HEALTH | Age: 83
End: 2018-03-18
Payer: MEDICARE

## 2018-03-18 DIAGNOSIS — J18.9 PNEUMONIA OF RIGHT LOWER LOBE DUE TO INFECTIOUS ORGANISM: Primary | ICD-10-CM

## 2018-03-18 LAB
ALBUMIN SERPL-MCNC: 3.4 G/DL (ref 3.4–5)
ALBUMIN/GLOB SERPL: 1 {RATIO} (ref 0.8–1.7)
ALP SERPL-CCNC: 61 U/L (ref 45–117)
ALT SERPL-CCNC: 20 U/L (ref 13–56)
ANION GAP SERPL CALC-SCNC: 6 MMOL/L (ref 3–18)
APPEARANCE UR: CLEAR
AST SERPL-CCNC: 15 U/L (ref 15–37)
BASOPHILS # BLD: 0 K/UL (ref 0–0.06)
BASOPHILS NFR BLD: 0 % (ref 0–2)
BILIRUB DIRECT SERPL-MCNC: 0.2 MG/DL (ref 0–0.2)
BILIRUB SERPL-MCNC: 0.5 MG/DL (ref 0.2–1)
BILIRUB UR QL: NEGATIVE
BUN SERPL-MCNC: 25 MG/DL (ref 7–18)
BUN/CREAT SERPL: 24 (ref 12–20)
CALCIUM SERPL-MCNC: 10.2 MG/DL (ref 8.5–10.1)
CHLORIDE SERPL-SCNC: 96 MMOL/L (ref 100–108)
CK MB CFR SERPL CALC: ABNORMAL % (ref 0–4)
CK MB SERPL-MCNC: <1 NG/ML (ref 5–25)
CK SERPL-CCNC: 22 U/L (ref 26–192)
CO2 SERPL-SCNC: 34 MMOL/L (ref 21–32)
COLOR UR: YELLOW
CREAT SERPL-MCNC: 1.06 MG/DL (ref 0.6–1.3)
DIFFERENTIAL METHOD BLD: ABNORMAL
EOSINOPHIL # BLD: 0.1 K/UL (ref 0–0.4)
EOSINOPHIL NFR BLD: 1 % (ref 0–5)
ERYTHROCYTE [DISTWIDTH] IN BLOOD BY AUTOMATED COUNT: 13.5 % (ref 11.6–14.5)
GLOBULIN SER CALC-MCNC: 3.3 G/DL (ref 2–4)
GLUCOSE SERPL-MCNC: 90 MG/DL (ref 74–99)
GLUCOSE UR STRIP.AUTO-MCNC: NEGATIVE MG/DL
HCT VFR BLD AUTO: 41.7 % (ref 35–45)
HGB BLD-MCNC: 13.9 G/DL (ref 12–16)
HGB UR QL STRIP: NEGATIVE
KETONES UR QL STRIP.AUTO: NEGATIVE MG/DL
LACTATE BLD-SCNC: 1.7 MMOL/L (ref 0.4–2)
LEUKOCYTE ESTERASE UR QL STRIP.AUTO: NEGATIVE
LIPASE SERPL-CCNC: 182 U/L (ref 73–393)
LYMPHOCYTES # BLD: 2.1 K/UL (ref 0.9–3.6)
LYMPHOCYTES NFR BLD: 13 % (ref 21–52)
MCH RBC QN AUTO: 31.4 PG (ref 24–34)
MCHC RBC AUTO-ENTMCNC: 33.3 G/DL (ref 31–37)
MCV RBC AUTO: 94.1 FL (ref 74–97)
MONOCYTES # BLD: 0.6 K/UL (ref 0.05–1.2)
MONOCYTES NFR BLD: 3 % (ref 3–10)
NEUTS SEG # BLD: 13.2 K/UL (ref 1.8–8)
NEUTS SEG NFR BLD: 83 % (ref 40–73)
NITRITE UR QL STRIP.AUTO: NEGATIVE
PH UR STRIP: 7 [PH] (ref 5–8)
PLATELET # BLD AUTO: 407 K/UL (ref 135–420)
PMV BLD AUTO: 9.8 FL (ref 9.2–11.8)
POTASSIUM SERPL-SCNC: 4 MMOL/L (ref 3.5–5.5)
PROT SERPL-MCNC: 6.7 G/DL (ref 6.4–8.2)
PROT UR STRIP-MCNC: NEGATIVE MG/DL
RBC # BLD AUTO: 4.43 M/UL (ref 4.2–5.3)
SODIUM SERPL-SCNC: 136 MMOL/L (ref 136–145)
SP GR UR REFRACTOMETRY: 1.02 (ref 1–1.03)
TROPONIN I SERPL-MCNC: <0.02 NG/ML (ref 0–0.06)
UROBILINOGEN UR QL STRIP.AUTO: 0.2 EU/DL (ref 0.2–1)
WBC # BLD AUTO: 16 K/UL (ref 4.6–13.2)

## 2018-03-18 PROCEDURE — 96375 TX/PRO/DX INJ NEW DRUG ADDON: CPT

## 2018-03-18 PROCEDURE — 99285 EMERGENCY DEPT VISIT HI MDM: CPT

## 2018-03-18 PROCEDURE — 87040 BLOOD CULTURE FOR BACTERIA: CPT | Performed by: EMERGENCY MEDICINE

## 2018-03-18 PROCEDURE — 96367 TX/PROPH/DG ADDL SEQ IV INF: CPT

## 2018-03-18 PROCEDURE — 96365 THER/PROPH/DIAG IV INF INIT: CPT

## 2018-03-18 PROCEDURE — 80076 HEPATIC FUNCTION PANEL: CPT | Performed by: EMERGENCY MEDICINE

## 2018-03-18 PROCEDURE — 80048 BASIC METABOLIC PNL TOTAL CA: CPT | Performed by: EMERGENCY MEDICINE

## 2018-03-18 PROCEDURE — 93005 ELECTROCARDIOGRAM TRACING: CPT

## 2018-03-18 PROCEDURE — 70450 CT HEAD/BRAIN W/O DYE: CPT

## 2018-03-18 PROCEDURE — 83605 ASSAY OF LACTIC ACID: CPT

## 2018-03-18 PROCEDURE — 65270000029 HC RM PRIVATE

## 2018-03-18 PROCEDURE — 85025 COMPLETE CBC W/AUTO DIFF WBC: CPT | Performed by: EMERGENCY MEDICINE

## 2018-03-18 PROCEDURE — 82550 ASSAY OF CK (CPK): CPT | Performed by: EMERGENCY MEDICINE

## 2018-03-18 PROCEDURE — 51702 INSERT TEMP BLADDER CATH: CPT

## 2018-03-18 PROCEDURE — 81003 URINALYSIS AUTO W/O SCOPE: CPT | Performed by: EMERGENCY MEDICINE

## 2018-03-18 PROCEDURE — 96361 HYDRATE IV INFUSION ADD-ON: CPT

## 2018-03-18 PROCEDURE — 3331090002 HH PPS REVENUE DEBIT

## 2018-03-18 PROCEDURE — 74011000250 HC RX REV CODE- 250: Performed by: EMERGENCY MEDICINE

## 2018-03-18 PROCEDURE — 96366 THER/PROPH/DIAG IV INF ADDON: CPT

## 2018-03-18 PROCEDURE — 83690 ASSAY OF LIPASE: CPT | Performed by: EMERGENCY MEDICINE

## 2018-03-18 PROCEDURE — 3331090001 HH PPS REVENUE CREDIT

## 2018-03-18 PROCEDURE — 77030005514 HC CATH URETH FOL14 BARD -A

## 2018-03-18 PROCEDURE — 74011250636 HC RX REV CODE- 250/636: Performed by: EMERGENCY MEDICINE

## 2018-03-18 PROCEDURE — 71045 X-RAY EXAM CHEST 1 VIEW: CPT

## 2018-03-18 RX ORDER — LEVOFLOXACIN 5 MG/ML
750 INJECTION, SOLUTION INTRAVENOUS EVERY 24 HOURS
Status: DISCONTINUED | OUTPATIENT
Start: 2018-03-18 | End: 2018-03-19

## 2018-03-18 RX ORDER — SODIUM CHLORIDE 9 MG/ML
150 INJECTION, SOLUTION INTRAVENOUS ONCE
Status: COMPLETED | OUTPATIENT
Start: 2018-03-18 | End: 2018-03-18

## 2018-03-18 RX ADMIN — LEVOFLOXACIN 750 MG: 5 INJECTION, SOLUTION INTRAVENOUS at 21:20

## 2018-03-18 RX ADMIN — SODIUM CHLORIDE 1000 MG: 900 INJECTION, SOLUTION INTRAVENOUS at 23:00

## 2018-03-18 RX ADMIN — Medication 2 G: at 21:12

## 2018-03-18 RX ADMIN — SODIUM CHLORIDE 150 ML/HR: 900 INJECTION, SOLUTION INTRAVENOUS at 21:09

## 2018-03-18 NOTE — ED TRIAGE NOTES
Pt  Family states pt was not acting herself states very sleepy on EMS arrival pt  O2 sats found to be 85% pt returned to home O2 at 3lpm via NC and  sats came up to 95%, On arrival to ER pt states she feels  \"allright\" co pain in back

## 2018-03-18 NOTE — IP AVS SNAPSHOT
[address] Patient: Herson Garcia MRN: [id number] HPS:[id number] About your hospitalization You were admitted on:  March 18, 2018 You last received care in the:  [facility] [address] You were discharged on:  March 22, 2018 Why you were hospitalized Your primary diagnosis was:  Hcap (Healthcare-Associated Pneumonia) Your diagnoses also included:  Right Lower Lobe Pneumonia (Hcc), Panhypopituitarism (Hcc), Essential Hypertension, Benign, Tobacco Abuse, Copd (Chronic Obstructive Pulmonary Disease) (Hcc) Follow-up Information Follow up With Details Comments Contact Info Erika Maya MD On 3/26/2018 @9:15AM [address] [place] [address] [address] [place] [address] 
[phone] Lacie Hansen MD On 4/19/2018 @10:30AM [address] [place], [place] [address] [address] 
[phone] Emmanuelle Galindo MD On 8/16/2018 @1:00PM [address] [place] [address] [address] [address] 
[phone] Your Scheduled Appointments Monday March 26, 2018  9:15 AM EDT TRANSITIONAL CARE MANAGEMENT with Erika Maya MD  
[facility] (--)  
 [address] [place] [address]  
[phone] Thursday April 19, 2018 10:30 AM EDT Follow Up with Alejandro Durand NP [address] ([address]) [address] [place] [address], [address] [address]  
[phone] Wednesday April 25, 2018 10:00 AM EDT Follow Up with Erika Maya MD  
[facility] (--)  
 [address] [place] [address]  
[phone] Discharge Orders None A check alma indicates which time of day the medication should be taken. My Medications START taking these medications Instructions Each Dose to Equal  
 Morning Noon Evening Bedtime  
 amoxicillin-clavulanate 875-125 mg per tablet Commonly known as:  AUGMENTIN Your last dose was: Your next dose is: Take 1 Tab by mouth every twelve (12) hours. 1 Tab  
    
   
   
   
  
 arformoterol 15 mcg/2 mL Nebu neb solution Commonly known as:  Vamshi Grullon Your last dose was: Your next dose is:    
   
   
 2 mL by Nebulization route two (2) times a day. 15 mcg  
    
   
   
   
  
 budesonide 1 mg/2 mL Nbsp Commonly known as:  PULMICORT Your last dose was: Your next dose is:    
   
   
 2 mL by Nebulization route two (2) times a day. 1000 mcg  
    
   
   
   
  
 doxycycline 100 mg capsule Commonly known as:  Paulino Mt Your last dose was: Your next dose is: Take 1 Cap by mouth two (2) times a day for 7 days. 100 mg  
    
   
   
   
  
 famotidine 20 mg tablet Commonly known as:  PEPCID Your last dose was: Your next dose is: Take 1 Tab by mouth two (2) times a day. 20 mg  
    
   
   
   
  
 guaiFENesin-dextromethorphan -30 mg per tablet Commonly known as:  HUMIBID DM Your last dose was: Your next dose is: Take 1 Tab by mouth every twelve (12) hours as needed for Cough. 1 Tab  
    
   
   
   
  
 L. acidophilus,casei,rhamnosus 50 billion cell Cpdr  
Commonly known as:  BIO-K PLUS Your last dose was: Your next dose is: Take 1 tab by mouth daily Nebulizer & Compressor machine Commonly known as:  PORTABLE NEBULIZER SYSTEM Your last dose was: Your next dose is:    
   
   
 1 Each by Does Not Apply route two (2) times a day. 1 Each  
    
   
   
   
  
 predniSONE 10 mg tablet Commonly known as:  Jannie Figueroa Your last dose was: Your next dose is: Take 4 tabs for 2 days, then 3 tabs for 3 days, then 2 tabs for 3 days, then 1 tab for 3 days. CHANGE how you take these medications Instructions Each Dose to Equal  
 Morning Noon Evening Bedtime * albuterol 90 mcg/actuation inhaler Commonly known as:  PROAIR HFA What changed:  Another medication with the same name was added. Make sure you understand how and when to take each. Your last dose was: Your next dose is: Take 2 Puffs by inhalation every four (4) hours as needed for Wheezing or Shortness of Breath. 2 Puff * albuterol 1.25 mg/3 mL Nebu Commonly known as:  Lestine [facility] What changed: You were already taking a medication with the same name, and this prescription was added. Make sure you understand how and when to take each. Your last dose was: Your next dose is:    
   
   
 3 mL by Nebulization route every four (4) hours as needed. 1.25 mg  
    
   
   
   
  
 cholecalciferol (vitamin D3) 4,000 unit Cap What changed:  Another medication with the same name was removed. Continue taking this medication, and follow the directions you see here. Your last dose was: Your next dose is: Take 1 Cap by mouth daily. 1 Cap * nystatin powder Commonly known as:  MYCOSTATIN What changed:  Another medication with the same name was added. Make sure you understand how and when to take each. Your last dose was: Your next dose is:    
   
   
 Apply  to affected area four (4) times daily. * nystatin 100,000 unit/mL suspension Commonly known as:  MYCOSTATIN What changed: You were already taking a medication with the same name, and this prescription was added. Make sure you understand how and when to take each. Your last dose was: Your next dose is: Take 5 mL by mouth four (4) times daily. swish and spit [address]1954 Units  
    
   
   
   
  
 potassium chloride 20 mEq tablet Commonly known as:  JOSY AHUMADA What changed:   
- how much to take 
- how to take this - when to take this 
- additional instructions Your last dose was: Your next dose is: TAKE ONE TABLET BY MOUTH 2 TIMES A DAY * Notice: This list has 4 medication(s) that are the same as other medications prescribed for you. Read the directions carefully, and ask your doctor or other care provider to review them with you. CONTINUE taking these medications Instructions Each Dose to Equal  
 Morning Noon Evening Bedtime ALPRAZolam 0.5 mg tablet Commonly known as:  Rannie Manual Your last dose was: Your next dose is: Take 0.5 mg by mouth nightly as needed for Anxiety or Sleep. 0.5 mg  
    
   
   
   
  
 amLODIPine 10 mg tablet Commonly known as:  Kelvin Perea Your last dose was: Your next dose is: TAKE ONE TABLET BY MOUTH EVERY DAY  
     
   
   
   
  
 aspirin 81 mg tablet Your last dose was: Your next dose is: Take 81 mg by mouth daily. 81 mg  
    
   
   
   
  
 atorvastatin 10 mg tablet Commonly known as:  LIPITOR Your last dose was: Your next dose is: TAKE ONE TABLET BY MOUTH EVERY DAY  
     
   
   
   
  
 bipap machine kit Your last dose was: Your next dose is:    
   
   
 1 each by Does Not Apply route. Started using BiPap Machine 01-15-15  
 1 Each CALCIUM CITRATE + PO Your last dose was: Your next dose is: Take  by mouth once over twenty-four (24) hours. CO Q-10 200 mg capsule Generic drug:  coenzyme Q-10 Your last dose was: Your next dose is: Take  by mouth daily. FISH OIL 1,000 mg (120 mg-180 mg) capsule Generic drug:  omega 3-DHA-EPA-fish oil Your last dose was: Your next dose is: Take 4 Caps by mouth daily. 4 Cap FISH OIL PO Your last dose was: Your next dose is: Take 4,000 mg by mouth daily. 4000 mg  
    
   
   
   
  
 fludrocortisone 0.1 mg tablet Commonly known as:  FLORINEF Your last dose was: Your next dose is: Take 1 tablet by mouth daily. 0.1 mg  
    
   
   
   
  
 fluticasone 50 mcg/actuation nasal spray Commonly known as:  Chickenjosee aTvaress Your last dose was: Your next dose is: 2 Sprays by Both Nostrils route daily. 2 Spray  
    
   
   
   
  
 furosemide 20 mg tablet Commonly known as:  LASIX Your last dose was: Your next dose is: Take 1 Tab by mouth daily as needed for Other (Leg Swelling). 20 mg  
    
   
   
   
  
 hydrocortisone 10 mg tablet Commonly known as:  CORTEF Your last dose was: Your next dose is: Take 10 mg by mouth daily. 10 mg  
    
   
   
   
  
 L-Methylfolate-X47-Nfsjwdhtdf tablet Commonly known as:  Sai Santamaria Your last dose was: Your next dose is: Take 1 tablet by mouth daily. 1 Tab  
    
   
   
   
  
 lisinopril 20 mg tablet Commonly known as:  Matt De Luna Your last dose was: Your next dose is: TAKE ONE TABLET BY MOUTH EVERY DAY  
     
   
   
   
  
 magnesium oxide 400 mg tablet Commonly known as:  MAG-OX Your last dose was: Your next dose is: Take 400 mg by mouth daily. 400 mg OXcarbazepine 300 mg tablet Commonly known as:  TRILEPTAL Your last dose was: Your next dose is: Take 300 mg by mouth two (2) times a day. 300 mg OXYGEN-AIR DELIVERY SYSTEMS Your last dose was: Your next dose is:    
   
   
 3 L/min by Does Not Apply route continuous. 3 L/min PARoxetine 30 mg tablet Commonly known as:  PAXIL Your last dose was: Your next dose is: TAKE ONE TABLET BY MOUTH EVERY DAY  
     
   
   
   
  
 SYNTHROID 112 mcg tablet Generic drug:  levothyroxine Your last dose was: Your next dose is: Take 112 mcg by mouth Daily (before breakfast). 112 mcg VOLTAREN 1 % Gel Generic drug:  diclofenac Your last dose was: Your next dose is:    
   
   
 Apply 2 g to affected area nightly as needed (pain). 2 g Redia Marilu Commonly known as:  [employer] Your last dose was: Your next dose is:    
   
   
 1 Device by Does Not Apply route daily. 1 Device STOP taking these medications COQ10 (LIPOSOMAL UBIQUINOL) PO  
   
  
 hydroCHLOROthiazide 12.5 mg capsule Commonly known as:  MICROZIDE  
   
  
 methylPREDNISolone 4 mg tablet Commonly known as:  MEDROL (NIRAJ) metoprolol tartrate 50 mg tablet Commonly known as:  LOPRESSOR  
   
  
 umeclidinium-vilanterol 62.5-25 mcg/actuation inhaler Commonly known as:  Akosua Boothe Where to Get Your Medications Information on where to get these meds will be given to you by the nurse or doctor. ! Ask your nurse or doctor about these medications  
  albuterol 1.25 mg/3 mL Nebu  
 amoxicillin-clavulanate 875-125 mg per tablet  
 arformoterol 15 mcg/2 mL Nebu neb solution  
 budesonide 1 mg/2 mL Nbsp  
 doxycycline 100 mg capsule  
 famotidine 20 mg tablet  
 guaiFENesin-dextromethorphan -30 mg per tablet L. acidophilus,casei,rhamnosus 50 billion cell Cpdr  
 Nebulizer & Compressor machine  
 nystatin 100,000 unit/mL suspension  
 predniSONE 10 mg tablet Discharge Instructions Asthma Attack: Care Instructions Your Care Instructions During an asthma attack, the airways swell and narrow. This makes it hard to breathe. Severe asthma attacks can be life-threatening, but you can help prevent them by keeping your asthma under control and treating symptoms before they get bad. Symptoms include being short of breath, having chest tightness, coughing, and wheezing. Noting and treating these symptoms can also help you avoid future trips to the emergency room. The doctor has checked you carefully, but problems can develop later. If you notice any problems or new symptoms, get medical treatment right away. Follow-up care is a key part of your treatment and safety. Be sure to make and go to all appointments, and call your doctor if you are having problems. It's also a good idea to know your test results and keep a list of the medicines you take. How can you care for yourself at home? · Follow your asthma action plan to prevent and treat attacks. If you don't have an asthma action plan, work with your doctor to create one. · Take your asthma medicines exactly as prescribed. Talk to your doctor right away if you have any questions about how to take them. ¨ Use your quick-relief medicine when you have symptoms of an attack. Quick-relief medicine is usually an albuterol inhaler. Some people need to use quick-relief medicine before they exercise. ¨ Take your controller medicine every day, not just when you have symptoms. Controller medicine is usually an inhaled corticosteroid. The goal is to prevent problems before they occur. Don't use your controller medicine to treat an attack that has already started. It doesn't work fast enough to help. ¨ If your doctor prescribed corticosteroid pills to use during an attack, take them exactly as prescribed. It may take hours for the pills to work, but they may make the episode shorter and help you breathe better. ¨ Keep your quick-relief medicine with you at all times. · Talk to your doctor before using other medicines. Some medicines, such as aspirin, can cause asthma attacks in some people. · If you have a peak flow meter, use it to check how well you are breathing. This can help you predict when an asthma attack is going to occur. Then you can take medicine to prevent the asthma attack or make it less severe. · Do not smoke or allow others to smoke around you. Avoid smoky places. Smoking makes asthma worse. If you need help quitting, talk to your doctor about stop-smoking programs and medicines. These can increase your chances of quitting for good. · Learn what triggers an asthma attack for you, and avoid the triggers when you can. Common triggers include colds, smoke, air pollution, dust, pollen, mold, pets, cockroaches, stress, and cold air. · Avoid colds and the flu. Get a pneumococcal vaccine shot. If you have had one before, ask your doctor if you need a second dose. Get a flu vaccine every fall. If you must be around people with colds or the flu, wash your hands often. When should you call for help? Call 911 anytime you think you may need emergency care. For example, call if: 
? · You have severe trouble breathing. ?Call your doctor now or seek immediate medical care if: 
? · Your symptoms do not get better after you have followed your asthma action plan. ? · You have new or worse trouble breathing. ? · Your coughing and wheezing get worse. ? · You cough up dark brown or bloody mucus (sputum). ? · You have a new or higher fever. ? Watch closely for changes in your health, and be sure to contact your doctor if: 
? · You need to use quick-relief medicine on more than 2 days a week (unless it is just for exercise). ? · You cough more deeply or more often, especially if you notice more mucus or a change in the color of your mucus. ? · You are not getting better as expected. Where can you learn more? Go to http://casey-sameer.info/. Enter P807 in the search box to learn more about \"Asthma Attack: Care Instructions. \" Current as of: May 12, 2017 Content Version: 11.4 © 3368-5140 [employer] Care instructions adapted under license by [employer] (which disclaims liability or warranty for this information). If you have questions about a medical condition or this instruction, always ask your healthcare professional. [address] any warranty or liability for your use of this information. Learning About Asthma Triggers What are asthma triggers? When you have asthma, certain things can make your symptoms worse. These are called triggers. Learn what triggers an asthma attack for you, and avoid the triggers when you can. Common triggers include colds, smoke, air pollution, dust, pollen, pets, stress, and cold air. How do asthma triggers affect you? Triggers can make it harder for your lungs to work as they should. They can lead to sudden breathing problems and other symptoms. When you are around a trigger, an asthma attack is more likely. If your symptoms are severe, you may need emergency treatment or have to go to the hospital for treatment. What can you do to avoid triggers? The first thing is to know your triggers. When you are having symptoms, note the things around you that might be causing them. Then look for patterns that may be triggering your symptoms. Record your triggers on a piece of paper or in an asthma diary. When you have your list of possible triggers, work with your doctor to find ways to avoid them. Avoid colds and flu. Get a pneumococcal vaccine shot. If you have had one before, ask your doctor whether you need a second dose. Get a flu vaccine every year, as soon as it's available. If you must be around people with colds or the flu, wash your hands often. Here are some ways to avoid a few common triggers. · Do not smoke or allow others to smoke around you. If you need help quitting, talk to your doctor about stop-smoking programs and medicines. These can increase your chances of quitting for good. · If there is a lot of pollution, pollen, or dust outside, stay at home and keep your windows closed. Use an air conditioner or air filter in your home. Check your local weather report or newspaper for air quality and pollen reports. What else should you know? · Take your controller medicine every day, not just when you have symptoms. It helps prevent problems before they occur. · Your doctor may suggest that you check how well your lungs are working by measuring your peak expiratory flow (PEF) throughout the day. Your PEF may drop when you are near things that trigger symptoms. Where can you learn more? Go to http://casye-sameer.info/. Enter Q933 in the search box to learn more about \"Learning About Asthma Triggers. \" Current as of: May 12, 2017 Content Version: 11.4 © 7248-8468 [employer]. Care instructions adapted under license by [employer] (which disclaims liability or warranty for this information). If you have questions about a medical condition or this instruction, always ask your healthcare professional. [place] [address] any warranty or liability for your use of this information. ACO Transitions of Care Introducing [employer] [address] offers a voluntary care coordination program to provide high quality service and care to [facility] fee-for-service beneficiaries. Carin Tom was designed to help you enhance your health and well-being through the following services: ? Transitions of Care  support for individuals who are transitioning from one care setting to another (example: Hospital to home). ?  Chronic and Complex Care Coordination  support for individuals and caregivers of those with serious or chronic illnesses or with more than one chronic (ongoing) condition and those who take a number of different medications. If you meet specific medical criteria, a [address] [place] [address] Rd may call you directly to coordinate your care with your primary care physician and your other care providers. For questions about the [facility] programs, please, contact your physicians office. For general questions or additional information about Accountable Care Organizations: 
Please visit www.medicare.gov/acos. html or call 1-800-MEDICARE (3-299.365.6735) TTY users should call 1-127.868.6168. FiPath Announcement We are excited to announce that we are making your provider's discharge notes available to you in FiPath. You will see these notes when they are completed and signed by the physician that discharged you from your recent hospital stay. If you have any questions or concerns about any information you see in FiPath, please call the Health Information Department where you were seen or reach out to your Primary Care Provider for more information about your plan of care. Introducing [facility]! Dear Samantha Kent: 
Thank you for requesting a FiPath account. Our records indicate that you already have an active FiPath account. You can access your account anytime at [email or web address] Did you know that you can access your hospital and ER discharge instructions at any time in FiPath? You can also review all of your test results from your hospital stay or ER visit. Additional Information If you have questions, please visit the Frequently Asked Questions section of the FiPath website at [email or web address] Remember, FiPath is NOT to be used for urgent needs. For medical emergencies, dial 911. Now available from your iPhone and Android! Unresulted Labs-Please follow up with your PCP about these lab tests Order Current Status CULTURE, BLOOD Preliminary result CULTURE, BLOOD Preliminary result Providers Seen During Your Hospitalization Provider Specialty Primary office phone Sedalia Gilford, MD Emergency Medicine [phone] Grayson Chávez MD Hospitalist [phone] Linette South MD Internal Medicine [phone] Your Primary Care Physician (PCP) Primary Care Physician Office Phone Office Fax [address], [address] [facility],[place] [address] [phone] You are allergic to the following Allergen Reactions Iodine Hives Recent Documentation Height Weight BMI OB Status Smoking Status 1.626 m 72.1 kg 27.29 kg/m2 Postmenopausal Current Every Day Smoker Emergency Contacts Name Discharge Info Relation Home Work Mobile [address] CAREGIVER [3] Daughter [21] [phone] Gavi Worthington  Child [2] [phone] Brissa Gonsales  Child [2] [phone] Patient Belongings The following personal items are in your possession at time of discharge: 
  Dental Appliances: None  Visual Aid: None      Home Medications: None   Jewelry: None  Clothing: None    Other Valuables: None Please provide this summary of care documentation to your next provider. Signatures-by signing, you are acknowledging that this After Visit Summary has been reviewed with you and you have received a copy. Patient Signature:  ____________________________________________________________ Date:  ____________________________________________________________  
  
Coreysandeep Oates Provider Signature:  ____________________________________________________________ Date:  ____________________________________________________________

## 2018-03-18 NOTE — Clinical Note
Status[de-identified] Inpatient [101] Type of Bed: Telemetry [19] Inpatient Hospitalization Certified Necessary for the Following Reasons: 3. Patient receiving treatment that can only be provided in an inpatient setting (further clarification in H&P documentation) Admitting Diagnosis: Pneumonia [[id number]] Admitting Physician: Shefali Armijo Attending Physician: Shefali Armijo Estimated Length of Stay: 3-4 Midnights Discharge Plan[de-identified] Home with Office Follow-up

## 2018-03-19 ENCOUNTER — HOME CARE VISIT (OUTPATIENT)
Dept: HOME HEALTH SERVICES | Facility: HOME HEALTH | Age: 83
End: 2018-03-19
Payer: MEDICARE

## 2018-03-19 PROBLEM — J18.9 HCAP (HEALTHCARE-ASSOCIATED PNEUMONIA): Status: ACTIVE | Noted: 2018-03-19

## 2018-03-19 LAB
ATRIAL RATE: 55 BPM
CALCULATED R AXIS, ECG10: -80 DEGREES
CALCULATED T AXIS, ECG11: 55 DEGREES
DIAGNOSIS, 93000: NORMAL
P-R INTERVAL, ECG05: 164 MS
Q-T INTERVAL, ECG07: 428 MS
QRS DURATION, ECG06: 120 MS
QTC CALCULATION (BEZET), ECG08: 409 MS
VENTRICULAR RATE, ECG03: 55 BPM

## 2018-03-19 PROCEDURE — 74011250636 HC RX REV CODE- 250/636: Performed by: EMERGENCY MEDICINE

## 2018-03-19 PROCEDURE — 92526 ORAL FUNCTION THERAPY: CPT

## 2018-03-19 PROCEDURE — 74011000250 HC RX REV CODE- 250: Performed by: EMERGENCY MEDICINE

## 2018-03-19 PROCEDURE — 77030027138 HC INCENT SPIROMETER -A

## 2018-03-19 PROCEDURE — 74011250637 HC RX REV CODE- 250/637: Performed by: HOSPITALIST

## 2018-03-19 PROCEDURE — 77010033678 HC OXYGEN DAILY: Performed by: INTERNAL MEDICINE

## 2018-03-19 PROCEDURE — 74011250636 HC RX REV CODE- 250/636: Performed by: INTERNAL MEDICINE

## 2018-03-19 PROCEDURE — 65270000029 HC RM PRIVATE

## 2018-03-19 PROCEDURE — 3331090002 HH PPS REVENUE DEBIT

## 2018-03-19 PROCEDURE — 3331090001 HH PPS REVENUE CREDIT

## 2018-03-19 PROCEDURE — 92610 EVALUATE SWALLOWING FUNCTION: CPT

## 2018-03-19 PROCEDURE — 74011250637 HC RX REV CODE- 250/637: Performed by: INTERNAL MEDICINE

## 2018-03-19 RX ORDER — OXYCODONE AND ACETAMINOPHEN 5; 325 MG/1; MG/1
1 TABLET ORAL
Status: DISCONTINUED | OUTPATIENT
Start: 2018-03-19 | End: 2018-03-22 | Stop reason: HOSPADM

## 2018-03-19 RX ORDER — NALOXONE HYDROCHLORIDE 0.4 MG/ML
0.4 INJECTION, SOLUTION INTRAMUSCULAR; INTRAVENOUS; SUBCUTANEOUS AS NEEDED
Status: DISCONTINUED | OUTPATIENT
Start: 2018-03-19 | End: 2018-03-22 | Stop reason: HOSPADM

## 2018-03-19 RX ORDER — DOCUSATE SODIUM 100 MG/1
100 CAPSULE, LIQUID FILLED ORAL
Status: DISCONTINUED | OUTPATIENT
Start: 2018-03-19 | End: 2018-03-22 | Stop reason: HOSPADM

## 2018-03-19 RX ORDER — FLUTICASONE PROPIONATE 50 MCG
2 SPRAY, SUSPENSION (ML) NASAL DAILY
Status: DISCONTINUED | OUTPATIENT
Start: 2018-03-19 | End: 2018-03-22 | Stop reason: HOSPADM

## 2018-03-19 RX ORDER — ACETAMINOPHEN 500 MG
500 TABLET ORAL
Status: DISCONTINUED | OUTPATIENT
Start: 2018-03-19 | End: 2018-03-22 | Stop reason: HOSPADM

## 2018-03-19 RX ORDER — ONDANSETRON 2 MG/ML
4 INJECTION INTRAMUSCULAR; INTRAVENOUS
Status: DISCONTINUED | OUTPATIENT
Start: 2018-03-19 | End: 2018-03-22 | Stop reason: HOSPADM

## 2018-03-19 RX ORDER — POTASSIUM CHLORIDE 20 MEQ/1
20 TABLET, EXTENDED RELEASE ORAL DAILY
Status: DISCONTINUED | OUTPATIENT
Start: 2018-03-20 | End: 2018-03-22 | Stop reason: HOSPADM

## 2018-03-19 RX ORDER — OXCARBAZEPINE 300 MG/1
300 TABLET, FILM COATED ORAL 2 TIMES DAILY
Status: DISCONTINUED | OUTPATIENT
Start: 2018-03-19 | End: 2018-03-22 | Stop reason: HOSPADM

## 2018-03-19 RX ORDER — GUAIFENESIN 100 MG/5ML
81 LIQUID (ML) ORAL DAILY
Status: DISCONTINUED | OUTPATIENT
Start: 2018-03-19 | End: 2018-03-22 | Stop reason: HOSPADM

## 2018-03-19 RX ORDER — ACETAMINOPHEN 325 MG/1
650 TABLET ORAL
Status: DISCONTINUED | OUTPATIENT
Start: 2018-03-19 | End: 2018-03-19

## 2018-03-19 RX ORDER — POTASSIUM CHLORIDE 20 MEQ/1
20 TABLET, EXTENDED RELEASE ORAL 2 TIMES DAILY
Status: DISCONTINUED | OUTPATIENT
Start: 2018-03-19 | End: 2018-03-19

## 2018-03-19 RX ORDER — ALPRAZOLAM 0.5 MG/1
0.5 TABLET ORAL
Status: DISCONTINUED | OUTPATIENT
Start: 2018-03-19 | End: 2018-03-22 | Stop reason: HOSPADM

## 2018-03-19 RX ORDER — ATORVASTATIN CALCIUM 10 MG/1
10 TABLET, FILM COATED ORAL
Status: DISCONTINUED | OUTPATIENT
Start: 2018-03-19 | End: 2018-03-22 | Stop reason: HOSPADM

## 2018-03-19 RX ORDER — HEPARIN SODIUM 5000 [USP'U]/ML
5000 INJECTION, SOLUTION INTRAVENOUS; SUBCUTANEOUS EVERY 8 HOURS
Status: DISCONTINUED | OUTPATIENT
Start: 2018-03-19 | End: 2018-03-22 | Stop reason: HOSPADM

## 2018-03-19 RX ORDER — ALBUTEROL SULFATE 0.83 MG/ML
2.5 SOLUTION RESPIRATORY (INHALATION)
Status: DISCONTINUED | OUTPATIENT
Start: 2018-03-19 | End: 2018-03-22 | Stop reason: HOSPADM

## 2018-03-19 RX ORDER — LANOLIN ALCOHOL/MO/W.PET/CERES
400 CREAM (GRAM) TOPICAL DAILY
Status: DISCONTINUED | OUTPATIENT
Start: 2018-03-19 | End: 2018-03-22 | Stop reason: HOSPADM

## 2018-03-19 RX ORDER — FLUDROCORTISONE ACETATE 0.1 MG/1
0.1 TABLET ORAL DAILY
Status: DISCONTINUED | OUTPATIENT
Start: 2018-03-19 | End: 2018-03-22 | Stop reason: HOSPADM

## 2018-03-19 RX ORDER — LEVOTHYROXINE SODIUM 112 UG/1
112 TABLET ORAL
Status: DISCONTINUED | OUTPATIENT
Start: 2018-03-19 | End: 2018-03-22 | Stop reason: HOSPADM

## 2018-03-19 RX ORDER — FUROSEMIDE 20 MG/1
20 TABLET ORAL
Status: DISCONTINUED | OUTPATIENT
Start: 2018-03-19 | End: 2018-03-22 | Stop reason: HOSPADM

## 2018-03-19 RX ORDER — HYDROCORTISONE 10 MG/1
10 TABLET ORAL DAILY
Status: DISCONTINUED | OUTPATIENT
Start: 2018-03-19 | End: 2018-03-20

## 2018-03-19 RX ADMIN — HEPARIN SODIUM 5000 UNITS: 5000 INJECTION, SOLUTION INTRAVENOUS; SUBCUTANEOUS at 22:16

## 2018-03-19 RX ADMIN — HYDROCORTISONE 10 MG: 10 TABLET ORAL at 09:39

## 2018-03-19 RX ADMIN — FLUDROCORTISONE ACETATE 100 MCG: 0.1 TABLET ORAL at 09:39

## 2018-03-19 RX ADMIN — OXCARBAZEPINE 300 MG: 300 TABLET ORAL at 09:39

## 2018-03-19 RX ADMIN — Medication 2 G: at 20:34

## 2018-03-19 RX ADMIN — Medication 400 MG: at 09:39

## 2018-03-19 RX ADMIN — LEVOTHYROXINE SODIUM 112 MCG: 112 TABLET ORAL at 09:39

## 2018-03-19 RX ADMIN — OXCARBAZEPINE 300 MG: 300 TABLET ORAL at 18:10

## 2018-03-19 RX ADMIN — ASPIRIN 81 MG 81 MG: 81 TABLET ORAL at 09:39

## 2018-03-19 RX ADMIN — HEPARIN SODIUM 5000 UNITS: 5000 INJECTION, SOLUTION INTRAVENOUS; SUBCUTANEOUS at 09:38

## 2018-03-19 RX ADMIN — HEPARIN SODIUM 5000 UNITS: 5000 INJECTION, SOLUTION INTRAVENOUS; SUBCUTANEOUS at 18:10

## 2018-03-19 RX ADMIN — Medication 2 G: at 09:39

## 2018-03-19 RX ADMIN — ATORVASTATIN CALCIUM 10 MG: 10 TABLET, FILM COATED ORAL at 22:16

## 2018-03-19 RX ADMIN — GUAIFENESIN AND DEXTROMETHORPHAN HYDROBROMIDE 1 TABLET: 600; 30 TABLET, EXTENDED RELEASE ORAL at 22:20

## 2018-03-19 RX ADMIN — POTASSIUM CHLORIDE 20 MEQ: 20 TABLET, EXTENDED RELEASE ORAL at 09:39

## 2018-03-19 RX ADMIN — PAROXETINE 30 MG: 30 TABLET, FILM COATED ORAL at 09:39

## 2018-03-19 NOTE — ROUTINE PROCESS
Bedside and Verbal shift change report given to [facility] (oncoming nurse) by Claudia Mcleod (offgoing nurse).  Report included the following information SBAR, Kardex, Intake/Output, MAR and Cardiac Rhythm SB.

## 2018-03-19 NOTE — PROGRESS NOTES
Problem: Dysphagia (Adult)  Goal: *Acute Goals and Plan of Care (Insert Text)  Recommendations:  Diet: soft/thin  Meds: one at a time, per pt preference  Aspiration Precautions  Oral Care TID  Other: MBS next am    Goals:  Patient will:  1. Tolerate PO trials with 0 s/s overt distress in 4/5 trials  2. Utilize compensatory swallow strategies/maneuvers (decrease bite/sip, size/rate, alt. liq/sol) with min cues in 4/5 trials  3. Complete an objective swallow study (i.e., MBSS) to assess swallow integrity, r/o aspiration, and determine of safest LRD, min A      Outcome: Progressing Towards Goal    Speech LAnguage Pathology bedside swallow   evaluation & TREATMENT     Patient: Wendy Ronquillo (89 y.o. female)  Date: 3/19/2018  Primary Diagnosis: Pneumonia  Right lower lobe pneumonia ([address]        Precautions: aspiration     PLOF: lives with dtr; regular solid/thin liquid diet  ASSESSMENT :  Based on the objective data described below, the patient presents with mild OP dysphagia in the setting of general debility. Pt A&Ox4; basic cognitive-linguistic function intact. Oral-motor exam revealed structures grossly intact for mastication and deglutition. Familiar to Speech Therapy from Out-Patient MBS 10/21/2015 during which no aspiration identified; only noted pt actively smokes with po intake resulting in aspiration episodes. At that time, SLP recommended regular/thin diet. Pt accepted self-fed nectar and thin liquids + straw, puree, and cracker trials without overt distress s/s; however, noted significantly increased WOB with po. At this time, pt safest for soft solid/thin liquid diet. Further, pt would benefit from MBS to re-assess swallow integrity and rule-out Silent aspiration in lieu of RLL on CXR. Dtr at b/s for education; verbalized comprehension. Skilled therapy initiated with minimal cueing re: need to decrease rate of intake and bite size with frequent rest breaks; requires reinforcement.  SLP will continue to follow as indicated. Patient will benefit from skilled intervention to address the above impairments. Patients rehabilitation potential is considered to be Fair  Factors which may influence rehabilitation potential include:   []            None noted  [x]            Mental ability/status  [x]            Medical condition  []            Home/family situation and support systems  [x]            Safety awareness  []            Pain tolerance/management  []            Other:      PLAN :  Recommendations and Planned Interventions:  soft solid/thin liquid diet; MBS next day  Frequency/Duration: Patient will be followed by speech-language pathology 1-2 times per day/4-7 days per week to address goals. Discharge Recommendations: Skilled Nursing Facility     SUBJECTIVE:   Patient stated Starlett Sheba you. OBJECTIVE:     Past Medical History:   Diagnosis Date    Abuse 1998    Alcohol    Anxiety     Calculus of kidney 1988    Cardiac echocardiogram 07/31/2012    EF 60-65%. No WMA. Mild conc LVH. Gr 1 DDfx. RVSP 20-25 mmHg. Mild SAGAR. Mild TR,. Mild PAE.  Cardiovascular LE venous duplex 10/08/2012    No venous thrombosis or venous insufficiency in bilateral lower extremities.  Carotid duplex 10/09/2014    Mild < 50% bilateral ICA stenosis.       Chronic lung disease     Contact dermatitis and other eczema, due to unspecified cause     COPD (chronic obstructive pulmonary disease) (HCC)     Degenerative joint disease     Depression     Hematuria     Hypercholesteremia     Hypertension     Hypothyroidism 1998    Kidney stone     Neuropathy 3/2007    Orthostatic hypotension     Pituitary adenoma ([address] 2/1998    Seizures ([address]     none recently    Severe obstructive sleep apnea 01-15-15     started using Bipap Machine    Stress     Stroke ([address] 2/2006    no residual    TIA (transient ischemic attack)     Trauma      Past Surgical History:   Procedure Laterality Date    CYSTOSCOPY,INSERT URETERAL STENT  9/14/15    Dr. Zulema Hassan HX LITHOTRIPSY  9/14/15    Dr. Kaya Aleman HX WISDOM TEETH EXTRACTION      x4     Prior Level of Function/Home Situation: lives with dtr  Home Situation  Home Environment: Private residence  One/Two Story Residence: One story  Living Alone: No  Support Systems: Child(jessica)  Patient Expects to be Discharged to[de-identified] Private residence  Current DME Used/Available at Home: Nereyda Luis, jagruti  Diet prior to admission: regular/thin  Current Diet:  Soft/thin   Cognitive and Communication Status:  Neurologic State: Alert  Orientation Level: Oriented X4  Cognition: Appropriate for age attention/concentration  Perception: Appears intact  Perseveration: No perseveration noted     Oral Assessment:  Oral Assessment  Labial: No impairment  Dentition: Natural  Oral Hygiene: Fair  Lingual: Decreased rate  Velum: No impairment  Mandible: No impairment  P.O. Trials:  Patient Position: HOB 60  Vocal quality prior to P.O.: Low volume  Consistency Presented: Thin liquid;Pudding; Solid  How Presented: Self-fed/presented;Straw;Successive swallows     Bolus Acceptance: No impairment  Bolus Formation/Control: Impaired  Type of Impairment: Delayed;Mastication  Propulsion: Delayed (# of seconds)  Oral Residue: None  Initiation of Swallow: Delayed (# of seconds)  Laryngeal Elevation: Functional  Aspiration Signs/Symptoms: Infiltrate on chest xray  Pharyngeal Phase Characteristics: Poor endurance  Effective Modifications: Small sips and bites  Cues for Modifications: Minimal-moderate       Oral Phase Severity: Mild  Pharyngeal Phase Severity : Mild    GCODESwallowing:  Swallow Current Status CJ= 20-39%   Swallow Goal Status CI= 1-19%      The severity rating is based on the following outcomes:  OMAYRA Noms Swallow Level 5    Clinical Judgement    PAIN:  Start of Eval/Tx: 0  End of Eval/Tx: 0     After treatment: []            Patient left in no apparent distress sitting up in chair  [x]            Patient left in no apparent distress in bed  [x]            Call bell left within reach  [x]            Nursing notified  [x]            Family present  []            Caregiver present  []            Bed alarm activated    COMMUNICATION/EDUCATION:   [x]            Aspiration precautions; swallow safety; compensatory techniques. [x]            Patient/family have participated as able in goal setting and plan of care. [x]            Patient/family agree to work toward stated goals and plan of care. []            Patient understands intent and goals of therapy; neutral about participation. []            Patient unable to participate in goal setting/plan of care; educ ongoing with interdisciplinary staff  []         Posted safety precautions in patient's room.     Thank you for this referral.  SHIVAM Muñoz  Time Calculation: 20 mins  Evaluation Time: 12 minutes   Treatment Time: 8 minutes

## 2018-03-19 NOTE — H&P
History & Physical    Patient: Dominic Vu MRN: [id number]  CSN: [id number]    YOB: 1931  Age: 80 y.o. Sex: female      DOA: 3/18/2018    Chief Complaint:   Chief Complaint   Patient presents with    Shortness of Breath    Fatigue          HPI:     Dominic Vu is a 80 y.o.  female who has PMH of  COPD, HTN, ETOH abuse, Stroke, Seizures, and heavy smoking presents to ER in [facility] with AMS, Lethargy and SOB . Pt was recently discharged from the hospital after being treated for GE. On admission, CXR was done and Bibasilar infiltrate Vs atelectasis. Pt had leukocytosis with lt shift w/out fever but was hypotensive   Pt will be admitted for possible HCAP. Felt much better after receiving sepsis protocol. Denies CP/fever/abdominal pain and urinary Sx but continues to c/o mild SOB and sklightly altered      Past Medical History:   Diagnosis Date    Abuse 1998    Alcohol    Anxiety     Calculus of kidney 1988    Cardiac echocardiogram 07/31/2012    EF 60-65%. No WMA. Mild conc LVH. Gr 1 DDfx. RVSP 20-25 mmHg. Mild SAGAR. Mild TR,. Mild PAE.  Cardiovascular LE venous duplex 10/08/2012    No venous thrombosis or venous insufficiency in bilateral lower extremities.  Carotid duplex 10/09/2014    Mild < 50% bilateral ICA stenosis.       Chronic lung disease     Contact dermatitis and other eczema, due to unspecified cause     COPD (chronic obstructive pulmonary disease) (HCC)     Degenerative joint disease     Depression     Hematuria     Hypercholesteremia     Hypertension     Hypothyroidism 1998    Kidney stone     Neuropathy 3/2007    Orthostatic hypotension     Pituitary adenoma ([address] 2/1998    Seizures ([address]     none recently    Severe obstructive sleep apnea 01-15-15     started using Bipap Machine    Stress     Stroke ([address] 2/2006    no residual    TIA (transient ischemic attack)     Trauma        Past Surgical History:   Procedure Laterality Date    CYSTOSCOPY,INSERT URETERAL STENT  9/14/15    Dr. Christopher Burk      HX LITHOTRIPSY  9/14/15    Dr. Jeffy Lemus    HX TUBAL LIGATION      HX WISDOM TEETH EXTRACTION      x4       Family History   Problem Relation Age of Onset    Heart Disease Father     Hypertension Father     Hypertension Mother     Cancer Mother      skin    Elevated Lipids Sister     Heart Disease Sister     Cancer Maternal Grandmother      colon and stomach    Heart Disease Paternal Grandmother     Heart Attack Paternal Grandmother     Heart Attack Paternal Grandfather     Heart Disease Paternal Grandfather        Social History     Social History    Marital status:      Spouse name: N/A    Number of children: N/A    Years of education: N/A     Social History Main Topics    Smoking status: Current Every Day Smoker     Packs/day: 1.50     Years: 65.00     Types: Cigarettes    Smokeless tobacco: Never Used    Alcohol use No      Comment: quit 1998 due to Alcohol Abuse    Drug use: No    Sexual activity: No     Other Topics Concern    Not on file     Social History Narrative       Prior to Admission medications    Medication Sig Start Date End Date Taking? Authorizing Provider   cholecalciferol, vitamin D3, 4,000 unit cap Take 1 Cap by mouth daily. Historical Provider   coenzyme Q-10 (CO Q-10) 200 mg capsule Take  by mouth daily. Historical Provider   omega 3-DHA-EPA-fish oil (FISH OIL) 1,000 mg (120 mg-180 mg) capsule Take 4 Caps by mouth daily. Historical Provider   OXYGEN-AIR DELIVERY SYSTEMS 3 L/min by Does Not Apply route continuous.     Historical Provider   methylPREDNISolone (MEDROL, NIRAJ,) 4 mg tablet As directed  Patient not taking: Reported on 3/14/2018 3/13/18   Bob David MD   lisinopril (PRINIVIL, ZESTRIL) 20 mg tablet TAKE ONE TABLET BY MOUTH EVERY DAY 1/25/18   Stacey Welch MD   PARoxetine (PAXIL) 30 mg tablet TAKE ONE TABLET BY MOUTH EVERY DAY 1/25/18   Dunia Tellez MD   atorvastatin (LIPITOR) 10 mg tablet TAKE ONE TABLET BY MOUTH EVERY DAY 1/25/18   Dunia Tellez MD   potassium chloride (K-DUR, KLOR-CON) 20 mEq tablet TAKE ONE TABLET BY MOUTH 2 TIMES A DAY  Patient taking differently: Take 20 mEq by mouth two (2) times a day. TAKE ONE TABLET BY MOUTH 2 TIMES A DAY 1/25/18   Dunia Tellez MD   metoprolol tartrate (LOPRESSOR) 50 mg tablet TAKE ONE TABLET BY MOUTH EVERY DAY FOR HYPERTENSION 1/25/18   Dunia Tellez MD   amLODIPine (NORVASC) 10 mg tablet TAKE ONE TABLET BY MOUTH EVERY DAY 1/25/18   Dunia Tellez MD   hydroCHLOROthiazide (MICROZIDE) 12.5 mg capsule TAKE ONE CAPSULE BY MOUTH EVERY DAY 1/18/18   Dunia Tellez MD   umeclidinium-vilanterol (ANORO ELLIPTA) 62.5-25 mcg/actuation inhaler Take 1 Puff by inhalation daily. 9/25/17   Christiane Bryson MD   nystatin (MYCOSTATIN) powder Apply  to affected area four (4) times daily. 7/25/17   Dunia Tellez MD   albuterol (PROAIR HFA) 90 mcg/actuation inhaler Take 2 Puffs by inhalation every four (4) hours as needed for Wheezing or Shortness of Breath. 5/26/17   Kimberlee Diaz NP   fluticasone (FLONASE) 50 mcg/actuation nasal spray 2 Sprays by Both Nostrils route daily. 4/10/17   Historical Provider   SYNTHROID 112 mcg tablet Take 112 mcg by mouth Daily (before breakfast). 7/19/16   Historical Provider   ALPRAZolam Umair Devante) 0.5 mg tablet Take 0.5 mg by mouth nightly as needed for Anxiety or Sleep. 9/6/16   Historical Provider   furosemide (LASIX) 20 mg tablet Take 1 Tab by mouth daily as needed for Other (Leg Swelling). 4/12/16   Dunia Tellez MD   hydrocortisone (CORTEF) 10 mg tablet Take 10 mg by mouth daily. 10/26/15   Historical Provider   VOLTAREN 1 % topical gel Apply 2 g to affected area nightly as needed (pain). 9/2/15   Historical Provider   Walker (BARNEY ROLLING WALKER) misc 1 Device by Does Not Apply route daily.  8/31/15   JENNIFER Gordon   bipap machine kit 1 each by Does Not Apply route. Started using BiPap Machine 01-15-15    Historical Provider   L-Methylfolate-K16-Tjritfgbfs (CEREFOLIN NAC) tablet Take 1 tablet by mouth daily. 10/30/14   Alisha Paige MD   fludrocortisone (FLORINEF) 0.1 mg tablet Take 1 tablet by mouth daily. 10/30/14   Alisha Paige MD   oxcarbazepine (TRILEPTAL) 300 mg tablet Take 300 mg by mouth two (2) times a day. Historical Provider   Cholecalciferol, Vitamin D3, 5,000 unit Tab Take 5,000 Int'l Units by mouth daily. Indications: VITAMIN D DEFICIENCY    Historical Provider   magnesium oxide (MAG-OX) 400 mg tablet Take 400 mg by mouth daily. Historical Provider   COQ10, LIPOSOMAL UBIQUINOL, PO Take 10 mg by mouth daily. Historical Provider   aspirin 81 mg tablet Take 81 mg by mouth daily. Historical Provider   DOCOSAHEXANOIC ACID/EPA (FISH OIL PO) Take 4,000 mg by mouth daily. Historical Provider   CALCIUM CITRATE/VITAMIN D3 (CALCIUM CITRATE + PO) Take  by mouth once over twenty-four (24) hours. Historical Provider       Allergies   Allergen Reactions    Iodine Hives         Review of Systems  GENERAL: Patient alert, awake and oriented times 3, able to communicate full sentences and not in distress. Generally weak and feels better now  HEENT: No change in vision, no earache, tinnitus, sore throat or sinus congestion. NECK: No pain or stiffness. PULMONARY:  +ve shortness of breath, cough and wheezing  Cardiovascular: no pnd / orthopnea, no CP  GASTROINTESTINAL: No abdominal pain, nausea, vomiting or diarrhea, melena or bright red blood per rectum. GENITOURINARY: No urinary frequency, urgency, hesitancy or dysuria. MUSCULOSKELETAL: No joint or muscle pain, no back pain, no recent trauma. DERMATOLOGIC: No rash, no itching, no lesions. ENDOCRINE: No polyuria, polydipsia, no heat or cold intolerance. No recent change in weight. HEMATOLOGICAL: No anemia or easy bruising or bleeding. NEUROLOGIC: general weakness.        Physical Exam:     Physical Exam:  Visit Vitals    /60 (BP 1 Location: Left arm, BP Patient Position: At rest)    Pulse (!) 54    Temp 98.8 °F (37.1 °C)    Resp 24    Ht 5' 4\" (1.626 m)    Wt 72.1 kg (159 lb)    SpO2 92%    BMI 27.29 kg/m2    O2 Flow Rate (L/min): 3 l/min O2 Device: Nasal cannula    Temp (24hrs), Av.3 °F (36.8 °C), Min:97.3 °F (36.3 °C), Max:99.6 °F (37.6 °C)         1901 -  0700  In: 470 [P.O.:120; I.V.:350]  Out: 350 [Urine:350]  Head: Normocephalic, without obvious abnormality, atraumatic. nAD   Eyes:  Conjunctivae/corneas clear. PERRL, EOMs intact. Nose: Nares normal. No drainage or sinus tenderness. Neck: Supple, symmetrical, trachea midline, no adenopathy, thyroid: no enlargement, no carotid bruit and no JVD. Lungs:   decrease BS B/L with mild wheezing    Heart:  Regular rate and rhythm, S1, S2 normal.     Abdomen: Soft, non-tender. Bowel sounds normal.    Extremities: Extremities normal, atraumatic, no cyanosis or edema. Pulses: 2+ and symmetric all extremities. Skin:  No rashes or lesions   Neurologic: AAOx2. Generally weak          Labs Reviewed: All lab results for the last 24 hours reviewed. 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  Pt is also border line filomena  On Lasix 20, BB , Lisinopril, HCTZ and CCB   Will restart meds as needed    COPD >> stable >> continue Inhalers , no need for steroids     Hypothyroidism>> Will continue Synthroid   Hx of Pituitary Tumor and Hypopituitarism>> will continue Fludrocortisone and hold Hydrocortisone for now>> on solumdrol    DVT/GI Prophylaxis: Hep SQ        Edelmira Coles MD  3/19/2018 6:02 AM

## 2018-03-19 NOTE — ROUTINE PROCESS
Bedside and Verbal shift change report given to Jessica Pineda, LINDA (oncoming nurse) by Rd Costa RN (offgoing nurse). Report included the following information SBAR, Kardex, MAR and Recent Results. SITUATION:    Code Status: Full Code   Reason for Admission: Pneumonia   Right lower lobe pneumonia [facility])    [facility] day: 1   Problem List:       Hospital Problems  Date Reviewed: 1/25/2018          Codes Class Noted POA    * (Principal)HCAP (healthcare-associated pneumonia) ICD-10-CM: J18.9  ICD-9-CM: 486  3/19/2018 Unknown        Right lower lobe pneumonia ([facility] ICD-10-CM: J18.1  ICD-9-CM: 486  3/18/2018 Unknown        Panhypopituitarism ([facility] ICD-10-CM: E23.0  ICD-9-CM: 253.2  3/10/2018 Yes        COPD (chronic obstructive pulmonary disease) ([facility] ICD-10-CM: J44.9  ICD-9-CM: 606  2/25/2013 Yes        Essential hypertension, benign ICD-10-CM: I10  ICD-9-CM: 401.1  10/2/2012 Yes        Tobacco abuse ICD-10-CM: Z72.0  ICD-9-CM: 305.1  10/2/2012 Yes              BACKGROUND:    Past Medical History:   Past Medical History:   Diagnosis Date    Abuse 1998    Alcohol    Anxiety     Calculus of kidney 1988    Cardiac echocardiogram 07/31/2012    EF 60-65%. No WMA. Mild conc LVH. Gr 1 DDfx. RVSP 20-25 mmHg. Mild SAGAR. Mild TR,. Mild PAE.  Cardiovascular LE venous duplex 10/08/2012    No venous thrombosis or venous insufficiency in bilateral lower extremities.  Carotid duplex 10/09/2014    Mild < 50% bilateral ICA stenosis.       Chronic lung disease     Contact dermatitis and other eczema, due to unspecified cause     COPD (chronic obstructive pulmonary disease) (HCC)     Degenerative joint disease     Depression     Hematuria     Hypercholesteremia     Hypertension     Hypothyroidism 1998    Kidney stone     Neuropathy 3/2007    Orthostatic hypotension     Pituitary adenoma ([facility] 2/1998    Seizures ([facility]     none recently    Severe obstructive sleep apnea 01-15-15     started using Bipap Machine    Stress     Stroke ([facility] 2/2006    no residual    TIA (transient ischemic attack)     Trauma          Patient taking anticoagulants yes     ASSESSMENT:    Changes in Assessment Throughout Shift: new admit     Patient has Central Line: no Reasons if yes:    Patient has Patiño Cath: yes Reasons if yes: medical      Last Vitals:     Vitals:    03/19/18 0000 03/19/18 0030 03/19/18 0348 03/19/18 0700   BP: 125/49 116/49 95/58 105/60   Pulse: 60 (!) 59 (!) 56 (!) 54   Resp: 19 19 24    Temp: 97.3 °F (36.3 °C)  99.6 °F (37.6 °C) 98.8 °F (37.1 °C)   SpO2: 96% 94% 92% 92%   Weight:       Height:            IV and DRAINS (will only show if present)   Peripheral IV 03/18/18 Left Antecubital-Site Assessment: Clean, dry, & intact     WOUND (if present)   Wound Type:  none   Dressing present Dressing Present : No   Wound Concerns/Notes:  none     PAIN    Pain Assessment    Pain Intensity 1: 0 (03/19/18 0348)              Patient Stated Pain Goal: 0  o Interventions for Pain:  none  o Intervention effective:   o Time of last intervention:    o Reassessment Completed:       Last 3 Weights:  Last 3 Recorded Weights in this Encounter    03/18/18 1943   Weight: 72.1 kg (159 lb)     Weight change:      INTAKE/OUPUT    Current Shift:      Last three shifts: 03/17 1901 - 03/19 0700  In: 470 [P.O.:120; I.V.:350]  Out: 350 [Urine:350]     LAB RESULTS     Recent Labs      03/18/18 2015   WBC  16.0*   HGB  13.9   HCT  41.7   PLT  407        Recent Labs      03/18/18 2015   NA  136   K  4.0   GLU  90   BUN  25*   CREA  1.06   CA  10.2*       RECOMMENDATIONS AND DISCHARGE PLANNING     1. Pending tests/procedures/ Plan of Care or Other Needs: none     2. Discharge plan for patient and Needs/Barriers: home oxygen    3. Estimated Discharge Date: unknown Posted on Whiteboard in Patients Room: no      4. The patient's care plan was reviewed with the oncoming nurse.        \"HEALS\" SAFETY CHECK      Fall Risk    Total Score: 2    Safety Measures: Safety Measures: Bed/Chair-Wheels locked, Bed in low position, Call light within reach, Side rails X 3    A safety check occurred in the patient's room between off going nurse and oncoming nurse listed above. The safety check included the below items  Area Items   H  High Alert Medications - Verify all high alert medication drips (heparin, PCA, etc.)   E  Equipment - Suction is set up for ALL patients (with shashank)  - Red plugs utilized for all equipment (IV pumps, etc.)  - WOWs wiped down at end of shift.  - Room stocked with oxygen, suction, and other unit-specific supplies   A  Alarms - Bed alarm is set for fall risk patients  - Ensure chair alarm is in place and activated if patient is up in a chair   L  Lines - Check IV for any infiltration  - Patiño bag is empty if patient has a Patiño   - Tubing and IV bags are labeled   S  Safety   - Room is clean, patient is clean, and equipment is clean. - Hallways are clear from equipment besides carts. - Fall bracelet on for fall risk patients  - Ensure room is clear and free of clutter  - Suction is set up for ALL patients (with shashank)  - Hallways are clear from equipment besides carts.    - Isolation precautions followed, supplies available outside room, sign posted     Aung Christina RN

## 2018-03-19 NOTE — PROGRESS NOTES
met with patient, completed the initial Spiritual Assessment of the patient, and offered Pastoral Care, see flow sheets for interventions. Pastoral support provided with prayer. Patient said she is somewhat better. She said she lives with two daughters and she is happy with the arrangement. Patient does not have any Evangelical/cultural needs that will affect patients preferences in health care. Chart reviewed. Chaplains will continue to follow and will provide pastoral care on an as needed/as requested basis. Jacinto Limon MDiv.   Board Certified [employer] [phone]

## 2018-03-19 NOTE — ROUTINE PROCESS
TRANSFER - OUT REPORT:    Verbal report given to Chula Pickens RN (name) on Susanna Anderson  being transferred to [address] (unit) for routine progression of care       Report consisted of patients Situation, Background, Assessment and   Recommendations(SBAR). Information from the following report(s) SBAR, ED Summary, Procedure Summary, Intake/Output, MAR, Recent Results, Med Rec Status and Cardiac Rhythm sinus bradycardia was reviewed with the receiving nurse. Lines:   Peripheral IV 03/18/18 Left Antecubital (Active)   Site Assessment Clean, dry, & intact 3/18/2018  8:22 PM   Phlebitis Assessment 0 3/18/2018  8:22 PM   Infiltration Assessment 0 3/18/2018  8:22 PM   Dressing Status Clean, dry, & intact 3/18/2018  8:22 PM   Dressing Type Tape;Transparent 3/18/2018  8:22 PM   Hub Color/Line Status Flushed;Patent 3/18/2018  8:22 PM   Action Taken Blood drawn 3/18/2018  8:22 PM        Opportunity for questions and clarification was provided.       Patient transported with:   Monitor  O2 @ 2 liters   IV pump with NS at 150 cc/hr

## 2018-03-19 NOTE — ED PROVIDER NOTES
EMERGENCY DEPARTMENT HISTORY AND PHYSICAL EXAM    8:32 PM      Date: 3/18/2018  Patient Name: Carlo Carcamo    History of Presenting Illness     Chief Complaint   Patient presents with    Shortness of Breath    Fatigue         History Provided By: Patient's Family Member     Chief Complaint: Shortness of Breath; Fatigue   Duration:  Hours  Timing:  Acute  Location: N/A  Quality: N/A  Severity: N/A  Modifying Factors: None   Associated Symptoms: Confusion       Additional History (Context): Carlo Carcamo is a 80 y.o. female with PMHx of COPD, TIA, seizures, HTN, and hypercholesterolemia presenting to the ED via EMS with family c/o acute onset of shortness of breath and fatigue that started approximately one hour ago. Family states that pt got up to go the bathroom and began \"gasping for air\" and also became confused. Pt was admitted to [facility] one week ago for AMS and dehydration. Per family, pt has been \"more tired than normal\" after being discharged from the hospital recently. No modifying factors for pt's symptoms. Family states pt has been eating and drinking well over the past several days. Family denies any other symptoms or complaints. PCP: Pablo Oreilly MD    Current Facility-Administered Medications   Medication Dose Route Frequency Provider Last Rate Last Dose    levoFLOXacin (LEVAQUIN) 750 mg in D5W IVPB  750 mg IntraVENous Q24H Lo Anders MD   Stopped at 03/18/18 2250    cefepime (MAXIPIME) 2 g in sterile water (preservative free) 10 mL IV syringe  2 g IntraVENous Q12H Lo Anders MD   2 g at 03/18/18 2112    vancomycin (VANCOCIN) 1,000 mg in 0.9% sodium chloride (MBP/ADV) 250 mL adv  1,000 mg IntraVENous Q24H Lo Anders MD         Current Outpatient Prescriptions   Medication Sig Dispense Refill    cholecalciferol, vitamin D3, 4,000 unit cap Take 1 Cap by mouth daily.  coenzyme Q-10 (CO Q-10) 200 mg capsule Take  by mouth daily.       omega 3-DHA-EPA-fish oil (FISH OIL) 1,000 mg (120 mg-180 mg) capsule Take 4 Caps by mouth daily.  OXYGEN-AIR DELIVERY SYSTEMS 3 L/min by Does Not Apply route continuous.  methylPREDNISolone (MEDROL, NIRAJ,) 4 mg tablet As directed (Patient not taking: Reported on 3/14/2018) 1 Dose Pack 0    lisinopril (PRINIVIL, ZESTRIL) 20 mg tablet TAKE ONE TABLET BY MOUTH EVERY DAY 30 Tab 5    PARoxetine (PAXIL) 30 mg tablet TAKE ONE TABLET BY MOUTH EVERY DAY 30 Tab 5    atorvastatin (LIPITOR) 10 mg tablet TAKE ONE TABLET BY MOUTH EVERY DAY 90 Tab 3    potassium chloride (K-DUR, KLOR-CON) 20 mEq tablet TAKE ONE TABLET BY MOUTH 2 TIMES A DAY (Patient taking differently: Take 20 mEq by mouth two (2) times a day. TAKE ONE TABLET BY MOUTH 2 TIMES A DAY) 180 Tab 3    metoprolol tartrate (LOPRESSOR) 50 mg tablet TAKE ONE TABLET BY MOUTH EVERY DAY FOR HYPERTENSION 90 Tab 3    amLODIPine (NORVASC) 10 mg tablet TAKE ONE TABLET BY MOUTH EVERY DAY 90 Tab 3    hydroCHLOROthiazide (MICROZIDE) 12.5 mg capsule TAKE ONE CAPSULE BY MOUTH EVERY DAY 30 Cap 5    umeclidinium-vilanterol (ANORO ELLIPTA) 62.5-25 mcg/actuation inhaler Take 1 Puff by inhalation daily. 2 Inhaler 0    nystatin (MYCOSTATIN) powder Apply  to affected area four (4) times daily. 1 Bottle 5    albuterol (PROAIR HFA) 90 mcg/actuation inhaler Take 2 Puffs by inhalation every four (4) hours as needed for Wheezing or Shortness of Breath. 1 Inhaler 5    fluticasone (FLONASE) 50 mcg/actuation nasal spray 2 Sprays by Both Nostrils route daily.  SYNTHROID 112 mcg tablet Take 112 mcg by mouth Daily (before breakfast).  ALPRAZolam (XANAX) 0.5 mg tablet Take 0.5 mg by mouth nightly as needed for Anxiety or Sleep.  furosemide (LASIX) 20 mg tablet Take 1 Tab by mouth daily as needed for Other (Leg Swelling). 90 Tab 3    hydrocortisone (CORTEF) 10 mg tablet Take 10 mg by mouth daily.  VOLTAREN 1 % topical gel Apply 2 g to affected area nightly as needed (pain).      Adam CLIFTON WALKER) misc 1 Device by Does Not Apply route daily. 1 Each 0    bipap machine kit 1 each by Does Not Apply route. Started using BiPap Machine 01-15-15      L-Methylfolate-P26-Igyzngichd (CEREFOLIN NAC) tablet Take 1 tablet by mouth daily. 30 tablet 5    fludrocortisone (FLORINEF) 0.1 mg tablet Take 1 tablet by mouth daily. 30 tablet 5    oxcarbazepine (TRILEPTAL) 300 mg tablet Take 300 mg by mouth two (2) times a day.  Cholecalciferol, Vitamin D3, 5,000 unit Tab Take 5,000 Int'l Units by mouth daily. Indications: VITAMIN D DEFICIENCY      magnesium oxide (MAG-OX) 400 mg tablet Take 400 mg by mouth daily.  COQ10, LIPOSOMAL UBIQUINOL, PO Take 10 mg by mouth daily.  aspirin 81 mg tablet Take 81 mg by mouth daily.  DOCOSAHEXANOIC ACID/EPA (FISH OIL PO) Take 4,000 mg by mouth daily.  CALCIUM CITRATE/VITAMIN D3 (CALCIUM CITRATE + PO) Take  by mouth once over twenty-four (24) hours. Past History     Past Medical History:  Past Medical History:   Diagnosis Date    Abuse 1998    Alcohol    Anxiety     Calculus of kidney 1988    Cardiac echocardiogram 07/31/2012    EF 60-65%. No WMA. Mild conc LVH. Gr 1 DDfx. RVSP 20-25 mmHg. Mild SAGAR. Mild TR,. Mild PAE.  Cardiovascular LE venous duplex 10/08/2012    No venous thrombosis or venous insufficiency in bilateral lower extremities.  Carotid duplex 10/09/2014    Mild < 50% bilateral ICA stenosis.       Chronic lung disease     Contact dermatitis and other eczema, due to unspecified cause     COPD (chronic obstructive pulmonary disease) (HCC)     Degenerative joint disease     Depression     Hematuria     Hypercholesteremia     Hypertension     Hypothyroidism 1998    Kidney stone     Neuropathy 3/2007    Orthostatic hypotension     Pituitary adenoma ([address] 2/1998    Seizures ([address]     none recently    Severe obstructive sleep apnea 01-15-15     started using Bipap Machine    Stress     Stroke ([address] 2/2006    no residual    TIA (transient ischemic attack)     Trauma        Past Surgical History:  Past Surgical History:   Procedure Laterality Date    CYSTOSCOPY,INSERT URETERAL STENT  9/14/15    Dr. Arna Lefort    HX APPENDECTOMY      HX INTRAOCULAR LENS PROSTHESIS      HX LITHOTRIPSY  9/14/15    Dr. José Miguel Taylor    HX TUBAL LIGATION      HX WISDOM TEETH EXTRACTION      x4       Family History:  Family History   Problem Relation Age of Onset    Heart Disease Father     Hypertension Father     Hypertension Mother     Cancer Mother      skin    Elevated Lipids Sister     Heart Disease Sister     Cancer Maternal Grandmother      colon and stomach    Heart Disease Paternal Grandmother     Heart Attack Paternal Grandmother     Heart Attack Paternal Grandfather     Heart Disease Paternal Grandfather        Social History:  Social History   Substance Use Topics    Smoking status: Current Every Day Smoker     Packs/day: 1.50     Years: 65.00     Types: Cigarettes    Smokeless tobacco: Never Used    Alcohol use No      Comment: quit 1998 due to Alcohol Abuse       Allergies: Allergies   Allergen Reactions    Iodine Hives         Review of Systems       Review of Systems   Unable to perform ROS: Mental status change (Altered mental status)   HENT: Positive for dental problem. Physical Exam     Visit Vitals    /49    Pulse (!) 59    Temp 97.3 °F (36.3 °C)    Resp 19    Ht 5' 4\" (1.626 m)    Wt 72.1 kg (159 lb)    SpO2 94%    BMI 27.29 kg/m2         Physical Exam   Constitutional: She appears well-developed and well-nourished. She appears lethargic. No distress. HENT:   Head: Normocephalic. Right Ear: External ear normal.   Left Ear: External ear normal.   Mouth/Throat: No oropharyngeal exudate. Eyes: Conjunctivae and EOM are normal. Pupils are equal, round, and reactive to light. Right eye exhibits no discharge. Left eye exhibits no discharge. No scleral icterus. Neck: Normal range of motion. Neck supple. No JVD present. No tracheal deviation present. No thyromegaly present. Cardiovascular: Normal rate, regular rhythm, normal heart sounds and intact distal pulses. Exam reveals no gallop and no friction rub. No murmur heard. Pulmonary/Chest: Effort normal and breath sounds normal. No stridor. No respiratory distress. She has no wheezes. She has no rales. She exhibits no tenderness. Abdominal: Soft. Bowel sounds are normal. She exhibits no distension and no mass. There is no tenderness. There is no rebound and no guarding. Musculoskeletal: Normal range of motion. She exhibits no edema or tenderness. Lymphadenopathy:     She has no cervical adenopathy. Neurological: She appears lethargic. She displays normal reflexes. No cranial nerve deficit. She exhibits normal muscle tone. Coordination normal.   Confused. Skin: Skin is warm and dry. No rash noted. She is not diaphoretic. No erythema. No pallor. Nursing note and vitals reviewed.         Diagnostic Study Results     Labs -  Recent Results (from the past 12 hour(s))   EKG, 12 LEAD, INITIAL    Collection Time: 03/18/18  7:49 PM   Result Value Ref Range    Ventricular Rate 55 BPM    Atrial Rate 55 BPM    P-R Interval 164 ms    QRS Duration 120 ms    Q-T Interval 428 ms    QTC Calculation (Bezet) 409 ms    Calculated R Axis -80 degrees    Calculated T Axis 55 degrees    Diagnosis       Sinus bradycardia with premature atrial complexes  Right bundle branch block  Left anterior fascicular block  Bifascicular block  Cannot rule out Anterior infarct , age undetermined  Abnormal ECG  When compared with ECG of 09-MAR-2018 09:57,  premature atrial complexes are now present  QT has shortened     POC LACTIC ACID    Collection Time: 03/18/18  8:08 PM   Result Value Ref Range    Lactic Acid (POC) 1.7 0.4 - 2.0 mmol/L   CBC WITH AUTOMATED DIFF    Collection Time: 03/18/18  8:15 PM   Result Value Ref Range    WBC 16.0 (H) 4.6 - 13.2 K/uL    RBC 4.43 4.20 - 5.30 M/uL    HGB 13.9 12.0 - 16.0 g/dL    HCT 41.7 35.0 - 45.0 %    MCV 94.1 74.0 - 97.0 FL    MCH 31.4 24.0 - 34.0 PG    MCHC 33.3 31.0 - 37.0 g/dL    RDW 13.5 11.6 - 14.5 %    PLATELET 938 755 - 711 K/uL    MPV 9.8 9.2 - 11.8 FL    NEUTROPHILS 83 (H) 40 - 73 %    LYMPHOCYTES 13 (L) 21 - 52 %    MONOCYTES 3 3 - 10 %    EOSINOPHILS 1 0 - 5 %    BASOPHILS 0 0 - 2 %    ABS. NEUTROPHILS 13.2 (H) 1.8 - 8.0 K/UL    ABS. LYMPHOCYTES 2.1 0.9 - 3.6 K/UL    ABS. MONOCYTES 0.6 0.05 - 1.2 K/UL    ABS. EOSINOPHILS 0.1 0.0 - 0.4 K/UL    ABS.  BASOPHILS 0.0 0.0 - 0.06 K/UL    DF AUTOMATED     METABOLIC PANEL, BASIC    Collection Time: 03/18/18  8:15 PM   Result Value Ref Range    Sodium 136 136 - 145 mmol/L    Potassium 4.0 3.5 - 5.5 mmol/L    Chloride 96 (L) 100 - 108 mmol/L    CO2 34 (H) 21 - 32 mmol/L    Anion gap 6 3.0 - 18 mmol/L    Glucose 90 74 - 99 mg/dL    BUN 25 (H) 7.0 - 18 MG/DL    Creatinine 1.06 0.6 - 1.3 MG/DL    BUN/Creatinine ratio 24 (H) 12 - 20      GFR est AA 60 (L) >60 ml/min/1.73m2    GFR est non-AA 49 (L) >60 ml/min/1.73m2    Calcium 10.2 (H) 8.5 - 10.1 MG/DL   CARDIAC PANEL,(CK, CKMB & TROPONIN)    Collection Time: 03/18/18  8:15 PM   Result Value Ref Range    CK 22 (L) 26 - 192 U/L    CK - MB <1.0 <3.6 ng/ml    CK-MB Index  0.0 - 4.0 %     CALCULATION NOT PERFORMED WHEN RESULT IS BELOW LINEAR LIMIT    Troponin-I, Qt. <0.02 0.00 - 0.06 NG/ML   URINALYSIS W/ RFLX MICROSCOPIC    Collection Time: 03/18/18  8:15 PM   Result Value Ref Range    Color YELLOW      Appearance CLEAR      Specific gravity 1.018 1.005 - 1.030      pH (UA) 7.0 5.0 - 8.0      Protein NEGATIVE  NEG mg/dL    Glucose NEGATIVE  NEG mg/dL    Ketone NEGATIVE  NEG mg/dL    Bilirubin NEGATIVE  NEG      Blood NEGATIVE  NEG      Urobilinogen 0.2 0.2 - 1.0 EU/dL    Nitrites NEGATIVE  NEG      Leukocyte Esterase NEGATIVE  NEG     LIPASE    Collection Time: 03/18/18  8:15 PM   Result Value Ref Range    Lipase 182 73 - 393 U/L   HEPATIC FUNCTION PANEL    Collection Time: 03/18/18  8:15 PM   Result Value Ref Range    Protein, total 6.7 6.4 - 8.2 g/dL    Albumin 3.4 3.4 - 5.0 g/dL    Globulin 3.3 2.0 - 4.0 g/dL    A-G Ratio 1.0 0.8 - 1.7      Bilirubin, total 0.5 0.2 - 1.0 MG/DL    Bilirubin, direct 0.2 0.0 - 0.2 MG/DL    Alk. phosphatase 61 45 - 117 U/L    AST (SGOT) 15 15 - 37 U/L    ALT (SGPT) 20 13 - 56 U/L       Radiologic Studies -   CT HEAD WO CONT   Final Result   IMPRESSION:     No acute intracranial abnormality. No acute infarct, acute hemorrhage, or mass. No significant change compared with 3/9/2018 head CT.     Chronic microvascular ischemic changes including moderate periventricular white  matter lucencies and multifocal chronic deep brain lacunar infarcts appear  stable. XR CHEST PORT   Final Result   IMPRESSION:     Hypoinflation with mild bibasal opacities with differential diagnosis of  infiltrates vs. subsegmental atelectasis. Medical Decision Making   I am the first provider for this patient. I reviewed the vital signs, available nursing notes, past medical history, past surgical history, family history and social history. Vital Signs-Reviewed the patient's vital signs. Pulse Oximetry Analysis -  97% on 3L of O2 via NC    Cardiac Monitor:  Rate:   Rhythm:      EKG: Interpreted by the EP. Time Interpreted: 7:50 PM    Rate: 55    Rhythm: Sinus Bradycardia    Interpretation: Right bundle branch block. Old inferior MI. Comparison: Unchanged from prior EKG on 03/09/18. Records Reviewed: Nursing Notes, Old Medical Records and Previous electrocardiograms (Time of Review: 8:32 PM)    ED Course: Progress Notes, Reevaluation, and Consults:  11:07 PM Consult: I discussed care with Dr. Libertad Hollingsworth (Hospitalist). It was a standard discussion including patient history, chief complaint, available diagnostic results, and predicted treatment course. Accepts patient for admission.      Provider Notes (Medical Decision Making): Dif Dx: pneumonia, sepsis, SIRS, UTI    For Hospitalized Patients:    1. Hospitalization Decision Time:  The decision to hospitalize the patient was made by Dr. Chandni Dubon at 11:07 PM on 3/18/2018    Diagnosis     Clinical Impression:   1. Pneumonia of right lower lobe due to infectious organism [facility])        Disposition: admit    Follow-up Information     None           Patient's Medications   Start Taking    No medications on file   Continue Taking    ALBUTEROL (PROAIR HFA) 90 MCG/ACTUATION INHALER    Take 2 Puffs by inhalation every four (4) hours as needed for Wheezing or Shortness of Breath. ALPRAZOLAM (XANAX) 0.5 MG TABLET    Take 0.5 mg by mouth nightly as needed for Anxiety or Sleep. AMLODIPINE (NORVASC) 10 MG TABLET    TAKE ONE TABLET BY MOUTH EVERY DAY    ASPIRIN 81 MG TABLET    Take 81 mg by mouth daily. ATORVASTATIN (LIPITOR) 10 MG TABLET    TAKE ONE TABLET BY MOUTH EVERY DAY    BIPAP MACHINE KIT    1 each by Does Not Apply route. Started using BiPap Machine 01-15-15    CALCIUM CITRATE/VITAMIN D3 (CALCIUM CITRATE + PO)    Take  by mouth once over twenty-four (24) hours. CHOLECALCIFEROL, VITAMIN D3, 4,000 UNIT CAP    Take 1 Cap by mouth daily. CHOLECALCIFEROL, VITAMIN D3, 5,000 UNIT TAB    Take 5,000 Int'l Units by mouth daily. Indications: VITAMIN D DEFICIENCY    COENZYME Q-10 (CO Q-10) 200 MG CAPSULE    Take  by mouth daily. COQ10, LIPOSOMAL UBIQUINOL, PO    Take 10 mg by mouth daily. DOCOSAHEXANOIC ACID/EPA (FISH OIL PO)    Take 4,000 mg by mouth daily. FLUDROCORTISONE (FLORINEF) 0.1 MG TABLET    Take 1 tablet by mouth daily. FLUTICASONE (FLONASE) 50 MCG/ACTUATION NASAL SPRAY    2 Sprays by Both Nostrils route daily. FUROSEMIDE (LASIX) 20 MG TABLET    Take 1 Tab by mouth daily as needed for Other (Leg Swelling).     HYDROCHLOROTHIAZIDE (MICROZIDE) 12.5 MG CAPSULE    TAKE ONE CAPSULE BY MOUTH EVERY DAY    HYDROCORTISONE (CORTEF) 10 MG TABLET    Take 10 mg by mouth daily. L-METHYLFOLATE-A02-GQDFDEOLLB (CEREFOLIN NAC) TABLET    Take 1 tablet by mouth daily. LISINOPRIL (PRINIVIL, ZESTRIL) 20 MG TABLET    TAKE ONE TABLET BY MOUTH EVERY DAY    MAGNESIUM OXIDE (MAG-OX) 400 MG TABLET    Take 400 mg by mouth daily. METHYLPREDNISOLONE (MEDROL, NIRAJ,) 4 MG TABLET    As directed    METOPROLOL TARTRATE (LOPRESSOR) 50 MG TABLET    TAKE ONE TABLET BY MOUTH EVERY DAY FOR HYPERTENSION    NYSTATIN (MYCOSTATIN) POWDER    Apply  to affected area four (4) times daily. OMEGA 3-DHA-EPA-FISH OIL (FISH OIL) 1,000 MG (120 MG-180 MG) CAPSULE    Take 4 Caps by mouth daily. OXCARBAZEPINE (TRILEPTAL) 300 MG TABLET    Take 300 mg by mouth two (2) times a day. OXYGEN-AIR DELIVERY SYSTEMS    3 L/min by Does Not Apply route continuous. PAROXETINE (PAXIL) 30 MG TABLET    TAKE ONE TABLET BY MOUTH EVERY DAY    POTASSIUM CHLORIDE (K-DUR, KLOR-CON) 20 MEQ TABLET    TAKE ONE TABLET BY MOUTH 2 TIMES A DAY    SYNTHROID 112 MCG TABLET    Take 112 mcg by mouth Daily (before breakfast). UMECLIDINIUM-VILANTEROL (ANORO ELLIPTA) 62.5-25 MCG/ACTUATION INHALER    Take 1 Puff by inhalation daily. VOLTAREN 1 % TOPICAL GEL    Apply 2 g to affected area nightly as needed (pain). WALKER (BARNEY ROLLING WALKER) MISC    1 Device by Does Not Apply route daily. These Medications have changed    No medications on file   Stop Taking    No medications on file     _______________________________    Attestations:  [address] [place] [address] acting as a scribe for and in the presence of Rafal Cazares MD      March 18, 2018 at 10:49 PM       Provider Attestation:      I personally performed the services described in the documentation, reviewed the documentation, as recorded by the scribe in my presence, and it accurately and completely records my words and actions.  March 18, 2018 at 10:49 PM - Rafal Cazares MD    _______________________________

## 2018-03-19 NOTE — ED NOTES
[facility]

## 2018-03-19 NOTE — PROGRESS NOTES
Hospitalist Progress Note    Patient: Wendy Ronquillo MRN: [id number]  CSN: [id number]    YOB: 1931  Age: 80 y.o. Sex: female    DOA: 3/18/2018 LOS:  LOS: 1 day          She is having difficulty expectorating sputum. States she quit tobacco in January. Does not recall why she was recently admitted. Denies cough, wheezing or eyes watering with po. Assessment/Plan       1. Acute bronchitis- Blood cx (3/18) ngtd. Sputum cx. On abx. Consult SLP, RT.   2. Chronic use of steroids - no need for stress dose steroids. 3. Hypothyroidism on synthroid. Recent TSH low - repeat TFTs. 4. Dyslipidemia on statin. 5. Seizure d/o on home meds  6. Tobacco abuse - quit earlier this year. 7. Sepsis poa (leukocytosis, hypoxia) - source m/l pulmonary  8. Metabolic alkalosis  9. ckd 3 at baseline  10. dvt prophylaxis   11. Full code. Pt and ot. Resides at home with 2 daughters. Additional Notes:      Case discussed with:  [x]Patient  []Family  [x]Nursing  []Case Management  DVT Prophylaxis:  []Lovenox  [x]Hep SQ  []SCDs  []Coumadin   []On Heparin gtt    Vital signs/Intake and Output:  Visit Vitals    /60 (BP 1 Location: Left arm, BP Patient Position: At rest)    Pulse (!) 54    Temp 98.8 °F (37.1 °C)    Resp 24    Ht 5' 4\" (1.626 m)    Wt 72.1 kg (159 lb)    SpO2 92%    BMI 27.29 kg/m2     Current Shift:     Last three shifts:  03/17 1901 - 03/19 0700  In: 470 [P.O.:120; I.V.:350]  Out: 350 [Urine:350]    Awake and alert   Ncat. perrl  RRR  Decreased bs at bases  Soft nt nd nabs  No edema.  dp 2+ b.l  No focal deficit  No rash    Medications Reviewed      Labs: Results:       Chemistry Recent Labs      03/18/18 2015   GLU  90   NA  136   K  4.0   CL  96*   CO2  34*   BUN  25*   CREA  1.06   CA  10.2*   AGAP  6   BUCR  24*   AP  61   TP  6.7   ALB  3.4   GLOB  3.3   AGRAT  1.0      CBC w/Diff Recent Labs      03/18/18 2015   WBC  16.0*   RBC  4.43   HGB  13.9   HCT  41.7   PLT  407   GRANS 83*   LYMPH  13*   EOS  1      Cardiac Enzymes Recent Labs      03/18/18 2015   CPK  22*   CKND1  CALCULATION NOT PERFORMED WHEN RESULT IS BELOW LINEAR LIMIT      Coagulation No results for input(s): PTP, INR, APTT in the last 72 hours. No lab exists for component: INREXT    Lipid Panel Lab Results   Component Value Date/Time    Cholesterol, total 164 04/11/2017 07:22 AM    HDL Cholesterol 48 04/11/2017 07:22 AM    LDL, calculated 78.8 04/11/2017 07:22 AM    VLDL, calculated 37.2 04/11/2017 07:22 AM    Triglyceride 186 (H) 04/11/2017 07:22 AM    CHOL/HDL Ratio 3.4 04/11/2017 07:22 AM      BNP No results for input(s): BNPP in the last 72 hours. Liver Enzymes Recent Labs      03/18/18 2015   TP  6.7   ALB  3.4   AP  61   SGOT  15      Thyroid Studies Lab Results   Component Value Date/Time    TSH 0.02 (L) 03/10/2018 03:40 AM        Procedures/imaging: see electronic medical records for all procedures/Xrays and details which were not copied into this note but were reviewed prior to creation of Plan.

## 2018-03-19 NOTE — PROGRESS NOTES
Respiratory Care Assessment for Bronchial hygiene or Lung Expansion Therapy  Patient  Susanna Anderson     80 y.o.   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    Dehydration E86.0    Acute renal failure (HCC) N17.9    Elevated lactic acid level R79.89    Wheezing R06.2    Gastroenteritis K52.9    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 COMPARISON: Chest x-ray March 9, 2018, June 22, 2017. FINDINGS:     The cardiac silhouette is mildly enlarged. Pulmonary vascularity is normal.  There is mild hypoinflation with increased opacity at the medial lung bases  bilaterally the costophrenic angles are sharp. .         Impression IMPRESSION:    Hypoinflation with mild bibasal opacities with differential diagnosis of  infiltrates vs. subsegmental atelectasis        Lab Test:  Date:3/19/2018  WBC:   Lab Results   Component Value Date/Time    WBC 16.0 (H) 03/18/2018 08:15 PM   HGB: Lab Results   Component Value Date/Time    HGB 13.9 03/18/2018 08:15 PM    PLTS: Lab Results   Component Value Date/Time    PLATELET 590 77/14/5458 08:15 PM       SaO2%/flow:   SpO2 Readings from Last 1 Encounters:   03/19/18 94%       Vital Signs:   Patient Vitals for the past 8 hrs:   Temp Pulse Resp BP SpO2   03/19/18 1238 98.5 °F (36.9 °C) 61 20 122/68 94 %   03/19/18 0700 98.8 °F (37.1 °C) (!) 54 - 105/60 92 %         RA/O2 flow/device: Sp02 92% on 3 LPM NC    First Inital Assesment:     [x]Yes []No   Reevaluation/Reassessment:    []Yes [x]No     CHART REVIEW   Points 0 X 1 X 2 X 3 X 4 X Points   Pulmonary History Smoking History (1) none  Recent Smoking History <1 PPD  Recent Smoking History >1 PPD  Pulmonary Disease or Impairment  Severe Pulmonary Disease  3   Surgical History No Surgery  General Surgery  Lower Abdominal  Thoracic or Upper Abdominal  Thoracic & Pulmonary Disease  0   CXR Clear or not indicated  Chronic changes or CXR Pending  Infiltrate, atelectasis or pleural effusions  Infiltrates in more than 1lobe  Infiltrates +atelectasis  +/or pleural effusions  2     PATIENT ASSESSMENT    0 X 1 X 2 X 3 X 4 X Points   Respiratory Pattern Regular pattern RR 12-20  Increased RR 21-27   Mild Dyspnea at rest, irregular pattern RR 28-32  Moderate Dyspnea at rest, Use of accessory muscles, RR 33-36  Severe Dyspnea, Use of accessory muscles RR >36  0   Mental Status Alert Oriented cooperative  Confused, Follows commands  Lethargic, Does not follow commands  Obtunded  Unresponsive  0   Breath Sounds Clear  Decreased Unilaterally  Decreased Bilaterally  Mild Scattered wheezing or Crackles in bases  Severe Wheezing or rhonchi  2   Cough Strong dry NPC  Strong Productive  Weak NPC  Weak productive or weak with rhonchi  No cough or may require suctioning  0   Level of Activity Ambulatory  Ambulatory with assistance  Temporarily Non-ambulatory  Non-Ambulatory, able to position self  Unable to position self, confined to bed  1   Total Points/Score:   8     Specific Intervention Chart()    Bronchial Hygiene/Secretion Clearance:    []EZPAP []Rotation bed with vibration    []CPT with percussor []CPT via vest   []Oscillastiang positive pressure expiratory device      Lung Expansion:    [x]Incentive Spirometer w/RT visits []Incentive Spirometer w/nursing    []EZPAP      *Suctioning:    []Nasal Tracheal []Tracheal     *suctioning will be ordered and done PRN with an associated frequency such as QID/PRN based on score       Other:    Care Plan   Level # Score Modality Frequency Comment   Level 1 >17 - -    Level 2 14-17 - -    Level 3 10-13 - -    Level 4 1-9 I/S BID Every hour WA on her own     BRONCHIAL HYGIENE SCORING AND FREQUENCY GUIDELINES   Frequency Indications/Findings Level #   Q4 ATC Copious secretions, SOB, unable to sleep 1   QID & PRN at night Moderate amounts of secretions 2   TID or Q6 wa Small amounts of secretions and poor cough: recent history of secretions 3   BID or Q8 wa Unable to deep breathe and cough effectively 4        Comments:  Patient can do 750-1000 CC on her I/S. Spoke with daughter and encouraged I/S hourly. Patient also takes Albuterol MDI at home PRN. Will order nebs PRN.     Respiratory Therapist: Grayson Coto, RT

## 2018-03-19 NOTE — PROGRESS NOTES
ADULT PROTOCOL: JET AEROSOL ASSESSMENT    Patient  Lizzette Villar     80 y.o.   female     3/19/2018  12:58 PM    Breath Sounds Pre Procedure:                                               Breath Sounds Post Procedure:                                                 Breathing pattern: Pre procedure             Post procedure       Cough: Pre procedure                  Post procedure      Heart Rate: Pre procedure             Post procedure      Resp Rate: Pre procedure              Post procedure          Nebulizer Therapy: Current medications         Problem List:   Patient Active Problem List   Diagnosis Code    Essential hypertension, benign I10    Dyslipidemia E78.5    Tobacco abuse Z72.0    Hypothyroidism E03.9    COPD (chronic obstructive pulmonary disease) ([facility]) J44.9    Pituitary adenoma ([facility]) D35.2    Nausea and vomiting R11.2    Acute upper respiratory infection J06.9    Nausea alone R11.0    Vasovagal reaction R55    Pancreatitis K85.90    XUAN (obstructive sleep apnea) G47.33    Flank pain R10.9    Kidney stone N20.0    Lumbar spinal stenosis M48.061    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    Dehydration E86.0    Acute renal failure ([facility]) N17.9    Elevated lactic acid level R79.89    Wheezing R06.2    Gastroenteritis K52.9    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    Pneumonia J18.9    Right lower lobe pneumonia ([facility]) J18.1    HCAP (healthcare-associated pneumonia) J18.9       Patient alert and cooperative to use MDI: Yes    Home Respiratory Therapy Regimen/Frequency:  YES No  Medication Albuterol MDI 2 Puffs PRN  Device  Frequency     SEVERITY INDEX:    ITEM 0 1 2 3 4 Score   Respiratory Pattern and or Rate Regular  10-19 Regular  20-24   24-30    30-34 Severe SOB or   Greater than 35 1   Breath Sounds Clear Occasional Wheeze Mild Wheezing Moderate Wheezing  wheezing/Absent breath sounds 0   Shortness of Breath None Dyspnea on Exertion Dyspnea at Rest Moderate Shortness of Breath at Rest Severe Shortness of Breath - Limited Speech 0       Total Score:  1     * Scoring Guidelines  0-4 pts:  PRN-BID   5-7 pts:  BID, TID, QID  8-9 pts:  TID, QID, Q6  10-12 pts:  Q4-Q6  * - Guidelines used with clinical judgement. PRN Treatments can be ordered to supplement scheduled treatments. Regardless of score, frequency should not be less than normal home regimen. Recommended Order/Frequency:  Albuterol Nebulizer Q4 PRN for SOB/Wheezing    Comments:  Patient takes Albuterol MDI PRN at home.         Respiratory Therapist: RT ROMI Roth

## 2018-03-20 ENCOUNTER — APPOINTMENT (OUTPATIENT)
Dept: GENERAL RADIOLOGY | Age: 83
DRG: 871 | End: 2018-03-20
Attending: HOSPITALIST
Payer: MEDICARE

## 2018-03-20 LAB
ANION GAP SERPL CALC-SCNC: 11 MMOL/L (ref 3–18)
BASOPHILS # BLD: 0 K/UL (ref 0–0.1)
BASOPHILS NFR BLD: 0 % (ref 0–2)
BUN SERPL-MCNC: 21 MG/DL (ref 7–18)
BUN/CREAT SERPL: 21 (ref 12–20)
CALCIUM SERPL-MCNC: 8.9 MG/DL (ref 8.5–10.1)
CHLORIDE SERPL-SCNC: 95 MMOL/L (ref 100–108)
CO2 SERPL-SCNC: 27 MMOL/L (ref 21–32)
CREAT SERPL-MCNC: 1.02 MG/DL (ref 0.6–1.3)
DIFFERENTIAL METHOD BLD: ABNORMAL
EOSINOPHIL # BLD: 0.3 K/UL (ref 0–0.4)
EOSINOPHIL NFR BLD: 3 % (ref 0–5)
ERYTHROCYTE [DISTWIDTH] IN BLOOD BY AUTOMATED COUNT: 13.9 % (ref 11.6–14.5)
GLUCOSE SERPL-MCNC: 73 MG/DL (ref 74–99)
HCT VFR BLD AUTO: 36.4 % (ref 35–45)
HGB BLD-MCNC: 11.9 G/DL (ref 12–16)
LYMPHOCYTES # BLD: 2.1 K/UL (ref 0.9–3.6)
LYMPHOCYTES NFR BLD: 19 % (ref 21–52)
MAGNESIUM SERPL-MCNC: 2.3 MG/DL (ref 1.6–2.6)
MCH RBC QN AUTO: 31.1 PG (ref 24–34)
MCHC RBC AUTO-ENTMCNC: 32.7 G/DL (ref 31–37)
MCV RBC AUTO: 95 FL (ref 74–97)
MONOCYTES # BLD: 0.6 K/UL (ref 0.05–1.2)
MONOCYTES NFR BLD: 5 % (ref 3–10)
NEUTS SEG # BLD: 8.5 K/UL (ref 1.8–8)
NEUTS SEG NFR BLD: 73 % (ref 40–73)
PHOSPHATE SERPL-MCNC: 2.8 MG/DL (ref 2.5–4.9)
PLATELET # BLD AUTO: 317 K/UL (ref 135–420)
PMV BLD AUTO: 9.9 FL (ref 9.2–11.8)
POTASSIUM SERPL-SCNC: 4 MMOL/L (ref 3.5–5.5)
RBC # BLD AUTO: 3.83 M/UL (ref 4.2–5.3)
SODIUM SERPL-SCNC: 133 MMOL/L (ref 136–145)
T4 FREE SERPL-MCNC: 0.8 NG/DL (ref 0.7–1.5)
TSH SERPL DL<=0.05 MIU/L-ACNC: 0.09 UIU/ML (ref 0.36–3.74)
WBC # BLD AUTO: 11.5 K/UL (ref 4.6–13.2)

## 2018-03-20 PROCEDURE — 74011000255 HC RX REV CODE- 255: Performed by: HOSPITALIST

## 2018-03-20 PROCEDURE — 36415 COLL VENOUS BLD VENIPUNCTURE: CPT | Performed by: HOSPITALIST

## 2018-03-20 PROCEDURE — 74011250636 HC RX REV CODE- 250/636: Performed by: EMERGENCY MEDICINE

## 2018-03-20 PROCEDURE — 84443 ASSAY THYROID STIM HORMONE: CPT | Performed by: HOSPITALIST

## 2018-03-20 PROCEDURE — 83735 ASSAY OF MAGNESIUM: CPT | Performed by: HOSPITALIST

## 2018-03-20 PROCEDURE — 74011250637 HC RX REV CODE- 250/637: Performed by: HOSPITALIST

## 2018-03-20 PROCEDURE — 94640 AIRWAY INHALATION TREATMENT: CPT

## 2018-03-20 PROCEDURE — 77010033678 HC OXYGEN DAILY

## 2018-03-20 PROCEDURE — 3331090002 HH PPS REVENUE DEBIT

## 2018-03-20 PROCEDURE — 74011000250 HC RX REV CODE- 250: Performed by: EMERGENCY MEDICINE

## 2018-03-20 PROCEDURE — 74011000250 HC RX REV CODE- 250: Performed by: INTERNAL MEDICINE

## 2018-03-20 PROCEDURE — 74011250637 HC RX REV CODE- 250/637: Performed by: INTERNAL MEDICINE

## 2018-03-20 PROCEDURE — 85025 COMPLETE CBC W/AUTO DIFF WBC: CPT | Performed by: HOSPITALIST

## 2018-03-20 PROCEDURE — 74011250636 HC RX REV CODE- 250/636: Performed by: INTERNAL MEDICINE

## 2018-03-20 PROCEDURE — 84100 ASSAY OF PHOSPHORUS: CPT | Performed by: HOSPITALIST

## 2018-03-20 PROCEDURE — 80048 BASIC METABOLIC PNL TOTAL CA: CPT | Performed by: HOSPITALIST

## 2018-03-20 PROCEDURE — 3331090001 HH PPS REVENUE CREDIT

## 2018-03-20 PROCEDURE — 92526 ORAL FUNCTION THERAPY: CPT

## 2018-03-20 PROCEDURE — 65270000029 HC RM PRIVATE

## 2018-03-20 PROCEDURE — 74230 X-RAY XM SWLNG FUNCJ C+: CPT

## 2018-03-20 PROCEDURE — 84439 ASSAY OF FREE THYROXINE: CPT | Performed by: HOSPITALIST

## 2018-03-20 PROCEDURE — 97530 THERAPEUTIC ACTIVITIES: CPT

## 2018-03-20 PROCEDURE — 97162 PT EVAL MOD COMPLEX 30 MIN: CPT

## 2018-03-20 PROCEDURE — 92611 MOTION FLUOROSCOPY/SWALLOW: CPT

## 2018-03-20 PROCEDURE — 74011250636 HC RX REV CODE- 250/636: Performed by: HOSPITALIST

## 2018-03-20 RX ORDER — NYSTATIN 100000 [USP'U]/ML
500000 SUSPENSION ORAL 4 TIMES DAILY
Status: DISCONTINUED | OUTPATIENT
Start: 2018-03-20 | End: 2018-03-22 | Stop reason: HOSPADM

## 2018-03-20 RX ORDER — BUDESONIDE 1 MG/2ML
1000 INHALANT ORAL
Status: DISCONTINUED | OUTPATIENT
Start: 2018-03-20 | End: 2018-03-22 | Stop reason: HOSPADM

## 2018-03-20 RX ORDER — IPRATROPIUM BROMIDE 0.5 MG/2.5ML
0.5 SOLUTION RESPIRATORY (INHALATION)
Status: DISCONTINUED | OUTPATIENT
Start: 2018-03-20 | End: 2018-03-21

## 2018-03-20 RX ORDER — IPRATROPIUM BROMIDE 0.5 MG/2.5ML
0.5 SOLUTION RESPIRATORY (INHALATION)
Status: DISCONTINUED | OUTPATIENT
Start: 2018-03-20 | End: 2018-03-20

## 2018-03-20 RX ORDER — ALBUTEROL SULFATE 1.25 MG/3ML
1.25 SOLUTION RESPIRATORY (INHALATION)
Status: DISCONTINUED | OUTPATIENT
Start: 2018-03-20 | End: 2018-03-22 | Stop reason: HOSPADM

## 2018-03-20 RX ORDER — METRONIDAZOLE 500 MG/100ML
500 INJECTION, SOLUTION INTRAVENOUS EVERY 12 HOURS
Status: DISCONTINUED | OUTPATIENT
Start: 2018-03-20 | End: 2018-03-22 | Stop reason: HOSPADM

## 2018-03-20 RX ORDER — METRONIDAZOLE 500 MG/100ML
500 INJECTION, SOLUTION INTRAVENOUS EVERY 8 HOURS
Status: DISCONTINUED | OUTPATIENT
Start: 2018-03-20 | End: 2018-03-20 | Stop reason: DRUGHIGH

## 2018-03-20 RX ORDER — ARFORMOTEROL TARTRATE 15 UG/2ML
15 SOLUTION RESPIRATORY (INHALATION)
Status: DISCONTINUED | OUTPATIENT
Start: 2018-03-20 | End: 2018-03-22 | Stop reason: HOSPADM

## 2018-03-20 RX ADMIN — METHYLPREDNISOLONE SODIUM SUCCINATE 60 MG: 40 INJECTION, POWDER, FOR SOLUTION INTRAMUSCULAR; INTRAVENOUS at 12:01

## 2018-03-20 RX ADMIN — METHYLPREDNISOLONE SODIUM SUCCINATE 60 MG: 40 INJECTION, POWDER, FOR SOLUTION INTRAMUSCULAR; INTRAVENOUS at 21:45

## 2018-03-20 RX ADMIN — PAROXETINE 30 MG: 30 TABLET, FILM COATED ORAL at 10:15

## 2018-03-20 RX ADMIN — HEPARIN SODIUM 5000 UNITS: 5000 INJECTION, SOLUTION INTRAVENOUS; SUBCUTANEOUS at 23:37

## 2018-03-20 RX ADMIN — GUAIFENESIN AND DEXTROMETHORPHAN HYDROBROMIDE 1 TABLET: 600; 30 TABLET, EXTENDED RELEASE ORAL at 10:14

## 2018-03-20 RX ADMIN — BARIUM SULFATE 60 G: 960 POWDER, FOR SUSPENSION ORAL at 09:00

## 2018-03-20 RX ADMIN — HEPARIN SODIUM 5000 UNITS: 5000 INJECTION, SOLUTION INTRAVENOUS; SUBCUTANEOUS at 10:14

## 2018-03-20 RX ADMIN — FLUTICASONE PROPIONATE 2 SPRAY: 50 SPRAY, METERED NASAL at 10:23

## 2018-03-20 RX ADMIN — LEVOTHYROXINE SODIUM 112 MCG: 112 TABLET ORAL at 10:15

## 2018-03-20 RX ADMIN — NYSTATIN 500000 UNITS: 100000 SUSPENSION ORAL at 18:03

## 2018-03-20 RX ADMIN — HYDROCORTISONE 10 MG: 10 TABLET ORAL at 10:14

## 2018-03-20 RX ADMIN — METRONIDAZOLE 500 MG: 500 INJECTION, SOLUTION INTRAVENOUS at 13:28

## 2018-03-20 RX ADMIN — BUDESONIDE 1000 MCG: 1 SUSPENSION RESPIRATORY (INHALATION) at 22:56

## 2018-03-20 RX ADMIN — ASPIRIN 81 MG 81 MG: 81 TABLET ORAL at 10:16

## 2018-03-20 RX ADMIN — BARIUM SULFATE 700 MG: 700 TABLET ORAL at 09:00

## 2018-03-20 RX ADMIN — POTASSIUM CHLORIDE 20 MEQ: 20 TABLET, EXTENDED RELEASE ORAL at 10:14

## 2018-03-20 RX ADMIN — BARIUM SULFATE 30 ML: 400 PASTE ORAL at 09:00

## 2018-03-20 RX ADMIN — OXCARBAZEPINE 300 MG: 300 TABLET ORAL at 18:04

## 2018-03-20 RX ADMIN — Medication 2 G: at 10:16

## 2018-03-20 RX ADMIN — GUAIFENESIN AND DEXTROMETHORPHAN HYDROBROMIDE 1 TABLET: 600; 30 TABLET, EXTENDED RELEASE ORAL at 18:04

## 2018-03-20 RX ADMIN — IPRATROPIUM BROMIDE 0.5 MG: 0.5 SOLUTION RESPIRATORY (INHALATION) at 22:56

## 2018-03-20 RX ADMIN — NYSTATIN 500000 UNITS: 100000 SUSPENSION ORAL at 21:45

## 2018-03-20 RX ADMIN — FLUDROCORTISONE ACETATE 100 MCG: 0.1 TABLET ORAL at 10:15

## 2018-03-20 RX ADMIN — Medication 400 MG: at 10:14

## 2018-03-20 RX ADMIN — ATORVASTATIN CALCIUM 10 MG: 10 TABLET, FILM COATED ORAL at 21:45

## 2018-03-20 RX ADMIN — SODIUM CHLORIDE 1000 MG: 900 INJECTION, SOLUTION INTRAVENOUS at 15:40

## 2018-03-20 RX ADMIN — Medication 2 G: at 21:44

## 2018-03-20 RX ADMIN — ARFORMOTEROL TARTRATE 15 MCG: 15 SOLUTION RESPIRATORY (INHALATION) at 22:56

## 2018-03-20 RX ADMIN — OXCARBAZEPINE 300 MG: 300 TABLET ORAL at 10:15

## 2018-03-20 NOTE — PROGRESS NOTES
Problem: Mobility Impaired (Adult and Pediatric)  Goal: *Acute Goals and Plan of Care (Insert Text)  Physical Therapy Goals  Initiated 3/20/2018 and to be accomplished within 7 day(s)  1. Patient will move from supine to sit and sit to supine , scoot up and down and roll side to side in bed with supervision/set-up. 2.  Patient will transfer from bed to chair and chair to bed with supervision/set-up using the least restrictive device. 3.  Patient will perform sit to stand with supervision/set-up. 4.  Patient will ambulate with supervision/set-up for 150 feet with the least restrictive device. Outcome: Progressing Towards Goal  physical Therapy EVALUATION    Patient: Gabrielle Nichole (34 y.o. female)  Date: 3/20/2018  Primary Diagnosis: Pneumonia  Right lower lobe pneumonia (HCC)        Precautions: needs O2 (3L),  Fall    ASSESSMENT :  PT orders received and patient cleared by nursing to participate with therapy. Patient is a 80 y.o. female admitted to the hospital due to SOB and fatigue as well as PNA. Patient consents to PT evaluation and treatment. Pt received supine in bed for therapy. Pt performed supine to sit with CGA. She performed transfers and scooting with CGA. Pt ambulated 25 feet using RW requiring multiple standing rest breaks due to SOB and fatigue. Pt kept 3L of O2 donned throughout therapy with prior to therapy SaO2 91-93% HR 71 and after gait SaO2 87-88% HR 75. Pt fatigues very quickly with activities and needed increased time with activities. She also needed cues for breathing technique throughout therapy. Pt will benefit from home health PT at d/c from hospital to increase mobility and safety at home. Pt ended therapy sitting up in chair with all needs met. Patient will benefit from skilled intervention to address the above impairments and increase functional independence.   Patients rehabilitation potential is considered to be Fair  Factors which may influence rehabilitation potential include:   []         None noted  []         Mental ability/status  [x]         Medical condition  []         Home/family situation and support systems  []         Safety awareness  []         Pain tolerance/management  []         Other:      PLAN :  Recommendations and Planned Interventions:  [x]           Bed Mobility Training             [x]    Neuromuscular Re-Education  [x]           Transfer Training                   []    Orthotic/Prosthetic Training  [x]           Gait Training                          []    Modalities  [x]           Therapeutic Exercises          []    Edema Management/Control  [x]           Therapeutic Activities            [x]    Patient and Family Training/Education  []           Other (comment):    Frequency/Duration: Patient will be followed by physical therapy 1-2 times per day to address goals. Discharge Recommendations: Home Health  Further Equipment Recommendations for Discharge: N/A     SUBJECTIVE:   Patient stated Davis Griffin went to the bathroom with nursing.     OBJECTIVE DATA SUMMARY:     Past Medical History:   Diagnosis Date    Abuse 1998    Alcohol    Anxiety     Calculus of kidney 1988    Cardiac echocardiogram 07/31/2012    EF 60-65%. No WMA. Mild conc LVH. Gr 1 DDfx. RVSP 20-25 mmHg. Mild SAGAR. Mild TR,. Mild PAE.  Cardiovascular LE venous duplex 10/08/2012    No venous thrombosis or venous insufficiency in bilateral lower extremities.  Carotid duplex 10/09/2014    Mild < 50% bilateral ICA stenosis.       Chronic lung disease     Contact dermatitis and other eczema, due to unspecified cause     COPD (chronic obstructive pulmonary disease) (HCC)     Degenerative joint disease     Depression     Hematuria     Hypercholesteremia     Hypertension     Hypothyroidism 1998    Kidney stone     Neuropathy 3/2007    Orthostatic hypotension     Pituitary adenoma ([facility] [address] 2/1998    Seizures ([facility] [address]     none recently    Severe obstructive sleep apnea 01-15-15     started using Bipap Machine    Stress     Stroke [facility]) 2/2006    no residual    TIA (transient ischemic attack)     Trauma      Past Surgical History:   Procedure Laterality Date    CYSTOSCOPY,INSERT URETERAL STENT  9/14/15    Dr. Sherlyn Regalado HX LITHOTRIPSY  9/14/15    Dr. Tristin Wick HX WISDOM TEETH EXTRACTION      x4     Barriers to Learning/Limitations: None  Compensate with: N/A  Prior Level of Function/Home Situation: Pt independent with all mobility including ambulating with rollator and cane. Pt lives with 2 daughters who will assist as needed. Home Situation  Home Environment: Private residence  Wheelchair Ramp: Yes  One/Two Story Residence: One story  Living Alone: No  Support Systems: Family member(s)  Patient Expects to be Discharged to[de-identified] Private residence  Current DME Used/Available at Home: Shower chair, Walker, rollator, Commode, bedside, Cane, straight, Walker, rolling  Critical Behavior:  Neurologic State: Alert  Psychosocial  Patient Behaviors: Calm; Cooperative  Purposeful Interaction: Yes  Pt Identified Daily Priority: Clinical issues (comment)  Caritas Process: Nurture loving kindness  Caring Interventions: Reassure  Therapeutic Modalities: Intentional therapeutic touch  Strength:    Strength: Generally decreased, functional (BLE, 4/5)  Tone & Sensation:   Tone: Normal (BLE)  Sensation: Intact (BLE)   Range Of Motion:  AROM: Within functional limits (BLE)  Functional Mobility:  Bed Mobility:  Supine to Sit: Contact guard assistance  Scooting: Contact guard assistance  Transfers:  Sit to Stand: Contact guard assistance  Stand to Sit: Contact guard assistance  Balance:   Sitting: Intact  Standing: Impaired; With support  Standing - Static: Good  Standing - Dynamic : Fair  Ambulation/Gait Training:  Distance (ft): 25 Feet (ft) (multiple standing rest breaks)  Ambulation - Level of Assistance: Minimal assistance  Base of Support: Narrowed  Speed/Ashwini: Pace decreased (<100 feet/min); Slow  Pain:  Pre: 0/10    Post: 0 /10    Activity Tolerance:   fair  Please refer to the flowsheet for vital signs taken during this treatment. After treatment:   [x] Patient left in no apparent distress sitting up in chair  [] Patient left sitting on EOB  [] Patient left in no apparent distress in bed  [] Patient declined to be OOB at this time due to    [x] Call bell left within reach  [x] Nursing notified(Rupa)  [x] Caregiver present  [] Bed alarm activated    COMMUNICATION/EDUCATION:   [x]         Fall prevention education was provided and the patient/caregiver indicated understanding. [x]         Patient/family have participated as able in goal setting and plan of care. [x]         Patient/family agree to work toward stated goals and plan of care. []         Patient understands intent and goals of therapy, but is neutral about his/her participation. []         Patient is unable to participate in goal setting and plan of care. Thank you for this referral.  Bib Baptiste, PT   Time Calculation: 23 mins      Mobility  Current  CJ= 20-39%   Goal  CI= 1-19%. The severity rating is based on the Level of Assistance required for Functional Mobility and ADLs.     Eval Complexity: History: MEDIUM  Complexity : 1-2 comorbidities / personal factors will impact the outcome/ POC Exam:HIGH Complexity : 4+ Standardized tests and measures addressing body structure, function, activity limitation and / or participation in recreation  Presentation: MEDIUM Complexity : Evolving with changing characteristics  Clinical Decision Making:Medium Complexity   Overall Complexity:MEDIUM

## 2018-03-20 NOTE — PROGRESS NOTES
Problem: Dysphagia (Adult)  Goal: *Acute Goals and Plan of Care (Insert Text)  Recommendations:  Diet: Regular/thin  Meds: Per pt preference  General safe swallow precautions     Goals:  Patient will:  1. Tolerate PO trials with 0 s/s overt distress in 4/5 trials-met   2. Utilize compensatory swallow strategies/maneuvers (decrease bite/sip, size/rate, alt. liq/sol) with min cues in 4/5 trials-n/a   3. Complete an objective swallow study (i.e., MBSS) to assess swallow integrity, r/o aspiration, and determine of safest LRD, min A-met        Outcome: Resolved/Met Date Met: 03/20/18  Speech Pathology Modified barium swallow Study/treatment/discharge    Patient: Mejia Bar (89 y.o. female)  Date: 3/20/2018  Primary Diagnosis: Pneumonia  Right lower lobe pneumonia (HCC)  Precautions: None on file       ASSESSMENT :  Based on the objective data described below, the patient presents with intact oropharyngeal swallow mechanics. Pt able to tolerate all consistencies presented (thin +/- straw, puree, regular solids and 13 mm Ba pill with thin liquid wash) with positive airway protection noted across multiple trials. Pt further challenged with consecutive swallows of thin liquids + straw, tolerating with no aspiration or penetration visualized. Pt demo adequate oral prep phase, timely swallow initiation, positive laryngeal elevation/adduction, positive epiglottic inversion and positive pharyngeal clearance across trials. Pt safe for regular diet with thin liquids. Treatment performed following evaluation (see below). PLAN :  Recommendations and Planned Interventions:  No further SLP needs identified   Discharge Recommendations: None     G-CODES:      GCODESwallowing:  Swallow Current Status CH= 0%   Swallow Goal Status CH= 0%   Swallow D/C Status CH= 0%    SUBJECTIVE:   Patient stated I can't remember why we are doing this.     OBJECTIVE:     Past Medical History:   Diagnosis Date    Abuse 1998 Alcohol    Anxiety     Calculus of kidney 1988    Cardiac echocardiogram 07/31/2012    EF 60-65%. No WMA. Mild conc LVH. Gr 1 DDfx. RVSP 20-25 mmHg. Mild SAGAR. Mild TR,. Mild PAE.  Cardiovascular LE venous duplex 10/08/2012    No venous thrombosis or venous insufficiency in bilateral lower extremities.  Carotid duplex 10/09/2014    Mild < 50% bilateral ICA stenosis.  Chronic lung disease     Contact dermatitis and other eczema, due to unspecified cause     COPD (chronic obstructive pulmonary disease) (HCC)     Degenerative joint disease     Depression     Hematuria     Hypercholesteremia     Hypertension     Hypothyroidism 1998    Kidney stone     Neuropathy 3/2007    Orthostatic hypotension     Pituitary adenoma ([facility] [address] 2/1998    Seizures ([facility] [address]     none recently    Severe obstructive sleep apnea 01-15-15     started using Bipap Machine    Stress     Stroke ([facility] [address] 2/2006    no residual    TIA (transient ischemic attack)     Trauma      Past Surgical History:   Procedure Laterality Date    CYSTOSCOPY,INSERT URETERAL STENT  9/14/15    Dr. Leilani Mustafa    HX APPENDECTOMY      HX Iesha Myah HX LITHOTRIPSY  9/14/15    Dr. Christal Em HX WISDOM TEETH EXTRACTION      x4     Prior Level of Function/Home Situation:   Home Situation  Home Environment: Private residence  One/Two Story Residence: One story  Living Alone: No  Support Systems: Child(jessica)  Patient Expects to be Discharged to[de-identified] Private residence  Current DME Used/Available at Home: [address], bedside  Diet prior to admission: Regular  Current Diet:  Regular    Radiologist:    Film Views: Fluoro;Lateral  Patient Position: 90 in chair    Trial 1:   Consistency Presented: Thin liquid;Puree; Solid;Pill/Tablet   How Presented: Self-fed/presented;Cup/sip;Spoon;Straw;Successive swallows       Bolus Acceptance: No impairment   Bolus Formation/Control: No impairment:     Propulsion: No impairment   Oral Residue: None   Initiation of Swallow: No impairment   Timing: No impairment   Penetration: None   Aspiration/Timing: No evidence of aspiration   Pharyngeal Clearance: No residue         Decreased Tongue Base Retraction?: No  Laryngeal Elevation: WFL (within functional limits)  Aspiration/Penetration Score: 1 (No penetration or aspiration-Contrast does not enter the airway)  Pharyngeal Symmetry: Not assessed  Pharyngeal-Esophageal Segment: No impairment  Pharyngeal Dysfunction: None  Oral Phase Severity: No impairment  Pharyngeal Phase Severity: N/A    Treatment performed following evaluation:  Treatment provided post diagnostic with video feedback  including oropharyngeal anatomy/physiology, MBS results, aspiration s/sx and to alert MD/RN if symptoms arise. Pt able to verbalize understanding. No further SLP needs identified. Will sign off. Pain:  Pt reports 0/10 pain or discomfort prior to MBS. Pt reports 0/10 pain or discomfort post MBS. COMMUNICATION/EDUCATION:     [x]   Patient/family have participated as able and agree with findings and recommendations.     Thank you for this referral.  MAMIE Montoya.S., [id number] [facility]  Speech-Language Pathologist

## 2018-03-20 NOTE — PROGRESS NOTES
Hospitalist Progress Note    Patient: Austin Powell MRN: [id number]  CSN: [id number]    YOB: 1931  Age: 80 y.o. Sex: female    DOA: 3/18/2018 LOS:  LOS: 2 days          Did well with SLP. She quit tobacco in January this year. Daughters smoke outside. She has been on oxygen since recent discharge. Has not followed up with pulmonologist since 2015. Still has dry cough, improving but unable to expectorate. She overall feels much better than when she presented. Grandson at bedside. Assessment/Plan       1. Acute bronchitis- Blood cx (3/18) ngtd. Sputum cx pending. On abx. Did well with SLP. RT for bronchial hygiene protocol. 2. 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 3. panhypopit on synthroid, florinef and cortef as outpatient. 4. Dyslipidemia on statin. 5. Seizure d/o on home meds. Seizure precautions. 6. Tobacco abuse - quit earlier this year. 7. Sepsis poa (leukocytosis, hypoxia) improving - source m/l pulmonary  8. Metabolic alkalosis  9. ckd 3 at baseline  10. 2nd hand smoke exposure - states her daughters smoke exposure. 11. COPD w/ AE - solumedrol, abx, BD. Pulmonary to consult. 12. Chronic hypoxic respiratory failure on home O2 continuous since last discharge. 13. dvt prophylaxis   14. Full code. Resides at home with 2 daughters. Home with hh when ready. I discussed the case with Dr. Katheryn Madrid.      Additional Notes:      Case discussed with:  [x]Patient  [x]Family  [x]Nursing  []Case Management  DVT Prophylaxis:  []Lovenox  [x]Hep SQ  []SCDs  []Coumadin   []On Heparin gtt    Vital signs/Intake and Output:  Visit Vitals    /65 (BP 1 Location: Right arm, BP Patient Position: At rest)    Pulse 69    Temp 98.1 °F (36.7 °C)    Resp 16    Ht 5' 4\" (1.626 m)    Wt 72.1 kg (159 lb)    SpO2 91%    BMI 27.29 kg/m2     Current Shift:  03/20 0701 - 03/20 1900  In: 120 [P.O.:120]  Out: -   Last three shifts:  03/18 1901 - 03/20 0700  In: 710 [P.O.:360; I.V.:350]  Out: 4557 [Urine:1775]    Awake and alert   Ncat. perrl  RRR  Coarse rhonchi throughout b.l  Soft nt nd nabs  No edema. dp 2+ b.l  No focal deficit  No rash    Medications Reviewed      Labs: Results:       Chemistry Recent Labs      03/20/18 0444 03/18/18 2015   GLU  73*  90   NA  133*  136   K  4.0  4.0   CL  95*  96*   CO2  27  34*   BUN  21*  25*   CREA  1.02  1.06   CA  8.9  10.2*   AGAP  11  6   BUCR  21*  24*   AP   --   61   TP   --   6.7   ALB   --   3.4   GLOB   --   3.3   AGRAT   --   1.0      CBC w/Diff Recent Labs      03/20/18 0444 03/18/18 2015   WBC  11.5  16.0*   RBC  3.83*  4.43   HGB  11.9*  13.9   HCT  36.4  41.7   PLT  317  407   GRANS  73  83*   LYMPH  19*  13*   EOS  3  1      Cardiac Enzymes Recent Labs      03/18/18 2015   CPK  22*   CKND1  CALCULATION NOT PERFORMED WHEN RESULT IS BELOW LINEAR LIMIT      Coagulation No results for input(s): PTP, INR, APTT in the last 72 hours. No lab exists for component: INREXT, INREXT    Lipid Panel Lab Results   Component Value Date/Time    Cholesterol, total 164 04/11/2017 07:22 AM    HDL Cholesterol 48 04/11/2017 07:22 AM    LDL, calculated 78.8 04/11/2017 07:22 AM    VLDL, calculated 37.2 04/11/2017 07:22 AM    Triglyceride 186 (H) 04/11/2017 07:22 AM    CHOL/HDL Ratio 3.4 04/11/2017 07:22 AM      BNP No results for input(s): BNPP in the last 72 hours. Liver Enzymes Recent Labs      03/18/18 2015   TP  6.7   ALB  3.4   AP  61   SGOT  15      Thyroid Studies Lab Results   Component Value Date/Time    TSH 0.09 (L) 03/20/2018 04:44 AM        Procedures/imaging: see electronic medical records for all procedures/Xrays and details which were not copied into this note but were reviewed prior to creation of Plan.

## 2018-03-20 NOTE — CONSULTS
[facility]  Pulmonary, Critical Care, and Sleep Medicine    Initial Patient Consult    Name: Ricci Vargas MRN: [id number]   : 1931 Hospital: [address] [place] [address] Dr   Date: 3/20/2018        This patient has been seen and evaluated at the request of Dr. Diana Asher for dyspnea. IMPRESSION:   · Acute dyspnea:  Etiology multifactorial but likely due to LRTI/pneumonia with COPD exacerbation  · LRTI with Pneumonia, HCAP vs aspiration:  B/L LL interstitial infiltrates, cannot rule out an infection, which may have caused patient's rapid decompensation. Pt recently discharged from hospital  · Bronchitis with COPD exacerbation  · Acute on chronic hypoxia  · Deconditioning  · Chronic use of mineralocorticoids:  Pt on florinef chronically  · Acute encephalopathy:  Resolved - likely due to hypoxia  · Thrush      RECOMMENDATIONS:   · Adjusted breathing treatments -- stopped home symbicort. Started Pulmicort 1mg nebs BID and Brovana nebs BID with ipratropium nebs QID and albuterol nebs PRN  · Agree with adding IV solumedrol, continue home florinef  · Agree with Vanc and Cefepime for HCAP, add flagyl for anaerobic coverage for potential aspiration pneumonia  · F/U cx results  · Check influenza PCR  · Started nystatin swish and swallow for oral thrush  · Continue supplemental oxygen to maintain SpO2 88-93%  · Bronchial hygiene protocol  · OOB to chair  · Ambulate as tolerated  · Frequent incentive spirometry  · PT/OT  · DVT ppx per primary service  · Discussed case with patient's daughter and nephew at bedside. Thank you for this consult     Subjective:     Patient is a 80 y.o. female with hx of COPD, HTN, EtOH abuse in the past, CVA, seizures, heavy smoking hx, presented to [facility] ER with AMS, lethargy and dyspnea. Pt was recently admitted to [facility] with dyspnea from 2 weeks ago. Pt's daughter reported that the patient did well post discharge, was doing well with PT.   Then early in the day of presentation she developed worsening dyspnea, present on minimal exertion, then developed weakness, fatigue, lethargy, and then became very poorly responsive, so her sister took her to the [facility] ER. Pt was found to have some bibasilar infiltrates vs atleectasis, had leukocytosis but no fever. Pt this morning has no complaints, denies chest pain, N/V/D, notes no new dyspnea but has some at baseline. Her daughter reports pt was not on supplemental oxygen prior to her previous admission but required it post discharge. Denies hemoptysis, N/V/D, chest pain, overt aspiration, night sweats. Past Medical History:   Diagnosis Date    Abuse 1998    Alcohol    Anxiety     Calculus of kidney 1988    Cardiac echocardiogram 07/31/2012    EF 60-65%. No WMA. Mild conc LVH. Gr 1 DDfx. RVSP 20-25 mmHg. Mild SAGAR. Mild TR,. Mild PAE.  Cardiovascular LE venous duplex 10/08/2012    No venous thrombosis or venous insufficiency in bilateral lower extremities.  Carotid duplex 10/09/2014    Mild < 50% bilateral ICA stenosis.       Chronic lung disease     Contact dermatitis and other eczema, due to unspecified cause     COPD (chronic obstructive pulmonary disease) (HCC)     Degenerative joint disease     Depression     Hematuria     Hypercholesteremia     Hypertension     Hypothyroidism 1998    Kidney stone     Neuropathy 3/2007    Orthostatic hypotension     Pituitary adenoma ([address] 2/1998    Seizures ([address]     none recently    Severe obstructive sleep apnea 01-15-15     started using Bipap Machine    Stress     Stroke ([address] 2/2006    no residual    TIA (transient ischemic attack)     Trauma       Past Surgical History:   Procedure Laterality Date    CYSTOSCOPY,INSERT URETERAL STENT  9/14/15    Dr. Keon Ricketts HX LITHOTRIPSY  9/14/15    Dr. Rosalinda Joseph HX WISDOM TEETH EXTRACTION      x4      Prior to Admission medications    Medication Sig Start Date End Date Taking? Authorizing Provider   cholecalciferol, vitamin D3, 4,000 unit cap Take 1 Cap by mouth daily. Historical Provider   coenzyme Q-10 (CO Q-10) 200 mg capsule Take  by mouth daily. Historical Provider   omega 3-DHA-EPA-fish oil (FISH OIL) 1,000 mg (120 mg-180 mg) capsule Take 4 Caps by mouth daily. Historical Provider   OXYGEN-AIR DELIVERY SYSTEMS 3 L/min by Does Not Apply route continuous. Historical Provider   methylPREDNISolone (MEDROL, NIRAJ,) 4 mg tablet As directed  Patient not taking: Reported on 3/14/2018 3/13/18   Hans De Los Santos MD   lisinopril (PRINIVIL, ZESTRIL) 20 mg tablet TAKE ONE TABLET BY MOUTH EVERY DAY 1/25/18   Alisha Paige MD   PARoxetine (PAXIL) 30 mg tablet TAKE ONE TABLET BY MOUTH EVERY DAY 1/25/18   Alisha Paige MD   atorvastatin (LIPITOR) 10 mg tablet TAKE ONE TABLET BY MOUTH EVERY DAY 1/25/18   Alisha Paige MD   potassium chloride (K-DUR, KLOR-CON) 20 mEq tablet TAKE ONE TABLET BY MOUTH 2 TIMES A DAY  Patient taking differently: Take 20 mEq by mouth two (2) times a day. TAKE ONE TABLET BY MOUTH 2 TIMES A DAY 1/25/18   Alisha Paige MD   metoprolol tartrate (LOPRESSOR) 50 mg tablet TAKE ONE TABLET BY MOUTH EVERY DAY FOR HYPERTENSION 1/25/18   Alisha Paige MD   amLODIPine (NORVASC) 10 mg tablet TAKE ONE TABLET BY MOUTH EVERY DAY 1/25/18   Alisha Paige MD   hydroCHLOROthiazide (MICROZIDE) 12.5 mg capsule TAKE ONE CAPSULE BY MOUTH EVERY DAY 1/18/18   Alisha Paige MD   umeclidinium-vilanterol (ANORO ELLIPTA) 62.5-25 mcg/actuation inhaler Take 1 Puff by inhalation daily. 9/25/17   Gemini Vigren MD   nystatin (MYCOSTATIN) powder Apply  to affected area four (4) times daily.  7/25/17   Alisha Paige MD   albuterol (PROAIR HFA) 90 mcg/actuation inhaler Take 2 Puffs by inhalation every four (4) hours as needed for Wheezing or Shortness of Breath. 5/26/17   Sarah Sherman NP   fluticasone (FLONASE) 50 mcg/actuation nasal spray 2 Sprays by Both Nostrils route daily. 4/10/17   Historical Provider   SYNTHROID 112 mcg tablet Take 112 mcg by mouth Daily (before breakfast). 7/19/16   Historical Provider   ALPRAZolam Guadlupe Amador) 0.5 mg tablet Take 0.5 mg by mouth nightly as needed for Anxiety or Sleep. 9/6/16   Historical Provider   furosemide (LASIX) 20 mg tablet Take 1 Tab by mouth daily as needed for Other (Leg Swelling). 4/12/16   Sammy Marcum MD   hydrocortisone (CORTEF) 10 mg tablet Take 10 mg by mouth daily. 10/26/15   Historical Provider   VOLTAREN 1 % topical gel Apply 2 g to affected area nightly as needed (pain). 9/2/15   Historical Provider   Walker (BARNEY CLIFTON WALKER) misc 1 Device by Does Not Apply route daily. 8/31/15   JENNIFER Rock   bipap machine kit 1 each by Does Not Apply route. Started using BiPap Machine 01-15-15    Historical Provider   L-Methylfolate-I83-Ybzydupvqs (CEREFOLIN NAC) tablet Take 1 tablet by mouth daily. 10/30/14   Sammy Marcum MD   fludrocortisone (FLORINEF) 0.1 mg tablet Take 1 tablet by mouth daily. 10/30/14   Sammy Marcum MD   oxcarbazepine (TRILEPTAL) 300 mg tablet Take 300 mg by mouth two (2) times a day. Historical Provider   Cholecalciferol, Vitamin D3, 5,000 unit Tab Take 5,000 Int'l Units by mouth daily. Indications: VITAMIN D DEFICIENCY    Historical Provider   magnesium oxide (MAG-OX) 400 mg tablet Take 400 mg by mouth daily. Historical Provider   COQ10, LIPOSOMAL UBIQUINOL, PO Take 10 mg by mouth daily. Historical Provider   aspirin 81 mg tablet Take 81 mg by mouth daily. Historical Provider   DOCOSAHEXANOIC ACID/EPA (FISH OIL PO) Take 4,000 mg by mouth daily. Historical Provider   CALCIUM CITRATE/VITAMIN D3 (CALCIUM CITRATE + PO) Take  by mouth once over twenty-four (24) hours.     Historical Provider     Allergies   Allergen Reactions    Iodine Hives      Social History   Substance Use Topics    Smoking status: Current Every Day Smoker Packs/day: 1.50     Years: 65.00     Types: Cigarettes    Smokeless tobacco: Never Used    Alcohol use No      Comment: quit 1998 due to Alcohol Abuse      Family History   Problem Relation Age of Onset    Heart Disease Father     Hypertension Father     Hypertension Mother     Cancer Mother      skin    Elevated Lipids Sister     Heart Disease Sister     Cancer Maternal Grandmother      colon and stomach    Heart Disease Paternal Grandmother     Heart Attack Paternal Grandmother     Heart Attack Paternal Grandfather     Heart Disease Paternal Grandfather         Immunization status: stated as current, but no records available.   Current Facility-Administered Medications   Medication Dose Route Frequency    methylPREDNISolone (PF) (SOLU-MEDROL) injection 60 mg  60 mg IntraVENous Q8H    [START ON 3/23/2018] Vancomycin trough level due 3/23/18 at 1130  1 Each Other ONCE    nystatin (MYCOSTATIN) 100,000 unit/mL oral suspension 500,000 Units  500,000 Units Oral QID    budesonide (PULMICORT) 1,000 mcg/2 mL nebulizer susp  1,000 mcg Nebulization BID RT    arformoterol (BROVANA) neb solution 15 mcg  15 mcg Nebulization BID RT    metroNIDAZOLE (FLAGYL) IVPB premix 500 mg  500 mg IntraVENous Q12H    ipratropium (ATROVENT) 0.02 % nebulizer solution 0.5 mg  0.5 mg Nebulization Q4H RT    aspirin chewable tablet 81 mg  81 mg Oral DAILY    atorvastatin (LIPITOR) tablet 10 mg  10 mg Oral QHS    fludrocortisone (FLORINEF) tablet 100 mcg  0.1 mg Oral DAILY    fluticasone (FLONASE) 50 mcg/actuation nasal spray 2 Spray  2 Spray Both Nostrils DAILY    magnesium oxide (MAG-OX) tablet 400 mg  400 mg Oral DAILY    OXcarbazepine (TRILEPTAL) tablet 300 mg  300 mg Oral BID    PARoxetine (PAXIL) tablet 30 mg  30 mg Oral DAILY    levothyroxine (SYNTHROID) tablet 112 mcg  112 mcg Oral ACB    heparin (porcine) injection 5,000 Units  5,000 Units SubCUTAneous Q8H    guaiFENesin-dextromethorphan SR (HUMIBID DM) 600-30 mg tablet 1 Tab  1 Tab Oral BID    potassium chloride (K-DUR, KLOR-CON) SR tablet 20 mEq  20 mEq Oral DAILY    cefepime (MAXIPIME) 2 g in sterile water (preservative free) 10 mL IV syringe  2 g IntraVENous Q12H    vancomycin (VANCOCIN) 1,000 mg in 0.9% sodium chloride (MBP/ADV) 250 mL adv  1,000 mg IntraVENous Q24H       Review of Systems:  A comprehensive review of systems was negative except for that written in the HPI. Objective:   Vital Signs:    Visit Vitals    /74 (BP 1 Location: Left arm, BP Patient Position: At rest)    Pulse 64    Temp 98.2 °F (36.8 °C)    Resp 16    Ht 5' 4\" (1.626 m)    Wt 72.1 kg (159 lb)    SpO2 93%    BMI 27.29 kg/m2       O2 Device: Nasal cannula   O2 Flow Rate (L/min): 3 l/min   Temp (24hrs), Av.1 °F (36.7 °C), Min:96.5 °F (35.8 °C), Max:99.3 °F (37.4 °C)       Intake/Output:   Last shift:       07 - 1900  In: 120 [P.O.:120]  Out: 200 [Urine:200]  Last 3 shifts:  190 -  0700  In: 710 [P.O.:360; I.V.:350]  Out: 1775 [Urine:1775]    Intake/Output Summary (Last 24 hours) at 18 1710  Last data filed at 18 1554   Gross per 24 hour   Intake              120 ml   Output             1375 ml   Net            -1255 ml      Physical Exam:   General:  Alert, cooperative, no distress, appears stated age, wearing NC, sitting up in chair eating lunch   Head:  Normocephalic, without obvious abnormality, atraumatic. Eyes:  Conjunctivae/corneas clear. PERRL, EOMs intact. Nose: Nares normal. Septum midline. Mucosa normal. No drainage or sinus tenderness. Throat: Lips normal, tongue and mucosa has thrush. Dentures are in place   Neck: Supple, symmetrical, trachea midline, no adenopathy, no carotid bruit and no JVD. Back:   Symmetric, no curvature. ROM normal.   Lungs:   B/L rales and rhonchi with very faint scattered wheezes, fair amount of air entry B/L throughout   Chest wall:  No tenderness or deformity.    Heart:  Regular rate and rhythm, S1, S2 normal, no murmur, click, rub or gallop. Abdomen:   Soft, non-tender. Bowel sounds normal. No masses,  No organomegaly. Extremities: Extremities normal, atraumatic, no cyanosis or edema. Pulses: 2+ and symmetric all extremities. Skin: Skin color, texture, turgor normal. No rashes or lesions   Lymph nodes: Cervical, supraclavicular, and axillary nodes normal.   Neurologic: Grossly nonfocal     Data review:     Recent Results (from the past 24 hour(s))   METABOLIC PANEL, BASIC    Collection Time: 03/20/18  4:44 AM   Result Value Ref Range    Sodium 133 (L) 136 - 145 mmol/L    Potassium 4.0 3.5 - 5.5 mmol/L    Chloride 95 (L) 100 - 108 mmol/L    CO2 27 21 - 32 mmol/L    Anion gap 11 3.0 - 18 mmol/L    Glucose 73 (L) 74 - 99 mg/dL    BUN 21 (H) 7.0 - 18 MG/DL    Creatinine 1.02 0.6 - 1.3 MG/DL    BUN/Creatinine ratio 21 (H) 12 - 20      GFR est AA >60 >60 ml/min/1.73m2    GFR est non-AA 51 (L) >60 ml/min/1.73m2    Calcium 8.9 8.5 - 10.1 MG/DL   MAGNESIUM    Collection Time: 03/20/18  4:44 AM   Result Value Ref Range    Magnesium 2.3 1.6 - 2.6 mg/dL   PHOSPHORUS    Collection Time: 03/20/18  4:44 AM   Result Value Ref Range    Phosphorus 2.8 2.5 - 4.9 MG/DL   TSH 3RD GENERATION    Collection Time: 03/20/18  4:44 AM   Result Value Ref Range    TSH 0.09 (L) 0.36 - 3.74 uIU/mL   T4, FREE    Collection Time: 03/20/18  4:44 AM   Result Value Ref Range    T4, Free 0.8 0.7 - 1.5 NG/DL   CBC WITH AUTOMATED DIFF    Collection Time: 03/20/18  4:44 AM   Result Value Ref Range    WBC 11.5 4.6 - 13.2 K/uL    RBC 3.83 (L) 4.20 - 5.30 M/uL    HGB 11.9 (L) 12.0 - 16.0 g/dL    HCT 36.4 35.0 - 45.0 %    MCV 95.0 74.0 - 97.0 FL    MCH 31.1 24.0 - 34.0 PG    MCHC 32.7 31.0 - 37.0 g/dL    RDW 13.9 11.6 - 14.5 %    PLATELET 158 610 - 370 K/uL    MPV 9.9 9.2 - 11.8 FL    NEUTROPHILS 73 40 - 73 %    LYMPHOCYTES 19 (L) 21 - 52 %    MONOCYTES 5 3 - 10 %    EOSINOPHILS 3 0 - 5 %    BASOPHILS 0 0 - 2 %    ABS. NEUTROPHILS 8.5 (H) 1.8 - 8.0 K/UL    ABS. LYMPHOCYTES 2.1 0.9 - 3.6 K/UL    ABS. MONOCYTES 0.6 0.05 - 1.2 K/UL    ABS. EOSINOPHILS 0.3 0.0 - 0.4 K/UL    ABS.  BASOPHILS 0.0 0.0 - 0.1 K/UL    DF AUTOMATED         Imaging:  I have personally reviewed the patients radiographs and have reviewed the reports:  CXR shows Bibasilar interstitial infiltrates and hyperinflation   PFT:           Jonnie Grimes MD/MPH     Pulmonary, Critical Care Medicine  [facility]

## 2018-03-20 NOTE — PROGRESS NOTES
Problem: Falls - Risk of  Goal: *Absence of Falls  Document Janett Fall Risk and appropriate interventions in the flowsheet.    Outcome: Progressing Towards Goal  Fall Risk Interventions:  Mobility Interventions: Patient to call before getting OOB    Mentation Interventions: Door open when patient unattended    Medication Interventions: Patient to call before getting OOB    Elimination Interventions: Call light in reach

## 2018-03-20 NOTE — PROGRESS NOTES
Metronidazole 500mg q12h was therapeutically interchanged for metronidazole 500mg q8h per the P&T Committee approved Therapeutic Interchanges Policy.     Nereida [facility], Pharmacist  3/20/2018 12:21 PM

## 2018-03-20 NOTE — ROUTINE PROCESS
Bedside and Verbal shift change report given to Katerin Jain (oncoming nurse) by Ariana Ambrosio RN (offgoing nurse). Report included the following information SBAR, Kardex, MAR and Recent Results. SITUATION:    Code Status: Full Code   Reason for Admission: Pneumonia   Right lower lobe pneumonia [facility])    [facility] day: 2   Problem List:       Hospital Problems  Date Reviewed: 1/25/2018          Codes Class Noted POA    * (Principal)HCAP (healthcare-associated pneumonia) ICD-10-CM: J18.9  ICD-9-CM: 486  3/19/2018 Unknown        Right lower lobe pneumonia ([facility] ICD-10-CM: J18.1  ICD-9-CM: 486  3/18/2018 Unknown        Panhypopituitarism ([facility] ICD-10-CM: E23.0  ICD-9-CM: 253.2  3/10/2018 Yes        [facility]chronic obstructive [facility] disease) [facility] ICD-10-CM: J44.9  ICD-9-CM: 540  2/25/2013 Yes        Essential hypertension, benign ICD-10-CM: I10  ICD-9-CM: 401.1  10/2/2012 Yes        Tobacco abuse ICD-10-CM: Z72.0  ICD-9-CM: 305.1  10/2/2012 Yes              BACKGROUND:    Past Medical History:   Past Medical History:   Diagnosis Date    Abuse 1998    Alcohol    Anxiety     Calculus of kidney 1988    Cardiac echocardiogram 07/31/2012    EF 60-65%. No WMA. Mild conc LVH. Gr 1 DDfx. RVSP 20-25 mmHg. Mild SAGAR. Mild TR,. Mild PAE.  Cardiovascular LE venous duplex 10/08/2012    No venous thrombosis or venous insufficiency in bilateral lower extremities.  Carotid duplex 10/09/2014    Mild < 50% bilateral ICA stenosis.       Chronic lung disease     Contact dermatitis and other eczema, due to unspecified cause     COPD (chronic obstructive pulmonary disease) (HCC)     Degenerative joint disease     Depression     Hematuria     Hypercholesteremia     Hypertension     Hypothyroidism 1998    Kidney stone     Neuropathy 3/2007    Orthostatic hypotension     Pituitary adenoma ([facility] 2/1998    Seizures ([facility]     none recently    Severe obstructive sleep apnea 01-15-15     started using Bipap Machine    Stress     Stroke ([facility] 2/2006    no residual    TIA (transient ischemic attack)     Trauma          Patient taking anticoagulants no     ASSESSMENT:    Changes in Assessment Throughout Shift: nol     Patient has Central Line: no Reasons if yes: no   Patient has Patiño Cath: no Reasons if yes: no      Last Vitals:     Vitals:    03/19/18 1238 03/19/18 1553 03/19/18 2040 03/20/18 0400   BP: 122/68 124/74 126/68 146/73   Pulse: 61 60 62 70   Resp: 20 18 15 16   Temp: 98.5 °F (36.9 °C) 98.1 °F (36.7 °C) 98.3 °F (36.8 °C) 99.3 °F (37.4 °C)   SpO2: 94% 95% 94% 92%   Weight:       Height:            IV and DRAINS (will only show if present)   Peripheral IV 03/18/18 Left Antecubital-Site Assessment: Clean, dry, & intact  Peripheral IV 03/18/18 Left Antecubital-Site Assessment: Clean, dry, & intact     WOUND (if present)   Wound Type:  none   Dressing present Dressing Present : No   Wound Concerns/Notes:  none     PAIN    Pain Assessment    Pain Intensity 1: 0 (03/19/18 0348)              Patient Stated Pain Goal: 0  o Interventions for Pain:  none  o Intervention effective: no  o Time of last intervention: no pain   o Reassessment Completed: no      Last 3 Weights:  Last 3 Recorded Weights in this Encounter    03/18/18 1943   Weight: 72.1 kg (159 lb)     Weight change:      INTAKE/OUPUT    Current Shift:      Last three shifts: 03/18 1901 - 03/20 0700  In: 710 [P.O.:360; I.V.:350]  Out: 1125 [Urine:1125]     LAB RESULTS     Recent Labs      03/20/18 0444 03/18/18 2015   WBC  11.5  16.0*   HGB  11.9*  13.9   HCT  36.4  41.7   PLT  317  407        Recent Labs      03/20/18 0444 03/18/18 2015   NA  133*  136   K  4.0  4.0   GLU  73*  90   BUN  21*  25*   CREA  1.02  1.06   CA  8.9  10.2*   MG  2.3   --        RECOMMENDATIONS AND DISCHARGE PLANNING     1. Pending tests/procedures/ Plan of Care or Other Needs totalcare     2.  Discharge plan for patient and Needs/Barriers: Home care, Hospice    3. Estimated Discharge Date: 3/20/18 Posted on Whiteboard in Patients Room: no      4. The patient's care plan was reviewed with the oncoming nurse. \"HEALS\" SAFETY CHECK      Fall Risk    Total Score: 4    Safety Measures: Safety Measures: Bed/Chair-Wheels locked, Bed in low position, Caregiver at bedside, Call light within reach, Gripper socks, Side rails X 3    A safety check occurred in the patient's room between off going nurse and oncoming nurse listed above. The safety check included the below items  Area Items   H  High Alert Medications - Verify all high alert medication drips (heparin, PCA, etc.)   E  Equipment - Suction is set up for ALL patients (with shashank)  - Red plugs utilized for all equipment (IV pumps, etc.)  - WOWs wiped down at end of shift.  - Room stocked with oxygen, suction, and other unit-specific supplies   A  Alarms - Bed alarm is set for fall risk patients  - Ensure chair alarm is in place and activated if patient is up in a chair   L  Lines - Check IV for any infiltration  - Patiño bag is empty if patient has a Patiño   - Tubing and IV bags are labeled   S  Safety   - Room is clean, patient is clean, and equipment is clean. - Hallways are clear from equipment besides carts. - Fall bracelet on for fall risk patients  - Ensure room is clear and free of clutter  - Suction is set up for ALL patients (with shashank)  - Hallways are clear from equipment besides carts.    - Isolation precautions followed, supplies available outside room, sign posted     Tere Vigil RN

## 2018-03-21 LAB
ANION GAP SERPL CALC-SCNC: 8 MMOL/L (ref 3–18)
APPEARANCE UR: CLEAR
BACTERIA URNS QL MICRO: NEGATIVE /HPF
BASOPHILS # BLD: 0 K/UL (ref 0–0.1)
BASOPHILS NFR BLD: 0 % (ref 0–2)
BILIRUB UR QL: NEGATIVE
BUN SERPL-MCNC: 19 MG/DL (ref 7–18)
BUN/CREAT SERPL: 22 (ref 12–20)
CALCIUM SERPL-MCNC: 8.8 MG/DL (ref 8.5–10.1)
CHLORIDE SERPL-SCNC: 97 MMOL/L (ref 100–108)
CO2 SERPL-SCNC: 24 MMOL/L (ref 21–32)
COLOR UR: YELLOW
CREAT SERPL-MCNC: 0.86 MG/DL (ref 0.6–1.3)
DIFFERENTIAL METHOD BLD: ABNORMAL
EOSINOPHIL # BLD: 0 K/UL (ref 0–0.4)
EOSINOPHIL NFR BLD: 0 % (ref 0–5)
EPITH CASTS URNS QL MICRO: ABNORMAL /LPF (ref 0–5)
ERYTHROCYTE [DISTWIDTH] IN BLOOD BY AUTOMATED COUNT: 13.1 % (ref 11.6–14.5)
GLUCOSE SERPL-MCNC: 154 MG/DL (ref 74–99)
GLUCOSE UR STRIP.AUTO-MCNC: 250 MG/DL
HCT VFR BLD AUTO: 33.8 % (ref 35–45)
HGB BLD-MCNC: 11.4 G/DL (ref 12–16)
HGB UR QL STRIP: NEGATIVE
HYALINE CASTS URNS QL MICRO: ABNORMAL /LPF (ref 0–2)
KETONES UR QL STRIP.AUTO: NEGATIVE MG/DL
LEUKOCYTE ESTERASE UR QL STRIP.AUTO: NEGATIVE
LYMPHOCYTES # BLD: 0.6 K/UL (ref 0.9–3.6)
LYMPHOCYTES NFR BLD: 9 % (ref 21–52)
MAGNESIUM SERPL-MCNC: 2.5 MG/DL (ref 1.6–2.6)
MCH RBC QN AUTO: 31.1 PG (ref 24–34)
MCHC RBC AUTO-ENTMCNC: 33.7 G/DL (ref 31–37)
MCV RBC AUTO: 92.1 FL (ref 74–97)
MONOCYTES # BLD: 0.1 K/UL (ref 0.05–1.2)
MONOCYTES NFR BLD: 1 % (ref 3–10)
MUCOUS THREADS URNS QL MICRO: ABNORMAL /LPF
NEUTS SEG # BLD: 6.1 K/UL (ref 1.8–8)
NEUTS SEG NFR BLD: 90 % (ref 40–73)
NITRITE UR QL STRIP.AUTO: NEGATIVE
OSMOLALITY UR: 593 MOSM/KG H2O (ref 300–900)
PH UR STRIP: 6 [PH] (ref 5–8)
PLATELET # BLD AUTO: 305 K/UL (ref 135–420)
PMV BLD AUTO: 10.1 FL (ref 9.2–11.8)
POTASSIUM SERPL-SCNC: 4.3 MMOL/L (ref 3.5–5.5)
PROT UR STRIP-MCNC: 30 MG/DL
RBC # BLD AUTO: 3.67 M/UL (ref 4.2–5.3)
RBC #/AREA URNS HPF: ABNORMAL /HPF (ref 0–5)
SODIUM SERPL-SCNC: 129 MMOL/L (ref 136–145)
SODIUM UR-SCNC: 24 MMOL/L (ref 20–110)
SP GR UR REFRACTOMETRY: 1.02 (ref 1–1.03)
UROBILINOGEN UR QL STRIP.AUTO: 0.2 EU/DL (ref 0.2–1)
WBC # BLD AUTO: 6.7 K/UL (ref 4.6–13.2)
WBC URNS QL MICRO: ABNORMAL /HPF (ref 0–4)

## 2018-03-21 PROCEDURE — 3331090001 HH PPS REVENUE CREDIT

## 2018-03-21 PROCEDURE — 36415 COLL VENOUS BLD VENIPUNCTURE: CPT | Performed by: HOSPITALIST

## 2018-03-21 PROCEDURE — 84300 ASSAY OF URINE SODIUM: CPT | Performed by: PHYSICIAN ASSISTANT

## 2018-03-21 PROCEDURE — 80048 BASIC METABOLIC PNL TOTAL CA: CPT | Performed by: HOSPITALIST

## 2018-03-21 PROCEDURE — 74011250637 HC RX REV CODE- 250/637: Performed by: INTERNAL MEDICINE

## 2018-03-21 PROCEDURE — 65270000029 HC RM PRIVATE

## 2018-03-21 PROCEDURE — 83735 ASSAY OF MAGNESIUM: CPT | Performed by: HOSPITALIST

## 2018-03-21 PROCEDURE — 3331090002 HH PPS REVENUE DEBIT

## 2018-03-21 PROCEDURE — 74011250636 HC RX REV CODE- 250/636: Performed by: INTERNAL MEDICINE

## 2018-03-21 PROCEDURE — 74011250636 HC RX REV CODE- 250/636: Performed by: HOSPITALIST

## 2018-03-21 PROCEDURE — 74011250637 HC RX REV CODE- 250/637: Performed by: HOSPITALIST

## 2018-03-21 PROCEDURE — 74011250636 HC RX REV CODE- 250/636: Performed by: EMERGENCY MEDICINE

## 2018-03-21 PROCEDURE — 94640 AIRWAY INHALATION TREATMENT: CPT

## 2018-03-21 PROCEDURE — 74011250636 HC RX REV CODE- 250/636: Performed by: PHYSICIAN ASSISTANT

## 2018-03-21 PROCEDURE — 81001 URINALYSIS AUTO W/SCOPE: CPT | Performed by: PHYSICIAN ASSISTANT

## 2018-03-21 PROCEDURE — 74011000250 HC RX REV CODE- 250: Performed by: EMERGENCY MEDICINE

## 2018-03-21 PROCEDURE — 74011000250 HC RX REV CODE- 250: Performed by: INTERNAL MEDICINE

## 2018-03-21 PROCEDURE — 83935 ASSAY OF URINE OSMOLALITY: CPT | Performed by: PHYSICIAN ASSISTANT

## 2018-03-21 PROCEDURE — 85025 COMPLETE CBC W/AUTO DIFF WBC: CPT | Performed by: HOSPITALIST

## 2018-03-21 RX ORDER — PREDNISONE 20 MG/1
40 TABLET ORAL
Status: DISCONTINUED | OUTPATIENT
Start: 2018-03-22 | End: 2018-03-22 | Stop reason: HOSPADM

## 2018-03-21 RX ORDER — SODIUM CHLORIDE 9 MG/ML
50 INJECTION, SOLUTION INTRAVENOUS CONTINUOUS
Status: DISCONTINUED | OUTPATIENT
Start: 2018-03-21 | End: 2018-03-22 | Stop reason: HOSPADM

## 2018-03-21 RX ADMIN — OXCARBAZEPINE 300 MG: 300 TABLET ORAL at 18:06

## 2018-03-21 RX ADMIN — BUDESONIDE 1000 MCG: 1 SUSPENSION RESPIRATORY (INHALATION) at 11:11

## 2018-03-21 RX ADMIN — NYSTATIN 500000 UNITS: 100000 SUSPENSION ORAL at 17:22

## 2018-03-21 RX ADMIN — ATORVASTATIN CALCIUM 10 MG: 10 TABLET, FILM COATED ORAL at 21:38

## 2018-03-21 RX ADMIN — METHYLPREDNISOLONE SODIUM SUCCINATE 60 MG: 40 INJECTION, POWDER, FOR SOLUTION INTRAMUSCULAR; INTRAVENOUS at 06:02

## 2018-03-21 RX ADMIN — SODIUM CHLORIDE 1000 MG: 900 INJECTION, SOLUTION INTRAVENOUS at 16:09

## 2018-03-21 RX ADMIN — ASPIRIN 81 MG 81 MG: 81 TABLET ORAL at 11:08

## 2018-03-21 RX ADMIN — FLUTICASONE PROPIONATE 2 SPRAY: 50 SPRAY, METERED NASAL at 11:10

## 2018-03-21 RX ADMIN — NYSTATIN 500000 UNITS: 100000 SUSPENSION ORAL at 11:09

## 2018-03-21 RX ADMIN — OXCARBAZEPINE 300 MG: 300 TABLET ORAL at 11:08

## 2018-03-21 RX ADMIN — BUDESONIDE 1000 MCG: 1 SUSPENSION RESPIRATORY (INHALATION) at 20:57

## 2018-03-21 RX ADMIN — METRONIDAZOLE 500 MG: 500 INJECTION, SOLUTION INTRAVENOUS at 01:00

## 2018-03-21 RX ADMIN — FLUDROCORTISONE ACETATE 100 MCG: 0.1 TABLET ORAL at 11:08

## 2018-03-21 RX ADMIN — GUAIFENESIN AND DEXTROMETHORPHAN HYDROBROMIDE 1 TABLET: 600; 30 TABLET, EXTENDED RELEASE ORAL at 18:06

## 2018-03-21 RX ADMIN — POTASSIUM CHLORIDE 20 MEQ: 20 TABLET, EXTENDED RELEASE ORAL at 11:08

## 2018-03-21 RX ADMIN — METHYLPREDNISOLONE SODIUM SUCCINATE 60 MG: 40 INJECTION, POWDER, FOR SOLUTION INTRAMUSCULAR; INTRAVENOUS at 21:38

## 2018-03-21 RX ADMIN — SODIUM CHLORIDE 50 ML/HR: 900 INJECTION, SOLUTION INTRAVENOUS at 12:10

## 2018-03-21 RX ADMIN — GUAIFENESIN AND DEXTROMETHORPHAN HYDROBROMIDE 1 TABLET: 600; 30 TABLET, EXTENDED RELEASE ORAL at 11:08

## 2018-03-21 RX ADMIN — METRONIDAZOLE 500 MG: 500 INJECTION, SOLUTION INTRAVENOUS at 17:21

## 2018-03-21 RX ADMIN — PAROXETINE 30 MG: 30 TABLET, FILM COATED ORAL at 11:07

## 2018-03-21 RX ADMIN — HEPARIN SODIUM 5000 UNITS: 5000 INJECTION, SOLUTION INTRAVENOUS; SUBCUTANEOUS at 16:05

## 2018-03-21 RX ADMIN — NYSTATIN 500000 UNITS: 100000 SUSPENSION ORAL at 21:38

## 2018-03-21 RX ADMIN — Medication 2 G: at 21:38

## 2018-03-21 RX ADMIN — IPRATROPIUM BROMIDE 0.5 MG: 0.5 SOLUTION RESPIRATORY (INHALATION) at 11:11

## 2018-03-21 RX ADMIN — NYSTATIN 500000 UNITS: 100000 SUSPENSION ORAL at 14:22

## 2018-03-21 RX ADMIN — Medication 2 G: at 11:09

## 2018-03-21 RX ADMIN — Medication 400 MG: at 11:08

## 2018-03-21 RX ADMIN — METHYLPREDNISOLONE SODIUM SUCCINATE 60 MG: 40 INJECTION, POWDER, FOR SOLUTION INTRAMUSCULAR; INTRAVENOUS at 14:21

## 2018-03-21 RX ADMIN — HEPARIN SODIUM 5000 UNITS: 5000 INJECTION, SOLUTION INTRAVENOUS; SUBCUTANEOUS at 23:23

## 2018-03-21 RX ADMIN — HEPARIN SODIUM 5000 UNITS: 5000 INJECTION, SOLUTION INTRAVENOUS; SUBCUTANEOUS at 07:44

## 2018-03-21 RX ADMIN — ARFORMOTEROL TARTRATE 15 MCG: 15 SOLUTION RESPIRATORY (INHALATION) at 20:57

## 2018-03-21 RX ADMIN — LEVOTHYROXINE SODIUM 112 MCG: 112 TABLET ORAL at 11:08

## 2018-03-21 RX ADMIN — ARFORMOTEROL TARTRATE 15 MCG: 15 SOLUTION RESPIRATORY (INHALATION) at 11:10

## 2018-03-21 NOTE — PROGRESS NOTES
conducted a Follow up consultation and Spiritual Assessment for STOVALL VINEET [facility], who is a 80 y.o.,female. The  provided the following Interventions:  Continued the relationship of care and support. Listened empathically. Chart reviewed. The following outcomes were achieved:  Patient expressed gratitude for 's visit. Assessment:  There are no further spiritual or Anabaptism issues which require Spiritual Care Services interventions at this time. Plan:  Chaplains will continue to follow and will provide pastoral care on an as needed/requested basis.  recommends bedside caregivers page  on duty if patient shows signs of acute spiritual or emotional distress.      [id number] Certified [address] [place] [address]   [phone]

## 2018-03-21 NOTE — PROGRESS NOTES
Problem: Falls - Risk of  Goal: *Absence of Falls  Document Janett Fall Risk and appropriate interventions in the flowsheet.    Outcome: Progressing Towards Goal  Fall Risk Interventions:  Mobility Interventions: Bed/chair exit alarm, Patient to call before getting OOB    Mentation Interventions: Bed/chair exit alarm, Door open when patient unattended    Medication Interventions: Bed/chair exit alarm    Elimination Interventions: Call light in reach

## 2018-03-21 NOTE — ROUTINE PROCESS
Bedside and Verbal shift change report given to Cloteal Hamman, LINDA (oncoming nurse) by Puja Epperson RN (offgoing nurse). Report included the following information SBAR, Kardex, MAR and Recent Results. 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 No WMA. Mild conc LVH. Gr 1 DDfx. RVSP 20-25 mmHg. Mild SAGAR. Mild TR,. Mild PAE.  Cardiovascular LE venous duplex 10/08/2012    No venous thrombosis or venous insufficiency in bilateral lower extremities.  Carotid duplex 10/09/2014    Mild < 50% bilateral ICA stenosis.       Chronic lung disease     Contact dermatitis and other eczema, due to unspecified cause     COPD (chronic obstructive pulmonary disease) (HCC)     Degenerative joint disease     Depression     Hematuria     Hypercholesteremia     Hypertension     Hypothyroidism 1998    Kidney stone     Neuropathy 3/2007    Orthostatic hypotension     Pituitary adenoma ([facility] 2/1998    Seizures ([facility]     none recently    Severe obstructive sleep apnea 01-15-15     started using Bipap Machine    Stress     Stroke ([facility] 2/2006    no residual    TIA (transient ischemic attack)     Trauma          Patient taking anticoagulants yes     ASSESSMENT:    Changes in Assessment Throughout Shift: none     Patient has Central Line: no Reasons if yes: n/a   Patient has Patiño Cath: no Reasons if yes: n/a      Last Vitals:     Vitals:    03/20/18 1548 03/20/18 2000 03/21/18 0000 03/21/18 0529   BP: 121/74 122/65 115/61 133/70   Pulse: 64 68 61 60   Resp: 16 18 17 18   Temp: 98.2 °F (36.8 °C) 97 °F (36.1 °C) 97.8 °F (36.6 °C) 97 °F (36.1 °C)   SpO2: 93% 90% 92% 94%   Weight:       Height:            IV and DRAINS (will only show if present)   Peripheral IV 03/18/18 Left Antecubital-Site Assessment: Clean, dry, & intact  Peripheral IV 03/18/18 Left Antecubital-Site Assessment: Clean, dry, & intact     WOUND (if present)   Wound Type:  none   Dressing present Dressing Present : No   Wound Concerns/Notes:  none     PAIN    Pain Assessment    Pain Intensity 1: 0 (03/21/18 0400)              Patient Stated Pain Goal: 0  o Interventions for Pain:  None given  o Intervention effective: no c/o pain  o Time of last intervention: n/a   o Reassessment Completed: yes      Last 3 Weights:  Last 3 Recorded Weights in this Encounter    03/18/18 1943   Weight: 72.1 kg (159 lb)     Weight change:      INTAKE/OUPUT    Current Shift:      Last three shifts: 03/19 0701 - 03/20 1900  In: 600 [P.O.:600]  Out: 1625 [Urine:1625]     LAB RESULTS     Recent Labs      03/21/18   0304  03/20/18   0444  03/18/18 2015   WBC  6.7  11.5  16.0*   HGB  11.4*  11.9*  13.9   HCT  33.8*  36.4  41.7   PLT  305  317  407        Recent Labs      03/20/18   0444  03/18/18 2015   NA  133*  136   K  4.0  4.0   GLU  73*  90   BUN  21*  25*   CREA  1.02  1.06   CA  8.9  10.2*   MG  2.3   --        RECOMMENDATIONS AND DISCHARGE PLANNING     1. Pending tests/procedures/ Plan of Care or Other Needs: respiratory culture     2.  Discharge plan for patient and Needs/Barriers: home with home health    3. Estimated Discharge Date: 3/22/18 Posted on Whiteboard in [facility] Room: yes      4. The patient's care plan was reviewed with the oncoming nurse. \"HEALS\" SAFETY CHECK      Fall Risk    Total Score: 4    Safety Measures: Safety Measures: Bed/Chair-Wheels locked    A safety check occurred in the patient's room between off going nurse and oncoming nurse listed above. The safety check included the below items  Area Items   H  High Alert Medications - Verify all high alert medication drips (heparin, PCA, etc.)   E  Equipment - Suction is set up for ALL patients (with shashank)  - Red plugs utilized for all equipment (IV pumps, etc.)  - WOWs wiped down at end of shift.  - Room stocked with oxygen, suction, and other unit-specific supplies   A  Alarms - Bed alarm is set for fall risk patients  - Ensure chair alarm is in place and activated if patient is up in a chair   L  Lines - Check IV for any infiltration  - Patiño bag is empty if patient has a Patiño   - Tubing and IV bags are labeled   S  Safety   - Room is clean, patient is clean, and equipment is clean. - Hallways are clear from equipment besides carts. - Fall bracelet on for fall risk patients  - Ensure room is clear and free of clutter  - Suction is set up for ALL patients (with shashank)  - Hallways are clear from equipment besides carts.    - Isolation precautions followed, supplies available outside room, sign posted     Arlette Patel RN

## 2018-03-21 NOTE — ROUTINE PROCESS
Daughter Padmini Duranon notified of patient being transferred to [address] [place] [address]

## 2018-03-21 NOTE — PROGRESS NOTES
[facility] Hospitalist Group  Progress Note    Patient: Susanna Anderson Age: 80 y.o. : 1931 MR#: [id number] SSN: [id number]  Date: 3/21/2018     Subjective:     Pt reports breathing is much better. Denies any chest pain, abd pain, N/V/D/C. Assessment/Plan:   1. Acute bronchitis with possible HCAP - Blood cx (3/18) ngtd. Sputum cx pending- awaiting specimen from pt. On abx. Did well with SLP. RT for bronchial hygiene protocol. 2. Hyponatremia: start NS IVF, check ua, urine Na+ studies  3. COPD w/ AE - cont solumedrol, abx, BD and bronchodilators. Pulmonary consult appreciated. 4. Sepsis poa (leukocytosis, hypoxia) improving - source m/l pulmonary  5. 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 6. panhypopit on synthroid, florinef and cortef as outpatient. 7. Dyslipidemia on statin. 8. Seizure d/o on home meds. Seizure precautions. 9. Tobacco abuse - quit earlier this year. 10. Metabolic alkalosis  11. ckd 3 at baseline  12. 2nd hand smoke exposure - states her daughters smoke exposure. 13. Chronic hypoxic respiratory failure on home O2 continuous since last discharge on 3L. 14. Thrush: cont nystatin swish and swallow for oral thrush. 15. dvt prophylaxis   16. Full code. Resides at home with 2 daughters. Home with hh when ready. I discussed the case with Dr. Jonnie Grimes. Goals of care:  Full code  Disposition:  [x]PT/OT ordered   [x] Case management referral    Case discussed with:  [x]Patient  [x]Family  []Nursing  []Case Management  DVT Prophylaxis:  []Lovenox  [x]Hep SQ  []SCDs  []Coumadin   []On Heparin gtt    Objective:   VS:   Visit Vitals    /70 (BP 1 Location: Left arm, BP Patient Position: At rest)    Pulse 62    Temp 97.1 °F (36.2 °C)    Resp 16    Ht 5' 4\" (1.626 m)    Wt 72.1 kg (159 lb)    SpO2 95%    BMI 27.29 kg/m2      Tmax/24hrs: Temp (24hrs), Av.3 °F (36.3 °C), Min:96.5 °F (35.8 °C), Max:98.2 °F (36.8 °C)    Intake/Output Summary (Last 24 hours) at 03/21/18 1146  Last data filed at 03/21/18 0949   Gross per 24 hour   Intake              480 ml   Output              200 ml   Net              280 ml       General:  Awake, alert, NAD  Cardiovascular:  RRR  Pulmonary:  Coarse breath sounds bilat  GI:  NT, normal BS  Extremities:  No edema or cyanosis  Additional:      Labs:    Recent Results (from the past 24 hour(s))   CBC WITH AUTOMATED DIFF    Collection Time: 03/21/18  3:04 AM   Result Value Ref Range    WBC 6.7 4.6 - 13.2 K/uL    RBC 3.67 (L) 4.20 - 5.30 M/uL    HGB 11.4 (L) 12.0 - 16.0 g/dL    HCT 33.8 (L) 35.0 - 45.0 %    MCV 92.1 74.0 - 97.0 FL    MCH 31.1 24.0 - 34.0 PG    MCHC 33.7 31.0 - 37.0 g/dL    RDW 13.1 11.6 - 14.5 %    PLATELET 235 537 - 200 K/uL    MPV 10.1 9.2 - 11.8 FL    NEUTROPHILS 90 (H) 40 - 73 %    LYMPHOCYTES 9 (L) 21 - 52 %    MONOCYTES 1 (L) 3 - 10 %    EOSINOPHILS 0 0 - 5 %    BASOPHILS 0 0 - 2 %    ABS. NEUTROPHILS 6.1 1.8 - 8.0 K/UL    ABS. LYMPHOCYTES 0.6 (L) 0.9 - 3.6 K/UL    ABS. MONOCYTES 0.1 0.05 - 1.2 K/UL    ABS. EOSINOPHILS 0.0 0.0 - 0.4 K/UL    ABS.  BASOPHILS 0.0 0.0 - 0.1 K/UL    DF AUTOMATED     MAGNESIUM    Collection Time: 03/21/18  3:04 AM   Result Value Ref Range    Magnesium 2.5 1.6 - 2.6 mg/dL   METABOLIC PANEL, BASIC    Collection Time: 03/21/18  3:04 AM   Result Value Ref Range    Sodium 129 (L) 136 - 145 mmol/L    Potassium 4.3 3.5 - 5.5 mmol/L    Chloride 97 (L) 100 - 108 mmol/L    CO2 24 21 - 32 mmol/L    Anion gap 8 3.0 - 18 mmol/L    Glucose 154 (H) 74 - 99 mg/dL    BUN 19 (H) 7.0 - 18 MG/DL    Creatinine 0.86 0.6 - 1.3 MG/DL    BUN/Creatinine ratio 22 (H) 12 - 20      GFR est AA >60 >60 ml/min/1.73m2    GFR est non-AA >60 >60 ml/min/1.73m2    Calcium 8.8 8.5 - 10.1 MG/DL       Signed By: Odilon Wen PA-C     March 21, 2018 11:46 AM

## 2018-03-21 NOTE — PROGRESS NOTES
ADULT PROTOCOL: JET AEROSOL ASSESSMENT    Patient  Vanessa Villar     80 y.o.   female     3/21/2018  11:21 AM    Breath Sounds Pre Procedure:  Breath Sounds Bilateral: Clear, Diminished                                            Breath Sounds Post Procedure: Breath Sounds Bilateral: Clear, Diminished                                               Breathing pattern: Pre procedure  Breathing Pattern: Regular          Post procedure  Breathing Pattern: Regular    Cough: Pre procedure  Cough: Non-productive, Strong               Post procedure Cough: Non-productive, Strong    Heart Rate: Pre procedure Pulse: 79           Post procedure Pulse: 79    Resp Rate: Pre procedure  Respirations: 18           Post procedure          Nebulizer Therapy: Current medications Aerosolized Medications: Pulmicort, Brovana, Ipratropium bromide       Problem List:   Patient Active Problem List   Diagnosis Code    Essential hypertension, benign I10    Dyslipidemia E78.5    Tobacco abuse Z72.0    Hypothyroidism E03.9    COPD (chronic obstructive pulmonary disease) ([facility] J44.9    Pituitary adenoma ([facility]) D35.2    Nausea and vomiting R11.2    Acute upper respiratory infection J06.9    Nausea alone R11.0    Vasovagal reaction R55    Pancreatitis K85.90    XUAN (obstructive sleep apnea) G47.33    Flank pain R10.9    Kidney stone N20.0    Lumbar spinal stenosis M48.061    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    Dehydration E86.0    Acute renal failure ([facility]) N17.9    Elevated lactic acid level R79.89    Wheezing R06.2    Gastroenteritis K52.9    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    Pneumonia J18.9    Right lower lobe pneumonia ([facility]) J18.1    HCAP (healthcare-associated pneumonia) J18.9       Patient alert and cooperative to use MDI: Yes    Home Respiratory Therapy Regimen/Frequency:  YES   Medication   Device  Frequency     SEVERITY INDEX:    ITEM 0 1 2 3 4 Score   Respiratory Pattern and or Rate Regular  10-19 Regular  20-24   24-30    30-34 Severe SOB or   Greater than 35 0   Breath Sounds Clear Occasional Wheeze Mild Wheezing Moderate Wheezing  wheezing/Absent breath sounds 0   Shortness of Breath None Dyspnea on Exertion Dyspnea at Rest Moderate Shortness of Breath at Rest Severe Shortness of Breath - Limited Speech 1       Total Score:  1     * Scoring Guidelines  0-4 pts:  PRN-BID   5-7 pts:  BID, TID, QID  8-9 pts:  TID, QID, Q6  10-12 pts:  Q4-Q6  * - Guidelines used with clinical judgement. PRN Treatments can be ordered to supplement scheduled treatments. Regardless of score, frequency should not be less than normal home regimen.     Recommended Order/Frequency:  PRN    Comments:          Respiratory Therapist: Corona Quiros RT

## 2018-03-21 NOTE — CONSULTS
[facility]  Pulmonary, Critical Care, and Sleep Medicine    Followup Patient Consult    Name: Bib Niño MRN: [id number]   : 1931 Hospital: [address] [place] [address] Dr   Date: 3/21/2018        This patient has been seen and evaluated at the request of Dr. Re Carroll for dyspnea. IMPRESSION:   · Acute dyspnea:  Etiology multifactorial but likely due to LRTI/pneumonia with COPD exacerbation  · LRTI with Pneumonia, HCAP vs aspiration:  B/L LL interstitial infiltrates, cannot rule out an infection, which may have caused patient's rapid decompensation. Pt recently discharged from hospital  · Bronchitis with COPD exacerbation: slowly improving  · Acute on chronic hypoxia  · Deconditioning  · Chronic use of mineralocorticoids:  Pt on florinef chronically  · Acute encephalopathy:  Resolved - likely due to hypoxia  · Thrush      RECOMMENDATIONS:   · Continue Pulmicort 1mg nebs BID and Brovana nebs BID with ipratropium nebs QID and albuterol nebs PRN  · Agree with IV solumedrol can transition to PO tomorrow if doing well, continue home florinef  · Agree with Vanc and Cefepime for HCAP, add flagyl for anaerobic coverage for potential aspiration pneumonia. As patient improves, if cx remain negative, then can consider deescalating  · F/U cx results  · Check influenza PCR  · Started nystatin swish and swallow for oral thrush  · Continue supplemental oxygen to maintain SpO2 88-93%  · Bronchial hygiene protocol - flutter device - explained use at bedside  · OOB to chair  · Ambulate as tolerated  · Frequent incentive spirometry  · PT/OT  · DVT ppx per primary service  · Discussed case with patient's daughter at bedside    Thank you for this consult     Subjective:   18  Pt seen and examined this afternoon. NO acute events overnight. Pt's daughter at bedside, pt eating lunch. Pt reports some improvement in dyspnea. Denies chest pain, N/V/D.     HPI  Patient is a 80 y.o. female with hx of COPD, HTN, EtOH abuse in the past, CVA, seizures, heavy smoking hx, presented to [facility] ER with AMS, lethargy and dyspnea. Pt was recently admitted to [facility] with dyspnea from 2 weeks ago. Pt's daughter reported that the patient did well post discharge, was doing well with PT. Then early in the day of presentation she developed worsening dyspnea, present on minimal exertion, then developed weakness, fatigue, lethargy, and then became very poorly responsive, so her sister took her to the [facility] ER. Pt was found to have some bibasilar infiltrates vs atleectasis, had leukocytosis but no fever. Pt this morning has no complaints, denies chest pain, N/V/D, notes no new dyspnea but has some at baseline. Her daughter reports pt was not on supplemental oxygen prior to her previous admission but required it post discharge. Denies hemoptysis, N/V/D, chest pain, overt aspiration, night sweats. Past Medical History:   Diagnosis Date    Abuse 1998    Alcohol    Anxiety     Calculus of kidney 1988    Cardiac echocardiogram 07/31/2012    EF 60-65%. No WMA. Mild conc LVH. Gr 1 DDfx. RVSP 20-25 mmHg. Mild SAGAR. Mild TR,. Mild PAE.  Cardiovascular LE venous duplex 10/08/2012    No venous thrombosis or venous insufficiency in bilateral lower extremities.  Carotid duplex 10/09/2014    Mild < 50% bilateral ICA stenosis.       Chronic lung disease     Contact dermatitis and other eczema, due to unspecified cause     COPD (chronic obstructive pulmonary disease) (HCC)     Degenerative joint disease     Depression     Hematuria     Hypercholesteremia     Hypertension     Hypothyroidism 1998    Kidney stone     Neuropathy 3/2007    Orthostatic hypotension     Pituitary adenoma ([address] 2/1998    Seizures ([address]     none recently    Severe obstructive sleep apnea 01-15-15     started using Bipap Machine    Stress     Stroke ([address] 2/2006    no residual    TIA (transient ischemic attack)     Trauma       Past Surgical History:   Procedure Laterality Date    CYSTOSCOPY,INSERT URETERAL STENT  9/14/15    Dr. Keon Ricketts HX LITHOTRIPSY  9/14/15    Dr. Cony Morel EXTRACTION      x4      Prior to Admission medications    Medication Sig Start Date End Date Taking? Authorizing Provider   cholecalciferol, vitamin D3, 4,000 unit cap Take 1 Cap by mouth daily. Historical Provider   coenzyme Q-10 (CO Q-10) 200 mg capsule Take  by mouth daily. Historical Provider   omega 3-DHA-EPA-fish oil (FISH OIL) 1,000 mg (120 mg-180 mg) capsule Take 4 Caps by mouth daily. Historical Provider   OXYGEN-AIR DELIVERY SYSTEMS 3 L/min by Does Not Apply route continuous. Historical Provider   methylPREDNISolone (MEDROL, NIRAJ,) 4 mg tablet As directed  Patient not taking: Reported on 3/14/2018 3/13/18   Julian Mcneal MD   lisinopril (PRINIVIL, ZESTRIL) 20 mg tablet TAKE ONE TABLET BY MOUTH EVERY DAY 1/25/18   Indio Kendrick MD   PARoxetine (PAXIL) 30 mg tablet TAKE ONE TABLET BY MOUTH EVERY DAY 1/25/18   Indio Kendrick MD   atorvastatin (LIPITOR) 10 mg tablet TAKE ONE TABLET BY MOUTH EVERY DAY 1/25/18   Indio Kendrick MD   potassium chloride (K-DUR, KLOR-CON) 20 mEq tablet TAKE ONE TABLET BY MOUTH 2 TIMES A DAY  Patient taking differently: Take 20 mEq by mouth two (2) times a day. TAKE ONE TABLET BY MOUTH 2 TIMES A DAY 1/25/18   Indio Kendrick MD   metoprolol tartrate (LOPRESSOR) 50 mg tablet TAKE ONE TABLET BY MOUTH EVERY DAY FOR HYPERTENSION 1/25/18   Indio Kendrick MD   amLODIPine (NORVASC) 10 mg tablet TAKE ONE TABLET BY MOUTH EVERY DAY 1/25/18   Indio Kendrick MD   hydroCHLOROthiazide (MICROZIDE) 12.5 mg capsule TAKE ONE CAPSULE BY MOUTH EVERY DAY 1/18/18   Indio Kendrick MD   umeclidinium-vilanterol (ANORO ELLIPTA) 62.5-25 mcg/actuation inhaler Take 1 Puff by inhalation daily.  9/25/17   Monica De Los Santos MD   nystatin (MYCOSTATIN) powder Apply  to affected area four (4) times daily. 7/25/17   Desiree Barragan MD   albuterol (PROAIR HFA) 90 mcg/actuation inhaler Take 2 Puffs by inhalation every four (4) hours as needed for Wheezing or Shortness of Breath. 5/26/17   Jr Fernandez NP   fluticasone (FLONASE) 50 mcg/actuation nasal spray 2 Sprays by Both Nostrils route daily. 4/10/17   Historical Provider   SYNTHROID 112 mcg tablet Take 112 mcg by mouth Daily (before breakfast). 7/19/16   Historical Provider   ALPRAZolam Bevely Shan) 0.5 mg tablet Take 0.5 mg by mouth nightly as needed for Anxiety or Sleep. 9/6/16   Historical Provider   furosemide (LASIX) 20 mg tablet Take 1 Tab by mouth daily as needed for Other (Leg Swelling). 4/12/16   Desiree Barragan MD   hydrocortisone (CORTEF) 10 mg tablet Take 10 mg by mouth daily. 10/26/15   Historical Provider   VOLTAREN 1 % topical gel Apply 2 g to affected area nightly as needed (pain). 9/2/15   Historical Provider   Walker (BARNEY CLIFTON WALKER) misc 1 Device by Does Not Apply route daily. 8/31/15   JENNIFER Avila   bipap machine kit 1 each by Does Not Apply route. Started using BiPap Machine 01-15-15    Historical Provider   L-Methylfolate-R02-Tgutkwqjei (CEREFOLIN NAC) tablet Take 1 tablet by mouth daily. 10/30/14   Desiree Barragan MD   fludrocortisone (FLORINEF) 0.1 mg tablet Take 1 tablet by mouth daily. 10/30/14   Desiree Barragan MD   oxcarbazepine (TRILEPTAL) 300 mg tablet Take 300 mg by mouth two (2) times a day. Historical Provider   Cholecalciferol, Vitamin D3, 5,000 unit Tab Take 5,000 Int'l Units by mouth daily. Indications: VITAMIN D DEFICIENCY    Historical Provider   magnesium oxide (MAG-OX) 400 mg tablet Take 400 mg by mouth daily. Historical Provider   COQ10, LIPOSOMAL UBIQUINOL, PO Take 10 mg by mouth daily. Historical Provider   aspirin 81 mg tablet Take 81 mg by mouth daily. Historical Provider   DOCOSAHEXANOIC ACID/EPA (FISH OIL PO) Take 4,000 mg by mouth daily. Historical Provider   CALCIUM CITRATE/VITAMIN D3 (CALCIUM CITRATE + PO) Take  by mouth once over twenty-four (24) hours. Historical Provider     Allergies   Allergen Reactions    Iodine Hives      Social History   Substance Use Topics    Smoking status: Current Every Day Smoker     Packs/day: 1.50     Years: 65.00     Types: Cigarettes    Smokeless tobacco: Never Used    Alcohol use No      Comment: quit 1998 due to Alcohol Abuse      Family History   Problem Relation Age of Onset    Heart Disease Father     Hypertension Father     Hypertension Mother     Cancer Mother      skin    Elevated Lipids Sister     Heart Disease Sister     Cancer Maternal Grandmother      colon and stomach    Heart Disease Paternal Grandmother     Heart Attack Paternal Grandmother     Heart Attack Paternal Grandfather     Heart Disease Paternal Grandfather         Immunization status: stated as current, but no records available.   Current Facility-Administered Medications   Medication Dose Route Frequency    0.9% sodium chloride infusion  50 mL/hr IntraVENous CONTINUOUS    methylPREDNISolone (PF) (SOLU-MEDROL) injection 60 mg  60 mg IntraVENous Q8H    [START ON 3/23/2018] Vancomycin trough level due 3/23/18 at 1130  1 Each Other ONCE    nystatin (MYCOSTATIN) 100,000 unit/mL oral suspension 500,000 Units  500,000 Units Oral QID    budesonide (PULMICORT) 1,000 mcg/2 mL nebulizer susp  1,000 mcg Nebulization BID RT    arformoterol (BROVANA) neb solution 15 mcg  15 mcg Nebulization BID RT    metroNIDAZOLE (FLAGYL) IVPB premix 500 mg  500 mg IntraVENous Q12H    aspirin chewable tablet 81 mg  81 mg Oral DAILY    atorvastatin (LIPITOR) tablet 10 mg  10 mg Oral QHS    fludrocortisone (FLORINEF) tablet 100 mcg  0.1 mg Oral DAILY    fluticasone (FLONASE) 50 mcg/actuation nasal spray 2 Spray  2 Spray Both Nostrils DAILY    magnesium oxide (MAG-OX) tablet 400 mg  400 mg Oral DAILY    OXcarbazepine (TRILEPTAL) tablet 300 mg  300 mg Oral BID    PARoxetine (PAXIL) tablet 30 mg  30 mg Oral DAILY    levothyroxine (SYNTHROID) tablet 112 mcg  112 mcg Oral ACB    heparin (porcine) injection 5,000 Units  5,000 Units SubCUTAneous Q8H    guaiFENesin-dextromethorphan SR (HUMIBID DM) 600-30 mg tablet 1 Tab  1 Tab Oral BID    potassium chloride (K-DUR, KLOR-CON) SR tablet 20 mEq  20 mEq Oral DAILY    cefepime (MAXIPIME) 2 g in sterile water (preservative free) 10 mL IV syringe  2 g IntraVENous Q12H    vancomycin (VANCOCIN) 1,000 mg in 0.9% sodium chloride (MBP/ADV) 250 mL adv  1,000 mg IntraVENous Q24H       Review of Systems:  A comprehensive review of systems was negative except for that written in the HPI. Objective:   Vital Signs:    Visit Vitals    /58 (BP 1 Location: Left arm, BP Patient Position: At rest)    Pulse 82    Temp 97.9 °F (36.6 °C)    Resp 20    Ht 5' 4\" (1.626 m)    Wt 72.1 kg (159 lb)    SpO2 91%    BMI 27.29 kg/m2       O2 Device: Nasal cannula   O2 Flow Rate (L/min): 3 l/min (home O2 settings)   Temp (24hrs), Av.4 °F (36.3 °C), Min:97 °F (36.1 °C), Max:97.9 °F (36.6 °C)       Intake/Output:   Last shift:       07 -  190  In: 240 [P.O.:240]  Out: -   Last 3 shifts:  190 -  0700  In: 360 [P.O.:360]  Out: 1375 [Urine:1375]    Intake/Output Summary (Last 24 hours) at 18 1605  Last data filed at 18 0949   Gross per 24 hour   Intake              480 ml   Output                0 ml   Net              480 ml      Physical Exam:   General:  Alert, cooperative, no distress, appears stated age, wearing NC, sitting up in chair eating lunch   Head:  Normocephalic, without obvious abnormality, atraumatic. Eyes:  Conjunctivae/corneas clear. PERRL, EOMs intact. Nose: Nares normal. Septum midline. Mucosa normal. No drainage or sinus tenderness. Throat: Lips normal, tongue and mucosa has thrush.   Dentures are in place   Neck: Supple, symmetrical, trachea midline, no adenopathy, no carotid bruit and no JVD. Back:   Symmetric, no curvature. ROM normal.   Lungs:   B/L mild rales and rhonchi, fair amount of air entry B/L throughout, fine wheezes R>L   Chest wall:  No tenderness or deformity. Heart:  Regular rate and rhythm, S1, S2 normal, no murmur, click, rub or gallop. Abdomen:   Soft, non-tender. Bowel sounds normal. No masses,  No organomegaly. Extremities: Extremities normal, atraumatic, no cyanosis or edema. Pulses: 2+ and symmetric all extremities. Skin: Skin color, texture, turgor normal. No rashes or lesions   Lymph nodes: Cervical, supraclavicular, and axillary nodes normal.   Neurologic: Grossly nonfocal     Data review:     Recent Results (from the past 24 hour(s))   CBC WITH AUTOMATED DIFF    Collection Time: 03/21/18  3:04 AM   Result Value Ref Range    WBC 6.7 4.6 - 13.2 K/uL    RBC 3.67 (L) 4.20 - 5.30 M/uL    HGB 11.4 (L) 12.0 - 16.0 g/dL    HCT 33.8 (L) 35.0 - 45.0 %    MCV 92.1 74.0 - 97.0 FL    MCH 31.1 24.0 - 34.0 PG    MCHC 33.7 31.0 - 37.0 g/dL    RDW 13.1 11.6 - 14.5 %    PLATELET 996 785 - 172 K/uL    MPV 10.1 9.2 - 11.8 FL    NEUTROPHILS 90 (H) 40 - 73 %    LYMPHOCYTES 9 (L) 21 - 52 %    MONOCYTES 1 (L) 3 - 10 %    EOSINOPHILS 0 0 - 5 %    BASOPHILS 0 0 - 2 %    ABS. NEUTROPHILS 6.1 1.8 - 8.0 K/UL    ABS. LYMPHOCYTES 0.6 (L) 0.9 - 3.6 K/UL    ABS. MONOCYTES 0.1 0.05 - 1.2 K/UL    ABS. EOSINOPHILS 0.0 0.0 - 0.4 K/UL    ABS.  BASOPHILS 0.0 0.0 - 0.1 K/UL    DF AUTOMATED     MAGNESIUM    Collection Time: 03/21/18  3:04 AM   Result Value Ref Range    Magnesium 2.5 1.6 - 2.6 mg/dL   METABOLIC PANEL, BASIC    Collection Time: 03/21/18  3:04 AM   Result Value Ref Range    Sodium 129 (L) 136 - 145 mmol/L    Potassium 4.3 3.5 - 5.5 mmol/L    Chloride 97 (L) 100 - 108 mmol/L    CO2 24 21 - 32 mmol/L    Anion gap 8 3.0 - 18 mmol/L    Glucose 154 (H) 74 - 99 mg/dL    BUN 19 (H) 7.0 - 18 MG/DL    Creatinine 0.86 0.6 - 1.3 MG/DL    BUN/Creatinine ratio 22 (H) 12 - 20      GFR est AA >60 >60 ml/min/1.73m2    GFR est non-AA >60 >60 ml/min/1.73m2    Calcium 8.8 8.5 - 10.1 MG/DL       Imaging:  I have personally reviewed the patients radiographs and have reviewed the reports:  No new studies today           Yehuda Lou MD/MPH     Pulmonary, [address] [facility]

## 2018-03-22 VITALS
TEMPERATURE: 97.2 F | RESPIRATION RATE: 20 BRPM | OXYGEN SATURATION: 94 % | DIASTOLIC BLOOD PRESSURE: 70 MMHG | HEART RATE: 56 BPM | SYSTOLIC BLOOD PRESSURE: 120 MMHG

## 2018-03-22 VITALS
HEART RATE: 78 BPM | TEMPERATURE: 98.4 F | SYSTOLIC BLOOD PRESSURE: 112 MMHG | OXYGEN SATURATION: 99 % | DIASTOLIC BLOOD PRESSURE: 78 MMHG | RESPIRATION RATE: 18 BRPM

## 2018-03-22 VITALS
RESPIRATION RATE: 18 BRPM | HEART RATE: 71 BPM | OXYGEN SATURATION: 97 % | TEMPERATURE: 97.1 F | DIASTOLIC BLOOD PRESSURE: 79 MMHG | WEIGHT: 159 LBS | BODY MASS INDEX: 27.14 KG/M2 | HEIGHT: 64 IN | SYSTOLIC BLOOD PRESSURE: 152 MMHG

## 2018-03-22 LAB
ANION GAP SERPL CALC-SCNC: 6 MMOL/L (ref 3–18)
BASOPHILS # BLD: 0 K/UL (ref 0–0.1)
BASOPHILS NFR BLD: 0 % (ref 0–2)
BUN SERPL-MCNC: 21 MG/DL (ref 7–18)
BUN/CREAT SERPL: 25 (ref 12–20)
CALCIUM SERPL-MCNC: 8.1 MG/DL (ref 8.5–10.1)
CHLORIDE SERPL-SCNC: 100 MMOL/L (ref 100–108)
CO2 SERPL-SCNC: 26 MMOL/L (ref 21–32)
CREAT SERPL-MCNC: 0.85 MG/DL (ref 0.6–1.3)
DIFFERENTIAL METHOD BLD: ABNORMAL
EOSINOPHIL # BLD: 0 K/UL (ref 0–0.4)
EOSINOPHIL NFR BLD: 0 % (ref 0–5)
ERYTHROCYTE [DISTWIDTH] IN BLOOD BY AUTOMATED COUNT: 13.2 % (ref 11.6–14.5)
GLUCOSE SERPL-MCNC: 152 MG/DL (ref 74–99)
HCT VFR BLD AUTO: 31.7 % (ref 35–45)
HGB BLD-MCNC: 10.5 G/DL (ref 12–16)
LYMPHOCYTES # BLD: 0.6 K/UL (ref 0.9–3.6)
LYMPHOCYTES NFR BLD: 8 % (ref 21–52)
MCH RBC QN AUTO: 30.6 PG (ref 24–34)
MCHC RBC AUTO-ENTMCNC: 33.1 G/DL (ref 31–37)
MCV RBC AUTO: 92.4 FL (ref 74–97)
MONOCYTES # BLD: 0.2 K/UL (ref 0.05–1.2)
MONOCYTES NFR BLD: 3 % (ref 3–10)
NEUTS SEG # BLD: 7.3 K/UL (ref 1.8–8)
NEUTS SEG NFR BLD: 89 % (ref 40–73)
PLATELET # BLD AUTO: 328 K/UL (ref 135–420)
PMV BLD AUTO: 10.1 FL (ref 9.2–11.8)
POTASSIUM SERPL-SCNC: 4.3 MMOL/L (ref 3.5–5.5)
RBC # BLD AUTO: 3.43 M/UL (ref 4.2–5.3)
SODIUM SERPL-SCNC: 132 MMOL/L (ref 136–145)
WBC # BLD AUTO: 8.1 K/UL (ref 4.6–13.2)

## 2018-03-22 PROCEDURE — 3331090001 HH PPS REVENUE CREDIT

## 2018-03-22 PROCEDURE — 97116 GAIT TRAINING THERAPY: CPT

## 2018-03-22 PROCEDURE — 74011250636 HC RX REV CODE- 250/636: Performed by: INTERNAL MEDICINE

## 2018-03-22 PROCEDURE — 74011250637 HC RX REV CODE- 250/637: Performed by: INTERNAL MEDICINE

## 2018-03-22 PROCEDURE — 85025 COMPLETE CBC W/AUTO DIFF WBC: CPT | Performed by: PHYSICIAN ASSISTANT

## 2018-03-22 PROCEDURE — 74011250637 HC RX REV CODE- 250/637: Performed by: HOSPITALIST

## 2018-03-22 PROCEDURE — 94640 AIRWAY INHALATION TREATMENT: CPT

## 2018-03-22 PROCEDURE — 74011636637 HC RX REV CODE- 636/637: Performed by: PHYSICIAN ASSISTANT

## 2018-03-22 PROCEDURE — 36415 COLL VENOUS BLD VENIPUNCTURE: CPT | Performed by: PHYSICIAN ASSISTANT

## 2018-03-22 PROCEDURE — 80048 BASIC METABOLIC PNL TOTAL CA: CPT | Performed by: PHYSICIAN ASSISTANT

## 2018-03-22 PROCEDURE — 74011250636 HC RX REV CODE- 250/636: Performed by: EMERGENCY MEDICINE

## 2018-03-22 PROCEDURE — 77010033678 HC OXYGEN DAILY

## 2018-03-22 PROCEDURE — 97165 OT EVAL LOW COMPLEX 30 MIN: CPT

## 2018-03-22 PROCEDURE — 74011000250 HC RX REV CODE- 250: Performed by: EMERGENCY MEDICINE

## 2018-03-22 PROCEDURE — 74011000250 HC RX REV CODE- 250: Performed by: INTERNAL MEDICINE

## 2018-03-22 PROCEDURE — 3331090002 HH PPS REVENUE DEBIT

## 2018-03-22 RX ORDER — PREDNISONE 10 MG/1
TABLET ORAL
Qty: 26 TAB | Refills: 0 | Status: SHIPPED | OUTPATIENT
Start: 2018-03-22 | End: 2018-06-28

## 2018-03-22 RX ORDER — ARFORMOTEROL TARTRATE 15 UG/2ML
15 SOLUTION RESPIRATORY (INHALATION) 2 TIMES DAILY
Qty: 100 ML | Refills: 0 | Status: SHIPPED | OUTPATIENT
Start: 2018-03-22 | End: 2018-03-23 | Stop reason: SDUPTHER

## 2018-03-22 RX ORDER — AMOXICILLIN AND CLAVULANATE POTASSIUM 875; 125 MG/1; MG/1
1 TABLET, FILM COATED ORAL EVERY 12 HOURS
Qty: 14 TAB | Refills: 0 | Status: SHIPPED | OUTPATIENT
Start: 2018-03-22 | End: 2018-04-01 | Stop reason: ALTCHOICE

## 2018-03-22 RX ORDER — BUDESONIDE 1 MG/2ML
1000 INHALANT ORAL 2 TIMES DAILY
Qty: 100 ML | Refills: 0 | Status: SHIPPED | OUTPATIENT
Start: 2018-03-22 | End: 2018-03-23 | Stop reason: SDUPTHER

## 2018-03-22 RX ORDER — NEBULIZER AND COMPRESSOR
1 EACH MISCELLANEOUS 2 TIMES DAILY
Qty: 1 EACH | Refills: 0 | Status: SHIPPED | OUTPATIENT
Start: 2018-03-22

## 2018-03-22 RX ORDER — DOXYCYCLINE 100 MG/1
100 CAPSULE ORAL 2 TIMES DAILY
Qty: 14 CAP | Refills: 0 | Status: SHIPPED | OUTPATIENT
Start: 2018-03-22 | End: 2018-03-29

## 2018-03-22 RX ORDER — FAMOTIDINE 20 MG/1
20 TABLET, FILM COATED ORAL 2 TIMES DAILY
Qty: 20 TAB | Refills: 0 | Status: SHIPPED | OUTPATIENT
Start: 2018-03-22 | End: 2019-10-28 | Stop reason: ALTCHOICE

## 2018-03-22 RX ORDER — ALBUTEROL SULFATE 1.25 MG/3ML
1.25 SOLUTION RESPIRATORY (INHALATION)
Qty: 100 ML | Refills: 0 | Status: SHIPPED | OUTPATIENT
Start: 2018-03-22

## 2018-03-22 RX ORDER — NYSTATIN 100000 [USP'U]/ML
500000 SUSPENSION ORAL 4 TIMES DAILY
Qty: 200 ML | Refills: 0 | Status: SHIPPED | OUTPATIENT
Start: 2018-03-22 | End: 2019-10-28 | Stop reason: ALTCHOICE

## 2018-03-22 RX ADMIN — METRONIDAZOLE 500 MG: 500 INJECTION, SOLUTION INTRAVENOUS at 03:00

## 2018-03-22 RX ADMIN — Medication 2 G: at 09:02

## 2018-03-22 RX ADMIN — GUAIFENESIN AND DEXTROMETHORPHAN HYDROBROMIDE 1 TABLET: 600; 30 TABLET, EXTENDED RELEASE ORAL at 09:37

## 2018-03-22 RX ADMIN — BUDESONIDE 1000 MCG: 1 SUSPENSION RESPIRATORY (INHALATION) at 08:38

## 2018-03-22 RX ADMIN — NYSTATIN 500000 UNITS: 100000 SUSPENSION ORAL at 09:37

## 2018-03-22 RX ADMIN — LEVOTHYROXINE SODIUM 112 MCG: 112 TABLET ORAL at 09:37

## 2018-03-22 RX ADMIN — OXCARBAZEPINE 300 MG: 300 TABLET ORAL at 09:37

## 2018-03-22 RX ADMIN — PREDNISONE 40 MG: 20 TABLET ORAL at 09:37

## 2018-03-22 RX ADMIN — FLUDROCORTISONE ACETATE 100 MCG: 0.1 TABLET ORAL at 09:37

## 2018-03-22 RX ADMIN — POTASSIUM CHLORIDE 20 MEQ: 20 TABLET, EXTENDED RELEASE ORAL at 09:37

## 2018-03-22 RX ADMIN — ASPIRIN 81 MG 81 MG: 81 TABLET ORAL at 09:37

## 2018-03-22 RX ADMIN — Medication 400 MG: at 09:37

## 2018-03-22 RX ADMIN — HEPARIN SODIUM 5000 UNITS: 5000 INJECTION, SOLUTION INTRAVENOUS; SUBCUTANEOUS at 09:38

## 2018-03-22 RX ADMIN — ARFORMOTEROL TARTRATE 15 MCG: 15 SOLUTION RESPIRATORY (INHALATION) at 08:38

## 2018-03-22 RX ADMIN — PAROXETINE 30 MG: 30 TABLET, FILM COATED ORAL at 09:37

## 2018-03-22 NOTE — INTERDISCIPLINARY ROUNDS
Interdisciplinary Round Note   Patient Information:   Raul Sheth   [birth year]   Reason for Admission: Pneumonia  Right lower lobe pneumonia [facility])   Attending Provider:   Deni Burgos MD  Primary Care Physician:       Rasheeda Neil MD       [phone]   Estimated discharge date:  3/23/18   Hospital day: 3  [unfilled]  3d 9h  RRAT Score: High Risk            22       Total Score        3 Has Seen PCP in Last 6 Months (Yes=3, No=0)    4 IP Visits Last 12 Months (1-3=4, 4=9, >4=11)    5 Pt. Coverage (Medicare=5 , Medicaid, or Self-Pay=4)    10 Charlson Comorbidity Score (Age + Comorbid Conditions)        Criteria that do not apply:    . Living with Significant Other. Assisted Living. LTAC. SNF. or   Rehab    Patient Length of Stay (>5 days = 3)       normal sinus rhythm     No  No       Mechanical      Lines, Drains, & Airways  none       IV Antibiotics:    Current Antimicrobial Therapy (168h ago through future)    Ordered     Start Stop    03/20/18 1220  metroNIDAZOLE (FLAGYL) IVPB premix 500 mg  500 mg,   IntraVENous,   EVERY 12 HOURS      03/20/18 1400 03/27/18 1359    03/20/18 1215  nystatin (MYCOSTATIN) 100,000 unit/mL oral suspension 500,000 Units  500,000 Units,   Oral,   4 TIMES DAILY      03/20/18 1300 --    03/18/18 2149  vancomycin (VANCOCIN) 1,000 mg in 0.9% sodium chloride (MBP/ADV) 250 mL adv  1,000 mg,   IntraVENous,   EVERY 24 HOURS      03/19/18 2200 --    03/18/18 2101  cefepime (MAXIPIME) 2 g in sterile water (preservative free) 10 mL IV syringe  2 g,   IntraVENous,   EVERY 12 HOURS      03/18/18 2102 --        GI Prophylaxis: GI Prophylaxis: no   Type:       Recent Glucose Results:   Lab Results   Component Value Date/Time     (H) 03/21/2018 03:04 AM      Activity Level: Activity Level:  Other (comment) (Move With Me)    Needs assistance with ADLs: no       Goals for Today: Sputum culture, Urine samlpe   Recommendations:   Discharge Disposition: Home with home health PT and Outpatient PT  P.T and O.T.   Follow up phone call to assess:   medication knowledge and compliance and follow up physician appointments    Needs for Discharge: PT/OT IDR Team:   Recommendations from IDR team:     Other Notes:

## 2018-03-22 NOTE — DISCHARGE INSTRUCTIONS
Asthma Attack: Care Instructions  Your Care Instructions    During an asthma attack, the airways swell and narrow. This makes it hard to breathe. Severe asthma attacks can be life-threatening, but you can help prevent them by keeping your asthma under control and treating symptoms before they get bad. Symptoms include being short of breath, having chest tightness, coughing, and wheezing. Noting and treating these symptoms can also help you avoid future trips to the emergency room. The doctor has checked you carefully, but problems can develop later. If you notice any problems or new symptoms, get medical treatment right away. Follow-up care is a key part of your treatment and safety. Be sure to make and go to all appointments, and call your doctor if you are having problems. It's also a good idea to know your test results and keep a list of the medicines you take. How can you care for yourself at home? · Follow your asthma action plan to prevent and treat attacks. If you don't have an asthma action plan, work with your doctor to create one. · Take your asthma medicines exactly as prescribed. Talk to your doctor right away if you have any questions about how to take them. ¨ Use your quick-relief medicine when you have symptoms of an attack. Quick-relief medicine is usually an albuterol inhaler. Some people need to use quick-relief medicine before they exercise. ¨ Take your controller medicine every day, not just when you have symptoms. Controller medicine is usually an inhaled corticosteroid. The goal is to prevent problems before they occur. Don't use your controller medicine to treat an attack that has already started. It doesn't work fast enough to help. ¨ If your doctor prescribed corticosteroid pills to use during an attack, take them exactly as prescribed. It may take hours for the pills to work, but they may make the episode shorter and help you breathe better.   ¨ Keep your quick-relief medicine with you at all times. · Talk to your doctor before using other medicines. Some medicines, such as aspirin, can cause asthma attacks in some people. · If you have a peak flow meter, use it to check how well you are breathing. This can help you predict when an asthma attack is going to occur. Then you can take medicine to prevent the asthma attack or make it less severe. · Do not smoke or allow others to smoke around you. Avoid smoky places. Smoking makes asthma worse. If you need help quitting, talk to your doctor about stop-smoking programs and medicines. These can increase your chances of quitting for good. · Learn what triggers an asthma attack for you, and avoid the triggers when you can. Common triggers include colds, smoke, air pollution, dust, pollen, mold, pets, cockroaches, stress, and cold air. · Avoid colds and the flu. Get a pneumococcal vaccine shot. If you have had one before, ask your doctor if you need a second dose. Get a flu vaccine every fall. If you must be around people with colds or the flu, wash your hands often. When should you call for help? Call 911 anytime you think you may need emergency care. For example, call if:  ? · You have severe trouble breathing. ?Call your doctor now or seek immediate medical care if:  ? · Your symptoms do not get better after you have followed your asthma action plan. ? · You have new or worse trouble breathing. ? · Your coughing and wheezing get worse. ? · You cough up dark brown or bloody mucus (sputum). ? · You have a new or higher fever. ? Watch closely for changes in your health, and be sure to contact your doctor if:  ? · You need to use quick-relief medicine on more than 2 days a week (unless it is just for exercise). ? · You cough more deeply or more often, especially if you notice more mucus or a change in the color of your mucus. ? · You are not getting better as expected. Where can you learn more?   Go to http://casey-sameer.info/. Enter Q872 in the search box to learn more about \"Asthma Attack: Care Instructions. \"  Current as of: May 12, 2017  Content Version: 11.4  © 6496-2548 [employer]. Care instructions adapted under license by [employer] (which disclaims liability or warranty for this information). If you have questions about a medical condition or this instruction, always ask your healthcare professional. [address] any warranty or liability for your use of this information. Learning About Asthma Triggers  What are asthma triggers? When you have asthma, certain things can make your symptoms worse. These are called triggers. Learn what triggers an asthma attack for you, and avoid the triggers when you can. Common triggers include colds, smoke, air pollution, dust, pollen, pets, stress, and cold air. How do asthma triggers affect you? Triggers can make it harder for your lungs to work as they should. They can lead to sudden breathing problems and other symptoms. When you are around a trigger, an asthma attack is more likely. If your symptoms are severe, you may need emergency treatment or have to go to the hospital for treatment. What can you do to avoid triggers? The first thing is to know your triggers. When you are having symptoms, note the things around you that might be causing them. Then look for patterns that may be triggering your symptoms. Record your triggers on a piece of paper or in an asthma diary. When you have your list of possible triggers, work with your doctor to find ways to avoid them. Avoid colds and flu. Get a pneumococcal vaccine shot. If you have had one before, ask your doctor whether you need a second dose. Get a flu vaccine every year, as soon as it's available. If you must be around people with colds or the flu, wash your hands often. Here are some ways to avoid a few common triggers.   · Do not smoke or allow others to smoke around you. If you need help quitting, talk to your doctor about stop-smoking programs and medicines. These can increase your chances of quitting for good. · If there is a lot of pollution, pollen, or dust outside, stay at home and keep your windows closed. Use an air conditioner or air filter in your home. Check your local weather report or newspaper for air quality and pollen reports. What else should you know? · Take your controller medicine every day, not just when you have symptoms. It helps prevent problems before they occur. · Your doctor may suggest that you check how well your lungs are working by measuring your peak expiratory flow (PEF) throughout the day. Your PEF may drop when you are near things that trigger symptoms. Where can you learn more? Go to http://casey-sameer.info/. Enter J816 in the search box to learn more about \"Learning About Asthma Triggers. \"  Current as of: May 12, 2017  Content Version: 11.4  © 3066-1578 [employer]. Care instructions adapted under license by [employer] (which disclaims liability or warranty for this information). If you have questions about a medical condition or this instruction, always ask your healthcare professional. [place] [address] any warranty or liability for your use of this information.

## 2018-03-22 NOTE — PROGRESS NOTES
I have assumed care of MsDave Jian. She was assessed after bedside report.  [facility] is currently resting in bed,

## 2018-03-22 NOTE — PROGRESS NOTES
Problem: Falls - Risk of  Goal: *Absence of Falls  Document Janett Fall Risk and appropriate interventions in the flowsheet.    Fall Risk Interventions:  Mobility Interventions: Bed/chair exit alarm    Mentation Interventions: Bed/chair exit alarm    Medication Interventions: Bed/chair exit alarm    Elimination Interventions: Bed/chair exit alarm, Call light in reach

## 2018-03-22 NOTE — ROUTINE PROCESS
Bedside and Verbal shift change report given to LINDA Brown (oncoming nurse) by Miguel Durham RN (offgoing nurse). Report included the following information SBAR, Kardex, MAR and Recent Results. 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 No WMA. Mild conc LVH. Gr 1 DDfx. RVSP 20-25 mmHg. Mild SAGAR. Mild TR,. Mild PAE.  Cardiovascular LE venous duplex 10/08/2012    No venous thrombosis or venous insufficiency in bilateral lower extremities.  Carotid duplex 10/09/2014    Mild < 50% bilateral ICA stenosis.       Chronic lung disease     Contact dermatitis and other eczema, due to unspecified cause     COPD (chronic obstructive pulmonary disease) (HCC)     Degenerative joint disease     Depression     Hematuria     Hypercholesteremia     Hypertension     Hypothyroidism 1998    Kidney stone     Neuropathy 3/2007    Orthostatic hypotension     Pituitary adenoma ([facility] 2/1998    Seizures ([facility]     none recently    Severe obstructive sleep apnea 01-15-15     started using Bipap Machine    Stress     Stroke ([facility] 2/2006    no residual    TIA (transient ischemic attack)     Trauma          Patient taking anticoagulants yes     ASSESSMENT:    Changes in Assessment Throughout Shift: none     Patient has Central Line: no Reasons if yes: n/a   Patient has Patiño Cath: no Reasons if yes: n/a      Last Vitals:     Vitals:    03/21/18 0848 03/21/18 1115 03/21/18 1210 03/21/18 1809   BP: 134/70  117/58 131/66   Pulse: 62  82 64   Resp: 16 20 18   Temp: 97.1 °F (36.2 °C)  97.9 °F (36.6 °C) 98.1 °F (36.7 °C)   SpO2: 90% 95% 91% 98%   Weight:       Height:            IV and DRAINS (will only show if present)   Peripheral IV 03/18/18 Left Antecubital-Site Assessment: Clean, dry, & intact  Peripheral IV 03/18/18 Left Antecubital-Site Assessment: Clean, dry, & intact     WOUND (if present)   Wound Type:  none   Dressing present Dressing Present : No   Wound Concerns/Notes:  none     PAIN    Pain Assessment    Pain Intensity 1: 0 (03/21/18 1922)              Patient Stated Pain Goal: 0  o Interventions for Pain:  None given  o Intervention effective: no c/o pain  o Time of last intervention: n/a   o Reassessment Completed: yes      Last 3 Weights:  Last 3 Recorded Weights in this Encounter    03/18/18 1943   Weight: 72.1 kg (159 lb)     Weight change:      INTAKE/OUPUT    Current Shift:      Last three shifts: 03/20 0701 - 03/21 1900  In: 600 [P.O.:600]  Out: 650 [Urine:650]     LAB RESULTS     Recent Labs      03/21/18   0304  03/20/18   0444   WBC  6.7  11.5   HGB  11.4*  11.9*   HCT  33.8*  36.4   PLT  305  317        Recent Labs      03/21/18   0304  03/20/18   0444   NA  129*  133*   K  4.3  4.0   GLU  154*  73*   BUN  19*  21*   CREA  0.86  1.02   CA  8.8  8.9   MG  2.5  2.3       RECOMMENDATIONS AND DISCHARGE PLANNING     1. Pending tests/procedures/ Plan of Care or Other Needs: respiratory culture     2.  Discharge plan for patient and Needs/Barriers: home with home health    3. Estimated Discharge Date: 3/22/18 Posted on Whiteboard in [facility] Room: yes      4. The patient's care plan was reviewed with the oncoming nurse. \"HEALS\" SAFETY CHECK      Fall Risk    Total Score: 4    Safety Measures: Safety Measures: Bed/Chair-Wheels locked    A safety check occurred in the patient's room between off going nurse and oncoming nurse listed above. The safety check included the below items  Area Items   H  High Alert Medications - Verify all high alert medication drips (heparin, PCA, etc.)   E  Equipment - Suction is set up for ALL patients (with shashank)  - Red plugs utilized for all equipment (IV pumps, etc.)  - WOWs wiped down at end of shift.  - Room stocked with oxygen, suction, and other unit-specific supplies   A  Alarms - Bed alarm is set for fall risk patients  - Ensure chair alarm is in place and activated if patient is up in a chair   L  Lines - Check IV for any infiltration  - Patiño bag is empty if patient has a Patiño   - Tubing and IV bags are labeled   S  Safety   - Room is clean, patient is clean, and equipment is clean. - Hallways are clear from equipment besides carts. - Fall bracelet on for fall risk patients  - Ensure room is clear and free of clutter  - Suction is set up for ALL patients (with shashank)  - Hallways are clear from equipment besides carts.    - Isolation precautions followed, supplies available outside room, sign posted     Bev Mills RN

## 2018-03-22 NOTE — PROGRESS NOTES
Problem: Self Care Deficits Care Plan (Adult)  Goal: *Acute Goals and Plan of Care (Insert Text)  Outcome: Resolved/Met Date Met: 03/22/18  Occupational Therapy EVALUATION/discharge    Patient: Sandra Brewster (89 y.o. female)  Date: 3/22/2018  Primary Diagnosis: Pneumonia  Right lower lobe pneumonia ([facility]        Precautions:   Fall, Aspiration    ASSESSMENT AND RECOMMENDATIONS:  Based on the objective data described below, the patient was sitting in the chair upon arrival. [facility] and decreased but functional strength. Patient was able to perform functional mobility/simulated toilet transfer with SBA/supervision with HHA. Patient simulated LE self care tasks sitting in chair with supervision. Patient educated on energy conservation strategies and breathing out on exertion when performing LE self care tasks. Patient left comfortable in chair. Skilled acute care occupational therapy is not indicated at this time. Discharge Recommendations: Home Health with supervision  Further Equipment Recommendations for Discharge: N/A, patient has all necessary equipment      Barriers to Learning/Limitations: none    COMPLEXITY     Eval Complexity: History: LOW Complexity : Brief history review ; Examination: LOW Complexity : 1-3 performance deficits relating to physical, cognitive , or psychosocial skils that result in activity limitations and / or participation restrictions ; Decision Making:LOW Complexity : No comorbidities that affect functional and no verbal or physical assistance needed to complete eval tasks  Assessment: Low Complexity      G-CODES:     Self Care  Current  CI= 1-19%   Goal  CI= 1-19%   D/C  CI= 1-19%. The severity rating is based on the Level of Assistance required for Functional Mobility and ADLs. SUBJECTIVE:   Patient stated Oh I have never thought of it that way. (energy conservation techniques)    OBJECTIVE DATA SUMMARY:     Past Medical History:   Diagnosis Date  Abuse 1998    Alcohol    Anxiety     Calculus of kidney 1988    Cardiac echocardiogram 07/31/2012    EF 60-65%. No WMA. Mild conc LVH. Gr 1 DDfx. RVSP 20-25 mmHg. Mild SAGAR. Mild TR,. Mild PAE.  Cardiovascular LE venous duplex 10/08/2012    No venous thrombosis or venous insufficiency in bilateral lower extremities.  Carotid duplex 10/09/2014    Mild < 50% bilateral ICA stenosis.  Chronic lung disease     Contact dermatitis and other eczema, due to unspecified cause     COPD (chronic obstructive pulmonary disease) (HCC)     Degenerative joint disease     Depression     Hematuria     Hypercholesteremia     Hypertension     Hypothyroidism 1998    Kidney stone     Neuropathy 3/2007    Orthostatic hypotension     Pituitary adenoma ([facility] 2/1998    Seizures ([facility]     none recently    Severe obstructive sleep apnea 01-15-15     started using Bipap Machine    Stress     Stroke ([facility] 2/2006    no residual    TIA (transient ischemic attack)     Trauma      Past Surgical History:   Procedure Laterality Date    CYSTOSCOPY,INSERT URETERAL STENT  9/14/15    Dr. Conner Edwards HX LITHOTRIPSY  9/14/15    Dr. Carey Sports EXTRACTION      x4     Prior Level of Function/Home Situation:Patient reported she was modified independent in basic self care tasks and functional mobility PTA.   Home Situation  Home Environment: Private residence  # Steps to Enter: 0 (ramp)  Wheelchair Ramp: Yes  One/Two Story Residence: One story  Living Alone: No  Support Systems: Child(jessica)  Patient Expects to be Discharged to[de-identified] Private residence  Current DME Used/Available at Home: [employer], straight, Commode, bedside, Shower chair, Walker, rolling, Walker, rollator, Oxygen, portable  Tub or Shower Type: Shower  [x]     Right hand dominant   []     Left hand dominant  Cognitive/Behavioral Status:  Neurologic State: Alert  Orientation Level: Oriented X4  Cognition: Follows commands    Skin: No skin changes noted    Edema: No edema noted    Vision/Perceptual:       Acuity: Within Defined Limits      Coordination:  Coordination: Within functional limits (BUEs)       Balance:  Sitting: Intact  Standing: Impaired; With support  Standing - Static: Good  Standing - Dynamic : Fair    Strength:  Strength: Generally decreased, functional (BUEs)    Tone & Sensation:  Tone: Normal (BUEs)  Sensation: Intact (BUEs)     Range of Motion:  AROM: Within functional limits (BUEs)     Functional Mobility and Transfers for ADLs:  Bed Mobility:    Patient in chair upon arrival  Scooting: Supervision  Transfers:  Sit to Stand: Supervision    Toilet Transfer : Stand-by assistance (with HHA)      ADL Assessment:(clinical judgement)  Feeding: Independent    Oral Facial Hygiene/Grooming: Supervision;Stand-by assistance    Bathing: Supervision;Stand-by assistance    Upper Body Dressing: Supervision    Lower Body Dressing: Supervision;Stand-by assistance    Toileting: Supervision      Pain:  Pt reports 0/10 pain or discomfort prior to treatment.    Pt reports 0/10 pain or discomfort post treatment. Activity Tolerance:   Fair    Please refer to the flowsheet for vital signs taken during this treatment. After treatment:   [x]  Patient left in no apparent distress sitting up in chair  []  Patient left in no apparent distress in bed  [x]  Call bell left within reach  [x]  Nursing notified  []  Caregiver present  []  Bed alarm activated    COMMUNICATION/EDUCATION:   Communication/Collaboration:  []      Home safety education was provided and the patient/caregiver indicated understanding. [x]      Patient/family have participated as able and agree with findings and recommendations. []      Patient is unable to participate in plan of care at this time.     Latanya Prater OTR/L  Time Calculation: 15 mins

## 2018-03-22 NOTE — PROGRESS NOTES
Care Management Interventions  Transition of Care Consult (CM Consult): [address]: Yes  Physical Therapy Consult: Yes  Occupational Therapy Consult: Yes  Speech Therapy Consult: Yes  Current Support Network: Relative's Home, Lives with Caregiver  Confirm Follow Up Transport: Family  Plan discussed with Pt/Family/Caregiver: Yes  Discharge Location  Discharge Placement: Home with home health     Pt is a 80year old female admitted for pneumonia, right lower lobe pneumonia. Pt is alert and oriented in room. Pt states that she resides in the home with her two daughters Jenni Sloan and Bria Dodd and a grandson. Pt indicates being independent with ADls and ambulation and reports that she uses a cane, walker and rollator to assist with ambulation as needed. Pt has access to 3 liters of home oxygen supplied by [employer]. Pt is active with [facility] and will need order to resume home health care. Pt shares that family will transport her home. At discharge, pt will be supplied with portable oxygen for transportation. Home nebulizer has been ordered and [employer] have received information. 2:20pm  Pt's daughters have arrived to transport pt home. They requested to be trained on setting the portable oxygen. CM contacted [employer] rep to request assistance. Pt's daughter will purchase nebulizer for home use. [facility] rep informed.

## 2018-03-22 NOTE — DISCHARGE SUMMARY
[facility]  Discharge Summary       Patient: Aga Serrano Age: 80 y.o. : 1931 MR#: [id number] SSN: [id number]  PCP on record: Neville Rodríguez MD  Admit date: 3/18/2018  Discharge date: 3/22/2018    Disposition:    []Home   [x]Home with Home Health   []SNF/NH   []Rehab   []Home with family   []Alternate Facility:____________________    Discharge Diagnoses:                             1. Acute bronchitis with possible HCAP   2. Hyponatremia  3. COPD w/ AE   4. Sepsis poa (leukocytosis, hypoxia)  6. panhypopit   7. Dyslipidemia   8. Seizure d/o   9. H/o Tobacco abuse   10. Metabolic alkalosis  11. ckd 3  12. 2nd hand smoke exposure   13. Chronic hypoxic respiratory failure on home O2   14. Thrush      Discharge Medications:     Current Discharge Medication List      START taking these medications    Details   albuterol (ACCUNEB) 1.25 mg/3 mL nebu 3 mL by Nebulization route every four (4) hours as needed. Qty: 100 mL, Refills: 0      arformoterol (BROVANA) 15 mcg/2 mL nebu neb solution 2 mL by Nebulization route two (2) times a day. Qty: 100 mL, Refills: 0      budesonide (PULMICORT) 1 mg/2 mL nbsp 2 mL by Nebulization route two (2) times a day. Qty: 100 mL, Refills: 0      guaiFENesin-dextromethorphan SR (HUMIBID DM) 600-30 mg per tablet Take 1 Tab by mouth every twelve (12) hours as needed for Cough. Qty: 15 Tab, Refills: 0      nystatin (MYCOSTATIN) 100,000 unit/mL suspension Take 5 mL by mouth four (4) times daily. swish and spit  Qty: 200 mL, Refills: 0      predniSONE (DELTASONE) 10 mg tablet Take 4 tabs for 2 days, then 3 tabs for 3 days, then 2 tabs for 3 days, then 1 tab for 3 days. Qty: 26 Tab, Refills: 0      amoxicillin-clavulanate (AUGMENTIN) 875-125 mg per tablet Take 1 Tab by mouth every twelve (12) hours. Qty: 14 Tab, Refills: 0      doxycycline (MONODOX) 100 mg capsule Take 1 Cap by mouth two (2) times a day for 7 days.   Qty: 14 Cap, Refills: 0 L. acidophilus,casei,rhamnosus (BIO-K PLUS) 50 billion cell cpDR Take 1 tab by mouth daily  Qty: 7 Cap, Refills: 0      famotidine (PEPCID) 20 mg tablet Take 1 Tab by mouth two (2) times a day. Qty: 20 Tab, Refills: 0      Nebulizer & Compressor (PORTABLE NEBULIZER SYSTEM) machine 1 Each by Does Not Apply route two (2) times a day. Qty: 1 Each, Refills: 0         CONTINUE these medications which have NOT CHANGED    Details   cholecalciferol, vitamin D3, 4,000 unit cap Take 1 Cap by mouth daily. coenzyme Q-10 (CO Q-10) 200 mg capsule Take  by mouth daily. !! omega 3-DHA-EPA-fish oil (FISH OIL) 1,000 mg (120 mg-180 mg) capsule Take 4 Caps by mouth daily. OXYGEN-AIR DELIVERY SYSTEMS 3 L/min by Does Not Apply route continuous. lisinopril (PRINIVIL, ZESTRIL) 20 mg tablet TAKE ONE TABLET BY MOUTH EVERY DAY  Qty: 30 Tab, Refills: 5    Associated Diagnoses: Essential hypertension, benign      PARoxetine (PAXIL) 30 mg tablet TAKE ONE TABLET BY MOUTH EVERY DAY  Qty: 30 Tab, Refills: 5    Associated Diagnoses: Anxiety state; Depression, unspecified depression type      atorvastatin (LIPITOR) 10 mg tablet TAKE ONE TABLET BY MOUTH EVERY DAY  Qty: 90 Tab, Refills: 3    Associated Diagnoses: Mixed hyperlipidemia      potassium chloride (K-DUR, KLOR-CON) 20 mEq tablet TAKE ONE TABLET BY MOUTH 2 TIMES A DAY  Qty: 180 Tab, Refills: 3    Associated Diagnoses: Essential hypertension, benign; Hypokalemia      amLODIPine (NORVASC) 10 mg tablet TAKE ONE TABLET BY MOUTH EVERY DAY  Qty: 90 Tab, Refills: 3    Associated Diagnoses: Essential hypertension, benign      nystatin (MYCOSTATIN) powder Apply  to affected area four (4) times daily. Qty: 1 Bottle, Refills: 5    Associated Diagnoses: Tinea corporis      albuterol (PROAIR HFA) 90 mcg/actuation inhaler Take 2 Puffs by inhalation every four (4) hours as needed for Wheezing or Shortness of Breath.   Qty: 1 Inhaler, Refills: 5    Associated Diagnoses: Chronic obstructive pulmonary disease with acute exacerbation (HCC)      fluticasone (FLONASE) 50 mcg/actuation nasal spray 2 Sprays by Both Nostrils route daily. SYNTHROID 112 mcg tablet Take 112 mcg by mouth Daily (before breakfast). ALPRAZolam (XANAX) 0.5 mg tablet Take 0.5 mg by mouth nightly as needed for Anxiety or Sleep.      furosemide (LASIX) 20 mg tablet Take 1 Tab by mouth daily as needed for Other (Leg Swelling). Qty: 90 Tab, Refills: 3    Associated Diagnoses: Bilateral lower extremity edema      hydrocortisone (CORTEF) 10 mg tablet Take 10 mg by mouth daily. VOLTAREN 1 % topical gel Apply 2 g to affected area nightly as needed (pain). Walker (BARNEY ROLLING WALKER) misc 1 Device by Does Not Apply route daily. Qty: 1 Each, Refills: 0      bipap machine kit 1 each by Does Not Apply route. Started using BiPap Machine 01-15-15      L-Methylfolate-Q23-Wezsfkttve (CEREFOLIN NAC) tablet Take 1 tablet by mouth daily. Qty: 30 tablet, Refills: 5      fludrocortisone (FLORINEF) 0.1 mg tablet Take 1 tablet by mouth daily. Qty: 30 tablet, Refills: 5      oxcarbazepine (TRILEPTAL) 300 mg tablet Take 300 mg by mouth two (2) times a day. magnesium oxide (MAG-OX) 400 mg tablet Take 400 mg by mouth daily. aspirin 81 mg tablet Take 81 mg by mouth daily. !! DOCOSAHEXANOIC ACID/EPA (FISH OIL PO) Take 4,000 mg by mouth daily. CALCIUM CITRATE/VITAMIN D3 (CALCIUM CITRATE + PO) Take  by mouth once over twenty-four (24) hours. !! - Potential duplicate medications found. Please discuss with provider.       STOP taking these medications       methylPREDNISolone (MEDROL, NIRAJ,) 4 mg tablet Comments:   Reason for Stopping:         metoprolol tartrate (LOPRESSOR) 50 mg tablet Comments:   Reason for Stopping:         hydroCHLOROthiazide (MICROZIDE) 12.5 mg capsule Comments:   Reason for Stopping:         umeclidinium-vilanterol (ANORO ELLIPTA) 62.5-25 mcg/actuation inhaler Comments:   Reason for Stopping:         Cholecalciferol, Vitamin D3, 5,000 unit Tab Comments:   Reason for Stopping:         COQ10, LIPOSOMAL UBIQUINOL, PO Comments:   Reason for Stopping:               Consults:  Pulmonary  -   Procedures: none  -     Significant Diagnostic Studies:   CT Of The Head Without Contrast  IMPRESSION:     No acute intracranial abnormality. No acute infarct, acute hemorrhage, or mass. No significant change compared with 3/9/2018 head CT.     Chronic microvascular ischemic changes including moderate periventricular white  matter lucencies and multifocal chronic deep brain lacunar infarcts appear  stable. Modified barium swallow  IMPRESSION:     Normal modified barium swallow    Portable Chest   IMPRESSION:     Hypoinflation with mild bibasal opacities with differential diagnosis of  infiltrates vs. subsegmental atelectasis    Hospital Course by Problem   1. Acute bronchitis with possible HCAP - Pt was admitted with AMS, Lethargy and SOB. CXR was done and Bibasilar infiltrate Vs atelectasis. Blood cx (3/18) ngtd. Sputum cx was not obtained because pt did not provide specimen. Managed with IV abx. SLP was consulted for concern for aspiration and barium swallow completed and was normal. RT consulted for bronchial hygiene protocol. Pt improved while inpatient, no complaints of SOB or lethargy before discharge. 2. Hyponatremia: managed with NS IVF, and improved while inpt. Urine studies done and was wnl.   3. COPD w/ AE - Pulmonary was consulted, managed with solumedrol, abx, and bronchodilators. Prednisone was tapered at time of discharge. 4. Sepsis poa (leukocytosis, hypoxia) improving - source m/l pulmonary: managed with abx and improved before discharge. 5. 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 6. panhypopit on synthroid, florinef and cortef as outpatient. 7. Dyslipidemia: managed with statin. 8. Seizure d/o on home meds. Seizure precautions.    9. Tobacco abuse - quit earlier this year. 10. Metabolic alkalosis: improved  11. ckd 3 at baseline  12. 2nd hand smoke exposure - states her daughters smoke exposure. 13. Chronic hypoxic respiratory failure on home O2 continuous since last discharge on 3L. Ambulatory oxygen completed, pt current dosing needs no change. 15. Thrush: managed with nystatin swish and swallow for oral thrush. 15. dvt prophylaxis   16. Full code. Resides at home with 2 daughters. Home with HH. Today's examination of the patient revealed:     Subjective:   Pt reports feeling well today and eager to go home. States her cough has improved. Denies any SOB, chest pain, abd pain, N/V/D/C.    Objective:   VS:   Visit Vitals    /79 (BP 1 Location: Right arm, BP Patient Position: At rest)    Pulse 71    Temp 97.1 °F (36.2 °C)    Resp 18    Ht 5' 4\" (1.626 m)    Wt 72.1 kg (159 lb)    SpO2 97%    BMI 27.29 kg/m2      Tmax/24hrs: Temp (24hrs), Av.8 °F (36.6 °C), Min:97.1 °F (36.2 °C), Max:98.1 °F (36.7 °C)     Input/Output:   Intake/Output Summary (Last 24 hours) at 18 1219  Last data filed at 18 1825   Gross per 24 hour   Intake                0 ml   Output              450 ml   Net             -450 ml       General:  Awake, alert, NAD  Cardiovascular:  RRR  Pulmonary:  CTA  GI:  NT, normal BS  Extremities:  No edema or cyanosis      Labs:    Recent Results (from the past 24 hour(s))   URINALYSIS W/ RFLX MICROSCOPIC    Collection Time: 18  5:30 PM   Result Value Ref Range    Color YELLOW      Appearance CLEAR      Specific gravity 1.024 1.005 - 1.030      pH (UA) 6.0 5.0 - 8.0      Protein 30 (A) NEG mg/dL    Glucose 250 (A) NEG mg/dL    Ketone NEGATIVE  NEG mg/dL    Bilirubin NEGATIVE  NEG      Blood NEGATIVE  NEG      Urobilinogen 0.2 0.2 - 1.0 EU/dL    Nitrites NEGATIVE  NEG      Leukocyte Esterase NEGATIVE  NEG     OSMOLALITY, UR    Collection Time: 18  5:30 PM   Result Value Ref Range    Osmolality,urine 593 300 - 900 MOSM/kg H2O   SODIUM, UR, RANDOM    Collection Time: 03/21/18  5:30 PM   Result Value Ref Range    Sodium urine, random 24 20 - 110 MMOL/L   URINE MICROSCOPIC ONLY    Collection Time: 03/21/18  5:30 PM   Result Value Ref Range    WBC 0 to 4 0 - 4 /hpf    RBC NONE 0 - 5 /hpf    Epithelial cells FEW 0 - 5 /lpf    Bacteria NEGATIVE  NEG /hpf    Mucus FEW (A) NEG /lpf    Hyaline cast 0 to 2 0 - 2 /lpf   CBC WITH AUTOMATED DIFF    Collection Time: 03/22/18  3:15 AM   Result Value Ref Range    WBC 8.1 4.6 - 13.2 K/uL    RBC 3.43 (L) 4.20 - 5.30 M/uL    HGB 10.5 (L) 12.0 - 16.0 g/dL    HCT 31.7 (L) 35.0 - 45.0 %    MCV 92.4 74.0 - 97.0 FL    MCH 30.6 24.0 - 34.0 PG    MCHC 33.1 31.0 - 37.0 g/dL    RDW 13.2 11.6 - 14.5 %    PLATELET 571 949 - 662 K/uL    MPV 10.1 9.2 - 11.8 FL    NEUTROPHILS 89 (H) 40 - 73 %    LYMPHOCYTES 8 (L) 21 - 52 %    MONOCYTES 3 3 - 10 %    EOSINOPHILS 0 0 - 5 %    BASOPHILS 0 0 - 2 %    ABS. NEUTROPHILS 7.3 1.8 - 8.0 K/UL    ABS. LYMPHOCYTES 0.6 (L) 0.9 - 3.6 K/UL    ABS. MONOCYTES 0.2 0.05 - 1.2 K/UL    ABS. EOSINOPHILS 0.0 0.0 - 0.4 K/UL    ABS. BASOPHILS 0.0 0.0 - 0.1 K/UL    DF AUTOMATED     METABOLIC PANEL, BASIC    Collection Time: 03/22/18  3:15 AM   Result Value Ref Range    Sodium 132 (L) 136 - 145 mmol/L    Potassium 4.3 3.5 - 5.5 mmol/L    Chloride 100 100 - 108 mmol/L    CO2 26 21 - 32 mmol/L    Anion gap 6 3.0 - 18 mmol/L    Glucose 152 (H) 74 - 99 mg/dL    BUN 21 (H) 7.0 - 18 MG/DL    Creatinine 0.85 0.6 - 1.3 MG/DL    BUN/Creatinine ratio 25 (H) 12 - 20      GFR est AA >60 >60 ml/min/1.73m2    GFR est non-AA >60 >60 ml/min/1.73m2    Calcium 8.1 (L) 8.5 - 10.1 MG/DL     Additional Data Reviewed:     Condition:   Follow-up Appointments:   1. Your PCP: Chip Clifford MD, within 7-10days  2. Your Pulmonologist: Wiley Fulton MD in 1-2 weeks  3. Your Endocrinologist: India Brower in 2-3 weeks.      >30 minutes spent coordinating this discharge (review instructions/follow-up, prescriptions, preparing report for sign off)    Signed:  Amada Morse PA-C  3/22/2018  12:19 PM

## 2018-03-22 NOTE — HOME CARE
D/c noted for today [facility] will follow - ordered nebulizer for pt from [address] [place] [address] will explain options to pt - D Star MCKEON

## 2018-03-22 NOTE — ROUTINE PROCESS
Bedside and Verbal shift change report given to Lanny Price RN (oncoming nurse) by Judy Ramirez RN (offgoing nurse). Report included the following information SBAR, Kardex, MAR and Recent Results. SITUATION:    Code Status: Full Code  Reason for Admission: Pneumonia   Right lower lobe pneumonia [facility])    [facility] day: 4   Problem List:       Hospital Problems  Date Reviewed: 1/25/2018          Codes Class Noted POA    * (Principal)HCAP (healthcare-associated pneumonia) ICD-10-CM: J18.9  ICD-9-CM: 486  3/19/2018 Unknown        Right lower lobe pneumonia ([facility] ICD-10-CM: J18.1  ICD-9-CM: 486  3/18/2018 Unknown        Panhypopituitarism ([facility] ICD-10-CM: E23.0  ICD-9-CM: 253.2  3/10/2018 Yes        COPD (chronic obstructive pulmonary disease) ([facility] ICD-10-CM: J44.9  ICD-9-CM: 403  2/25/2013 Yes        Essential hypertension, benign ICD-10-CM: I10  ICD-9-CM: 401.1  10/2/2012 Yes        Tobacco abuse ICD-10-CM: Z72.0  ICD-9-CM: 305.1  10/2/2012 Yes              BACKGROUND:    Past Medical History:   Past Medical History:   Diagnosis Date    Abuse 1998    Alcohol    Anxiety     Calculus of kidney 1988    Cardiac echocardiogram 07/31/2012    EF 60-65%. No WMA. Mild conc LVH. Gr 1 DDfx. RVSP 20-25 mmHg. Mild SAGAR. Mild TR,. Mild PAE.  Cardiovascular LE venous duplex 10/08/2012    No venous thrombosis or venous insufficiency in bilateral lower extremities.  Carotid duplex 10/09/2014    Mild < 50% bilateral ICA stenosis.       Chronic lung disease     Contact dermatitis and other eczema, due to unspecified cause     COPD (chronic obstructive pulmonary disease) (HCC)     Degenerative joint disease     Depression     Hematuria     Hypercholesteremia     Hypertension     Hypothyroidism 1998    Kidney stone     Neuropathy 3/2007    Orthostatic hypotension     Pituitary adenoma ([facility] 2/1998    Seizures ([facility] [address]     none recently    Severe obstructive sleep apnea 01-15-15     started using Bipap Machine    Stress     Stroke ([facility] 2/2006    no residual    TIA (transient ischemic attack)     Trauma          Patient taking anticoagulants yes     ASSESSMENT:    Changes in Assessment Throughout Shift: none     Patient has Central Line: no Reasons if yes: n/a   Patient has Patiño Cath: no Reasons if yes: n/a      Last Vitals:     Vitals:    03/21/18 1809 03/21/18 2058 03/22/18 0028 03/22/18 0402   BP: 131/66  131/71 135/70   Pulse: 64  67 66   Resp: 18 18 18   Temp: 98.1 °F (36.7 °C)  97.7 °F (36.5 °C) 98.1 °F (36.7 °C)   SpO2: 98% 93% 95% 95%   Weight:       Height:            IV and DRAINS (will only show if present)   Peripheral IV 03/18/18 Left Antecubital-Site Assessment: Clean, dry, & intact  Peripheral IV 03/18/18 Left Antecubital-Site Assessment: Clean, dry, & intact     WOUND (if present)   Wound Type:  none   Dressing present Dressing Present : No   Wound Concerns/Notes:  none     PAIN    Pain Assessment    Pain Intensity 1: 0 (03/22/18 0459)              Patient Stated Pain Goal: 0  o Interventions for Pain:  None given  o Intervention effective: no c/o pain  o Time of last intervention: n/a   o Reassessment Completed: yes      Last 3 Weights:  Last 3 Recorded Weights in this Encounter    03/18/18 1943   Weight: 72.1 kg (159 lb)     Weight change:      INTAKE/OUPUT    Current Shift:      Last three shifts: 03/20 1901 - 03/22 0700  In: 240 [P.O.:240]  Out: 450 [Urine:450]     LAB RESULTS     Recent Labs      03/22/18 0315 03/21/18   0304  03/20/18   0444   WBC  8.1  6.7  11.5   HGB  10.5*  11.4*  11.9*   HCT  31.7*  33.8*  36.4   PLT  328  305  317        Recent Labs      03/22/18 0315 03/21/18   0304  03/20/18   0444   NA  132*  129*  133*   K  4.3  4.3  4.0   GLU  152*  154*  73*   BUN  21*  19*  21*   CREA  0.85  0.86  1.02   CA  8.1*  8.8  8.9   MG   --   2.5  2.3       RECOMMENDATIONS AND DISCHARGE PLANNING     1.  Pending tests/procedures/ Plan of Care or Other Needs: respiratory culture, urine culture    2. Discharge plan for patient and Needs/Barriers: home with home health    3. Estimated Discharge Date: 3/22/18 Posted on Whiteboard in [facility] Room: yes      4. The patient's care plan was reviewed with the oncoming nurse. \"HEALS\" SAFETY CHECK      Fall Risk    Total Score: 3    Safety Measures: Safety Measures: Bed/Chair alarm on, Bed/Chair-Wheels locked, Bed in low position, Call light within reach    A safety check occurred in the patient's room between off going nurse and oncoming nurse listed above. The safety check included the below items  Area Items   H  High Alert Medications - Verify all high alert medication drips (heparin, PCA, etc.)   E  Equipment - Suction is set up for ALL patients (with shashank)  - Red plugs utilized for all equipment (IV pumps, etc.)  - WOWs wiped down at end of shift.  - Room stocked with oxygen, suction, and other unit-specific supplies   A  Alarms - Bed alarm is set for fall risk patients  - Ensure chair alarm is in place and activated if patient is up in a chair   L  Lines - Check IV for any infiltration  - Patiño bag is empty if patient has a Patiño   - Tubing and IV bags are labeled   S  Safety   - Room is clean, patient is clean, and equipment is clean. - Hallways are clear from equipment besides carts. - Fall bracelet on for fall risk patients  - Ensure room is clear and free of clutter  - Suction is set up for ALL patients (with shashank)  - Hallways are clear from equipment besides carts.    - Isolation precautions followed, supplies available outside room, sign posted     Juventino Pineda RN

## 2018-03-22 NOTE — PROGRESS NOTES
Problem: Mobility Impaired (Adult and Pediatric)  Goal: *Acute Goals and Plan of Care (Insert Text)  Physical Therapy Goals  Initiated 3/20/2018 and to be accomplished within 7 day(s)  1. Patient will move from supine to sit and sit to supine , scoot up and down and roll side to side in bed with supervision/set-up. 2.  Patient will transfer from bed to chair and chair to bed with supervision/set-up using the least restrictive device. 3.  Patient will perform sit to stand with supervision/set-up. 4.  Patient will ambulate with supervision/set-up for 150 feet with the least restrictive device. Outcome: Progressing Towards Goal  physical Therapy TREATMENT    Patient: Austin Powell (32 y.o. female)  Date: 3/22/2018  Diagnosis: Pneumonia  Right lower lobe pneumonia (HCC) HCAP (healthcare-associated pneumonia)  Precautions: Fall, Aspiration   Chart, physical therapy assessment, plan of care and goals were reviewed. OBJECTIVE/ ASSESSMENT:  Pt found sitting in b/s chair willing to work with PT. Pt ambulated into hallway this tx with 3LO2 via nasal cannula. Pt's O2 saturations were recently measured during ambulation with nursing. Pt is able to increase distance this tx. Pt returned to b/s chair where activity pacing at home and seated therex were reviewed. Pt left with needs in reach. Education: gait, transfers. Progression toward goals:  [x]      Improving appropriately and progressing toward goals  []      Improving slowly and progressing toward goals  []      Not making progress toward goals and plan of care will be adjusted     PLAN:  Patient continues to benefit from skilled intervention to address the above impairments. Continue treatment per established plan of care. Discharge Recommendations:  Home Health  Further Equipment Recommendations for Discharge:  rolling walker     SUBJECTIVE:   Patient stated What should I be doing at home?     OBJECTIVE DATA SUMMARY:   Critical Behavior:  Neurologic State: Alert  Orientation Level: Oriented X4  Cognition: Follows commands  Safety/Judgement: Fall prevention, Decreased awareness of need for assistance  Functional Mobility Training:  Bed Mobility:  Scooting: Supervision  Transfers:  Sit to Stand: Supervision  Stand to Sit: Supervision  Balance:  Sitting: Intact  Standing: Impaired; With support  Standing - Static: Good  Standing - Dynamic : Good  Ambulation/Gait Training:  Distance (ft): 240 Feet (ft)  Ambulation - Level of Assistance: Supervision  Gait Abnormalities: Decreased step clearance  Base of Support: Narrowed  Speed/Ashwini: Slow;Pace decreased (<100 feet/min)  Step Length: Left shortened;Right shortened    Therapeutic Exercises:   Reviewed  Pain:  Pre tx pain: 0  Post tx pain: 0  Pain Scale 1: Numeric (0 - 10)  Pain Intensity 1: 0  Activity Tolerance:   Fair  Please refer to the flowsheet for vital signs taken during this treatment. After treatment:   [x] Patient left in no apparent distress sitting up in chair  [] Patient left in no apparent distress in bed  [x] Call bell left within reach  [] Nursing notified  [] Caregiver present  [] Bed alarm activated  [] SCDs applied  [] Ice applied      Juan Daniel Ansari PTA   Time Calculation: 9 mins    Mobility  Current  CI= 1-19%. The severity rating is based on the Level of Assistance required for Functional Mobility and ADLs. Mobility   Goal  CI= 1-19%. The severity rating is based on the Level of Assistance required for Functional Mobility and ADLs.

## 2018-03-22 NOTE — PROGRESS NOTES
Patient and family educated on discharge medications and their side effects, especially albuterol and prednisone's side effects of jitteriness and heart palpitations. Respiratory in room educating patient on nebulizer machine and usage. Family and patient verbalizes understanding of discharge instructions and medications. Celine Dubois in right and left AC discontinued with catheter intact. Patient placed on oxygen 3 liters for discharge.

## 2018-03-23 ENCOUNTER — TELEPHONE (OUTPATIENT)
Dept: FAMILY MEDICINE CLINIC | Age: 83
End: 2018-03-23

## 2018-03-23 DIAGNOSIS — Z72.0 TOBACCO ABUSE: ICD-10-CM

## 2018-03-23 DIAGNOSIS — G47.33 OSA (OBSTRUCTIVE SLEEP APNEA): ICD-10-CM

## 2018-03-23 DIAGNOSIS — J44.9 CHRONIC OBSTRUCTIVE PULMONARY DISEASE WITH SEVERITY TO BE DETERMINED (HCC): Primary | ICD-10-CM

## 2018-03-23 DIAGNOSIS — J44.9 CHRONIC OBSTRUCTIVE PULMONARY DISEASE, UNSPECIFIED COPD TYPE (HCC): Primary | ICD-10-CM

## 2018-03-23 PROBLEM — J44.0 ACUTE BRONCHITIS WITH CHRONIC OBSTRUCTIVE PULMONARY DISEASE (COPD) (HCC): Status: ACTIVE | Noted: 2018-03-22

## 2018-03-23 PROBLEM — J20.9 ACUTE BRONCHITIS WITH CHRONIC OBSTRUCTIVE PULMONARY DISEASE (COPD) (HCC): Status: ACTIVE | Noted: 2018-03-22

## 2018-03-23 PROCEDURE — 3331090001 HH PPS REVENUE CREDIT

## 2018-03-23 PROCEDURE — 3331090002 HH PPS REVENUE DEBIT

## 2018-03-23 RX ORDER — ARFORMOTEROL TARTRATE 15 UG/2ML
15 SOLUTION RESPIRATORY (INHALATION) 2 TIMES DAILY
Qty: 60 VIAL | Refills: 0 | Status: CANCELLED | OUTPATIENT
Start: 2018-03-23

## 2018-03-23 RX ORDER — BUDESONIDE 0.5 MG/2ML
500 INHALANT ORAL 2 TIMES DAILY
Qty: 60 EACH | Refills: 0 | Status: CANCELLED | OUTPATIENT
Start: 2018-03-23

## 2018-03-23 RX ORDER — BUDESONIDE 1 MG/2ML
1000 INHALANT ORAL 2 TIMES DAILY
Qty: 100 ML | Refills: 0 | Status: SHIPPED | OUTPATIENT
Start: 2018-03-23 | End: 2018-03-23 | Stop reason: DRUGHIGH

## 2018-03-23 RX ORDER — ARFORMOTEROL TARTRATE 15 UG/2ML
15 SOLUTION RESPIRATORY (INHALATION) 2 TIMES DAILY
Qty: 100 ML | Refills: 0 | Status: SHIPPED | OUTPATIENT
Start: 2018-03-23 | End: 2018-03-23 | Stop reason: DRUGHIGH

## 2018-03-23 NOTE — TELEPHONE ENCOUNTER
DOUG Ochoa, LPN       Caller: Unspecified (Today,  9:51 AM)                         Spoke with pharmacy per [facility])   Pt need  appt first before I can px the med's per medicare guidelines.

## 2018-03-23 NOTE — TELEPHONE ENCOUNTER
This pt. was seen in 2015 by Dr. Medardo Yeh. Recently seen in [facility] where they gave her Brovana and Budesonide Neb treatments and informed the pt. to contact us for orders. She has an appt. with Tyron Llanos MD on 3/29. Are you willing to issue these orders as seen on the Disch. Summary 3/22?

## 2018-03-23 NOTE — TELEPHONE ENCOUNTER
Pt's daughter Carin Briggs called because pt was seen in [facility] recently and was put on a nebulizer and given rx meds to take home. The pharmacy told the daughter that they could not be filled without an approval from her pulmonologist. Pt uses [facility].  Please call Carin Briggs [phone] since pt has not had breathing treatment in two days

## 2018-03-23 NOTE — TELEPHONE ENCOUNTER
I spoke with the pt's daughter Crsitin Ware, who tells me that she doesn't have a need for the Nebulizer solutions at present. She purchased them out right with her own funds. She will await the office appt. on 3/29/18 and get the prescription needs met then.

## 2018-03-23 NOTE — PROGRESS NOTES
[facility]  Pulmonary, Critical Care, and Sleep Medicine    Followup Patient Consult    Name: Herson Garcia MRN: [id number]   : 1931 Hospital: [address] [place] [address] Dr   Date: 3/22/2018        This patient has been seen and evaluated at the request of Dr. Candida Maldonado for dyspnea. IMPRESSION:   · Acute dyspnea: Improving. Etiology multifactorial but likely due to LRTI/pneumonia with COPD exacerbation  · LRTI with Pneumonia, HCAP vs aspiration:  B/L LL interstitial infiltrates, cannot rule out an infection, which may have caused patient's rapid decompensation. Pt recently discharged from hospital  · Bronchitis with COPD exacerbation: slowly improving  · Acute on chronic hypoxia  · Deconditioning  · Chronic use of mineralocorticoids:  Pt on florinef chronically  · Acute encephalopathy:  Resolved - likely due to hypoxia  · Thrush      RECOMMENDATIONS:   · Continue Pulmicort 1mg nebs BID and Brovana nebs BID with ipratropium nebs QID and albuterol nebs PRN on discharge. Pt unable to do proper inhaler technique for MDI's/inhalers. Explained to both daughters at bedside. · Taper PO prednisone, continue home florinef  · Ok with PO ABx for PNA to cover for aspiration and atypicals for 7 days  · nystatin swish and swallow for oral thrush  · Continue supplemental oxygen to maintain SpO2 88-93%. Pt using 3LNC. Per nursing, pt did well with ambulation without oxygen for half of the hallway. · Bronchial hygiene protocol - flutter device  · OOB to chair  · Ambulate as tolerated  · Frequent incentive spirometry  · PT/OT  · DVT ppx per primary service  · Discussed case with patient's daughter at bedside  · -Counseled the patient regarding cessation of smoking. I reviewed health risks of tobacco use including increased risk of MI, stroke, cancer, etc.  We reviewed various approaches to cessation. Pt declined cessation assistance at this time noting that she is stopping cold turkey.   I also strongly advised patient to avoid smoking with oxygen use due to fire/explosion risk. Pt expressed understanding. · F/U in 2-4 weeks post discharge    Thank you for this consult     Subjective:   03/22/18  Pt seen and examined this afternoon. NO acute events overnight. Pt's daughters at bedside, pt sitting up in chair, ready for discharge. Pt reports some improvement in dyspnea. Denies chest pain, N/V/D. Pt wants to go home    HPI  Patient is a 80 y.o. female with hx of COPD, HTN, EtOH abuse in the past, CVA, seizures, heavy smoking hx, presented to [facility] ER with AMS, lethargy and dyspnea. Pt was recently admitted to [facility] with dyspnea from 2 weeks ago. Pt's daughter reported that the patient did well post discharge, was doing well with PT. Then early in the day of presentation she developed worsening dyspnea, present on minimal exertion, then developed weakness, fatigue, lethargy, and then became very poorly responsive, so her sister took her to the [facility] ER. Pt was found to have some bibasilar infiltrates vs atleectasis, had leukocytosis but no fever. Pt this morning has no complaints, denies chest pain, N/V/D, notes no new dyspnea but has some at baseline. Her daughter reports pt was not on supplemental oxygen prior to her previous admission but required it post discharge. Denies hemoptysis, N/V/D, chest pain, overt aspiration, night sweats. Past Medical History:   Diagnosis Date    Abuse 1998    Alcohol    Anxiety     Calculus of kidney 1988    Cardiac echocardiogram 07/31/2012    EF 60-65%. No WMA. Mild conc LVH. Gr 1 DDfx. RVSP 20-25 mmHg. Mild SAGAR. Mild TR,. Mild PAE.  Cardiovascular LE venous duplex 10/08/2012    No venous thrombosis or venous insufficiency in bilateral lower extremities.  Carotid duplex 10/09/2014    Mild < 50% bilateral ICA stenosis.       Chronic lung disease     Contact dermatitis and other eczema, due to unspecified cause     [facility]     Degenerative joint disease     Depression     Hematuria     Hypercholesteremia     Hypertension     Hypothyroidism 1998    Kidney stone     Neuropathy 3/2007    Orthostatic hypotension     Pituitary adenoma ([facility] 2/1998    Seizures ([facility]     none recently    Severe obstructive sleep apnea 01-15-15     started using Bipap Machine    Stress     Stroke ([facility] 2/2006    no residual    TIA (transient ischemic attack)     Trauma       Past Surgical History:   Procedure Laterality Date    CYSTOSCOPY,INSERT URETERAL STENT  9/14/15    Dr. Reyes Bravo HX LITHOTRIPSY  9/14/15    Dr. Jessie Hammond HX WISDOM TEETH EXTRACTION      x4      Prior to Admission medications    Medication Sig Start Date End Date Taking? Authorizing Provider   albuterol (ACCUNEB) 1.25 mg/3 mL nebu 3 mL by Nebulization route every four (4) hours as needed. 3/22/18  Yes Hyun Manzo PA-C   arformoterol (BROVANA) 15 mcg/2 mL nebu neb solution 2 mL by Nebulization route two (2) times a day. 3/22/18  Yes Hyun Manzo PA-C   budesonide (PULMICORT) 1 mg/2 mL nbsp 2 mL by Nebulization route two (2) times a day. 3/22/18  Yes Hyun Manzo PA-C   guaiFENesin-dextromethorphan SR (HUMIBID DM) 600-30 mg per tablet Take 1 Tab by mouth every twelve (12) hours as needed for Cough. 3/22/18  Yes Hyun Manzo PA-C   nystatin (MYCOSTATIN) 100,000 unit/mL suspension Take 5 mL by mouth four (4) times daily. swish and spit 3/22/18  Yes Hyun Manzo PA-C   predniSONE (DELTASONE) 10 mg tablet Take 4 tabs for 2 days, then 3 tabs for 3 days, then 2 tabs for 3 days, then 1 tab for 3 days. 3/22/18  Yes Hyun Manzo PA-C   amoxicillin-clavulanate (AUGMENTIN) 875-125 mg per tablet Take 1 Tab by mouth every twelve (12) hours.  3/22/18  Yes Hyun Manzo PA-C   doxycycline (MONODOX) 100 mg capsule Take 1 Cap by mouth two (2) times a day for 7 days. 3/22/18 3/29/18 Yes Hyun Manzo PA-C   L. acidophilus,casei,rhamnosus (BIO-K PLUS) 50 billion cell cpDR Take 1 tab by mouth daily 3/22/18  Yes Hyun Manzo PA-C   famotidine (PEPCID) 20 mg tablet Take 1 Tab by mouth two (2) times a day. 3/22/18  Yes Hyun Manzo PA-C   Nebulizer & Compressor (PORTABLE NEBULIZER SYSTEM) machine 1 Each by Does Not Apply route two (2) times a day. 3/22/18  Yes Hyun Manzo PA-C   cholecalciferol, vitamin D3, 4,000 unit cap Take 1 Cap by mouth daily. Historical Provider   coenzyme Q-10 (CO Q-10) 200 mg capsule Take  by mouth daily. Historical Provider   omega 3-DHA-EPA-fish oil (FISH OIL) 1,000 mg (120 mg-180 mg) capsule Take 4 Caps by mouth daily. Historical Provider   OXYGEN-AIR DELIVERY SYSTEMS 3 L/min by Does Not Apply route continuous. Historical Provider   lisinopril (PRINIVIL, ZESTRIL) 20 mg tablet TAKE ONE TABLET BY MOUTH EVERY DAY 1/25/18   Belem Simon MD   PARoxetine (PAXIL) 30 mg tablet TAKE ONE TABLET BY MOUTH EVERY DAY 1/25/18   Belem Simon MD   atorvastatin (LIPITOR) 10 mg tablet TAKE ONE TABLET BY MOUTH EVERY DAY 1/25/18   Belem Simon MD   potassium chloride (K-DUR, KLOR-CON) 20 mEq tablet TAKE ONE TABLET BY MOUTH 2 TIMES A DAY  Patient taking differently: Take 20 mEq by mouth two (2) times a day. TAKE ONE TABLET BY MOUTH 2 TIMES A DAY 1/25/18   Belem Simon MD   amLODIPine (NORVASC) 10 mg tablet TAKE ONE TABLET BY MOUTH EVERY DAY 1/25/18   Belem Simon MD   nystatin (MYCOSTATIN) powder Apply  to affected area four (4) times daily. 7/25/17   Belem Simon MD   albuterol (PROAIR HFA) 90 mcg/actuation inhaler Take 2 Puffs by inhalation every four (4) hours as needed for Wheezing or Shortness of Breath. 5/26/17   Fayrene Sami, NP   fluticasone (FLONASE) 50 mcg/actuation nasal spray 2 Sprays by Both Nostrils route daily.  4/10/17   Historical Provider   SYNTHROID 112 mcg tablet Take 112 mcg by mouth Daily (before breakfast). 7/19/16   Historical Provider   ALPRAZolam Edita Danita) 0.5 mg tablet Take 0.5 mg by mouth nightly as needed for Anxiety or Sleep. 9/6/16   Historical Provider   furosemide (LASIX) 20 mg tablet Take 1 Tab by mouth daily as needed for Other (Leg Swelling). 4/12/16   Hardik Rivero MD   hydrocortisone (CORTEF) 10 mg tablet Take 10 mg by mouth daily. 10/26/15   Historical Provider   VOLTAREN 1 % topical gel Apply 2 g to affected area nightly as needed (pain). 9/2/15   Historical Provider   Walker (BARNEY ROLLING WALKER) misc 1 Device by Does Not Apply route daily. 8/31/15   JENNIFER Geller   bipap machine kit 1 each by Does Not Apply route. Started using BiPap Machine 01-15-15    Historical Provider   L-Methylfolate-Q29-Rparnzcrkm (CEREFOLIN NAC) tablet Take 1 tablet by mouth daily. 10/30/14   Hardik Rivero MD   fludrocortisone (FLORINEF) 0.1 mg tablet Take 1 tablet by mouth daily. 10/30/14   Hardik Rivero MD   oxcarbazepine (TRILEPTAL) 300 mg tablet Take 300 mg by mouth two (2) times a day. Historical Provider   magnesium oxide (MAG-OX) 400 mg tablet Take 400 mg by mouth daily. Historical Provider   aspirin 81 mg tablet Take 81 mg by mouth daily. Historical Provider   DOCOSAHEXANOIC ACID/EPA (FISH OIL PO) Take 4,000 mg by mouth daily. Historical Provider   CALCIUM CITRATE/VITAMIN D3 (CALCIUM CITRATE + PO) Take  by mouth once over twenty-four (24) hours.     Historical Provider     Allergies   Allergen Reactions    Iodine Hives      Social History   Substance Use Topics    Smoking status: Current Every Day Smoker     Packs/day: 1.50     Years: 65.00     Types: Cigarettes    Smokeless tobacco: Never Used    Alcohol use No      Comment: quit 1998 due to Alcohol Abuse      Family History   Problem Relation Age of Onset    Heart Disease Father     Hypertension Father     Hypertension Mother     Cancer Mother      skin    Elevated Lipids Sister     Heart Disease Sister     Cancer Maternal Grandmother      colon and stomach    Heart Disease Paternal Grandmother     Heart Attack Paternal Grandmother     Heart Attack Paternal Grandfather     Heart Disease Paternal Grandfather         Immunization status: stated as current, but no records available. No current facility-administered medications for this encounter. Review of Systems:  A comprehensive review of systems was negative except for that written in the HPI. Objective:   Vital Signs:    Visit Vitals    /79 (BP 1 Location: Right arm, BP Patient Position: At rest)    Pulse 71    Temp 97.1 °F (36.2 °C)    Resp 18    Ht 5' 4\" (1.626 m)    Wt 72.1 kg (159 lb)    SpO2 97%    BMI 27.29 kg/m2       O2 Device: Nasal cannula   O2 Flow Rate (L/min): 3 l/min   Temp (24hrs), Av.6 °F (36.4 °C), Min:97.1 °F (36.2 °C), Max:98.1 °F (36.7 °C)       Intake/Output:   Last shift:         Last 3 shifts:  07 - 1900  In: 240 [P.O.:240]  Out: 450 [Urine:450]  No intake or output data in the 24 hours ending 182   Physical Exam:   General:  Alert, cooperative, no distress, appears stated age, wearing NC, sitting up in chair eating lunch   Head:  Normocephalic, without obvious abnormality, atraumatic. Eyes:  Conjunctivae/corneas clear. PERRL, EOMs intact. Nose: Nares normal. Septum midline. Mucosa normal. No drainage or sinus tenderness. Throat: Lips normal, tongue and mucosa has thrush. Dentures are in place   Neck: Supple, symmetrical, trachea midline, no adenopathy, no carotid bruit and no JVD. Back:   Symmetric, no curvature. ROM normal.   Lungs:   CTABL, only very fine wheeze in right lung, no rales/rhonchi   Chest wall:  No tenderness or deformity. Heart:  Regular rate and rhythm, S1, S2 normal, no murmur, click, rub or gallop. Abdomen:   Soft, non-tender. Bowel sounds normal. No masses,  No organomegaly. Extremities: Extremities normal, atraumatic, no cyanosis or edema.    Pulses: 2+ and symmetric all extremities. Skin: Skin color, texture, turgor normal. No rashes or lesions   Lymph nodes: Cervical, supraclavicular, and axillary nodes normal.   Neurologic: Grossly nonfocal     Data review:     Recent Results (from the past 24 hour(s))   CBC WITH AUTOMATED DIFF    Collection Time: 03/22/18  3:15 AM   Result Value Ref Range    WBC 8.1 4.6 - 13.2 K/uL    RBC 3.43 (L) 4.20 - 5.30 M/uL    HGB 10.5 (L) 12.0 - 16.0 g/dL    HCT 31.7 (L) 35.0 - 45.0 %    MCV 92.4 74.0 - 97.0 FL    MCH 30.6 24.0 - 34.0 PG    MCHC 33.1 31.0 - 37.0 g/dL    RDW 13.2 11.6 - 14.5 %    PLATELET 751 684 - 396 K/uL    MPV 10.1 9.2 - 11.8 FL    NEUTROPHILS 89 (H) 40 - 73 %    LYMPHOCYTES 8 (L) 21 - 52 %    MONOCYTES 3 3 - 10 %    EOSINOPHILS 0 0 - 5 %    BASOPHILS 0 0 - 2 %    ABS. NEUTROPHILS 7.3 1.8 - 8.0 K/UL    ABS. LYMPHOCYTES 0.6 (L) 0.9 - 3.6 K/UL    ABS. MONOCYTES 0.2 0.05 - 1.2 K/UL    ABS. EOSINOPHILS 0.0 0.0 - 0.4 K/UL    ABS.  BASOPHILS 0.0 0.0 - 0.1 K/UL    DF AUTOMATED     METABOLIC PANEL, BASIC    Collection Time: 03/22/18  3:15 AM   Result Value Ref Range    Sodium 132 (L) 136 - 145 mmol/L    Potassium 4.3 3.5 - 5.5 mmol/L    Chloride 100 100 - 108 mmol/L    CO2 26 21 - 32 mmol/L    Anion gap 6 3.0 - 18 mmol/L    Glucose 152 (H) 74 - 99 mg/dL    BUN 21 (H) 7.0 - 18 MG/DL    Creatinine 0.85 0.6 - 1.3 MG/DL    BUN/Creatinine ratio 25 (H) 12 - 20      GFR est AA >60 >60 ml/min/1.73m2    GFR est non-AA >60 >60 ml/min/1.73m2    Calcium 8.1 (L) 8.5 - 10.1 MG/DL       Imaging:  I have personally reviewed the patients radiographs and have reviewed the reports:  No new studies today           Blair Skelton MD/MPH     Pulmonary, Critical Care Medicine  [facility]

## 2018-03-23 NOTE — TELEPHONE ENCOUNTER
Pharacmist called and stated that pt was seen in the ER and a script was written order for albuterol 1.5-3ml neb q 4hrs prn, pulmicort 1ml per 2ml BID neb, Brovana 15 microgramsz per 2ml BID neb solution. Ins needs medication to be filled Part B and PA that seen patient in ER last night will not be able to sign off on med. Will Dr. Ana Laura Morgan be able to fill this medication for patient. Please advise. Pt needs medication for this weekend, can not go with out it.  Please advise

## 2018-03-24 ENCOUNTER — HOME CARE VISIT (OUTPATIENT)
Dept: SCHEDULING | Facility: HOME HEALTH | Age: 83
End: 2018-03-24

## 2018-03-24 PROCEDURE — 3331090002 HH PPS REVENUE DEBIT

## 2018-03-24 PROCEDURE — 3331090001 HH PPS REVENUE CREDIT

## 2018-03-25 PROCEDURE — 3331090002 HH PPS REVENUE DEBIT

## 2018-03-25 PROCEDURE — 3331090001 HH PPS REVENUE CREDIT

## 2018-03-26 ENCOUNTER — OFFICE VISIT (OUTPATIENT)
Dept: FAMILY MEDICINE CLINIC | Age: 83
End: 2018-03-26

## 2018-03-26 ENCOUNTER — PATIENT OUTREACH (OUTPATIENT)
Dept: FAMILY MEDICINE CLINIC | Age: 83
End: 2018-03-26

## 2018-03-26 VITALS
HEIGHT: 64 IN | WEIGHT: 146 LBS | DIASTOLIC BLOOD PRESSURE: 71 MMHG | RESPIRATION RATE: 14 BRPM | OXYGEN SATURATION: 96 % | SYSTOLIC BLOOD PRESSURE: 122 MMHG | TEMPERATURE: 97.5 F | BODY MASS INDEX: 24.92 KG/M2 | HEART RATE: 69 BPM

## 2018-03-26 DIAGNOSIS — I10 ESSENTIAL HYPERTENSION, BENIGN: ICD-10-CM

## 2018-03-26 DIAGNOSIS — E86.0 DEHYDRATION: ICD-10-CM

## 2018-03-26 DIAGNOSIS — Z87.891 QUIT SMOKING: ICD-10-CM

## 2018-03-26 DIAGNOSIS — J18.9 PNEUMONIA OF RIGHT LOWER LOBE DUE TO INFECTIOUS ORGANISM: ICD-10-CM

## 2018-03-26 DIAGNOSIS — J44.9 CHRONIC OBSTRUCTIVE PULMONARY DISEASE, UNSPECIFIED COPD TYPE (HCC): Primary | ICD-10-CM

## 2018-03-26 DIAGNOSIS — R19.7 DIARRHEA, UNSPECIFIED TYPE: ICD-10-CM

## 2018-03-26 PROCEDURE — 3331090001 HH PPS REVENUE CREDIT

## 2018-03-26 PROCEDURE — 3331090002 HH PPS REVENUE DEBIT

## 2018-03-26 NOTE — PROGRESS NOTES
Justine Westyuniel Villar 80 y.o. female   Chief Complaint   Patient presents with   [facility] Follow Up     Admission to [facility] on 3-18-18 and discharged on 3-22-18         1. Have you been to the ER, urgent care clinic since your last visit? Hospitalized since your last visit? Yes When: 3-18- 3-22-18 Where: [facility] Reason for visit: SEE NOTE    2. Have you seen or consulted any other health care providers outside of the [address] [place] [address] since your last visit? Include any pap smears or colon screening.  No

## 2018-03-26 NOTE — PROGRESS NOTES
HISTORY OF PRESENT ILLNESS  Nehemiah Noriega is a 80 y.o. female. Chief Complaint   Patient presents with   [facility] Follow Up     Admission to [facility] on 3-18-18 and discharged on 3-22-18       HPI   Patient is here for a Hospital follow up from [facility] She has had 2 recent admissions. On 3/9/18, pt was at home and could not get out of bed. She lost control of her bowels and bladder and had AMS. Her family called EMS for assistance. In the hospital, seizure, MI, and CVA were ruled out. Pt was treated for dehydration, ARF, and bronchitis. She was released to home and seemed ok initially. She was getting stronger, eating, and communicating well. On 3/18/18, pt had been doing well but took her oxygen off to use the bathroom. She had trouble ambulating and had difficulty breathing. Pt sat down and was noted to be very confused again when her dtr checked on her 2 min later. They called EMS again and pt was transported back to [facility] ER. She was admitted at [facility] with AMS and bronchitis vs HCAP. Pt improved with abx, steroids, and iv hydration. She was again released to home. Pt has been feeling well since going home. She has stopped smoking since all of this started. Pt does have some diarrhea now. She was started on probiotics at discharge. There were great difficulties in getting her meds for her nebulizer. The family ultimately paid for them out of pocket so that she would have them. Pt has not been using her cpap as she does not have a way to connect the oxygen to the cpap machine. She was told to use the O2 24/7. Patient has appt with pulmonology on 3-29-18.        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 9   3/9/2018 - Present Yes           Elevated lactic acid level ICD-10-CM: R79.89  ICD-9-CM: 276.2   3/9/2018 - Present Yes           Wheezing ICD-10-CM: R06.2  ICD-9-CM: 786.07   3/9/2018 - Present Unknown           * (Principal)Gastroenteritis ICD-10-CM: K52.9  ICD-9-CM: 558. 9   3/9/2018 - Present Yes           COPD (chronic obstructive pulmonary disease) (HCC) ICD-10-CM: J44.9  ICD-9-CM: 496   2/25/2013 - Present Yes           Essential hypertension, benign ICD-10-CM: I10  ICD-9-CM: 287. 1   10/2/2012 - Present Yes           Dyslipidemia ICD-10-CM: E78.5  ICD-9-CM: 272.4   10/2/2012 - Present Yes           Hypothyroidism ICD-10-CM: E03.9  ICD-9-CM: 244. 9   10/2/2012 - Present Yes                  * Hospital Course: The patient is a pleasant 81 yo female admitted on 3/10/2018 with altered mentation and lethargy, persistent diarrhea and dehydration. She was admitted to the medical unit and was aggressively rehydrated. Her mentation returned to baseline with rehydration, her diarrhea has since subsided. Her lactic acid was elevated and responded appropriately to fluid resuscitation in the ED. Stool for C. Diff was sent and was negative. Blood and stool cultures are negative. No source of sepsis was identified.       Pt was also found to have ARF requiring supplemental O2, CXR was unremarkable for pneumonia. The patient remains on supplemental O2 to maintain sats greater than 92%. She was placed on oral steroids for her COPD and bronchitis however she continues to wheeze and require supplemental O2. Home oxygen has been obtained for her. Admit date: 3/18/2018  Discharge date: 3/22/2018  [facility] [place] Critical access hospital Course by Problem   1.  Acute bronchitis with possible HCAP - Pt was admitted with AMS, Lethargy and SOB. CXR was done and Bibasilar infiltrate Vs atelectasis. Blood cx (3/18) ngtd. Sputum cx was not obtained because pt did not provide specimen. Managed with IV abx. SLP was consulted for concern for aspiration and barium swallow completed and was normal. RT consulted for bronchial hygiene protocol. Pt improved while inpatient, no complaints of SOB or lethargy before discharge. 2. Hyponatremia: managed with NS IVF, and improved while inpt. Urine studies done and was wnl.   3. COPD w/ AE - Pulmonary was consulted, managed with solumedrol, abx, and bronchodilators. Prednisone was tapered at time of discharge. 4. Sepsis poa (leukocytosis, hypoxia) improving - source m/l pulmonary: managed with abx and improved before discharge. 5. 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 6. panhypopit on synthroid, florinef and cortef as outpatient. 7. Dyslipidemia: managed with statin. 8. Seizure d/o on home meds. Seizure precautions. 9. Tobacco abuse - quit earlier this year. 10. Metabolic alkalosis: improved  11. ckd 3 at baseline  12. 2nd hand smoke exposure - states her daughters smoke exposure. 13. Chronic hypoxic respiratory failure on home O2 continuous since last discharge on 3L. Ambulatory oxygen completed, pt current dosing needs no change. 15. Thrush: managed with nystatin swish and swallow for oral thrush. 15. dvt prophylaxis   16. Full code. Resides at home with 2 daughters. Home with HH. Review of Systems   Constitutional: Negative. HENT: Negative. Respiratory: Positive for shortness of breath. Negative for cough, sputum production and wheezing. Cardiovascular: Negative. Gastrointestinal: Positive for diarrhea. All other systems reviewed and are negative. Physical Exam  Physical Exam   Nursing note and vitals reviewed. Constitutional: She is oriented to person, place, and time.  She appears well-developed and well-nourished. HENT:   Head: Normocephalic and atraumatic. Right Ear: External ear normal.   Left Ear: External ear normal.   Nose: Nose normal.   Eyes: Conjunctivae and EOM are normal.   Neck: Normal range of motion. Neck supple. No JVD present. Carotid bruit is not present. No thyromegaly present. Cardiovascular: Normal rate, regular rhythm, normal heart sounds and intact distal pulses. Exam reveals no gallop and no friction rub. No murmur heard. Pulmonary/Chest: Effort normal and breath sounds normal. She has no wheezes. She has no rhonchi. She has no rales. Abdominal: Soft. Bowel sounds are normal.   Musculoskeletal: Normal range of motion. Neurological: She is alert and oriented to person, place, and time. Coordination normal.   Skin: Skin is warm and dry. Psychiatric: She has a normal mood and affect. Her behavior is normal. Judgment and thought content normal.     ASSESSMENT and PLAN  Diagnoses and all orders for this visit:    1. Chronic obstructive pulmonary disease, unspecified COPD type ([address]  Controlled with current meds and home O2. Will see pulm later this week to re-establish care. 2. Pneumonia of right lower lobe due to infectious organism [facility])  Improving; cont pres tx plan. 3. Quit smoking  Pt commended! 4. Essential hypertension, benign  Stable, cont pres tx plan. 5. Diarrhea, unspecified type  Cont probiotic. Likely related to multiple recent abx. 6. Dehydration  Resolved. Maintain good po intake.      Follow-up Disposition: 3 months; sooner prn

## 2018-03-26 NOTE — MR AVS SNAPSHOT
[facility] 
 
 
 [place] [address] [address] [address] [phone] Patient: Aga Serrano MRN: [id number] Visit Information Date & Time Provider Department Dept. Phone Encounter #  
 3/26/2018  101 MD Lizzy Huang 77 [id number] Your Appointments 3/29/2018  2:30 PM  
Follow Up with Yudi Maki MD  
[address] ([facility] Appt Note: HOSP FU [facility] MV 3/21; RS from 4/19/18  
 [address] [place] [address], [address] [address] [phone]  
  
   
 [address] [place] [address], [address] [facility],[address]  15 [place] [address]  
  
    
 4/25/2018 10:00 AM  
Follow Up with Neville Rodríguez MD  
[facility] (--) Appt Note: hany [place] 57 Pending sale to [facility] [address] [phone] [place] [phone]  
  
    
 6/26/2018  9:00 AM  
Follow Up with Neville Rodríguez MD  
[facility] (--) Appt Note: hany [place] [address] [place] [address] [phone]  
  
    
  
 6/22/2018 10:30 AM  
Any with Dave Brooks MD  
[facility] ([facility] Appt Note: 1 year us hany here at the office; new appt date, letter mailed 709 [facility] [address]  
  
   
 709 [facility] [address] [address] [address] Upcoming Health Maintenance Date Due Pneumococcal 65+ High/Highest Risk (1 of 2 - PCV13) 11/23/1996 BREAST CANCER SCRN MAMMOGRAM 4/13/2018 GLAUCOMA SCREENING Q2Y 10/12/2019 DTaP/Tdap/Td series (2 - Td) 3/29/2027 Allergies as of 3/26/2018  Review Complete On: 3/26/2018 By: Neville Rodríguez MD  
  
 Severity Noted Reaction Type Reactions Iodine  10/02/2012   Intolerance Hives Current Immunizations  Reviewed on 4/20/2015 No immunizations on file. Not reviewed this visit Vitals BP Pulse Temp Resp Height(growth percentile) Weight(growth percentile) 122/71 69 97.5 °F (36.4 °C) (Oral) 14 5' 4\" (1.626 m) 146 lb (66.2 kg) SpO2 BMI OB Status Smoking Status 96% 25.06 kg/m2 Postmenopausal Current Every Day Smoker BMI and BSA Data Body Mass Index Body Surface Area 25.06 kg/m 2 1.73 m 2 Preferred Pharmacy Pharmacy Name Phone Chuckie Flores [facility] Your Updated Medication List  
  
   
This list is accurate as of 3/26/18 10:33 AM.  Always use your most recent med list.  
  
  
  
  
 * albuterol 90 mcg/actuation inhaler Commonly known as:  PROAIR HFA Take 2 Puffs by inhalation every four (4) hours as needed for Wheezing or Shortness of Breath. * albuterol 1.25 mg/3 mL Nebu Commonly known as:  ACCUNEB  
3 mL by Nebulization route every four (4) hours as needed. ALPRAZolam 0.5 mg tablet Commonly known as:  [place] Hams Take 0.5 mg by mouth nightly as needed for Anxiety or Sleep. amLODIPine 10 mg tablet Commonly known as:  Dandre Free TAKE ONE TABLET BY MOUTH EVERY DAY  
  
 amoxicillin-clavulanate 875-125 mg per tablet Commonly known as:  AUGMENTIN Take 1 Tab by mouth every twelve (12) hours. aspirin 81 mg tablet Take 81 mg by mouth daily. atorvastatin 10 mg tablet Commonly known as:  LIPITOR  
TAKE ONE TABLET BY MOUTH EVERY DAY  
  
 bipap machine kit 1 each by Does Not Apply route. Started using BiPap Machine 01-15-15 CALCIUM CITRATE + PO Take  by mouth once over twenty-four (24) hours. cholecalciferol (vitamin D3) 4,000 unit Cap Take 1 Cap by mouth daily. CO Q-10 200 mg capsule Generic drug:  coenzyme Q-10 Take  by mouth daily. doxycycline 100 mg capsule Commonly known as:  Kailey Shackleton Take 1 Cap by mouth two (2) times a day for 7 days. famotidine 20 mg tablet Commonly known as:  PEPCID  
 Take 1 Tab by mouth two (2) times a day. FISH OIL 1,000 mg (120 mg-180 mg) capsule Generic drug:  omega 3-DHA-EPA-fish oil Take 4 Caps by mouth daily. FISH OIL PO Take 4,000 mg by mouth daily. fludrocortisone 0.1 mg tablet Commonly known as:  FLORINEF Take 1 tablet by mouth daily. fluticasone 50 mcg/actuation nasal spray Commonly known as:  Sherrine Karla 2 Sprays by Both Nostrils route daily. furosemide 20 mg tablet Commonly known as:  LASIX Take 1 Tab by mouth daily as needed for Other (Leg Swelling). guaiFENesin-dextromethorphan -30 mg per tablet Commonly known as:  HUMIBID DM Take 1 Tab by mouth every twelve (12) hours as needed for Cough. hydrocortisone 10 mg tablet Commonly known as:  CORTEF Take 10 mg by mouth daily. L-Methylfolate-U67-Ikqcrkdgbw tablet Commonly known as:  Maninder Flurry Take 1 tablet by mouth daily. L. acidophilus,casei,rhamnosus 50 billion cell Cpdr  
Commonly known as:  BIO-K PLUS Take 1 tab by mouth daily  
  
 lisinopril 20 mg tablet Commonly known as:  PRINIVIL, ZESTRIL  
TAKE ONE TABLET BY MOUTH EVERY DAY  
  
 magnesium oxide 400 mg tablet Commonly known as:  MAG-OX Take 400 mg by mouth daily. Nebulizer & Compressor machine Commonly known as:  PORTABLE NEBULIZER SYSTEM  
1 Each by Does Not Apply route two (2) times a day. * nystatin powder Commonly known as:  MYCOSTATIN Apply  to affected area four (4) times daily. * nystatin 100,000 unit/mL suspension Commonly known as:  MYCOSTATIN Take 5 mL by mouth four (4) times daily. swish and spit OXcarbazepine 300 mg tablet Commonly known as:  TRILEPTAL Take 300 mg by mouth two (2) times a day. OXYGEN-AIR DELIVERY SYSTEMS  
3 L/min by Does Not Apply route continuous. PARoxetine 30 mg tablet Commonly known as:  PAXIL TAKE ONE TABLET BY MOUTH EVERY DAY  
  
 potassium chloride 20 mEq tablet Commonly known as:  K-ABIMBOLA, KLOR-CON  
TAKE ONE TABLET BY MOUTH 2 TIMES A DAY  
  
 predniSONE 10 mg tablet Commonly known as:  Verphoenix Pierre Take 4 tabs for 2 days, then 3 tabs for 3 days, then 2 tabs for 3 days, then 1 tab for 3 days. SYNTHROID 112 mcg tablet Generic drug:  levothyroxine Take 112 mcg by mouth Daily (before breakfast). VOLTAREN 1 % Gel Generic drug:  diclofenac Apply 2 g to affected area nightly as needed (pain). Duc Stefanieing Commonly known as:  BARNEY ROLLING WALKER  
1 Device by Does Not Apply route daily. * Notice: This list has 4 medication(s) that are the same as other medications prescribed for you. Read the directions carefully, and ask your doctor or other care provider to review them with you. Introducing [facility]! Dear Radha Claros: 
Thank you for requesting a [employer] account. Our records indicate that you already have an active [employer] account. You can access your account anytime at [email or web address] Did you know that you can access your hospital and ER discharge instructions at any time in [employer]? You can also review all of your test results from your hospital stay or ER visit. Additional Information If you have questions, please visit the Frequently Asked Questions section of the [employer] website at [email or web address] Remember, [employer] is NOT to be used for urgent needs. For medical emergencies, dial 911. Now available from your iPhone and Android! Please provide this summary of care documentation to your next provider. Your primary care clinician is listed as Daniella Lam. If you have any questions after today's visit, please call [phone].

## 2018-03-27 ENCOUNTER — HOME CARE VISIT (OUTPATIENT)
Dept: SCHEDULING | Facility: HOME HEALTH | Age: 83
End: 2018-03-27
Payer: MEDICARE

## 2018-03-27 VITALS
SYSTOLIC BLOOD PRESSURE: 122 MMHG | RESPIRATION RATE: 18 BRPM | TEMPERATURE: 99.3 F | DIASTOLIC BLOOD PRESSURE: 66 MMHG | OXYGEN SATURATION: 97 % | HEART RATE: 83 BPM

## 2018-03-27 PROCEDURE — G0299 HHS/HOSPICE OF RN EA 15 MIN: HCPCS

## 2018-03-27 PROCEDURE — 3331090002 HH PPS REVENUE DEBIT

## 2018-03-27 PROCEDURE — 3331090001 HH PPS REVENUE CREDIT

## 2018-03-28 PROCEDURE — 3331090002 HH PPS REVENUE DEBIT

## 2018-03-28 PROCEDURE — 3331090001 HH PPS REVENUE CREDIT

## 2018-03-29 ENCOUNTER — PATIENT OUTREACH (OUTPATIENT)
Dept: FAMILY MEDICINE CLINIC | Age: 83
End: 2018-03-29

## 2018-03-29 ENCOUNTER — PATIENT OUTREACH (OUTPATIENT)
Dept: PULMONOLOGY | Age: 83
End: 2018-03-29

## 2018-03-29 ENCOUNTER — OFFICE VISIT (OUTPATIENT)
Dept: PULMONOLOGY | Age: 83
End: 2018-03-29

## 2018-03-29 VITALS
BODY MASS INDEX: 25.27 KG/M2 | HEIGHT: 64 IN | HEART RATE: 70 BPM | WEIGHT: 148 LBS | RESPIRATION RATE: 16 BRPM | DIASTOLIC BLOOD PRESSURE: 52 MMHG | SYSTOLIC BLOOD PRESSURE: 100 MMHG | TEMPERATURE: 98.3 F | OXYGEN SATURATION: 98 %

## 2018-03-29 DIAGNOSIS — J43.2 CENTRILOBULAR EMPHYSEMA (HCC): ICD-10-CM

## 2018-03-29 DIAGNOSIS — J20.9 ACUTE BRONCHITIS WITH CHRONIC OBSTRUCTIVE PULMONARY DISEASE (COPD) (HCC): Primary | ICD-10-CM

## 2018-03-29 DIAGNOSIS — J44.0 ACUTE BRONCHITIS WITH CHRONIC OBSTRUCTIVE PULMONARY DISEASE (COPD) (HCC): Primary | ICD-10-CM

## 2018-03-29 DIAGNOSIS — J96.12 CHRONIC RESPIRATORY FAILURE WITH HYPOXIA AND HYPERCAPNIA (HCC): ICD-10-CM

## 2018-03-29 DIAGNOSIS — J96.11 CHRONIC RESPIRATORY FAILURE WITH HYPOXIA AND HYPERCAPNIA (HCC): ICD-10-CM

## 2018-03-29 DIAGNOSIS — R53.81 PHYSICAL DECONDITIONING: ICD-10-CM

## 2018-03-29 DIAGNOSIS — R06.09 DYSPNEA ON EXERTION: ICD-10-CM

## 2018-03-29 DIAGNOSIS — R06.02 SHORTNESS OF BREATH: ICD-10-CM

## 2018-03-29 PROCEDURE — 3331090001 HH PPS REVENUE CREDIT

## 2018-03-29 PROCEDURE — 3331090002 HH PPS REVENUE DEBIT

## 2018-03-29 RX ORDER — ARFORMOTEROL TARTRATE 15 UG/2ML
15 SOLUTION RESPIRATORY (INHALATION) 2 TIMES DAILY
Qty: 180 VIAL | Refills: 3 | Status: SHIPPED | OUTPATIENT
Start: 2018-03-29 | End: 2018-12-11

## 2018-03-29 RX ORDER — BUDESONIDE 1 MG/2ML
1000 INHALANT ORAL 2 TIMES DAILY
Qty: 180 EACH | Refills: 3 | Status: SHIPPED | OUTPATIENT
Start: 2018-03-29 | End: 2018-06-29 | Stop reason: SDUPTHER

## 2018-03-29 RX ORDER — IPRATROPIUM BROMIDE 0.5 MG/2.5ML
0.5 SOLUTION RESPIRATORY (INHALATION) 4 TIMES DAILY
Qty: 360 VIAL | Refills: 3 | Status: SHIPPED | OUTPATIENT
Start: 2018-03-29 | End: 2019-06-18 | Stop reason: SDUPTHER

## 2018-03-29 NOTE — PROGRESS NOTES
Nurse Navigator was asked to follow up on patient's CPAP and Nebulizer needs during [facility] rounding call today. Patient is being seen today by Pulmonary, Dr. Tyron Llanos , for s/p hospital follow up. Patient is being followed by Vahe Leslie, RN, Nurse Navigator. Writer spoke with Dr. Tyron Llanos prior to patient's appointment regarding patient's potential needs. Dr. Tyron Llanos will evaluate. Writer met patient and patient's daughter's prior to being seen by Dr. Tyron Llanos  Verified 2 patient identifiers. Introduced self, role and reason for visit. Writer inquired about CPAP or Nebulizer medication needs. Patient daughter, Eloisa Cruz stated, \" we have taken care of the CPAP problem with [facility] and we have the medications for her nebulizer but was told to f/u with Pulmonary re: if she will continue  nebulizer treatments and RX if needed. \"      Writer encouraged Eloisa Cruz that if Dr. Dugan Royal her on nebulizer treatments after assessing her to request a RX to be sent to patient's pharmacy or [employer] company due to any further medications should be covered under Medicare Part B. Eloisa Curz stated, \" okay, thank you. \"       Eloisa Cruz asked writer how to operate patient's back up tank due to oxygen tank patient is currently on is empty. Writer demonstrated use to Eloisa Cruz and her sister. Both reported, we can't hear anything coming out through valve. Writer informed them that the tank is a conserving device and will give patient oxygen on demand thus will be unable to hear due to oxygen is not connected to patient at this time. Patient in waiting room with staff nurse and connected to Pulmonary oxygen at this time.

## 2018-03-29 NOTE — MR AVS SNAPSHOT
[address] [facility], [address] [address] 
[phone] Patient: Sheyla He MRN: [id number] Visit Information Date & Time Provider Department Dept. Phone Encounter #  
 3/29/2018  2:30 PM Von Mcrae MD [facility] \[facility]\"" [id number] Follow-up Instructions Return in about 2 months (around 5/29/2018). Your Appointments 6/26/2018  9:00 AM  
Follow Up with Morris Cohen MD  
[facility] (--) Appt Note: hany  
 [place] [address] [address]  
  
    
  
 6/22/2018 10:30 AM  
Any with Akshat Benavides MD  
[facility]) Appt Note: 1 year fu, us here at the office; new appt date, letter mailed 709 [facility] [address] [place] [address]  
  
   
 [address] [place] [address] Sally Flower [address] Upcoming Health Maintenance Date Due Pneumococcal 65+ High/Highest Risk (1 of 2 - PCV13) 11/23/1996 BREAST CANCER SCRN MAMMOGRAM 4/13/2018 MEDICARE YEARLY EXAM 3/30/2018 GLAUCOMA SCREENING Q2Y 10/12/2019 DTaP/Tdap/Td series (2 - Td) 3/29/2027 Allergies as of 3/29/2018  Review Complete On: 3/29/2018 By: Saritha Guido RN Severity Noted Reaction Type Reactions Iodine  10/02/2012   Intolerance Hives Current Immunizations  Reviewed on 4/20/2015 No immunizations on file. Not reviewed this visit You Were Diagnosed With   
  
 Codes Comments Acute bronchitis with chronic obstructive pulmonary disease (COPD) ([facility] [address]    -  Primary ICD-10-CM: J44.0, J20.9 ICD-9-CM: 139.36 Centrilobular emphysema ([facility] [address]     ICD-10-CM: J43.2 ICD-9-CM: 492.8 Shortness of breath     ICD-10-CM: R06.02 
ICD-9-CM: 786.05  Dyspnea on exertion     ICD-10-CM: R06.09 
 ICD-9-CM: 786.09 Chronic respiratory failure with hypoxia and hypercapnia (HCC)     ICD-10-CM: J96.11, J96.12 
ICD-9-CM: 518.83, 799.02, 786.09 Vitals BP Pulse Temp Resp Height(growth percentile) Weight(growth percentile) 100/52 (BP 1 Location: Left arm, BP Patient Position: Sitting) 70 98.3 °F (36.8 °C) (Oral) 16 5' 4\" (1.626 m) 148 lb (67.1 kg) SpO2 BMI OB Status Smoking Status 98% 25.4 kg/m2 Postmenopausal Former Smoker Vitals History BMI and BSA Data Body Mass Index Body Surface Area  
 25.4 kg/m 2 1.74 m 2 Preferred Pharmacy Pharmacy [place] [phone] [facility] Your Updated Medication List  
  
   
This list is accurate as of 3/29/18  3:36 PM.  Always use your most recent med list.  
  
  
  
  
 * albuterol 90 mcg/actuation inhaler Commonly known as:  PROAIR HFA Take 2 Puffs by inhalation every four (4) hours as needed for Wheezing or Shortness of Breath. * albuterol 1.25 mg/3 mL Nebu Commonly known as:  ACCUNEB  
3 mL by Nebulization route every four (4) hours as needed. amLODIPine 10 mg tablet Commonly known as:  Harlon Doyne TAKE ONE TABLET BY MOUTH EVERY DAY  
  
 amoxicillin-clavulanate 875-125 mg per tablet Commonly known as:  AUGMENTIN Take 1 Tab by mouth every twelve (12) hours. arformoterol 15 mcg/2 mL Nebu neb solution Commonly known as:  Illene Altes 2 mL by Nebulization route two (2) times a day. Indications: BRONCHOSPASM PREVENTION WITH COPD  
  
 aspirin 81 mg tablet Take 81 mg by mouth daily. atorvastatin 10 mg tablet Commonly known as:  LIPITOR  
TAKE ONE TABLET BY MOUTH EVERY DAY  
  
 bipap machine kit 1 each by Does Not Apply route. Started using BiPap Machine 01-15-15  
  
 budesonide 1 mg/2 mL Nbsp Commonly known as:  PULMICORT  
2 mL by Nebulization route two (2) times a day.  Rinse your mouth after each use  Indications: Severe Chronic Obstructive Pulmonary Disease CALCIUM CITRATE + PO Take 100 mg by mouth once over twenty-four (24) hours. cholecalciferol (vitamin D3) 4,000 unit Cap Take 1 Cap by mouth daily. doxycycline 100 mg capsule Commonly known as:  Mike Last Take 1 Cap by mouth two (2) times a day for 7 days. famotidine 20 mg tablet Commonly known as:  PEPCID Take 1 Tab by mouth two (2) times a day. FISH OIL PO Take 4,000 mg by mouth daily. fludrocortisone 0.1 mg tablet Commonly known as:  FLORINEF Take 1 tablet by mouth daily. fluticasone 50 mcg/actuation nasal spray Commonly known as:  Jessie Hoot 2 Sprays by Both Nostrils route daily. furosemide 20 mg tablet Commonly known as:  LASIX Take 1 Tab by mouth daily as needed for Other (Leg Swelling). guaiFENesin-dextromethorphan -30 mg per tablet Commonly known as:  HUMIBID DM Take 1 Tab by mouth every twelve (12) hours as needed for Cough. hydrocortisone 10 mg tablet Commonly known as:  CORTEF Take 10 mg by mouth daily. ipratropium 0.02 % Soln Commonly known as:  ATROVENT  
2.5 mL by Nebulization route four (4) times daily. Indications: BRONCHOSPASM PREVENTION WITH COPD  
  
 L-Methylfolate-Z90-Ciggwisvvg tablet Commonly known as:  Darnell Parth Take 1 tablet by mouth daily. L. acidophilus,casei,rhamnosus 50 billion cell Cpdr  
Commonly known as:  BIO-K PLUS Take 1 tab by mouth daily  
  
 lisinopril 20 mg tablet Commonly known as:  PRINIVIL, ZESTRIL  
TAKE ONE TABLET BY MOUTH EVERY DAY  
  
 magnesium oxide 400 mg tablet Commonly known as:  MAG-OX Take 400 mg by mouth daily. Nebulizer & Compressor machine Commonly known as:  PORTABLE NEBULIZER SYSTEM  
1 Each by Does Not Apply route two (2) times a day. * nystatin powder Commonly known as:  MYCOSTATIN Apply  to affected area four (4) times daily. * nystatin 100,000 unit/mL suspension Commonly known as:  MYCOSTATIN Take 5 mL by mouth four (4) times daily. swish and spit OXcarbazepine 300 mg tablet Commonly known as:  TRILEPTAL Take 300 mg by mouth two (2) times a day. * OXYGEN-AIR DELIVERY SYSTEMS  
3 L/min by Does Not Apply route continuous. * OXYGEN-AIR DELIVERY SYSTEMS  
3 L/min by IntraNASal route continuous. PARoxetine 30 mg tablet Commonly known as:  PAXIL TAKE ONE TABLET BY MOUTH EVERY DAY  
  
 potassium chloride 20 mEq tablet Commonly known as:  K-DUR, KLOR-CON  
TAKE ONE TABLET BY MOUTH 2 TIMES A DAY  
  
 predniSONE 10 mg tablet Commonly known as:  Aram Nadir Take 4 tabs for 2 days, then 3 tabs for 3 days, then 2 tabs for 3 days, then 1 tab for 3 days. SYNTHROID 112 mcg tablet Generic drug:  levothyroxine Take 112 mcg by mouth Daily (before breakfast). Elinda [place] Commonly known as:  BARNEY ROLLING WALKER  
1 Device by Does Not Apply route daily. * Notice: This list has 6 medication(s) that are the same as other medications prescribed for you. Read the directions carefully, and ask your doctor or other care provider to review them with you. Prescriptions Printed Refills  
 arformoterol (BROVANA) 15 mcg/2 mL nebu neb solution 3 Si mL by Nebulization route two (2) times a day. Indications: BRONCHOSPASM PREVENTION WITH COPD Class: Print Route: Nebulization  
 budesonide (PULMICORT) 1 mg/2 mL nbsp 3 Si mL by Nebulization route two (2) times a day. Rinse your mouth after each use  Indications: Severe Chronic Obstructive Pulmonary Disease Class: Print Route: Nebulization  
 ipratropium (ATROVENT) 0.02 % soln 3 Si.5 mL by Nebulization route four (4) times daily. Indications: BRONCHOSPASM PREVENTION WITH COPD Class: Print Route: Nebulization Follow-up Instructions Return in about 2 months (around 2018). To-Do List   
 03/30/2018 To Be Determined Appointment with Ofelia Brenner PT at [facility] [facility]  
  
 04/03/2018 To Be Determined Appointment with Brooklynn Varghese RN at [address] [facility]  
  
 04/05/2018 To Be Determined Appointment with Brooklynn Varghese RN at [address] [facility]! Dear [facility]: 
Thank you for requesting a [employer] account. Our records indicate that you already have an active [employer] account. You can access your account anytime at [email or web address] Did you know that you can access your hospital and ER discharge instructions at any time in [employer]? You can also review all of your test results from your hospital stay or ER visit. Additional Information If you have questions, please visit the Frequently Asked Questions section of the [employer] website at [email or web address] Remember, [employer] is NOT to be used for urgent needs. For medical emergencies, dial 911. Now available from your iPhone and Android! Please provide this summary of care documentation to your next provider. Your primary care clinician is listed as Dayton Clancy. If you have any questions after today's visit, please call [phone].

## 2018-03-29 NOTE — PROGRESS NOTES
[address] [place] [address], [facility] [place] [facility]  Pulmonary, Critical Care, and Sleep Medicine    Pulmonary Office F/U  Name: Lillian Luz 80 y.o. female  MRN: [id number]  : 1931  Service Date: 18   Chief Complaint:   Chief Complaint   Patient presents with   [facility] Follow Up    COPD    Shortness of Breath     sometimes,         History of Present Illness:  Lillian Luz is a 80 y.o. female, who presents to Pulmonary clinic referred for hspital discharge f/u for dyspnea and pneumonia. Pt was admitted to [facility] from 3/18-3/22; and was admitted earlier in the month as well. Pt is accompanied by her two daughters who provide the majority of the history. They report patient is doing much better. She ABx completed on , and three days left on steroids. Pt's dyspnea has much improved since getting home. Pt reports she is able to get around the house more easily with only mild fatigue. Denies worsening cough, no sputum, fevers, chills, night sweats, chest pain, hemoptysis. They also report the patient has stopped smoking since her last admission. Past Medical Hx: I have personally reviewed medical hx  Past Medical History:   Diagnosis Date    Abuse     Alcohol    Adrenal insufficiency (HCC)     Anxiety     Calculus of kidney     Cardiac echocardiogram 2012    EF 60-65%. No WMA. Mild conc LVH. Gr 1 DDfx. RVSP 20-25 mmHg. Mild SAGAR. Mild TR,. Mild PAE.  Cardiovascular LE venous duplex 10/08/2012    No venous thrombosis or venous insufficiency in bilateral lower extremities.  Carotid duplex 10/09/2014    Mild < 50% bilateral ICA stenosis.       Chronic lung disease     Contact dermatitis and other eczema, due to unspecified cause     COPD (chronic obstructive pulmonary disease) (HCC)     Degenerative joint disease     Depression     Hematuria     Hypercholesteremia     Hypertension     Hypothyroidism 1998    Kidney stone     Neuropathy 3/2007    Orthostatic hypotension     Pituitary adenoma ([facility] [address] 2/1998    Pituitary tumor 1998    Pneumonia     Seizures ([facility] [address]     none recently    Severe obstructive sleep apnea 01-15-15     started using Bipap Machine    Stress     Stroke ([facility] [address] 2/2006    no residual    TIA (transient ischemic attack)     Trauma        Past Surgical Hx: I have personally reviewed surgical hx  Past Surgical History:   Procedure Laterality Date    CYSTOSCOPY,INSERT URETERAL STENT  9/14/15    Dr. Brianna Manuel HX LITHOTRIPSY  9/14/15    Dr. Samantha Fuller HX WISDOM TEETH EXTRACTION      x4       Family Hx: I have personally reviewed the family hx. No family hx of hereditary lung disease with immediate family  Family History   Problem Relation Age of Onset    Heart Disease Father     Hypertension Father     Hypertension Mother     Cancer Mother      skin    Elevated Lipids Sister     Heart Disease Sister     Cancer Maternal Grandmother      colon and stomach    Heart Disease Paternal Grandmother     Heart Attack Paternal Grandmother     Heart Attack Paternal Grandfather     Heart Disease Paternal Grandfather        Social Hx: I have personally reviewed the social hx. Social History     Social History    Marital status:      Spouse name: N/A    Number of children: N/A    Years of education: N/A     Occupational History    Not on file.      Social History Main Topics    Smoking status: Former Smoker     Packs/day: 1.50     Years: 65.00     Types: Cigarettes     Quit date: 3/9/2018    Smokeless tobacco: Never Used    Alcohol use No      Comment: quit 1998 due to Alcohol Abuse    Drug use: No    Sexual activity: No     Other Topics Concern    Not on file     Social History Narrative     Occupational Hx:  No occupational exposure to coal, silica, or asbestos      Allergies: I have reviewed the allergy hx  Allergies   Allergen Reactions    Iodine Hives       Medications:  I have reviewed the patient's medications  Prior to Admission medications    Medication Sig Start Date End Date Taking? Authorizing Provider   arformoterol (BROVANA) 15 mcg/2 mL nebu neb solution Take 15 mcg by inhalation two (2) times a day. Yes Historical Provider   guaiFENesin-dextromethorphan SR (HUMIBID DM) 600-30 mg per tablet Take 1 Tab by mouth every twelve (12) hours as needed for Cough. 3/22/18  Yes Hyun Manzo PA-C   nystatin (MYCOSTATIN) 100,000 unit/mL suspension Take 5 mL by mouth four (4) times daily. swish and spit 3/22/18  Yes Hyun Manzo PA-C   predniSONE (DELTASONE) 10 mg tablet Take 4 tabs for 2 days, then 3 tabs for 3 days, then 2 tabs for 3 days, then 1 tab for 3 days. 3/22/18  Yes Hyun Manzo PA-C   amoxicillin-clavulanate (AUGMENTIN) 875-125 mg per tablet Take 1 Tab by mouth every twelve (12) hours. 3/22/18  Yes Hyun Manzo PA-C   doxycycline (MONODOX) 100 mg capsule Take 1 Cap by mouth two (2) times a day for 7 days. 3/22/18 3/29/18 Yes Hyun Manzo PA-C   L. acidophilus,casei,rhamnosus (BIO-K PLUS) 50 billion cell cpDR Take 1 tab by mouth daily 3/22/18  Yes Hyun Manzo PA-C   famotidine (PEPCID) 20 mg tablet Take 1 Tab by mouth two (2) times a day. 3/22/18  Yes Hyun Manzo PA-C   Nebulizer & Compressor (PORTABLE NEBULIZER SYSTEM) machine 1 Each by Does Not Apply route two (2) times a day. 3/22/18  Yes Hyun Manzo PA-C   cholecalciferol, vitamin D3, 4,000 unit cap Take 1 Cap by mouth daily. Yes Historical Provider   OXYGEN-AIR DELIVERY SYSTEMS 3 L/min by Does Not Apply route continuous.    Yes Historical Provider   lisinopril (PRINIVIL, ZESTRIL) 20 mg tablet TAKE ONE TABLET BY MOUTH EVERY DAY 1/25/18  Yes Jaspreet Maciel MD   PARoxetine (PAXIL) 30 mg tablet TAKE ONE TABLET BY MOUTH EVERY DAY 1/25/18  Yes Jaspreet Maciel MD   atorvastatin (LIPITOR) 10 mg tablet TAKE ONE TABLET BY MOUTH EVERY DAY 1/25/18  Yes Alyce Salvador MD   potassium chloride (K-DUR, KLOR-CON) 20 mEq tablet TAKE ONE TABLET BY MOUTH 2 TIMES A DAY  Patient taking differently: Take 20 mEq by mouth two (2) times a day. TAKE ONE TABLET BY MOUTH 2 TIMES A DAY 1/25/18  Yes Alyce Salvador MD   amLODIPine (NORVASC) 10 mg tablet TAKE ONE TABLET BY MOUTH EVERY DAY 1/25/18  Yes Alyce Salvador MD   albuterol (PROAIR HFA) 90 mcg/actuation inhaler Take 2 Puffs by inhalation every four (4) hours as needed for Wheezing or Shortness of Breath. 5/26/17  Yes Tory Fuentes NP   fluticasone (FLONASE) 50 mcg/actuation nasal spray 2 Sprays by Both Nostrils route daily. 4/10/17  Yes Historical Provider   SYNTHROID 112 mcg tablet Take 112 mcg by mouth Daily (before breakfast). 7/19/16  Yes Historical Provider   hydrocortisone (CORTEF) 10 mg tablet Take 10 mg by mouth daily. 10/26/15  Yes Historical Provider   Walker (BARNEY ROLLING WALKER) misc 1 Device by Does Not Apply route daily. 8/31/15  Yes JENNIFER Francis   bipap machine kit 1 each by Does Not Apply route. Started using BiPap Machine 01-15-15   Yes Historical Provider   L-Methylfolate-M83-Rlgebwyqfx (CEREFOLIN NAC) tablet Take 1 tablet by mouth daily. 10/30/14  Yes Alyce Salvador MD   fludrocortisone (FLORINEF) 0.1 mg tablet Take 1 tablet by mouth daily. 10/30/14  Yes Alyce Salvador MD   oxcarbazepine (TRILEPTAL) 300 mg tablet Take 300 mg by mouth two (2) times a day. Yes Historical Provider   magnesium oxide (MAG-OX) 400 mg tablet Take 400 mg by mouth daily. Yes Historical Provider   aspirin 81 mg tablet Take 81 mg by mouth daily. Yes Historical Provider   DOCOSAHEXANOIC ACID/EPA (FISH OIL PO) Take 4,000 mg by mouth daily. Yes Historical Provider   CALCIUM CITRATE/VITAMIN D3 (CALCIUM CITRATE + PO) Take 100 mg by mouth once over twenty-four (24) hours. Yes Historical Provider   OXYGEN-AIR DELIVERY SYSTEMS 3 L/min by IntraNASal route continuous. Historical Provider   albuterol (ACCUNEB) 1.25 mg/3 mL nebu 3 mL by Nebulization route every four (4) hours as needed. 3/22/18   Hyun Manzo PA-C   nystatin (MYCOSTATIN) powder Apply  to affected area four (4) times daily. Patient taking differently: Apply 100 g to affected area four (4) times daily. 7/25/17   Beth Silverio MD   furosemide (LASIX) 20 mg tablet Take 1 Tab by mouth daily as needed for Other (Leg Swelling). Patient not taking: Reported on 3/27/2018 4/12/16   Beth Silevrio MD       Immunizations:  I have reviewed the patient's immunizations    There is no immunization history on file for this patient. Review of Systems:  A complete review of systems was performed as stated in the HPI, all others are negative.       Objective:    Physical Exam:  /52 (BP 1 Location: Left arm, BP Patient Position: Sitting)  Pulse 70  Temp 98.3 °F (36.8 °C) (Oral)   Resp 16  Ht 5' 4\" (1.626 m)  Wt 67.1 kg (148 lb)  SpO2 98% Comment: 3 L continuous  BMI 25.4 kg/m2  Vitals were personally reviewed  Gen: no acute distress, pleasant and cooperative, sitting up in chair, unaable to climb to exam table, ambulates slowly with walker without difficulty  HEENT: normocephalic/atraumatic, PERRLA, EOMI, no scleral icterus, nasal turbinates have no erythema, no nasal polyps, no oral lesions, poor dentition, Mallampati IV  Neck: supple, trachea midline, no JVD, no cervical and supraclavicular adenopathy  Chest: no lesions, with even rise and fall, no pectus excavatum or flail chest  CVS: regular rate rhythm, S1/S2, no murmurs/rubs/gallops  Lungs: improved air entry B/L, no wheezes/rales/rhonchi  Back: + kyphosis, no scoliosis  Abdomen: soft, nontender, bowel sounds present, no hepatosplenomegaly  Ext: no pitting edema B/L, no peripheral cyanosis or clubbing  Neuro: grossly normal, AAOx3, normal strength and coordination grossly, no focal deficits  Skin: no rashes, erythema, lesions  Psych: normal memory, thought content, and processing    Labs: I have reviewed the patient's available labs  Lab Results   Component Value Date/Time    WBC 8.1 03/22/2018 03:15 AM    HGB 10.5 (L) 03/22/2018 03:15 AM    HCT 31.7 (L) 03/22/2018 03:15 AM    PLATELET 455 37/95/4076 03:15 AM    MCV 92.4 03/22/2018 03:15 AM     Lab Results   Component Value Date/Time    Sodium 132 (L) 03/22/2018 03:15 AM    Potassium 4.3 03/22/2018 03:15 AM    Chloride 100 03/22/2018 03:15 AM    CO2 26 03/22/2018 03:15 AM    Anion gap 6 03/22/2018 03:15 AM    Glucose 152 (H) 03/22/2018 03:15 AM    BUN 21 (H) 03/22/2018 03:15 AM    Creatinine 0.85 03/22/2018 03:15 AM    BUN/Creatinine ratio 25 (H) 03/22/2018 03:15 AM    GFR est AA >60 03/22/2018 03:15 AM    GFR est non-AA >60 03/22/2018 03:15 AM    Calcium 8.1 (L) 03/22/2018 03:15 AM    Bilirubin, total 0.5 03/18/2018 08:15 PM    AST (SGOT) 15 03/18/2018 08:15 PM    Alk. phosphatase 61 03/18/2018 08:15 PM    Protein, total 6.7 03/18/2018 08:15 PM    Albumin 3.4 03/18/2018 08:15 PM    Globulin 3.3 03/18/2018 08:15 PM    A-G Ratio 1.0 03/18/2018 08:15 PM    ALT (SGPT) 20 03/18/2018 08:15 PM       Imaging:  I have personally reviewed patient's imaging  XR Results (most recent):    Results from Hospital Encounter encounter on 03/18/18   XR SWALLOW FUNC VIDEO   Narrative Modified barium swallow. CPT CODE: 21689    HISTORY: Dysphagia, difficulty feeding, bibasal infiltrates suspicious for  aspiration pneumonia. COMPARISON: Chest x-ray 3/18/2018. FLUORO TIME: 30 seconds    NUMBER OF FLUOROSCOPIC IMAGES: 6 loops    FINDINGS:    Study was performed in conjunction with the speech therapist.  The video is  available in the department for review. Patient swallowed multiple consistencies  of barium mixtures including thin liquids, puree, solids. There is normal swallowing with all consistencies. No evidence of aspiration.          Impression IMPRESSION:    Normal modified barium swallow        CT Results (most recent):    Results from [place] encounter on 03/18/18   CT HEAD WO CONT   Narrative CT Of The Head Without Contrast    CPT CODE: 24979    HISTORY: TIA. Decreased O2 sats, sleepy. COMPARISON: 3/9/2018 head CT. 7/22/2014 brain MRI    TECHNIQUE:  Axial CT images of the head from the skull base to the vertex  without IV contrast. CT dose reduction was achieved through use of a  standardized protocol tailored for this examination and automatic exposure  control for dose modulation. FINDINGS:     No findings of acute intracranial hemorrhage. No abnormal extra-axial fluid  collections over the convexities. Increased CSF space over the frontal lobes  also present on prior exams, appears to be atrophy-related enlargement of  subarachnoid space. Mild prominence of the lateral ventricles consistent with  the degree of cerebral volume loss. No hydrocephalus. No visible mass lesion or  shift of midline structures. Periventricular white matter lucencies consistent  with moderate chronic small vessel ischemic changes. Chronic lacunar infarcts in  the deep brain involving bilateral basal ganglia, bilateral thalami similar to  the prior exam. No acute or chronic cortical infarct. Paranasal sinuses are clear to level imaged. No mastoid effusion. Impression IMPRESSION:    No acute intracranial abnormality. No acute infarct, acute hemorrhage, or mass. No significant change compared with 3/9/2018 head CT. Chronic microvascular ischemic changes including moderate periventricular white  matter lucencies and multifocal chronic deep brain lacunar infarcts appear  stable. PFTs:  I have reviewed the patient's PFTs -- last done in 6/2017, pt unable to do maneuver    TTE:  I have reviewed the patient's TTE results  No results found for this or any previous visit. Assessment and Plan:  80 y.o. female with:    Impression:  1. LRTI with pneumonia: Resolved, pt hospitalized from 3/18-3/22  2. COPD exacerbation:  Resolved  3. Underlying severe COPD  4. Chronic hypoxia:  Pt on 3LNC  5. Deconditioning:  Improving  6. Tobacco dependence in remission:  Pt quit smoking at last hospitalization  7. Hx of chronic mineralcorticoid use    Plan:  -Will continue nebulizer therapy and avoid inhalers since patient is unable to complete breathhold maneuver. Discussed difficulty in obtaining medications. Wrote new scripts  -Brovana nebs BID  -Pulmicort 1mg nebs BID. Advised to rinse mouth thoroughly after each use  -Ipratropium nebs QID  -Continue albuterol PRN  -Continue supplemental oxygen 3LNC at all times  -Will obtain repeat chest imaging at next visit  -Continue home physical therapy and advised to remain active to avoid further deconditioning  -Congratulated patient on smoking abstinence and counseled on health risks of continued smoking including MI, CVA, cancer, pneumonia, etc.      RTC: Follow-up Disposition:  Return in about 2 months (around 5/29/2018).       Orders Placed This Encounter    arformoterol (BROVANA) 15 mcg/2 mL nebu neb solution    budesonide (PULMICORT) 1 mg/2 mL nbsp    ipratropium (ATROVENT) 0.02 % festus Bansal MD/MPH     Pulmonary, Critical Care Medicine  [facility]

## 2018-03-29 NOTE — LETTER
4/1/2018 11:25 PM 
 
Patient:  Sina Navas YOB: 1931 Date of Visit: 3/29/2018 Dear MD [address] [place] [address] [id number] [address] [id number] VIA In Basket 
 : Thank you for referring Ms. Nguyen Elmore to me for evaluation/treatment. Below are the relevant portions of my assessment and plan of care. If you have questions, please do not hesitate to call me. I look forward to following Ms. Villar along with you. Sincerely, Vernel Goodpasture MD/MPH Pulmonary, Critical Care Medicine [facility]

## 2018-03-30 ENCOUNTER — HOME CARE VISIT (OUTPATIENT)
Dept: SCHEDULING | Facility: HOME HEALTH | Age: 83
End: 2018-03-30
Payer: MEDICARE

## 2018-03-30 ENCOUNTER — HOME CARE VISIT (OUTPATIENT)
Dept: HOME HEALTH SERVICES | Facility: HOME HEALTH | Age: 83
End: 2018-03-30
Payer: MEDICARE

## 2018-03-30 VITALS
HEART RATE: 65 BPM | RESPIRATION RATE: 20 BRPM | DIASTOLIC BLOOD PRESSURE: 90 MMHG | OXYGEN SATURATION: 98 % | TEMPERATURE: 98.1 F | SYSTOLIC BLOOD PRESSURE: 132 MMHG

## 2018-03-30 PROCEDURE — 3331090002 HH PPS REVENUE DEBIT

## 2018-03-30 PROCEDURE — G0151 HHCP-SERV OF PT,EA 15 MIN: HCPCS

## 2018-03-30 PROCEDURE — G0299 HHS/HOSPICE OF RN EA 15 MIN: HCPCS

## 2018-03-30 PROCEDURE — 3331090001 HH PPS REVENUE CREDIT

## 2018-03-31 PROCEDURE — 3331090001 HH PPS REVENUE CREDIT

## 2018-03-31 PROCEDURE — 3331090002 HH PPS REVENUE DEBIT

## 2018-04-01 VITALS
HEART RATE: 65 BPM | OXYGEN SATURATION: 97 % | SYSTOLIC BLOOD PRESSURE: 105 MMHG | DIASTOLIC BLOOD PRESSURE: 51 MMHG | TEMPERATURE: 100 F

## 2018-04-01 PROCEDURE — 3331090002 HH PPS REVENUE DEBIT

## 2018-04-01 PROCEDURE — 3331090001 HH PPS REVENUE CREDIT

## 2018-04-02 ENCOUNTER — HOME CARE VISIT (OUTPATIENT)
Dept: SCHEDULING | Facility: HOME HEALTH | Age: 83
End: 2018-04-02
Payer: MEDICARE

## 2018-04-02 PROCEDURE — 3331090002 HH PPS REVENUE DEBIT

## 2018-04-02 PROCEDURE — G0157 HHC PT ASSISTANT EA 15: HCPCS

## 2018-04-02 PROCEDURE — 3331090001 HH PPS REVENUE CREDIT

## 2018-04-03 ENCOUNTER — HOME CARE VISIT (OUTPATIENT)
Dept: SCHEDULING | Facility: HOME HEALTH | Age: 83
End: 2018-04-03
Payer: MEDICARE

## 2018-04-03 VITALS
OXYGEN SATURATION: 99 % | TEMPERATURE: 98.9 F | HEART RATE: 63 BPM | DIASTOLIC BLOOD PRESSURE: 66 MMHG | SYSTOLIC BLOOD PRESSURE: 134 MMHG

## 2018-04-03 PROCEDURE — 3331090001 HH PPS REVENUE CREDIT

## 2018-04-03 PROCEDURE — 3331090002 HH PPS REVENUE DEBIT

## 2018-04-03 PROCEDURE — G0299 HHS/HOSPICE OF RN EA 15 MIN: HCPCS

## 2018-04-04 ENCOUNTER — HOME CARE VISIT (OUTPATIENT)
Dept: SCHEDULING | Facility: HOME HEALTH | Age: 83
End: 2018-04-04
Payer: MEDICARE

## 2018-04-04 VITALS
TEMPERATURE: 98.8 F | HEART RATE: 65 BPM | OXYGEN SATURATION: 98 % | DIASTOLIC BLOOD PRESSURE: 77 MMHG | RESPIRATION RATE: 18 BRPM | SYSTOLIC BLOOD PRESSURE: 138 MMHG

## 2018-04-04 PROCEDURE — 3331090002 HH PPS REVENUE DEBIT

## 2018-04-04 PROCEDURE — 3331090001 HH PPS REVENUE CREDIT

## 2018-04-04 PROCEDURE — G0157 HHC PT ASSISTANT EA 15: HCPCS

## 2018-04-05 ENCOUNTER — HOME CARE VISIT (OUTPATIENT)
Dept: HOME HEALTH SERVICES | Facility: HOME HEALTH | Age: 83
End: 2018-04-05
Payer: MEDICARE

## 2018-04-05 ENCOUNTER — HOME CARE VISIT (OUTPATIENT)
Dept: SCHEDULING | Facility: HOME HEALTH | Age: 83
End: 2018-04-05
Payer: MEDICARE

## 2018-04-05 VITALS
HEART RATE: 62 BPM | DIASTOLIC BLOOD PRESSURE: 72 MMHG | TEMPERATURE: 99.5 F | SYSTOLIC BLOOD PRESSURE: 126 MMHG | RESPIRATION RATE: 18 BRPM | OXYGEN SATURATION: 98 %

## 2018-04-05 VITALS
TEMPERATURE: 98.7 F | RESPIRATION RATE: 18 BRPM | SYSTOLIC BLOOD PRESSURE: 121 MMHG | HEART RATE: 67 BPM | DIASTOLIC BLOOD PRESSURE: 64 MMHG | OXYGEN SATURATION: 100 %

## 2018-04-05 PROCEDURE — G0299 HHS/HOSPICE OF RN EA 15 MIN: HCPCS

## 2018-04-05 PROCEDURE — 3331090001 HH PPS REVENUE CREDIT

## 2018-04-05 PROCEDURE — G0152 HHCP-SERV OF OT,EA 15 MIN: HCPCS

## 2018-04-05 PROCEDURE — 3331090002 HH PPS REVENUE DEBIT

## 2018-04-06 ENCOUNTER — HOME CARE VISIT (OUTPATIENT)
Dept: SCHEDULING | Facility: HOME HEALTH | Age: 83
End: 2018-04-06
Payer: MEDICARE

## 2018-04-06 ENCOUNTER — TELEPHONE (OUTPATIENT)
Dept: PULMONOLOGY | Age: 83
End: 2018-04-06

## 2018-04-06 PROCEDURE — 3331090002 HH PPS REVENUE DEBIT

## 2018-04-06 PROCEDURE — 3331090001 HH PPS REVENUE CREDIT

## 2018-04-06 PROCEDURE — G0157 HHC PT ASSISTANT EA 15: HCPCS

## 2018-04-07 VITALS
SYSTOLIC BLOOD PRESSURE: 125 MMHG | OXYGEN SATURATION: 98 % | TEMPERATURE: 98.6 F | DIASTOLIC BLOOD PRESSURE: 65 MMHG | HEART RATE: 71 BPM

## 2018-04-07 PROCEDURE — 3331090002 HH PPS REVENUE DEBIT

## 2018-04-07 PROCEDURE — 3331090001 HH PPS REVENUE CREDIT

## 2018-04-08 VITALS
DIASTOLIC BLOOD PRESSURE: 68 MMHG | TEMPERATURE: 98 F | OXYGEN SATURATION: 97 % | RESPIRATION RATE: 18 BRPM | HEART RATE: 67 BPM | SYSTOLIC BLOOD PRESSURE: 121 MMHG

## 2018-04-08 PROCEDURE — 3331090002 HH PPS REVENUE DEBIT

## 2018-04-08 PROCEDURE — 3331090001 HH PPS REVENUE CREDIT

## 2018-04-09 PROCEDURE — 3331090001 HH PPS REVENUE CREDIT

## 2018-04-09 PROCEDURE — 3331090002 HH PPS REVENUE DEBIT

## 2018-04-10 ENCOUNTER — HOME CARE VISIT (OUTPATIENT)
Dept: SCHEDULING | Facility: HOME HEALTH | Age: 83
End: 2018-04-10
Payer: MEDICARE

## 2018-04-10 PROCEDURE — G0157 HHC PT ASSISTANT EA 15: HCPCS

## 2018-04-10 PROCEDURE — 3331090001 HH PPS REVENUE CREDIT

## 2018-04-10 PROCEDURE — 3331090002 HH PPS REVENUE DEBIT

## 2018-04-11 ENCOUNTER — HOME CARE VISIT (OUTPATIENT)
Dept: SCHEDULING | Facility: HOME HEALTH | Age: 83
End: 2018-04-11
Payer: MEDICARE

## 2018-04-11 VITALS
HEART RATE: 70 BPM | TEMPERATURE: 98.8 F | HEART RATE: 72 BPM | DIASTOLIC BLOOD PRESSURE: 60 MMHG | DIASTOLIC BLOOD PRESSURE: 64 MMHG | RESPIRATION RATE: 19 BRPM | OXYGEN SATURATION: 95 % | RESPIRATION RATE: 19 BRPM | SYSTOLIC BLOOD PRESSURE: 110 MMHG | OXYGEN SATURATION: 98 % | TEMPERATURE: 98.4 F | SYSTOLIC BLOOD PRESSURE: 118 MMHG

## 2018-04-11 PROCEDURE — 3331090001 HH PPS REVENUE CREDIT

## 2018-04-11 PROCEDURE — 3331090002 HH PPS REVENUE DEBIT

## 2018-04-11 PROCEDURE — G0157 HHC PT ASSISTANT EA 15: HCPCS

## 2018-04-11 PROCEDURE — G0152 HHCP-SERV OF OT,EA 15 MIN: HCPCS

## 2018-04-12 ENCOUNTER — HOME CARE VISIT (OUTPATIENT)
Dept: HOME HEALTH SERVICES | Facility: HOME HEALTH | Age: 83
End: 2018-04-12
Payer: MEDICARE

## 2018-04-12 ENCOUNTER — HOME CARE VISIT (OUTPATIENT)
Dept: SCHEDULING | Facility: HOME HEALTH | Age: 83
End: 2018-04-12
Payer: MEDICARE

## 2018-04-12 PROCEDURE — 3331090001 HH PPS REVENUE CREDIT

## 2018-04-12 PROCEDURE — G0152 HHCP-SERV OF OT,EA 15 MIN: HCPCS

## 2018-04-12 PROCEDURE — 3331090002 HH PPS REVENUE DEBIT

## 2018-04-13 ENCOUNTER — HOME CARE VISIT (OUTPATIENT)
Dept: SCHEDULING | Facility: HOME HEALTH | Age: 83
End: 2018-04-13
Payer: MEDICARE

## 2018-04-13 VITALS
HEART RATE: 70 BPM | TEMPERATURE: 98.9 F | SYSTOLIC BLOOD PRESSURE: 130 MMHG | TEMPERATURE: 98.2 F | DIASTOLIC BLOOD PRESSURE: 70 MMHG | HEART RATE: 67 BPM | OXYGEN SATURATION: 95 % | DIASTOLIC BLOOD PRESSURE: 70 MMHG | OXYGEN SATURATION: 93 % | SYSTOLIC BLOOD PRESSURE: 140 MMHG

## 2018-04-13 PROCEDURE — G0157 HHC PT ASSISTANT EA 15: HCPCS

## 2018-04-13 PROCEDURE — 3331090001 HH PPS REVENUE CREDIT

## 2018-04-13 PROCEDURE — 3331090002 HH PPS REVENUE DEBIT

## 2018-04-14 PROCEDURE — 3331090001 HH PPS REVENUE CREDIT

## 2018-04-14 PROCEDURE — 3331090002 HH PPS REVENUE DEBIT

## 2018-04-15 PROCEDURE — 3331090002 HH PPS REVENUE DEBIT

## 2018-04-15 PROCEDURE — 3331090001 HH PPS REVENUE CREDIT

## 2018-04-16 ENCOUNTER — HOME CARE VISIT (OUTPATIENT)
Dept: SCHEDULING | Facility: HOME HEALTH | Age: 83
End: 2018-04-16
Payer: MEDICARE

## 2018-04-16 PROCEDURE — 3331090002 HH PPS REVENUE DEBIT

## 2018-04-16 PROCEDURE — G0157 HHC PT ASSISTANT EA 15: HCPCS

## 2018-04-16 PROCEDURE — 3331090001 HH PPS REVENUE CREDIT

## 2018-04-17 VITALS
SYSTOLIC BLOOD PRESSURE: 100 MMHG | OXYGEN SATURATION: 99 % | HEART RATE: 70 BPM | DIASTOLIC BLOOD PRESSURE: 63 MMHG | RESPIRATION RATE: 19 BRPM | RESPIRATION RATE: 20 BRPM | TEMPERATURE: 98.6 F | DIASTOLIC BLOOD PRESSURE: 64 MMHG | TEMPERATURE: 97.7 F | HEART RATE: 67 BPM | SYSTOLIC BLOOD PRESSURE: 121 MMHG | OXYGEN SATURATION: 98 %

## 2018-04-17 PROCEDURE — 3331090002 HH PPS REVENUE DEBIT

## 2018-04-17 PROCEDURE — 3331090001 HH PPS REVENUE CREDIT

## 2018-04-18 ENCOUNTER — HOME CARE VISIT (OUTPATIENT)
Dept: SCHEDULING | Facility: HOME HEALTH | Age: 83
End: 2018-04-18
Payer: MEDICARE

## 2018-04-18 VITALS
OXYGEN SATURATION: 98 % | SYSTOLIC BLOOD PRESSURE: 122 MMHG | TEMPERATURE: 97.3 F | HEART RATE: 65 BPM | DIASTOLIC BLOOD PRESSURE: 62 MMHG | RESPIRATION RATE: 18 BRPM

## 2018-04-18 PROCEDURE — G0157 HHC PT ASSISTANT EA 15: HCPCS

## 2018-04-18 PROCEDURE — G0152 HHCP-SERV OF OT,EA 15 MIN: HCPCS

## 2018-04-18 PROCEDURE — 3331090001 HH PPS REVENUE CREDIT

## 2018-04-18 PROCEDURE — 3331090002 HH PPS REVENUE DEBIT

## 2018-04-19 PROCEDURE — 3331090001 HH PPS REVENUE CREDIT

## 2018-04-19 PROCEDURE — 3331090002 HH PPS REVENUE DEBIT

## 2018-04-20 ENCOUNTER — HOME CARE VISIT (OUTPATIENT)
Dept: SCHEDULING | Facility: HOME HEALTH | Age: 83
End: 2018-04-20
Payer: MEDICARE

## 2018-04-20 PROCEDURE — 3331090002 HH PPS REVENUE DEBIT

## 2018-04-20 PROCEDURE — 3331090001 HH PPS REVENUE CREDIT

## 2018-04-20 PROCEDURE — G0157 HHC PT ASSISTANT EA 15: HCPCS

## 2018-04-21 PROCEDURE — 3331090001 HH PPS REVENUE CREDIT

## 2018-04-21 PROCEDURE — 3331090002 HH PPS REVENUE DEBIT

## 2018-04-22 PROCEDURE — 3331090001 HH PPS REVENUE CREDIT

## 2018-04-22 PROCEDURE — 3331090002 HH PPS REVENUE DEBIT

## 2018-04-23 ENCOUNTER — HOME CARE VISIT (OUTPATIENT)
Dept: SCHEDULING | Facility: HOME HEALTH | Age: 83
End: 2018-04-23
Payer: MEDICARE

## 2018-04-23 ENCOUNTER — PATIENT OUTREACH (OUTPATIENT)
Dept: FAMILY MEDICINE CLINIC | Age: 83
End: 2018-04-23

## 2018-04-23 VITALS
DIASTOLIC BLOOD PRESSURE: 59 MMHG | OXYGEN SATURATION: 98 % | HEART RATE: 69 BPM | TEMPERATURE: 98.7 F | SYSTOLIC BLOOD PRESSURE: 128 MMHG

## 2018-04-23 VITALS
RESPIRATION RATE: 18 BRPM | OXYGEN SATURATION: 100 % | DIASTOLIC BLOOD PRESSURE: 76 MMHG | HEART RATE: 63 BPM | SYSTOLIC BLOOD PRESSURE: 123 MMHG | TEMPERATURE: 98.9 F

## 2018-04-23 PROCEDURE — 3331090001 HH PPS REVENUE CREDIT

## 2018-04-23 PROCEDURE — G0151 HHCP-SERV OF PT,EA 15 MIN: HCPCS

## 2018-04-23 PROCEDURE — 3331090002 HH PPS REVENUE DEBIT

## 2018-04-23 NOTE — PROGRESS NOTES
Complex Case Management  Follow-Up    Date/Time:  4/23/2018 3:48 PM     NN contacted patient for Complex Case Management  follow up. Spoke to patient. Introduced self/role and reason for call. Patient/daughter [facility] SADIE) reported: \"I'm doing good\", O2 3 LPM, using CPAP, has nebulizer solution, dry cough, wheezing; \"no more than usual\", confusion very seldom, daughters smoking on the porch, has not smoked since 1/3/18    Pertinent negatives: dizziness, CP, fever/chills, constipation, diarrhea, dysuria    Medication:   Medications reconciliation during this outreach:no  New medications since last outreach: no  Does patient need refills on any medications: no  Medication changes since last outreach (dose adjustments or discontinued meds): no    Home Health company: [facility]  Date of initial visit: 3/18/18  Date of discharge: SN discharge 4/5; has PT/OT    Barriers to care? No apparent barriers to care noted at this time. Specialist appointments since last outreach? no  If so, specialist and date: N/A    Patient reminded that there are physicians on call 24 hours a day / 7 days a week (M-F 5pm to 8am and from Friday 5pm until Monday 8a for the weekend) should the patient have questions or concerns. Future Appointments  Date Time Provider Marcelo Flores   6/22/2018 10:30 AM Joelle Salazar MD [address] [facility]   6/26/2018 9:00 AM Deb Mendez MD [facility] None        Other upcoming appointments:  N/A    Goals        COPD     Smoking cessation. Plan: Discuss/educate on the effect of smoking regarding COPD.   4/23/18: Patient reported has not smoked since 1/3/18         Post ED     Supportive resources in place to maintain patient in the community (ie., home health, equipment, DME, refer to, etc.)            3/14:[facility] to scheduled to see patient today  4/23/18: D/c'd from [facility]; has [facility] for PT/OT. Post Hospitalization     Attends follow-up appointments as directed.             Plan: Verify aware of upcoming PCP appt and advise to schedule appt with pulmonology. 3/14Bola Rashid (daughter) aware of PCP appt and agreed patient should follow up with pulmonology. 3/26/18: Patient attended transition of care appt today. 3/29/18: Patient attended pulmonology appt today.

## 2018-04-24 PROCEDURE — 3331090002 HH PPS REVENUE DEBIT

## 2018-04-24 PROCEDURE — 3331090001 HH PPS REVENUE CREDIT

## 2018-04-25 ENCOUNTER — HOME CARE VISIT (OUTPATIENT)
Dept: SCHEDULING | Facility: HOME HEALTH | Age: 83
End: 2018-04-25
Payer: MEDICARE

## 2018-04-25 PROCEDURE — 3331090001 HH PPS REVENUE CREDIT

## 2018-04-25 PROCEDURE — 3331090002 HH PPS REVENUE DEBIT

## 2018-04-25 PROCEDURE — G0157 HHC PT ASSISTANT EA 15: HCPCS

## 2018-04-26 ENCOUNTER — HOME CARE VISIT (OUTPATIENT)
Dept: HOME HEALTH SERVICES | Facility: HOME HEALTH | Age: 83
End: 2018-04-26
Payer: MEDICARE

## 2018-04-26 VITALS
HEART RATE: 75 BPM | OXYGEN SATURATION: 98 % | TEMPERATURE: 98.4 F | RESPIRATION RATE: 20 BRPM | DIASTOLIC BLOOD PRESSURE: 60 MMHG | SYSTOLIC BLOOD PRESSURE: 121 MMHG

## 2018-04-26 PROCEDURE — 3331090001 HH PPS REVENUE CREDIT

## 2018-04-26 PROCEDURE — 3331090002 HH PPS REVENUE DEBIT

## 2018-04-27 ENCOUNTER — HOME CARE VISIT (OUTPATIENT)
Dept: SCHEDULING | Facility: HOME HEALTH | Age: 83
End: 2018-04-27
Payer: MEDICARE

## 2018-04-27 VITALS
OXYGEN SATURATION: 98 % | TEMPERATURE: 97.3 F | DIASTOLIC BLOOD PRESSURE: 62 MMHG | RESPIRATION RATE: 18 BRPM | HEART RATE: 65 BPM | SYSTOLIC BLOOD PRESSURE: 122 MMHG

## 2018-04-27 DIAGNOSIS — Z12.31 ENCOUNTER FOR SCREENING MAMMOGRAM FOR MALIGNANT NEOPLASM OF BREAST: Primary | ICD-10-CM

## 2018-04-27 PROCEDURE — 3331090002 HH PPS REVENUE DEBIT

## 2018-04-27 PROCEDURE — G0157 HHC PT ASSISTANT EA 15: HCPCS

## 2018-04-27 PROCEDURE — 3331090001 HH PPS REVENUE CREDIT

## 2018-04-28 PROCEDURE — 3331090002 HH PPS REVENUE DEBIT

## 2018-04-28 PROCEDURE — 3331090001 HH PPS REVENUE CREDIT

## 2018-04-29 VITALS
RESPIRATION RATE: 18 BRPM | OXYGEN SATURATION: 98 % | DIASTOLIC BLOOD PRESSURE: 68 MMHG | TEMPERATURE: 98.2 F | SYSTOLIC BLOOD PRESSURE: 116 MMHG | HEART RATE: 67 BPM

## 2018-04-29 PROCEDURE — 3331090001 HH PPS REVENUE CREDIT

## 2018-04-29 PROCEDURE — 3331090002 HH PPS REVENUE DEBIT

## 2018-04-30 ENCOUNTER — HOME CARE VISIT (OUTPATIENT)
Dept: SCHEDULING | Facility: HOME HEALTH | Age: 83
End: 2018-04-30
Payer: MEDICARE

## 2018-04-30 PROCEDURE — G0157 HHC PT ASSISTANT EA 15: HCPCS

## 2018-04-30 PROCEDURE — 3331090001 HH PPS REVENUE CREDIT

## 2018-04-30 PROCEDURE — 3331090002 HH PPS REVENUE DEBIT

## 2018-05-01 PROCEDURE — 3331090001 HH PPS REVENUE CREDIT

## 2018-05-01 PROCEDURE — 3331090002 HH PPS REVENUE DEBIT

## 2018-05-02 ENCOUNTER — HOME CARE VISIT (OUTPATIENT)
Dept: SCHEDULING | Facility: HOME HEALTH | Age: 83
End: 2018-05-02
Payer: MEDICARE

## 2018-05-02 VITALS
OXYGEN SATURATION: 96 % | DIASTOLIC BLOOD PRESSURE: 70 MMHG | SYSTOLIC BLOOD PRESSURE: 136 MMHG | TEMPERATURE: 98.9 F | TEMPERATURE: 97.3 F | RESPIRATION RATE: 20 BRPM | RESPIRATION RATE: 19 BRPM | DIASTOLIC BLOOD PRESSURE: 74 MMHG | HEART RATE: 67 BPM | SYSTOLIC BLOOD PRESSURE: 113 MMHG | HEART RATE: 73 BPM | OXYGEN SATURATION: 98 %

## 2018-05-02 PROCEDURE — 3331090002 HH PPS REVENUE DEBIT

## 2018-05-02 PROCEDURE — 3331090001 HH PPS REVENUE CREDIT

## 2018-05-02 PROCEDURE — G0157 HHC PT ASSISTANT EA 15: HCPCS

## 2018-05-03 PROCEDURE — 3331090001 HH PPS REVENUE CREDIT

## 2018-05-03 PROCEDURE — 3331090002 HH PPS REVENUE DEBIT

## 2018-05-04 PROCEDURE — 3331090001 HH PPS REVENUE CREDIT

## 2018-05-04 PROCEDURE — 3331090002 HH PPS REVENUE DEBIT

## 2018-05-05 PROCEDURE — 3331090002 HH PPS REVENUE DEBIT

## 2018-05-05 PROCEDURE — 3331090001 HH PPS REVENUE CREDIT

## 2018-05-06 PROCEDURE — 3331090002 HH PPS REVENUE DEBIT

## 2018-05-06 PROCEDURE — 3331090001 HH PPS REVENUE CREDIT

## 2018-05-07 ENCOUNTER — HOME CARE VISIT (OUTPATIENT)
Dept: SCHEDULING | Facility: HOME HEALTH | Age: 83
End: 2018-05-07
Payer: MEDICARE

## 2018-05-07 VITALS
HEART RATE: 61 BPM | SYSTOLIC BLOOD PRESSURE: 117 MMHG | RESPIRATION RATE: 17 BRPM | OXYGEN SATURATION: 98 % | DIASTOLIC BLOOD PRESSURE: 60 MMHG | TEMPERATURE: 98.3 F

## 2018-05-07 PROCEDURE — G0157 HHC PT ASSISTANT EA 15: HCPCS

## 2018-05-07 PROCEDURE — 3331090002 HH PPS REVENUE DEBIT

## 2018-05-07 PROCEDURE — 3331090001 HH PPS REVENUE CREDIT

## 2018-05-08 PROCEDURE — 3331090001 HH PPS REVENUE CREDIT

## 2018-05-08 PROCEDURE — 3331090002 HH PPS REVENUE DEBIT

## 2018-05-09 ENCOUNTER — PATIENT OUTREACH (OUTPATIENT)
Dept: INTERNAL MEDICINE CLINIC | Age: 83
End: 2018-05-09

## 2018-05-09 ENCOUNTER — HOME CARE VISIT (OUTPATIENT)
Dept: SCHEDULING | Facility: HOME HEALTH | Age: 83
End: 2018-05-09
Payer: MEDICARE

## 2018-05-09 PROCEDURE — 3331090001 HH PPS REVENUE CREDIT

## 2018-05-09 PROCEDURE — G0151 HHCP-SERV OF PT,EA 15 MIN: HCPCS

## 2018-05-09 PROCEDURE — 3331090003 HH PPS REVENUE ADJ

## 2018-05-09 PROCEDURE — 3331090002 HH PPS REVENUE DEBIT

## 2018-05-09 NOTE — PROGRESS NOTES
Complex Case Management  Follow-Up    Date/Time:  5/9/2018 12:51 PM     NN contacted patient for Complex Case Management  follow up. Spoke to patient. Introduced self/role and reason for call. Patient reported: has a big tank of oxygen that is in wheels, on 3 LPM, daughters smoke outside, non productive cough, swelling to feet/ankles once in awhile     Pertinent negatives: worsening SOB/GARCIA, wheezing, CP, fever/chills. N/V    Medication:   Medications reconciliation during this outreach:no  New medications since last outreach: no  Does patient need refills on any medications: no  Medication changes since last outreach (dose adjustments or discontinued meds): no    Home Health company: [facility] (PT/OT)  Date of initial visit: 3/18/18  Date of discharge: TBD    Barriers to care? No apparent barriers to care noted at this time. Specialist appointments since last outreach? no  If so, specialist and date: N/A    Patient reminded that there are physicians on call 24 hours a day / 7 days a week (M-F 5pm to 8am and from Friday 5pm until Monday 8a for the weekend) should the patient have questions or concerns. Future Appointments  Date Time Provider Marcelo Flores   6/22/2018 10:30 AM MD Scotty Mack   6/26/2018 9:00 AM Domitila Quiroz MD [facility] None        Other upcoming appointments:  None     Goals        COPD     Smoking cessation. Plan: Discuss/educate on the effect of smoking regarding COPD.   4/23/18: Patient reported has not smoked since 1/3/18         Post ED     Supportive resources in place to maintain patient in the community (ie., home health, equipment, DME, refer to, etc.)            3/14:[facility] to scheduled to see patient today  4/23/18: D/c'd [facility]; has [facility] for PT/OT. Post Hospitalization     Attends follow-up appointments as directed. Plan: Verify aware of upcoming PCP appt and advise to schedule appt with pulmonology.   3/14Jhonatan Rose (daughter) aware of PCP appt and agreed patient should follow up with pulmonology. 3/26/18: Patient attended transition of care appt today. 3/29/18: Patient attended pulmonology appt today.

## 2018-05-10 VITALS
SYSTOLIC BLOOD PRESSURE: 147 MMHG | TEMPERATURE: 99 F | OXYGEN SATURATION: 98 % | DIASTOLIC BLOOD PRESSURE: 64 MMHG | HEART RATE: 62 BPM

## 2018-05-10 PROCEDURE — 3331090001 HH PPS REVENUE CREDIT

## 2018-05-10 PROCEDURE — 3331090002 HH PPS REVENUE DEBIT

## 2018-05-11 PROCEDURE — 3331090001 HH PPS REVENUE CREDIT

## 2018-05-11 PROCEDURE — 3331090002 HH PPS REVENUE DEBIT

## 2018-05-12 PROCEDURE — 3331090002 HH PPS REVENUE DEBIT

## 2018-05-12 PROCEDURE — 3331090001 HH PPS REVENUE CREDIT

## 2018-05-12 PROCEDURE — 3331090003 HH PPS REVENUE ADJ

## 2018-05-21 ENCOUNTER — PATIENT OUTREACH (OUTPATIENT)
Dept: FAMILY MEDICINE CLINIC | Age: 83
End: 2018-05-21

## 2018-05-21 ENCOUNTER — PATIENT OUTREACH (OUTPATIENT)
Dept: INTERNAL MEDICINE CLINIC | Age: 83
End: 2018-05-21

## 2018-05-21 NOTE — PROGRESS NOTES
Complex Case Management  Follow-Up    Date/Time:  5/21/2018 4:03 PM     NN contacted patient for Complex Case Management  follow up. Spoke to patient. Introduced self/role and reason for call. Patient reported: using 3 liters, \"back wants to act crazy\", swelling to feet and ankles, swelling/fullness to abdomen    Pertinent negatives: smoking, increased/worsening SOB/GARCIA, CP, cough, wheezing, dizziness, N/V, chills/fever, diarrhea/constipation    Medication:   Medications reconciliation during this outreach:no  New medications since last outreach: no  Does patient need refills on any medications: no  Medication changes since last outreach (dose adjustments or discontinued meds): no    Home Health company: [facility]  Date of initial visit: 3/18/18  Date of discharge: 5/9/18    Barriers to care? No apparent barriers to care noted at this time. Specialist appointments since last outreach? no  If so, specialist and date: N/A    Patient reminded that there are physicians on call 24 hours a day / 7 days a week (M-F 5pm to 8am and from Friday 5pm until Monday 8a for the weekend) should the patient have questions or concerns. Future Appointments  Date Time Provider Marcelo Flores   6/22/2018 10:30 AM Kevon Kussmaul, MD [place]   6/26/2018 9:00 AM Felipe Stevenson MD [facility] None        Other upcoming appointments:  N/A    Goals        COPD     Smoking cessation. Plan: Discuss/educate on the effect of smoking regarding COPD.   4/23/18: Patient reported has not smoked since 1/3/18  5/21/18: Patient reported she has not resumed smoking. Post Hospitalization     Attends follow-up appointments as directed. Plan: Verify aware of upcoming PCP appt and advise to schedule appt with pulmonology. 3/14Lew Jacqueline (daughter) aware of PCP appt and agreed patient should follow up with pulmonology. 3/26/18: Patient attended transition of care appt today.   3/29/18: Patient attended pulmonology appt today. 5/21/18: Notified [facility] of upcoming appts with urology and PCP.

## 2018-05-22 ENCOUNTER — PATIENT OUTREACH (OUTPATIENT)
Dept: FAMILY MEDICINE CLINIC | Age: 83
End: 2018-05-22

## 2018-05-22 NOTE — PROGRESS NOTES
Patient has graduated from the Complex Case Management  program on 5/22/18. Patient's symptoms are stable at this time. Patient/family has the ability to self-manage. Care management goals have been completed at this time. No further nurse navigator follow up scheduled. Goals Addressed             Most Recent       COPD     COMPLETED: Smoking cessation. On track (5/21/2018)             Plan: Discuss/educate on the effect of smoking regarding COPD.   4/23/18: Patient reported has not smoked since 1/3/18  5/21/18: Patient reported she has not resumed smoking. Post Hospitalization     COMPLETED: Attends follow-up appointments as directed. On track (5/21/2018)             Plan: Verify aware of upcoming PCP appt and advise to schedule appt with pulmonology. 3/14Welizabeth Parks (daughter) aware of PCP appt and agreed patient should follow up with pulmonology. 3/26/18: Patient attended transition of care appt today. 3/29/18: Patient attended pulmonology appt today. 5/21/18: Notified [facility] of upcoming appts with urology and PCP. Pt has nurse navigator's contact information for any further questions, concerns, or needs.   Patients upcoming visits:  Future Appointments  Date Time Provider Marcelo Flores   6/22/2018 10:30 AM Sharona Yoder MD [address]   6/26/2018 9:00 AM [facility] None

## 2018-05-25 ENCOUNTER — TELEPHONE (OUTPATIENT)
Dept: PULMONOLOGY | Age: 83
End: 2018-05-25

## 2018-05-25 NOTE — TELEPHONE ENCOUNTER
Per pharmacy. The Pulmicort neb solution was picked up back in March for 90 vials (180ml)to be used BID. This lasted the pt 45 days. Now Medicare will not authorize but 1 x per day and advised pharmacy she is not due for another rx until 7/6/18 and only will fill for 1 x per day. The pharmacy has talked to Medicare part b multiply times to try to clarify or see about getting prior auth for 2 x per day but they state they will not authorize but for 1 x per day and no prior auth will help.

## 2018-05-25 NOTE — TELEPHONE ENCOUNTER
Preet [address] [place] [phone]). JACQUELINE GOODMAN WROTE SCRIPT FOR BUDESINIDE FOR 2X DAILY AND PT'S INSURANCE WILL ONLY COVER 1 VIAL DAILY. PLEASE CHECK TO SEE IF  WANTS TO ADD TO THE SCRIPT. PLEASE CHECK AND CALL HER BACK.

## 2018-05-30 NOTE — TELEPHONE ENCOUNTER
Kwan Warner from [facility] called asking to speak with a nurse about the budesonide.  Please call [phone]

## 2018-05-30 NOTE — TELEPHONE ENCOUNTER
Spoke with Gurpreet with [facility]. Since the Pulmicort Neb is on hold until July she was questioning if the doctor wanted to send something else. Advised Dr. Loli Thomson did already send in [address] [place]  to [facility] and pt and family advised. If they want to transfer the pt will do so.

## 2018-06-26 ENCOUNTER — OFFICE VISIT (OUTPATIENT)
Dept: FAMILY MEDICINE CLINIC | Age: 83
End: 2018-06-26

## 2018-06-26 VITALS
HEIGHT: 64 IN | WEIGHT: 152 LBS | DIASTOLIC BLOOD PRESSURE: 64 MMHG | TEMPERATURE: 98.4 F | RESPIRATION RATE: 18 BRPM | OXYGEN SATURATION: 97 % | BODY MASS INDEX: 25.95 KG/M2 | SYSTOLIC BLOOD PRESSURE: 98 MMHG | HEART RATE: 85 BPM

## 2018-06-26 DIAGNOSIS — E87.6 HYPOKALEMIA: ICD-10-CM

## 2018-06-26 DIAGNOSIS — J43.2 CENTRILOBULAR EMPHYSEMA (HCC): ICD-10-CM

## 2018-06-26 DIAGNOSIS — E78.5 DYSLIPIDEMIA: Primary | ICD-10-CM

## 2018-06-26 DIAGNOSIS — I10 ESSENTIAL HYPERTENSION, BENIGN: ICD-10-CM

## 2018-06-26 RX ORDER — AMLODIPINE BESYLATE 10 MG/1
5 TABLET ORAL DAILY
Qty: 90 TAB | Refills: 3
Start: 2018-06-26 | End: 2018-06-27 | Stop reason: SDUPTHER

## 2018-06-26 NOTE — PROGRESS NOTES
HISTORY OF PRESENT ILLNESS  Nato Neil is a 80 y.o. female. Chief Complaint   Patient presents with    Follow-up    Hypertension    Cholesterol Problem       HPI  Patient is here for a  follow up of Htn, and lipids. Pt also has copd. She had stopped smoking after her last hospital admission but she has restarted. Pt reports that she only smokes 1-3 cigarettes per day. She is still using her meds but it getting sick sometimes after smoking. Recently, she had to go lay down for the rest of the day after having a cigarette. Pt's dtr is concerned that she is not eating enough or exercising as she should. Patient does not need medication refills today. No recent labs. ROS  Review of Systems   Constitutional: Negative. HENT: Negative. Respiratory: Negative. Cardiovascular: Negative. All other systems reviewed and are negative. Physical Exam  Physical Exam   Nursing note and vitals reviewed. Constitutional: She is oriented to person, place, and time. She appears well-developed and well-nourished. HENT:   Head: Normocephalic and atraumatic. Right Ear: External ear normal.   Left Ear: External ear normal.   Nose: Nose normal.   Eyes: Conjunctivae and EOM are normal.   Neck: Normal range of motion. Neck supple. No JVD present. Carotid bruit is not present. No thyromegaly present. Cardiovascular: Normal rate, regular rhythm, normal heart sounds and intact distal pulses. Exam reveals no gallop and no friction rub. No murmur heard. Pulmonary/Chest: Effort normal and breath sounds normal. She has no wheezes. She has no rhonchi. She has no rales. Abdominal: Soft. Bowel sounds are normal.   Musculoskeletal: Normal range of motion. Neurological: She is alert and oriented to person, place, and time. Coordination normal.   Skin: Skin is warm and dry. Psychiatric: She has a normal mood and affect.  Her behavior is normal. Judgment and thought content normal.     ASSESSMENT and PLAN  Diagnoses and all orders for this visit:    1. Dyslipidemia  -     METABOLIC PANEL, COMPREHENSIVE; Future  -     LIPID PANEL; Future    2. Essential hypertension, benign  -   Decrease amlodipine. amLODIPine (NORVASC) 10 mg tablet; Take 0.5 Tabs by mouth daily. TAKE ONE TABLET BY MOUTH EVERY DAY  -     METABOLIC PANEL, COMPREHENSIVE; Future  -     LIPID PANEL; Future  -     CBC WITH AUTOMATED DIFF; Future  Monitor home bp readings. Discussed correcting hypotension to avoid dizziness or falls. 3. Centrilobular emphysema ([address]  -     CBC WITH AUTOMATED DIFF; Future  Need for smoking cessation emphasized. Harmful effects of smoking discussed. 4. Hypokalemia  -     METABOLIC PANEL, COMPREHENSIVE;  Future      Follow-up Disposition: 1 month; sooner prn

## 2018-06-26 NOTE — MR AVS SNAPSHOT
[address] [place] [address] 
 
 
 [address] [place] [address] 
[phone] Patient: Roosevelt Pichardo MRN: [id number] PGE:97/[id number]/[id number] Visit Information Date & Time Provider Department Dept. Phone Encounter #  
 6/26/2018  9:00 AM Paco Aguila MD Lizzy Watson 77 [id number] Your Appointments 6/28/2018  1:15 PM  
SICK VISIT with Lory Clifford MD  
[address] ([address] [place] [address] Road) Appt Note: per pt's dtr req; more sob; ofc mvd from 6/27 d/t dr primitivo-pt's dtr Naa Shrestha [address] [place] [address], [address] [address]  
[phone]  
  
   
 [address] [place] [address], [address] [place] [address],[address] [place] [id number]  
  
    
 7/12/2018 11:45 AM  
Follow Up with Lory Clifford MD  
[address] ([facility]) Appt Note: from 3/29/18; ofc rsch from 7/10 d/t dr Kwan Denton [address] [place] [address], [address] [address]  
[phone]  
  
   
 [address] [place] [address], [address] [place] [address],[address] [place] [address]  
  
    
 8/7/2018 11:15 AM  
Follow Up with Paco Aguila MD  
[address] [place] [address] (--) Appt Note: Medication follow up [address] [place] [address]  
[phone]  
  
   
 [address] [place] [address] Upcoming Health Maintenance Date Due Pneumococcal 65+ High/Highest Risk (1 of 2 - PCV13) 11/23/1996 MEDICARE YEARLY EXAM 3/30/2018 BREAST CANCER SCRN MAMMOGRAM 4/13/2018 Influenza Age 5 to Adult 8/1/2018 GLAUCOMA SCREENING Q2Y 10/12/2019 DTaP/Tdap/Td series (2 - Td) 3/29/2027 Allergies as of 6/26/2018  Review Complete On: 6/26/2018 By: Paco Aguila MD  
  
 Severity Noted Reaction Type Reactions Iodine  10/02/2012   Intolerance Hives Current Immunizations  Reviewed on 4/20/2015 No immunizations on file. Not reviewed this visit You Were Diagnosed With   
  
 Codes Comments Dyslipidemia    -  Primary ICD-10-CM: E78.5 ICD-9-CM: 272.4 Essential hypertension, benign     ICD-10-CM: I10 
ICD-9-CM: 401.1 Centrilobular emphysema ([address]     ICD-10-CM: J43.2 ICD-9-CM: 492.8 Hypokalemia     ICD-10-CM: E87.6 ICD-9-CM: 276.8 Vitals BP Pulse Temp Resp Height(growth percentile) Weight(growth percentile) 98/64 85 98.4 °F (36.9 °C) (Oral) 18 5' 4\" (1.626 m) 152 lb (68.9 kg) SpO2 BMI OB Status Smoking Status 97% 26.09 kg/m2 Postmenopausal Former Smoker BMI and BSA Data Body Mass Index Body Surface Area 26.09 kg/m 2 1.76 m 2 Preferred Pharmacy Pharmacy Name [facility] Your Updated Medication List  
  
   
This list is accurate as of 6/26/18  9:37 AM.  Always use your most recent med list.  
  
  
  
  
 * albuterol 90 mcg/actuation inhaler Commonly known as:  PROAIR HFA Take 2 Puffs by inhalation every four (4) hours as needed for Wheezing or Shortness of Breath. * albuterol 1.25 mg/3 mL Nebu Commonly known as:  ACCUNEB  
3 mL by Nebulization route every four (4) hours as needed. amLODIPine 10 mg tablet Commonly known as:  Yesenia [place] Take 0.5 Tabs by mouth daily. TAKE ONE TABLET BY MOUTH EVERY DAY  
  
 amoxicillin-clavulanate 875-125 mg per tablet Commonly known as:  AUGMENTIN  
  
 arformoterol 15 mcg/2 mL Nebu neb solution Commonly known as:  Radha Ganong 2 mL by Nebulization route two (2) times a day. Indications: BRONCHOSPASM PREVENTION WITH COPD  
  
 aspirin 81 mg tablet Take 81 mg by mouth daily. atorvastatin 10 mg tablet Commonly known as:  LIPITOR  
TAKE ONE TABLET BY MOUTH EVERY DAY  
  
 bipap machine kit 1 each by Does Not Apply route. Started using BiPap Machine 01-15-15  
  
 budesonide 1 mg/2 mL Nbsp Commonly known as:  PULMICORT  
2 mL by Nebulization route two (2) times a day.  Rinse your mouth after each use  Indications: Severe Chronic Obstructive Pulmonary Disease CALCIUM CITRATE + PO Take 100 mg by mouth once over twenty-four (24) hours. cholecalciferol (vitamin D3) 4,000 unit Cap Take 1 Cap by mouth daily. famotidine 20 mg tablet Commonly known as:  PEPCID Take 1 Tab by mouth two (2) times a day. FISH OIL PO Take 4,000 mg by mouth daily. fludrocortisone 0.1 mg tablet Commonly known as:  FLORINEF Take 1 tablet by mouth daily. fluticasone 50 mcg/actuation nasal spray Commonly known as:  Genie Shashank 2 Sprays by Both Nostrils route as needed for Rhinitis or Allergies. fluticasone furoate 200 mcg/actuation Dsdv inhaler Commonly known as:  ARNUITY ELLIPTA Take 1 Puff by inhalation daily. Rinse mouth thoroughly after each use  
  
 furosemide 20 mg tablet Commonly known as:  LASIX Take 1 Tab by mouth daily as needed for Other (Leg Swelling). guaiFENesin-dextromethorphan -30 mg per tablet Commonly known as:  HUMIBID DM Take 1 Tab by mouth every twelve (12) hours as needed for Cough. hydrocortisone 10 mg tablet Commonly known as:  CORTEF Take 20 mg by mouth daily. ipratropium 0.02 % Soln Commonly known as:  ATROVENT  
2.5 mL by Nebulization route four (4) times daily. Indications: BRONCHOSPASM PREVENTION WITH COPD  
  
 L-Methylfolate-B68-Yuvymdmkvh tablet Commonly known as:  Jaylin Lee Take 1 tablet by mouth daily. L. acidophilus,casei,rhamnosus 50 billion cell Cpdr  
Commonly known as:  BIO-K PLUS Take 1 tab by mouth daily  
  
 lisinopril 20 mg tablet Commonly known as:  PRINIVIL, ZESTRIL  
TAKE ONE TABLET BY MOUTH EVERY DAY  
  
 magnesium oxide 400 mg tablet Commonly known as:  MAG-OX Take 400 mg by mouth daily. Nebulizer & Compressor machine Commonly known as:  PORTABLE NEBULIZER SYSTEM  
1 Each by Does Not Apply route two (2) times a day. * nystatin powder Commonly known as:  MYCOSTATIN Apply  to affected area four (4) times daily. * nystatin 100,000 unit/mL suspension Commonly known as:  MYCOSTATIN Take 5 mL by mouth four (4) times daily. swish and spit OXcarbazepine 300 mg tablet Commonly known as:  TRILEPTAL Take 300 mg by mouth two (2) times a day. * OXYGEN-AIR DELIVERY SYSTEMS  
3 L/min by Does Not Apply route continuous. * OXYGEN-AIR DELIVERY SYSTEMS  
3 L/min by IntraNASal route continuous. PARoxetine 30 mg tablet Commonly known as:  PAXIL TAKE ONE TABLET BY MOUTH EVERY DAY  
  
 potassium chloride 20 mEq tablet Commonly known as:  K-DUR, KLOR-CON  
TAKE ONE TABLET BY MOUTH 2 TIMES A DAY  
  
 predniSONE 10 mg tablet Commonly known as:  Rachel Beau Take 4 tabs for 2 days, then 3 tabs for 3 days, then 2 tabs for 3 days, then 1 tab for 3 days. SYNTHROID 112 mcg tablet Generic drug:  levothyroxine Take 112 mcg by mouth Daily (before breakfast). Latanya Reus Commonly known as:  BARNEY ROLLING WALKER  
1 Device by Does Not Apply route daily. * Notice: This list has 6 medication(s) that are the same as other medications prescribed for you. Read the directions carefully, and ask your doctor or other care provider to review them with you. To-Do List   
 06/26/2018 Lab:  CBC WITH AUTOMATED DIFF   
  
 06/26/2018 Lab:  LIPID PANEL   
  
 06/26/2018 Lab:  METABOLIC PANEL, [facility]! Dear Kathy Qiu: 
Thank you for requesting a [employer] account. Our records indicate that you already have an active [employer] account. You can access your account anytime at [email or web address] Did you know that you can access your hospital and ER discharge instructions at any time in [employer]? You can also review all of your test results from your hospital stay or ER visit. Additional Information If you have questions, please visit the Frequently Asked Questions section of the [employer] website at [email or web address] Remember, [email or web address] is NOT to be used for urgent needs. For medical emergencies, dial 911. Now available from your iPhone and Android! Please provide this summary of care documentation to your next provider. Your primary care clinician is listed as Naomie Gupta. If you have any questions after today's visit, please call [phone].

## 2018-06-26 NOTE — PROGRESS NOTES
Chief Complaint   Patient presents with    Follow-up    Hypertension    Cholesterol Problem     1. Have you been to the ER, urgent care clinic since your last visit? Hospitalized since your last visit? Yes see chart    2. Have you seen or consulted any other health care providers outside of the [facility] since your last visit? Include any pap smears or colon screening.  No

## 2018-06-27 DIAGNOSIS — I10 ESSENTIAL HYPERTENSION, BENIGN: Primary | ICD-10-CM

## 2018-06-27 DIAGNOSIS — I10 ESSENTIAL HYPERTENSION, BENIGN: ICD-10-CM

## 2018-06-27 RX ORDER — AMLODIPINE BESYLATE 5 MG/1
5 TABLET ORAL DAILY
Qty: 30 TAB | Refills: 5 | OUTPATIENT
Start: 2018-06-27

## 2018-06-27 RX ORDER — AMLODIPINE BESYLATE 5 MG/1
5 TABLET ORAL DAILY
Qty: 90 TAB | Refills: 3 | Status: SHIPPED | OUTPATIENT
Start: 2018-06-27 | End: 2019-10-28 | Stop reason: ALTCHOICE

## 2018-06-27 NOTE — TELEPHONE ENCOUNTER
Patients daughter states that she is unable to cut the 10 mg amlodipine in half, and is asking for the 5 mg amlodipine once daily.

## 2018-06-27 NOTE — TELEPHONE ENCOUNTER
Calling to ask dr lee to pls call in the 5mg amlodipine prescription,said she tried to cut in half ,did not work. --pls call in asap pls.  To [facility] pharmacy

## 2018-06-28 ENCOUNTER — OFFICE VISIT (OUTPATIENT)
Dept: PULMONOLOGY | Age: 83
End: 2018-06-28

## 2018-06-28 ENCOUNTER — HOSPITAL ENCOUNTER (OUTPATIENT)
Dept: LAB | Age: 83
Discharge: HOME | End: 2018-06-28
Payer: MEDICARE

## 2018-06-28 VITALS
HEIGHT: 64 IN | HEART RATE: 72 BPM | DIASTOLIC BLOOD PRESSURE: 60 MMHG | TEMPERATURE: 98.3 F | BODY MASS INDEX: 25.99 KG/M2 | RESPIRATION RATE: 20 BRPM | OXYGEN SATURATION: 91 % | WEIGHT: 152.25 LBS | SYSTOLIC BLOOD PRESSURE: 100 MMHG

## 2018-06-28 DIAGNOSIS — J44.1 COPD EXACERBATION (HCC): Primary | ICD-10-CM

## 2018-06-28 DIAGNOSIS — E78.5 DYSLIPIDEMIA: ICD-10-CM

## 2018-06-28 DIAGNOSIS — J43.2 CENTRILOBULAR EMPHYSEMA (HCC): ICD-10-CM

## 2018-06-28 DIAGNOSIS — E87.6 HYPOKALEMIA: ICD-10-CM

## 2018-06-28 DIAGNOSIS — I10 ESSENTIAL HYPERTENSION, BENIGN: ICD-10-CM

## 2018-06-28 DIAGNOSIS — F17.210 CIGARETTE NICOTINE DEPENDENCE WITHOUT COMPLICATION: ICD-10-CM

## 2018-06-28 DIAGNOSIS — R06.2 WHEEZING: ICD-10-CM

## 2018-06-28 DIAGNOSIS — R06.02 SHORTNESS OF BREATH: ICD-10-CM

## 2018-06-28 DIAGNOSIS — R06.09 DYSPNEA ON EXERTION: ICD-10-CM

## 2018-06-28 DIAGNOSIS — G47.33 OSA (OBSTRUCTIVE SLEEP APNEA): ICD-10-CM

## 2018-06-28 LAB
ALBUMIN SERPL-MCNC: 3.9 G/DL (ref 3.4–5)
ALBUMIN/GLOB SERPL: 1.2 {RATIO} (ref 0.8–1.7)
ALP SERPL-CCNC: 72 U/L (ref 45–117)
ALT SERPL-CCNC: 18 U/L (ref 13–56)
ANION GAP SERPL CALC-SCNC: 9 MMOL/L (ref 3–18)
AST SERPL-CCNC: 16 U/L (ref 15–37)
BASOPHILS # BLD: 0.1 K/UL (ref 0–0.06)
BASOPHILS NFR BLD: 1 % (ref 0–2)
BILIRUB SERPL-MCNC: 0.4 MG/DL (ref 0.2–1)
BUN SERPL-MCNC: 18 MG/DL (ref 7–18)
BUN/CREAT SERPL: 15 (ref 12–20)
CALCIUM SERPL-MCNC: 9.7 MG/DL (ref 8.5–10.1)
CHLORIDE SERPL-SCNC: 102 MMOL/L (ref 100–108)
CHOLEST SERPL-MCNC: 159 MG/DL
CO2 SERPL-SCNC: 27 MMOL/L (ref 21–32)
CREAT SERPL-MCNC: 1.17 MG/DL (ref 0.6–1.3)
DIFFERENTIAL METHOD BLD: ABNORMAL
EOSINOPHIL # BLD: 0.3 K/UL (ref 0–0.4)
EOSINOPHIL NFR BLD: 6 % (ref 0–5)
ERYTHROCYTE [DISTWIDTH] IN BLOOD BY AUTOMATED COUNT: 12.5 % (ref 11.6–14.5)
GLOBULIN SER CALC-MCNC: 3.3 G/DL (ref 2–4)
GLUCOSE SERPL-MCNC: 99 MG/DL (ref 74–99)
HCT VFR BLD AUTO: 39.5 % (ref 35–45)
HDLC SERPL-MCNC: 48 MG/DL (ref 40–60)
HDLC SERPL: 3.3 {RATIO} (ref 0–5)
HGB BLD-MCNC: 12.8 G/DL (ref 12–16)
LDLC SERPL CALC-MCNC: 83.2 MG/DL (ref 0–100)
LIPID PROFILE,FLP: NORMAL
LYMPHOCYTES # BLD: 3 K/UL (ref 0.9–3.6)
LYMPHOCYTES NFR BLD: 52 % (ref 21–52)
MCH RBC QN AUTO: 31.7 PG (ref 24–34)
MCHC RBC AUTO-ENTMCNC: 32.4 G/DL (ref 31–37)
MCV RBC AUTO: 97.8 FL (ref 74–97)
MONOCYTES # BLD: 0.4 K/UL (ref 0.05–1.2)
MONOCYTES NFR BLD: 7 % (ref 3–10)
NEUTS SEG # BLD: 1.9 K/UL (ref 1.8–8)
NEUTS SEG NFR BLD: 34 % (ref 40–73)
PLATELET # BLD AUTO: 313 K/UL (ref 135–420)
PMV BLD AUTO: 10.9 FL (ref 9.2–11.8)
POTASSIUM SERPL-SCNC: 4.2 MMOL/L (ref 3.5–5.5)
PROT SERPL-MCNC: 7.2 G/DL (ref 6.4–8.2)
RBC # BLD AUTO: 4.04 M/UL (ref 4.2–5.3)
SODIUM SERPL-SCNC: 138 MMOL/L (ref 136–145)
TRIGL SERPL-MCNC: 139 MG/DL (ref ?–150)
VLDLC SERPL CALC-MCNC: 27.8 MG/DL
WBC # BLD AUTO: 5.7 K/UL (ref 4.6–13.2)

## 2018-06-28 PROCEDURE — 80053 COMPREHEN METABOLIC PANEL: CPT | Performed by: FAMILY MEDICINE

## 2018-06-28 PROCEDURE — 85025 COMPLETE CBC W/AUTO DIFF WBC: CPT | Performed by: FAMILY MEDICINE

## 2018-06-28 PROCEDURE — 36415 COLL VENOUS BLD VENIPUNCTURE: CPT | Performed by: FAMILY MEDICINE

## 2018-06-28 PROCEDURE — 80061 LIPID PANEL: CPT | Performed by: FAMILY MEDICINE

## 2018-06-28 RX ORDER — AZITHROMYCIN 250 MG/1
TABLET, FILM COATED ORAL
Qty: 6 TAB | Refills: 0 | Status: SHIPPED | OUTPATIENT
Start: 2018-06-28 | End: 2018-07-03

## 2018-06-28 RX ORDER — PREDNISONE 20 MG/1
20 TABLET ORAL
Qty: 5 TAB | Refills: 0 | Status: SHIPPED | OUTPATIENT
Start: 2018-06-28 | End: 2018-07-03

## 2018-06-28 NOTE — PROGRESS NOTES
[facility], [facility] [place] [facility]  Pulmonary, Critical Care, and Sleep Medicine    Pulmonary Office F/U  Name: Ignacio Alan 80 y.o. female  MRN: [id number]  : 1931  Service Date: 18   Chief Complaint:   Chief Complaint   Patient presents with    Breathing Problem     F/U to 3/29     Cough         History of Present Illness:  Hx given by daughter  Ignacio Alan is a 80 y.o. female, who presents to Pulmonary clinic for followup with regards to COPD and complaints of cough. Pt was last seen on 3/29/18. Pt reports dyspnea since Friday and through the weekend. They reported pt had dyspnea on exertion. Since that time, they note that the the patient has had a continued cough with increased sputum and has been more dyspneic. They do not report progressive symptoms. Pt and daughter report pt has increased weakness since then but no worsening of dyspnea. In the interval, they report pt has resumed smoking -- they report the daughter and other family members are buying cigarettes for her and pt uses half used cigarettes in ashtray from others around the house -- pt smoking about 0.25-0.5PPD. Per patient, she mistakenly insists \"you told me that I could smoke. \"  They deny fevers, chills, night sweats, chest pain, hemoptysis. Pt noncompliant with oxygen  With regards to breathing treatments, they report insurance would only pay for pulmicort once daily so she was using Arnuity as well. They also report she was only using duonebs TID as well as brovanna once a day. Past Medical Hx: I have personally reviewed medical hx  Past Medical History:   Diagnosis Date    Abuse     Alcohol    Adrenal insufficiency (HCC)     Anxiety     Calculus of kidney     Cardiac echocardiogram 2012    EF 60-65%. No WMA. Mild conc LVH. Gr 1 DDfx. RVSP 20-25 mmHg. Mild SAGAR. Mild TR,. Mild PAE.      Cardiovascular LE venous duplex 10/08/2012    No venous thrombosis or venous insufficiency in bilateral lower extremities.  Carotid duplex 10/09/2014    Mild < 50% bilateral ICA stenosis.  Chronic lung disease     Contact dermatitis and other eczema, due to unspecified cause     COPD (chronic obstructive pulmonary disease) (HCC)     Degenerative joint disease     Depression     Hematuria     Hypercholesteremia     Hypertension     Hypothyroidism 1998    Kidney stone     Neuropathy 3/2007    Orthostatic hypotension     Pituitary adenoma ([facility] [address] 2/1998    Pituitary tumor 1998    Pneumonia     Seizures ([facility] [address]     none recently    Severe obstructive sleep apnea 01-15-15     started using Bipap Machine    Stress     Stroke ([facility] [address] 2/2006    no residual    TIA (transient ischemic attack)     Trauma        Past Surgical Hx: I have personally reviewed surgical hx  Past Surgical History:   Procedure Laterality Date    CYSTOSCOPY,INSERT URETERAL STENT  9/14/15    Dr. Mitzie Ormond    HX APPENDECTOMY      HX [place] [employer] HX LITHOTRIPSY  9/14/15    Dr. Duran Lewis HX WISDOM TEETH EXTRACTION      x4       Family Hx: I have personally reviewed the family hx. No family hx of hereditary lung disease with immediate family  Family History   Problem Relation Age of Onset    Heart Disease Father     Hypertension Father     Hypertension Mother     Cancer Mother      skin    Elevated Lipids Sister     Heart Disease Sister     Cancer Maternal Grandmother      colon and stomach    Heart Disease Paternal Grandmother     Heart Attack Paternal Grandmother     Heart Attack Paternal Grandfather     Heart Disease Paternal Grandfather        Social Hx: I have personally reviewed the social hx. Social History     Social History    Marital status:      Spouse name: N/A    Number of children: N/A    Years of education: N/A     Occupational History    Not on file.      Social History Main Topics  Smoking status: Current Every Day Smoker     Packs/day: 1.50     Years: 65.00     Types: Cigarettes     Last attempt to quit: 3/9/2018    Smokeless tobacco: Never Used      Comment: 6/28/18 decreased amt. of cigarettes/day only    Alcohol use No      Comment: quit 1998 due to Alcohol Abuse    Drug use: No    Sexual activity: No     Other Topics Concern    Not on file     Social History Narrative     Occupational Hx:  No occupational exposure to coal, silica, or asbestos      Allergies: I have reviewed the allergy hx  Allergies   Allergen Reactions    Iodine Hives       Medications:  I have reviewed the patient's medications  Prior to Admission medications    Medication Sig Start Date End Date Taking? Authorizing Provider   amLODIPine (NORVASC) 5 mg tablet Take 1 Tab by mouth daily. TAKE ONE TABLET BY MOUTH EVERY DAY 6/27/18  Yes Kay Guo MD   fluticasone furoate (ARNUITY ELLIPTA) 200 mcg/actuation dsdv inhaler Take 1 Puff by inhalation daily. Rinse mouth thoroughly after each use 5/29/18  Yes Refugio Caballero MD   fluticasone (FLONASE) 50 mcg/actuation nasal spray 2 Sprays by Both Nostrils route as needed for Rhinitis or Allergies. 4/10/17  Yes Historical Provider   hydrocortisone (CORTEF) 10 mg tablet Take 20 mg by mouth daily. 10/26/15  Yes Historical Provider   arformoterol (BROVANA) 15 mcg/2 mL nebu neb solution 2 mL by Nebulization route two (2) times a day. Indications: BRONCHOSPASM PREVENTION WITH COPD 3/29/18  Yes Refugio Caballero MD   budesonide (PULMICORT) 1 mg/2 mL nbsp 2 mL by Nebulization route two (2) times a day. Rinse your mouth after each use  Indications: Severe Chronic Obstructive Pulmonary Disease  Patient taking differently: 1,000 mcg by Nebulization route daily. Rinse your mouth after each use  Indications: Severe Chronic Obstructive Pulmonary Disease 3/29/18  Yes Refugio Caballero MD   ipratropium (ATROVENT) 0.02 % soln 2.5 mL by Nebulization route four (4) times daily.  Indications: BRONCHOSPASM PREVENTION WITH COPD 3/29/18  Yes Teo Mobley MD   albuterol (ACCUNEB) 1.25 mg/3 mL nebu 3 mL by Nebulization route every four (4) hours as needed. 3/22/18  Yes Hyun Manzo PA-C   Nebulizer & Compressor (PORTABLE NEBULIZER SYSTEM) machine 1 Each by Does Not Apply route two (2) times a day. 3/22/18  Yes Hyun Manzo PA-C   cholecalciferol, vitamin D3, 4,000 unit cap Take 1 Cap by mouth daily. Yes Historical Provider   OXYGEN-AIR DELIVERY SYSTEMS 3 L/min by Does Not Apply route continuous. [employer] Choice O2   Yes Historical Provider   lisinopril (PRINIVIL, ZESTRIL) 20 mg tablet TAKE ONE TABLET BY MOUTH EVERY DAY 1/25/18  Yes Digna Mcnamara MD   PARoxetine (PAXIL) 30 mg tablet TAKE ONE TABLET BY MOUTH EVERY DAY 1/25/18  Yes Digna Mcnamara MD   atorvastatin (LIPITOR) 10 mg tablet TAKE ONE TABLET BY MOUTH EVERY DAY 1/25/18  Yes Digna Mcnamara MD   potassium chloride (K-DUR, KLOR-CON) 20 mEq tablet TAKE ONE TABLET BY MOUTH 2 TIMES A DAY  Patient taking differently: Take 20 mEq by mouth two (2) times a day. TAKE ONE TABLET BY MOUTH 2 TIMES A DAY 1/25/18  Yes Digna Mcnamara MD   albuterol (PROAIR HFA) 90 mcg/actuation inhaler Take 2 Puffs by inhalation every four (4) hours as needed for Wheezing or Shortness of Breath. 5/26/17  Yes Tyshawn Cook NP   SYNTHROID 112 mcg tablet Take 112 mcg by mouth Daily (before breakfast). 7/19/16  Yes Historical Provider   Walker (BARNEY CLIFTON WALKER) misc 1 Device by Does Not Apply route daily. 8/31/15  Yes JENNIFER Peralta   bipap machine kit 1 Each by Does Not Apply route. Started using BiPap Machine 01-15-15 [facility] Yes Historical Provider   L-Methylfolate-O88-Ibrmtoliyw (CEREFOLIN NAC) tablet Take 1 tablet by mouth daily. 10/30/14  Yes Digna Mcnamara MD   fludrocortisone (FLORINEF) 0.1 mg tablet Take 1 tablet by mouth daily. 10/30/14  Yes Digna Mcnamara MD   oxcarbazepine (TRILEPTAL) 300 mg tablet Take 300 mg by mouth two (2) times a day. Yes Historical Provider   magnesium oxide (MAG-OX) 400 mg tablet Take 400 mg by mouth daily. Yes Historical Provider   aspirin 81 mg tablet Take 81 mg by mouth daily. Yes Historical Provider   DOCOSAHEXANOIC ACID/EPA (FISH OIL PO) Take 4,000 mg by mouth daily. Yes Historical Provider   CALCIUM CITRATE/VITAMIN D3 (CALCIUM CITRATE + PO) Take 100 mg by mouth once over twenty-four (24) hours. Yes Historical Provider   amoxicillin-clavulanate (AUGMENTIN) 875-125 mg per tablet  3/22/18   Historical Provider   guaiFENesin-dextromethorphan SR (HUMIBID DM) 600-30 mg per tablet Take 1 Tab by mouth every twelve (12) hours as needed for Cough. 3/22/18   Hyun Manzo PA-C   nystatin (MYCOSTATIN) 100,000 unit/mL suspension Take 5 mL by mouth four (4) times daily. swish and spit 3/22/18   Hyun Manzo PA-C   predniSONE (DELTASONE) 10 mg tablet Take 4 tabs for 2 days, then 3 tabs for 3 days, then 2 tabs for 3 days, then 1 tab for 3 days. 3/22/18   Hyun Manzo PA-C   L. acidophilus,casei,rhamnosus (BIO-K PLUS) 50 billion cell cpDR Take 1 tab by mouth daily 3/22/18   Hyun Manzo PA-C   famotidine (PEPCID) 20 mg tablet Take 1 Tab by mouth two (2) times a day. 3/22/18   Hyun Manzo PA-C   nystatin (MYCOSTATIN) powder Apply  to affected area four (4) times daily. Patient taking differently: Apply 100 g to affected area four (4) times daily. 7/25/17   Rock Florian MD   furosemide (LASIX) 20 mg tablet Take 1 Tab by mouth daily as needed for Other (Leg Swelling). 4/12/16   Rock Florian MD       Immunizations:  I have reviewed the patient's immunizations    There is no immunization history on file for this patient. Review of Systems:  A complete review of systems was performed as stated in the HPI, all others are negative.       Objective:    Physical Exam:  /60 (BP 1 Location: Left arm, BP Patient Position: Sitting)  Pulse 72  Temp 98.3 °F (36.8 °C)  Resp 20  Ht 5' 4\" (1.626 m)  Wt 69.1 kg (152 lb 4 oz)  SpO2 91%  BMI 26.13 kg/m2  Vitals were personally reviewed  Gen: no acute distress, pleasant and cooperative, sitting up in chair, unaable to climb to exam table, ambulates slowly without difficulty  HEENT: normocephalic/atraumatic, PERRLA, EOMI, no scleral icterus, nasal turbinates have no erythema, no nasal polyps, no oral lesions, poor dentition, Mallampati IV  Neck: supple, trachea midline, no JVD, no cervical and supraclavicular adenopathy  Chest: no lesions, with even rise and fall, no pectus excavatum or flail chest  CVS: regular rate rhythm, S1/S2, no murmurs/rubs/gallops  Lungs: improved air entry B/L, no wheezes/rales/rhonchi  Back: + kyphosis, no scoliosis  Abdomen: soft, nontender, bowel sounds present, no hepatosplenomegaly  Ext: no pitting edema B/L, no peripheral cyanosis or clubbing  Neuro: grossly normal, AAOx3, normal strength and coordination grossly, no focal deficits  Skin: no rashes, erythema, lesions  Psych: normal memory, thought content, and processing    Labs: I have reviewed the patient's available labs  Lab Results   Component Value Date/Time    WBC 8.1 03/22/2018 03:15 AM    HGB 10.5 (L) 03/22/2018 03:15 AM    HCT 31.7 (L) 03/22/2018 03:15 AM    PLATELET 241 92/65/2725 03:15 AM    MCV 92.4 03/22/2018 03:15 AM     Lab Results   Component Value Date/Time    Sodium 132 (L) 03/22/2018 03:15 AM    Potassium 4.3 03/22/2018 03:15 AM    Chloride 100 03/22/2018 03:15 AM    CO2 26 03/22/2018 03:15 AM    Anion gap 6 03/22/2018 03:15 AM    Glucose 152 (H) 03/22/2018 03:15 AM    BUN 21 (H) 03/22/2018 03:15 AM    Creatinine 0.85 03/22/2018 03:15 AM    BUN/Creatinine ratio 25 (H) 03/22/2018 03:15 AM    GFR est AA >60 03/22/2018 03:15 AM    GFR est non-AA >60 03/22/2018 03:15 AM    Calcium 8.1 (L) 03/22/2018 03:15 AM    Bilirubin, total 0.5 03/18/2018 08:15 PM    AST (SGOT) 15 03/18/2018 08:15 PM    Alk.  phosphatase 61 03/18/2018 08:15 PM    Protein, total 6.7 03/18/2018 08:15 PM Albumin 3.4 03/18/2018 08:15 PM    Globulin 3.3 03/18/2018 08:15 PM    A-G Ratio 1.0 03/18/2018 08:15 PM    ALT (SGPT) 20 03/18/2018 08:15 PM       Imaging:  I have personally reviewed patient's imaging  XR Results (most recent):    Results from Hospital Encounter encounter on 03/18/18   XR SWALLOW FUNC VIDEO   Narrative Modified barium swallow. CPT CODE: 32083    HISTORY: Dysphagia, difficulty feeding, bibasal infiltrates suspicious for  aspiration pneumonia. COMPARISON: Chest x-ray 3/18/2018. FLUORO TIME: 30 seconds    NUMBER OF FLUOROSCOPIC IMAGES: 6 loops    FINDINGS:    Study was performed in conjunction with the speech therapist.  The video is  available in the department for review. Patient swallowed multiple consistencies  of barium mixtures including thin liquids, puree, solids. There is normal swallowing with all consistencies. No evidence of aspiration. Impression IMPRESSION:    Normal modified barium swallow        CT Results (most recent):    Results from [place] encounter on 03/18/18   CT HEAD WO CONT   Narrative CT Of The Head Without Contrast    CPT CODE: 81658    HISTORY: TIA. Decreased O2 sats, sleepy. COMPARISON: 3/9/2018 head CT. 7/22/2014 brain MRI    TECHNIQUE:  Axial CT images of the head from the skull base to the vertex  without IV contrast. CT dose reduction was achieved through use of a  standardized protocol tailored for this examination and automatic exposure  control for dose modulation. FINDINGS:     No findings of acute intracranial hemorrhage. No abnormal extra-axial fluid  collections over the convexities. Increased CSF space over the frontal lobes  also present on prior exams, appears to be atrophy-related enlargement of  subarachnoid space. Mild prominence of the lateral ventricles consistent with  the degree of cerebral volume loss. No hydrocephalus. No visible mass lesion or  shift of midline structures.  Periventricular white matter lucencies consistent  with moderate chronic small vessel ischemic changes. Chronic lacunar infarcts in  the deep brain involving bilateral basal ganglia, bilateral thalami similar to  the prior exam. No acute or chronic cortical infarct. Paranasal sinuses are clear to level imaged. No mastoid effusion. Impression IMPRESSION:    No acute intracranial abnormality. No acute infarct, acute hemorrhage, or mass. No significant change compared with 3/9/2018 head CT. Chronic microvascular ischemic changes including moderate periventricular white  matter lucencies and multifocal chronic deep brain lacunar infarcts appear  stable. PFTs:  I have reviewed the patient's PFTs -- last done in 6/2017, pt unable to do maneuver    TTE:  I have reviewed the patient's TTE results  No results found for this or any previous visit. Assessment and Plan:  80 y.o. female with:    Impression:  1. COPD exacerbation:  Likely precipitated by pt resuming smoking  2. Severe COPD  3. Chronic hypoxia:  Pt supposed to be on 3LNC continuous, but has been noncompliant and does not wear outside house  4. Deconditioning:  Improving since last visit  5. Nicotine dependence to cigarettes, relapse, uncomplicated:  Pt resumed smoking 0.25-0.5PPD  6. Hx of chronic mineralcorticoid use    Plan:  -Prescribe prednisone 20mg daily x 5 days and z-vick for mild exacerbation  -Spent extensive time going over pt's nebulizer regimen and then also given in after visit summary   -Brovana nebs BID   -Pulmicort 1mg nebs BID. Advised to rinse mouth thoroughly after each use. Use once daily if insurance does not cover   -Ipratropium nebs QID   -Continue albuterol PRN  -Continue supplemental oxygen 3LNC at all times. Counseled on noncompliance  -Continue home physical therapy and advised to remain active to avoid further deconditioning  -I spent 20 minutes with patient regarding cessation of smoking cigarettes.   I emphatically and in no uncertain terms advised her to stop smoking and cleared up any potential confusion -- however pt still insists that she could smoke. I reviewed health risks of tobacco use including increased risk of MI, stroke, cancer, etc.  We reviewed various approaches to cessation including pills, patches, inhaler, gum, weaning self, \"cold turkey\", and smoking cessation classes. Pt declined cessation assistance at this time. I also strongly advised patient to avoid smoking with oxygen use due to fire/explosion risk. RTC: Follow-up Disposition:  Return in about 3 months (around 9/28/2018).       Orders Placed This Encounter    predniSONE (DELTASONE) 20 mg tablet    azithromycin (ZITHROMAX) 250 mg tablet         Brad Barillas MD/MPH     Pulmonary, Critical Care Medicine  [facility]

## 2018-06-28 NOTE — MR AVS SNAPSHOT
[address] [facility], [address] [address] 
[phone] Patient: China Corea MRN: [id number] VVC:[id number]/19/[id number] Visit Information Date & Time Provider Department Dept. Phone Encounter #  
 6/28/2018  1:15 PM Brad Barillas MD [facility] [place] [phone] Follow-up Instructions Return in about 3 months (around 9/28/2018). Your Appointments 7/12/2018 11:45 AM  
Follow Up with Brad Barillas MD  
[address] ([facility]) Appt Note: from 3/29/18; ofc rsch from 7/10 d/t dr Stefania Harrington [address] [place] [address], [address] [address]  
[phone]  
  
   
 [address] [place] [address], [address] [place] [address],[address] [place] [address]  
  
    
 8/7/2018 11:15 AM  
Follow Up with Elinor Lanes, MD  
[facility] (--) Appt Note: Medication follow up [address] [facility] [address]  
[phone]  
  
   
 [address] [facility] [address] Upcoming Health Maintenance Date Due Pneumococcal 65+ High/Highest Risk (1 of 2 - PCV13) 11/23/1996 MEDICARE YEARLY EXAM 3/30/2018 BREAST CANCER SCRN MAMMOGRAM 4/13/2018 Influenza Age 5 to Adult 8/1/2018 GLAUCOMA SCREENING Q2Y 10/12/2019 DTaP/Tdap/Td series (2 - Td) 3/29/2027 Allergies as of 6/28/2018  Review Complete On: 6/28/2018 By: Lauren Cabrera LPN Severity Noted Reaction Type Reactions Iodine  10/02/2012   Intolerance Hives Current Immunizations  Reviewed on 4/20/2015 No immunizations on file. Not reviewed this visit You Were Diagnosed With   
  
 Codes Comments COPD exacerbation ([facility] [address]    -  Primary ICD-10-CM: J44.1 ICD-9-CM: 491.21 Centrilobular emphysema ([facility] [address]     ICD-10-CM: J43.2 ICD-9-CM: 492.8   
 XUAN (obstructive sleep apnea)     ICD-10-CM: G47.33 
ICD-9-CM: 327.23 Wheezing     ICD-10-CM: R06.2 ICD-9-CM: 786.07   
 Dyspnea on exertion     ICD-10-CM: R06.09 
ICD-9-CM: 786.09 Shortness of breath     ICD-10-CM: R06.02 
ICD-9-CM: 786.05 Cigarette nicotine dependence without complication     C-35-DU: F17.210 ICD-9-CM: 305.1 Vitals BP Pulse Temp Resp Height(growth percentile) Weight(growth percentile) 100/60 (BP 1 Location: Left arm, BP Patient Position: Sitting) 72 98.3 °F (36.8 °C) 20 5' 4\" (1.626 m) 152 lb 4 oz (69.1 kg) SpO2 BMI OB Status Smoking Status 91% 26.13 kg/m2 Postmenopausal Current Every Day Smoker BMI and BSA Data Body Mass Index Body Surface Area  
 26.13 kg/m 2 1.77 m 2 Preferred Pharmacy Pharmacy Name Phone [facility] Your Updated Medication List  
  
   
This list is accurate as of 6/28/18  2:07 PM.  Always use your most recent med list.  
  
  
  
  
 * albuterol 90 mcg/actuation inhaler Commonly known as:  PROAIR HFA Take 2 Puffs by inhalation every four (4) hours as needed for Wheezing or Shortness of Breath. * albuterol 1.25 mg/3 mL Nebu Commonly known as:  ACCUNEB  
3 mL by Nebulization route every four (4) hours as needed. amLODIPine 5 mg tablet Commonly known as:  Caroline Cele Take 1 Tab by mouth daily. TAKE ONE TABLET BY MOUTH EVERY DAY  
  
 amoxicillin-clavulanate 875-125 mg per tablet Commonly known as:  AUGMENTIN  
  
 arformoterol 15 mcg/2 mL Nebu neb solution Commonly known as:  Georganne Docker 2 mL by Nebulization route two (2) times a day. Indications: BRONCHOSPASM PREVENTION WITH COPD  
  
 aspirin 81 mg tablet Take 81 mg by mouth daily. atorvastatin 10 mg tablet Commonly known as:  LIPITOR  
TAKE ONE TABLET BY MOUTH EVERY DAY  
  
 azithromycin 250 mg tablet Commonly known as:  Tati Olsen Please take 2 tabs (500mg) for the first day, followed by 1 tablet daily  
  
 bipap machine kit 1 Each by Does Not Apply route. Started using BiPap Machine 01-15-15 [facility]  
  
 budesonide 1 mg/2 mL Nbsp Commonly known as:  PULMICORT  
2 mL by Nebulization route two (2) times a day. Rinse your mouth after each use  Indications: Severe Chronic Obstructive Pulmonary Disease CALCIUM CITRATE + PO Take 100 mg by mouth once over twenty-four (24) hours. cholecalciferol (vitamin D3) 4,000 unit Cap Take 1 Cap by mouth daily. famotidine 20 mg tablet Commonly known as:  PEPCID Take 1 Tab by mouth two (2) times a day. FISH OIL PO Take 4,000 mg by mouth daily. fludrocortisone 0.1 mg tablet Commonly known as:  FLORINEF Take 1 tablet by mouth daily. fluticasone 50 mcg/actuation nasal spray Commonly known as:  Eliel Sacks 2 Sprays by Both Nostrils route as needed for Rhinitis or Allergies. fluticasone furoate 200 mcg/actuation Dsdv inhaler Commonly known as:  ARNUITY ELLIPTA Take 1 Puff by inhalation daily. Rinse mouth thoroughly after each use  
  
 furosemide 20 mg tablet Commonly known as:  LASIX Take 1 Tab by mouth daily as needed for Other (Leg Swelling). guaiFENesin-dextromethorphan -30 mg per tablet Commonly known as:  HUMIBID DM Take 1 Tab by mouth every twelve (12) hours as needed for Cough. hydrocortisone 10 mg tablet Commonly known as:  CORTEF Take 20 mg by mouth daily. ipratropium 0.02 % Soln Commonly known as:  ATROVENT  
2.5 mL by Nebulization route four (4) times daily. Indications: BRONCHOSPASM PREVENTION WITH COPD  
  
 L-Methylfolate-Q83-Kykyixctsv tablet Commonly known as:  Katelin Fender Take 1 tablet by mouth daily. L. acidophilus,casei,rhamnosus 50 billion cell Cpdr  
Commonly known as:  BIO-K PLUS Take 1 tab by mouth daily  
  
 lisinopril 20 mg tablet Commonly known as:  PRINIVIL, ZESTRIL  
TAKE ONE TABLET BY MOUTH EVERY DAY  
  
 magnesium oxide 400 mg tablet Commonly known as:  MAG-OX Take 400 mg by mouth daily. Nebulizer & Compressor machine Commonly known as:  PORTABLE NEBULIZER SYSTEM  
1 Each by Does Not Apply route two (2) times a day. * nystatin powder Commonly known as:  MYCOSTATIN Apply  to affected area four (4) times daily. * nystatin 100,000 unit/mL suspension Commonly known as:  MYCOSTATIN Take 5 mL by mouth four (4) times daily. swish and spit OXcarbazepine 300 mg tablet Commonly known as:  TRILEPTAL Take 300 mg by mouth two (2) times a day. OXYGEN-AIR DELIVERY SYSTEMS  
3 L/min by Does Not Apply route continuous. First Choice O2 PARoxetine 30 mg tablet Commonly known as:  PAXIL TAKE ONE TABLET BY MOUTH EVERY DAY  
  
 potassium chloride 20 mEq tablet Commonly known as:  K-DUR, KLOR-CON  
TAKE ONE TABLET BY MOUTH 2 TIMES A DAY  
  
 predniSONE 20 mg tablet Commonly known as:  Maxime Lever Take 1 Tab by mouth daily (with breakfast) for 5 days. SYNTHROID 112 mcg tablet Generic drug:  levothyroxine Take 112 mcg by mouth Daily (before breakfast). Jesenia Dennis Commonly known as:  BARNEY ROLLING WALKER  
1 Device by Does Not Apply route daily. * Notice: This list has 4 medication(s) that are the same as other medications prescribed for you. Read the directions carefully, and ask your doctor or other care provider to review them with you. Prescriptions Sent to Pharmacy Refills  
 predniSONE (DELTASONE) 20 mg tablet 0 Sig: Take 1 Tab by mouth daily (with breakfast) for 5 days. Class: Normal  
 Pharmacy: [place] [address] [facility] Ph #: [phone] Route: Oral  
 azithromycin (ZITHROMAX) 250 mg tablet 0 Sig: Please take 2 tabs (500mg) for the first day, followed by 1 tablet daily Class: Normal  
 Pharmacy: [address], [place] Ph #: [phone] Follow-up Instructions Return in about 3 months (around 9/28/2018). Patient Instructions STOP SMOKING!!!! 
 
 
Take Prednisone 20mg (1 tablet) once daily for 5 days Take Azithromycin 2 tabs on day 1, followed by 1 tablet daily Breathing treatments: 
-Brovana nebs TWICE a day 
-Pulmicort 1mg nebs ONCE a day (due to insurance not paying for twice a day). Please rinse mouth out thoroughly 
-Ipratropium nebs FOUR times a day 
-Albuterol nebs up to FOUR times a day AS NEEDED ONLY Introducing [facility] SERVICES! Dear Kapil Del Rio: 
Thank you for requesting a [employer] account. Our records indicate that you already have an active [employer] account. You can access your account anytime at [email or web address] Did you know that you can access your hospital and ER discharge instructions at any time in [employer]? You can also review all of your test results from your hospital stay or ER visit. Additional Information If you have questions, please visit the Frequently Asked Questions section of the [employer] website at [email or web address] Remember, [employer] is NOT to be used for urgent needs. For medical emergencies, dial 911. Now available from your iPhone and Android! Please provide this summary of care documentation to your next provider. Your primary care clinician is listed as Tara Rizzo. If you have any questions after today's visit, please call [phone].

## 2018-06-28 NOTE — PATIENT INSTRUCTIONS
STOP SMOKING!!!!      Take Prednisone 20mg (1 tablet) once daily for 5 days  Take Azithromycin 2 tabs on day 1, followed by 1 tablet daily      Breathing treatments:  -Brovana nebs TWICE a day  -Pulmicort 1mg nebs ONCE a day (due to insurance not paying for twice a day).   Please rinse mouth out thoroughly  -Ipratropium nebs FOUR times a day  -Albuterol nebs up to FOUR times a day AS NEEDED ONLY

## 2018-06-28 NOTE — LETTER
7/2/2018 12:18 AM 
 
Patient:  Dinah Bell YOB: 1931 Date of Visit: 6/28/2018 Dear Chelle Parker MD 
[address] [id number] [address] [address] VIA In Basket 
 : Thank you for referring Ms. Precious Almeida to me for evaluation/treatment. Below are the relevant portions of my assessment and plan of care. If you have questions, please do not hesitate to call me. I look forward to following Ms. Villar along with you. Sincerely, Kyra Maxwell MD/MPH Pulmonary, Critical Care Medicine [facility]

## 2018-06-28 NOTE — PROGRESS NOTES
The pt. restarted smoking. Following the cigarette use per her dght. she was totally winded and had to lie down until she could catch her breath. The pt.'s dght.  C/o no help from the [facility] re: O2, nebulizer  and Bipap

## 2018-06-29 DIAGNOSIS — J43.2 CENTRILOBULAR EMPHYSEMA (HCC): ICD-10-CM

## 2018-06-29 DIAGNOSIS — J20.9 ACUTE BRONCHITIS WITH CHRONIC OBSTRUCTIVE PULMONARY DISEASE (COPD) (HCC): ICD-10-CM

## 2018-06-29 DIAGNOSIS — R06.02 SHORTNESS OF BREATH: ICD-10-CM

## 2018-06-29 DIAGNOSIS — J96.12 CHRONIC RESPIRATORY FAILURE WITH HYPOXIA AND HYPERCAPNIA (HCC): ICD-10-CM

## 2018-06-29 DIAGNOSIS — J96.11 CHRONIC RESPIRATORY FAILURE WITH HYPOXIA AND HYPERCAPNIA (HCC): ICD-10-CM

## 2018-06-29 DIAGNOSIS — R06.09 DYSPNEA ON EXERTION: ICD-10-CM

## 2018-06-29 DIAGNOSIS — J44.0 ACUTE BRONCHITIS WITH CHRONIC OBSTRUCTIVE PULMONARY DISEASE (COPD) (HCC): ICD-10-CM

## 2018-06-29 RX ORDER — BUDESONIDE 1 MG/2ML
1000 INHALANT ORAL DAILY
Qty: 30 EACH | Refills: 5 | Status: SHIPPED | OUTPATIENT
Start: 2018-06-29 | End: 2019-02-18 | Stop reason: SDUPTHER

## 2018-06-29 NOTE — TELEPHONE ENCOUNTER
PT'S DAUGHTER CALLED([phone]). NEEDS NEW SCRIPT FOR BUDESONIDE SENT TO [facility] [phone]. SCRIPT NEEDS TO BE WRITTEN FOR ONCE DAILY OR INSURANCE WILL NOT PAY FOR TWICE DAILY. PLEASE CHECK AND CALL.

## 2018-06-29 NOTE — TELEPHONE ENCOUNTER
Budesonide neb solution was written back in may for 2 x per day but medicare will only pay for 1 x per day.

## 2018-07-03 ENCOUNTER — APPOINTMENT (OUTPATIENT)
Dept: GENERAL RADIOLOGY | Age: 83
End: 2018-07-03
Attending: EMERGENCY MEDICINE
Payer: MEDICARE

## 2018-07-03 ENCOUNTER — HOSPITAL ENCOUNTER (EMERGENCY)
Age: 83
Discharge: HOME | End: 2018-07-03
Attending: EMERGENCY MEDICINE | Admitting: EMERGENCY MEDICINE
Payer: MEDICARE

## 2018-07-03 VITALS
HEIGHT: 64 IN | RESPIRATION RATE: 18 BRPM | HEART RATE: 78 BPM | BODY MASS INDEX: 27.31 KG/M2 | WEIGHT: 160 LBS | SYSTOLIC BLOOD PRESSURE: 153 MMHG | TEMPERATURE: 98.8 F | DIASTOLIC BLOOD PRESSURE: 82 MMHG | OXYGEN SATURATION: 93 %

## 2018-07-03 DIAGNOSIS — S50.01XA CONTUSION OF RIGHT ELBOW, INITIAL ENCOUNTER: ICD-10-CM

## 2018-07-03 DIAGNOSIS — S50.311A ABRASION OF RIGHT ELBOW, INITIAL ENCOUNTER: Primary | ICD-10-CM

## 2018-07-03 DIAGNOSIS — S93.402A SPRAIN OF LEFT ANKLE, UNSPECIFIED LIGAMENT, INITIAL ENCOUNTER: ICD-10-CM

## 2018-07-03 PROCEDURE — 90715 TDAP VACCINE 7 YRS/> IM: CPT | Performed by: EMERGENCY MEDICINE

## 2018-07-03 PROCEDURE — 74011250636 HC RX REV CODE- 250/636: Performed by: EMERGENCY MEDICINE

## 2018-07-03 PROCEDURE — 90471 IMMUNIZATION ADMIN: CPT

## 2018-07-03 PROCEDURE — 73610 X-RAY EXAM OF ANKLE: CPT

## 2018-07-03 PROCEDURE — 73080 X-RAY EXAM OF ELBOW: CPT

## 2018-07-03 PROCEDURE — 99283 EMERGENCY DEPT VISIT LOW MDM: CPT

## 2018-07-03 RX ADMIN — TETANUS TOXOID, REDUCED DIPHTHERIA TOXOID AND ACELLULAR PERTUSSIS VACCINE, ADSORBED 0.5 ML: 5; 2.5; 8; 8; 2.5 SUSPENSION INTRAMUSCULAR at 17:06

## 2018-07-03 NOTE — ED TRIAGE NOTES
Patient states experiencing unwitnessed fall prior to arrival to ER. She states that she is unsure if she struck her head. States bending down to  item when fall occurred. C/o laceration to right arm.

## 2018-07-03 NOTE — ED PROVIDER NOTES
EMERGENCY DEPARTMENT HISTORY AND PHYSICAL EXAM    4:27 PM      Date: 7/3/2018  Patient Name: Ashley Espino    History of Presenting Illness     Chief Complaint   Patient presents with    Fall    Laceration         History Provided By: Patient    Chief Complaint: Laceration   Duration: PTA  Timing:  Acute  Location: Right arm near elbow   Quality: Aching  Severity: Moderate  Modifying Factors: None   Associated Symptoms: Left ankle soreness, elbow soreness        Additional History (Context): Ashley Espino is a 80 y.o. female with PMHx of COPD, HTN, hypothyroidism, pituitary adenoma, hypercholesteremia, depression, stress, anxiety, neuropathy, trauma, alcohol abuse, calculus of kidney, DJD, TIA, seizures, chronic lung disease, and pituitary tumor who presents with c/o moderate acute onset aching right arm near the elbow laceration that occurred PTA. Pt states that she was sitting on the edge of the bed and bent over to  something off the ground and fell forward. Pt states that she didn't hit her head when she fell. Pt is not aware of when her last Tetanus shot was. Pt is not currently taking any blood thinners. Pt denies knee pain, neck pain, wrist pain, or shoulder pain. Denies any further complaints or symptoms at the moment. PCP: Digna Mcnamara MD    Current Outpatient Prescriptions   Medication Sig Dispense Refill    budesonide (PULMICORT) 1 mg/2 mL nbsp 2 mL by Nebulization route daily. Rinse your mouth after each use. File under Medicare Part B Dx code J44.9  Indications: Severe Chronic Obstructive Pulmonary Disease 30 Each 5    predniSONE (DELTASONE) 20 mg tablet Take 1 Tab by mouth daily (with breakfast) for 5 days. 5 Tab 0    azithromycin (ZITHROMAX) 250 mg tablet Please take 2 tabs (500mg) for the first day, followed by 1 tablet daily 6 Tab 0    amLODIPine (NORVASC) 5 mg tablet Take 1 Tab by mouth daily.  TAKE ONE TABLET BY MOUTH EVERY DAY 90 Tab 3    amoxicillin-clavulanate (AUGMENTIN) 875-125 mg per tablet       fluticasone furoate (ARNUITY ELLIPTA) 200 mcg/actuation dsdv inhaler Take 1 Puff by inhalation daily. Rinse mouth thoroughly after each use 1 Inhaler 11    fluticasone (FLONASE) 50 mcg/actuation nasal spray 2 Sprays by Both Nostrils route as needed for Rhinitis or Allergies.  hydrocortisone (CORTEF) 10 mg tablet Take 20 mg by mouth daily.  arformoterol (BROVANA) 15 mcg/2 mL nebu neb solution 2 mL by Nebulization route two (2) times a day. Indications: BRONCHOSPASM PREVENTION WITH COPD 180 Vial 3    ipratropium (ATROVENT) 0.02 % soln 2.5 mL by Nebulization route four (4) times daily. Indications: BRONCHOSPASM PREVENTION WITH COPD 360 Vial 3    albuterol (ACCUNEB) 1.25 mg/3 mL nebu 3 mL by Nebulization route every four (4) hours as needed. 100 mL 0    guaiFENesin-dextromethorphan SR (HUMIBID DM) 600-30 mg per tablet Take 1 Tab by mouth every twelve (12) hours as needed for Cough. 15 Tab 0    nystatin (MYCOSTATIN) 100,000 unit/mL suspension Take 5 mL by mouth four (4) times daily. swish and spit 200 mL 0    L. acidophilus,casei,rhamnosus (BIO-K PLUS) 50 billion cell cpDR Take 1 tab by mouth daily 7 Cap 0    famotidine (PEPCID) 20 mg tablet Take 1 Tab by mouth two (2) times a day. 20 Tab 0    Nebulizer & Compressor (PORTABLE NEBULIZER SYSTEM) machine 1 Each by Does Not Apply route two (2) times a day. 1 Each 0    cholecalciferol, vitamin D3, 4,000 unit cap Take 1 Cap by mouth daily.  OXYGEN-AIR DELIVERY SYSTEMS 3 L/min by Does Not Apply route continuous.  [facility] Choice O2      lisinopril (PRINIVIL, ZESTRIL) 20 mg tablet TAKE ONE TABLET BY MOUTH EVERY DAY 30 Tab 5    PARoxetine (PAXIL) 30 mg tablet TAKE ONE TABLET BY MOUTH EVERY DAY 30 Tab 5    atorvastatin (LIPITOR) 10 mg tablet TAKE ONE TABLET BY MOUTH EVERY DAY 90 Tab 3    potassium chloride (K-DUR, KLOR-CON) 20 mEq tablet TAKE ONE TABLET BY MOUTH 2 TIMES A DAY (Patient taking differently: Take 20 mEq by mouth two (2) times a day. TAKE ONE TABLET BY MOUTH 2 TIMES A DAY) 180 Tab 3    nystatin (MYCOSTATIN) powder Apply  to affected area four (4) times daily. (Patient taking differently: Apply 100 g to affected area four (4) times daily. ) 1 Bottle 5    albuterol (PROAIR HFA) 90 mcg/actuation inhaler Take 2 Puffs by inhalation every four (4) hours as needed for Wheezing or Shortness of Breath. 1 Inhaler 5    SYNTHROID 112 mcg tablet Take 112 mcg by mouth Daily (before breakfast).  furosemide (LASIX) 20 mg tablet Take 1 Tab by mouth daily as needed for Other (Leg Swelling). 90 Tab 3    Walker (BARNEY ROLLING WALKER) misc 1 Device by Does Not Apply route daily. 1 Each 0    bipap machine kit 1 Each by Does Not Apply route. Started using BiPap Machine 01-15-15 [facility]      L-Methylfolate-W24-Lnaquqxhes (CEREFOLIN NAC) tablet Take 1 tablet by mouth daily. 30 tablet 5    fludrocortisone (FLORINEF) 0.1 mg tablet Take 1 tablet by mouth daily. 30 tablet 5    oxcarbazepine (TRILEPTAL) 300 mg tablet Take 300 mg by mouth two (2) times a day.  magnesium oxide (MAG-OX) 400 mg tablet Take 400 mg by mouth daily.  aspirin 81 mg tablet Take 81 mg by mouth daily.  DOCOSAHEXANOIC ACID/EPA (FISH OIL PO) Take 4,000 mg by mouth daily.  CALCIUM CITRATE/VITAMIN D3 (CALCIUM CITRATE + PO) Take 100 mg by mouth once over twenty-four (24) hours. Past History     Past Medical History:  Past Medical History:   Diagnosis Date    Abuse 1998    Alcohol    Adrenal insufficiency ([facility] [address]     Anxiety     Calculus of kidney 1988    Cardiac echocardiogram 07/31/2012    EF 60-65%. No WMA. Mild conc LVH. Gr 1 DDfx. RVSP 20-25 mmHg. Mild SAGAR. Mild TR,. Mild PAE.  Cardiovascular LE venous duplex 10/08/2012    No venous thrombosis or venous insufficiency in bilateral lower extremities.  Carotid duplex 10/09/2014    Mild < 50% bilateral ICA stenosis.       Chronic lung disease     Contact dermatitis and other eczema, due to unspecified cause     COPD [facility]chronic obstructive [facility] disease) [facility] [address]     Degenerative joint disease     Depression     Hematuria     Hypercholesteremia     Hypertension     Hypothyroidism 1998    Kidney stone     Neuropathy 3/2007    Orthostatic hypotension     Pituitary adenoma ([facility] [address] 2/1998    Pituitary tumor 1998    Pneumonia     Seizures ([facility] [address]     none recently    Severe obstructive sleep apnea 01-15-15     started using Bipap Machine    Stress     Stroke ([facility] [address] 2/2006    no residual    TIA (transient ischemic attack)     Trauma        Past Surgical History:  Past Surgical History:   Procedure Laterality Date    CYSTOSCOPY,INSERT URETERAL STENT  9/14/15    Dr. Nickie Barnes    HX APPENDECTOMY      HX INTRAOCULAR LENS PROSTHESIS      HX LITHOTRIPSY  9/14/15    Dr. Judith Craig    HX TUBAL LIGATION      HX WISDOM TEETH EXTRACTION      x4       Family History:  Family History   Problem Relation Age of Onset    Heart Disease Father     Hypertension Father     Hypertension Mother     Cancer Mother      skin    Elevated Lipids Sister     Heart Disease Sister     Cancer Maternal Grandmother      colon and stomach    Heart Disease Paternal Grandmother     Heart Attack Paternal Grandmother     Heart Attack Paternal Grandfather     Heart Disease Paternal Grandfather        Social History:  Social History   Substance Use Topics    Smoking status: Current Every Day Smoker     Packs/day: 1.50     Years: 65.00     Types: Cigarettes     Last attempt to quit: 3/9/2018    Smokeless tobacco: Never Used      Comment: 6/28/18 decreased amt. of cigarettes/day only    Alcohol use No      Comment: quit 1998 due to Alcohol Abuse       Allergies: Allergies   Allergen Reactions    Iodine Hives         Review of Systems       Review of Systems   Cardiovascular: Negative for chest pain. Neurological: Negative for syncope and headaches.    All other systems reviewed and are negative. Physical Exam     Visit Vitals    /82 (BP 1 Location: Left arm, BP Patient Position: At rest)    Pulse 78    Temp 98.8 °F (37.1 °C)    Resp 18    Ht 5' 4\" (1.626 m)    Wt 72.6 kg (160 lb)    SpO2 93%    BMI 27.46 kg/m2         Physical Exam   Constitutional: She is oriented to person, place, and time. She appears well-developed and well-nourished. No distress. HENT:   Head: Normocephalic and atraumatic. No palpable hematomas   Eyes: No scleral icterus. Neck:   No cervical spine tenderness   Cardiovascular: Normal rate and regular rhythm. No murmur heard. Pulmonary/Chest: Effort normal and breath sounds normal. She exhibits no tenderness. Musculoskeletal:   Skin slough abrasion to outer aspect R elbow w/ local bruising and tenderness to palpation. No wrist or shoulder tenderness. There is some mild swelling and tenderness to lat malleolus L ankle. No knee or hip involvement. Neurological: She is alert and oriented to person, place, and time. Skin: Skin is warm and dry. Psychiatric: She has a normal mood and affect. Nursing note and vitals reviewed. Diagnostic Study Results     Labs -  No results found for this or any previous visit (from the past 12 hour(s)). Radiologic Studies -   XR ELBOW RT MIN 3 V    (Results Pending)   XR ANKLE LT MIN 3 V    (Results Pending)     Per my reading R elbow and L ankle x-rays negative for fx    Medical Decision Making   I am the first provider for this patient. I reviewed the vital signs, available nursing notes, past medical history, past surgical history, family history and social history. Vital Signs-Reviewed the patient's vital signs. Pulse Oximetry Analysis -  93% on room air (Interpretation)     Records Reviewed: Nursing Notes (Time of Review: 4:27 PM)    ED Course: Progress Notes, Reevaluation, and Consults:  Imp: Extremity abrasion and contusions. No history of head trauma and no external signs of trauma. Plain films ok. Symptomatic treatment. Diagnosis     Clinical Impression:   1. Abrasion of right elbow, initial encounter    2. Contusion of right elbow, initial encounter    3. Sprain of left ankle, unspecified ligament, initial encounter        Disposition: home    Follow-up Information     Follow up With Details Comments 100 Elisa Yarbrough MD In 1 week As needed, for recheck of ongoing symptoms [address] [id number]  [facility] EMERGENCY DEPT  As needed, If symptoms worsen [id number] [facility]  [phone]           Discharge Medication List as of 7/3/2018  5:47 PM      CONTINUE these medications which have NOT CHANGED    Details   budesonide (PULMICORT) 1 mg/2 mL nbsp 2 mL by Nebulization route daily. Rinse your mouth after each use. File under Medicare Part B Dx code J44.9  Indications: Severe Chronic Obstructive Pulmonary Disease, PrintFile under Medicare Part B. May substitute for generic. Disp-30 Each, R-5      predniSONE (DELTASONE) 20 mg tablet Take 1 Tab by mouth daily (with breakfast) for 5 days. , Normal, Disp-5 Tab, R-0      azithromycin (ZITHROMAX) 250 mg tablet Please take 2 tabs (500mg) for the first day, followed by 1 tablet daily, Normal, Disp-6 Tab, R-0      amLODIPine (NORVASC) 5 mg tablet Take 1 Tab by mouth daily. TAKE ONE TABLET BY MOUTH EVERY DAY, Normal, Disp-90 Tab, R-3      amoxicillin-clavulanate (AUGMENTIN) 875-125 mg per tablet Historical Med      fluticasone furoate (ARNUITY ELLIPTA) 200 mcg/actuation dsdv inhaler Take 1 Puff by inhalation daily. Rinse mouth thoroughly after each use, Normal, Disp-1 Inhaler, R-11      fluticasone (FLONASE) 50 mcg/actuation nasal spray 2 Sprays by Both Nostrils route as needed for Rhinitis or Allergies. , Historical Med      hydrocortisone (CORTEF) 10 mg tablet Take 20 mg by mouth daily. , Historical Med      arformoterol (BROVANA) 15 mcg/2 mL nebu neb solution 2 mL by Nebulization route two (2) times a day. Indications: BRONCHOSPASM PREVENTION WITH COPD, PrintFile under Medicare Part B. May be substituted for generic or for formoterol nebsDisp-180 Vial, R-3      ipratropium (ATROVENT) 0.02 % soln 2.5 mL by Nebulization route four (4) times daily. Indications: BRONCHOSPASM PREVENTION WITH COPD, PrintPlease file under Medicare part B. May substitute for genericDisp-360 Vial, R-3      albuterol (ACCUNEB) 1.25 mg/3 mL nebu 3 mL by Nebulization route every four (4) hours as needed. , Print, Disp-100 mL, R-0      guaiFENesin-dextromethorphan SR (HUMIBID DM) 600-30 mg per tablet Take 1 Tab by mouth every twelve (12) hours as needed for Cough. , Print, Disp-15 Tab, R-0      nystatin (MYCOSTATIN) 100,000 unit/mL suspension Take 5 mL by mouth four (4) times daily. swish and spit, Print, Disp-200 mL, R-0      L. acidophilus,casei,rhamnosus (BIO-K PLUS) 50 billion cell cpDR Take 1 tab by mouth daily, Print, Disp-7 Cap, R-0      famotidine (PEPCID) 20 mg tablet Take 1 Tab by mouth two (2) times a day., Print, Disp-20 Tab, R-0      Nebulizer & Compressor (PORTABLE NEBULIZER SYSTEM) machine 1 Each by Does Not Apply route two (2) times a day., Print, Disp-1 Each, R-0      cholecalciferol, vitamin D3, 4,000 unit cap Take 1 Cap by mouth daily. , Historical Med      OXYGEN-AIR DELIVERY SYSTEMS 3 L/min by Does Not Apply route continuous. First Choice O2, Historical Med      lisinopril (PRINIVIL, ZESTRIL) 20 mg tablet TAKE ONE TABLET BY MOUTH EVERY DAY, Normal, Disp-30 Tab, R-5      PARoxetine (PAXIL) 30 mg tablet TAKE ONE TABLET BY MOUTH EVERY DAY, Normal, Disp-30 Tab, R-5      atorvastatin (LIPITOR) 10 mg tablet TAKE ONE TABLET BY MOUTH EVERY DAY, Normal, Disp-90 Tab, R-3      potassium chloride (K-DUR, KLOR-CON) 20 mEq tablet TAKE ONE TABLET BY MOUTH 2 TIMES A DAY, Normal, Disp-180 Tab, R-3      nystatin (MYCOSTATIN) powder Apply  to affected area four (4) times daily. , Normal, Disp-1 Bottle, R-5 albuterol (PROAIR HFA) 90 mcg/actuation inhaler Take 2 Puffs by inhalation every four (4) hours as needed for Wheezing or Shortness of Breath., Normal, Disp-1 Inhaler, R-5      SYNTHROID 112 mcg tablet Take 112 mcg by mouth Daily (before breakfast). , Historical Med, LEWIS      furosemide (LASIX) 20 mg tablet Take 1 Tab by mouth daily as needed for Other (Leg Swelling). , Normal, Disp-90 Tab, R-3      Walker (BARNEY ROLLING WALKER) misc 1 Device by Does Not Apply route daily. , Print, Disp-1 Each, R-0      bipap machine kit 1 Each by Does Not Apply route. Started using BiPap Machine 01-15-15 [facility] Historical Med      L-Methylfolate-L48-Wlpnqymcko (CEREFOLIN NAC) tablet Take 1 tablet by mouth daily. , Print, Disp-30 tablet, R-5      fludrocortisone (FLORINEF) 0.1 mg tablet Take 1 tablet by mouth daily. , Print, Disp-30 tablet, R-5      oxcarbazepine (TRILEPTAL) 300 mg tablet Take 300 mg by mouth two (2) times a day., Historical Med      magnesium oxide (MAG-OX) 400 mg tablet Take 400 mg by mouth daily. , Historical Med      aspirin 81 mg tablet Take 81 mg by mouth daily. , Historical Med      DOCOSAHEXANOIC ACID/EPA (FISH OIL PO) Take 4,000 mg by mouth daily. , Historical Med      CALCIUM CITRATE/VITAMIN D3 (CALCIUM CITRATE + PO) Take 100 mg by mouth once over twenty-four (24) hours. , Historical Med           _______________________________    Attestations:  Kayla Sandoval 97 acting as a scribe for and in the presence of Rebecca Sullivan MD      July 03, 2018 at 4:27 PM       Provider Attestation:      I personally performed the services described in the documentation, reviewed the documentation, as recorded by the scribe in my presence, and it accurately and completely records my words and actions.  July 03, 2018 at 4:27 PM - Rebecca Sullivan MD    _______________________________

## 2018-07-03 NOTE — DISCHARGE INSTRUCTIONS
Scrapes (Abrasions): Care Instructions  Your Care Instructions  Scrapes (abrasions) are wounds where your skin has been rubbed or torn off. Most scrapes do not go deep into the skin, but some may remove several layers of skin. Scrapes usually don't bleed much, but they may ooze pinkish fluid. Scrapes on the head or face may appear worse than they are. They may bleed a lot because of the good blood supply to this area. Most scrapes heal well and may not need a bandage. They usually heal within 3 to 7 days. A large, deep scrape may take 1 to 2 weeks or longer to heal. A scab may form on some scrapes. Follow-up care is a key part of your treatment and safety. Be sure to make and go to all appointments, and call your doctor if you are having problems. It's also a good idea to know your test results and keep a list of the medicines you take. How can you care for yourself at home? · If your doctor told you how to care for your wound, follow your doctor's instructions. If you did not get instructions, follow this general advice:  ¨ Wash the scrape with clean water 2 times a day. Don't use hydrogen peroxide or alcohol, which can slow healing. ¨ You may cover the scrape with a thin layer of petroleum jelly, such as Vaseline, and a nonstick bandage. ¨ Apply more petroleum jelly and replace the bandage as needed. · Prop up the injured area on a pillow anytime you sit or lie down during the next 3 days. Try to keep it above the level of your heart. This will help reduce swelling. · Be safe with medicines. Take pain medicines exactly as directed. ¨ If the doctor gave you a prescription medicine for pain, take it as prescribed. ¨ If you are not taking a prescription pain medicine, ask your doctor if you can take an over-the-counter medicine. When should you call for help?   Call your doctor now or seek immediate medical care if:  ? · You have signs of infection, such as:  ¨ Increased pain, swelling, warmth, or redness around the scrape. ¨ Red streaks leading from the scrape. ¨ Pus draining from the scrape. ¨ A fever. ? · The scrape starts to bleed, and blood soaks through the bandage. Oozing small amounts of blood is normal.   ? Watch closely for changes in your health, and be sure to contact your doctor if the scrape is not getting better each day. Where can you learn more? Go to http://casey-sameer.info/. Enter A374 in the search box to learn more about \"Scrapes (Abrasions): Care Instructions. \"  Current as of: March 20, 2017  Content Version: 11.4  © 7431-8042 [employer]. Care instructions adapted under license by [employer] (which disclaims liability or warranty for this information). If you have questions about a medical condition or this instruction, always ask your healthcare professional. [place] [address] any warranty or liability for your use of this information.

## 2018-07-03 NOTE — ED NOTES
Both written and verbal discharge instructions given to pt with verbalized understanding of home care and follow up. Pt has a ride home with family.

## 2018-07-05 NOTE — PROGRESS NOTES
XR with fracture of malleolus. Called listed phone number for patient and daughter. No answer. Left VM message to return call here to [facility] to discuss XR results. Pt requires splinting.

## 2018-07-05 NOTE — ED NOTES
Received call back from daughter. She is informed of XR results and need for splint. Daughter states she will take patient back for splint later today. Called and spoke with Dary Robles PA-C, at [facility]. She will keep an eye out for patient.

## 2018-07-05 NOTE — ED NOTES
Pt arrived to the ED at 2:20 PM for splint placement. Tech is made aware that the pt will need a posterior short leg splint of the L ankle. Pt is made aware that the splint will be placed momentarily and she will need ortho follow-up with Dr. Edilson Joe. Pt agrees with this plan.  Sonya Singleton PA-C 2:21 PM

## 2018-07-23 ENCOUNTER — TELEPHONE (OUTPATIENT)
Dept: PULMONOLOGY | Age: 83
End: 2018-07-23

## 2018-07-23 DIAGNOSIS — J44.9 CHRONIC OBSTRUCTIVE PULMONARY DISEASE, UNSPECIFIED COPD TYPE (HCC): Primary | ICD-10-CM

## 2018-07-23 NOTE — TELEPHONE ENCOUNTER
PT'S DAUGHTER [phone]). PT NEEDS SCRIPT FOR  NEBULIZER SUPPLIES SENT TO [facility]. PLEASE CHECK AND CALL BACK.

## 2018-08-07 ENCOUNTER — OFFICE VISIT (OUTPATIENT)
Dept: FAMILY MEDICINE CLINIC | Age: 83
End: 2018-08-07

## 2018-08-07 VITALS
OXYGEN SATURATION: 92 % | HEIGHT: 64 IN | TEMPERATURE: 97.5 F | HEART RATE: 76 BPM | DIASTOLIC BLOOD PRESSURE: 75 MMHG | RESPIRATION RATE: 18 BRPM | SYSTOLIC BLOOD PRESSURE: 115 MMHG | BODY MASS INDEX: 25.95 KG/M2 | WEIGHT: 152 LBS

## 2018-08-07 DIAGNOSIS — R53.1 WEAKNESS: ICD-10-CM

## 2018-08-07 DIAGNOSIS — F41.1 ANXIETY STATE: ICD-10-CM

## 2018-08-07 DIAGNOSIS — E78.2 MIXED HYPERLIPIDEMIA: Primary | ICD-10-CM

## 2018-08-07 DIAGNOSIS — F32.A DEPRESSION, UNSPECIFIED DEPRESSION TYPE: ICD-10-CM

## 2018-08-07 DIAGNOSIS — R22.1 NECK MASS: ICD-10-CM

## 2018-08-07 DIAGNOSIS — I10 ESSENTIAL HYPERTENSION, BENIGN: ICD-10-CM

## 2018-08-07 DIAGNOSIS — E87.6 HYPOKALEMIA: ICD-10-CM

## 2018-08-07 RX ORDER — POTASSIUM CHLORIDE 20 MEQ/1
TABLET, EXTENDED RELEASE ORAL
Qty: 180 TAB | Refills: 3 | Status: SHIPPED | OUTPATIENT
Start: 2018-08-07 | End: 2019-08-19 | Stop reason: SDUPTHER

## 2018-08-07 RX ORDER — ATORVASTATIN CALCIUM 10 MG/1
TABLET, FILM COATED ORAL
Qty: 90 TAB | Refills: 3 | Status: SHIPPED | OUTPATIENT
Start: 2018-08-07 | End: 2019-12-02 | Stop reason: SDUPTHER

## 2018-08-07 RX ORDER — PAROXETINE 30 MG/1
TABLET, FILM COATED ORAL
Qty: 30 TAB | Refills: 5 | Status: SHIPPED | OUTPATIENT
Start: 2018-08-07 | End: 2019-02-27 | Stop reason: SDUPTHER

## 2018-08-07 RX ORDER — LISINOPRIL 20 MG/1
TABLET ORAL
Qty: 30 TAB | Refills: 5 | Status: SHIPPED | OUTPATIENT
Start: 2018-08-07 | End: 2019-10-28 | Stop reason: ALTCHOICE

## 2018-08-07 NOTE — MR AVS SNAPSHOT
[facility] 
 
 
 [place] [address] [address] [address] [phone] Patient: Levester Councilman MRN: [id number] VCD:[id number] Visit Information Date & Time Provider Department Dept. Phone Encounter #  
 8/7/2018 11:15 AM Emmanuel Agustin MD [place] [address] [id number] Your Appointments 11/6/2018  1:00 PM  
Follow Up with Emmanuel Agustin MD  
[address] (--) Appt Note: Follow up [place] [address] [address] [address] [phone]  
  
   
 [place] [address] [place] [id number] Upcoming Health Maintenance Date Due Pneumococcal 65+ High/Highest Risk (1 of 2 - PCV13) 11/23/1996 MEDICARE YEARLY EXAM 3/30/2018 BREAST CANCER SCRN MAMMOGRAM 4/13/2018 Influenza Age 5 to Adult 8/1/2018 GLAUCOMA SCREENING Q2Y 10/12/2019 DTaP/Tdap/Td series (2 - Td) 7/3/2028 Allergies as of 8/7/2018  Review Complete On: 8/7/2018 By: Emmanuel Agustin MD  
  
 Severity Noted Reaction Type Reactions Iodine  10/02/2012   Intolerance Hives Current Immunizations  Reviewed on 4/20/2015 Name Date Tdap 7/3/2018  5:06 PM  
  
 Not reviewed this visit You Were Diagnosed With   
  
 Codes Comments Mixed hyperlipidemia    -  Primary ICD-10-CM: J09.9 ICD-9-CM: 272.2 Anxiety state     ICD-10-CM: F41.1 ICD-9-CM: 300.00 Depression, unspecified depression type     ICD-10-CM: F32.9 ICD-9-CM: 806 Essential hypertension, benign     ICD-10-CM: I10 
ICD-9-CM: 401.1 Hypokalemia     ICD-10-CM: E87.6 ICD-9-CM: 276.8 Weakness     ICD-10-CM: R53.1 ICD-9-CM: 780.79 Neck mass     ICD-10-CM: R22.1 ICD-9-CM: 485. 2 Vitals BP Pulse Temp Resp Height(growth percentile) Weight(growth percentile) 115/75 76 97.5 °F (36.4 °C) (Oral) 18 5' 4\" (1.626 m) 152 lb (68.9 kg) SpO2 BMI OB Status Smoking Status 92% 26.09 kg/m2 Postmenopausal Current Every Day Smoker Vitals History BMI and BSA Data Body Mass Index Body Surface Area 26.09 kg/m 2 1.76 m 2 Preferred Pharmacy Pharmacy Name Phone Chuckie Flores [facility] Your Updated Medication List  
  
   
This list is accurate as of 8/7/18  1:04 PM.  Always use your most recent med list.  
  
  
  
  
 * albuterol 90 mcg/actuation inhaler Commonly known as:  PROAIR HFA Take 2 Puffs by inhalation every four (4) hours as needed for Wheezing or Shortness of Breath. * albuterol 1.25 mg/3 mL Nebu Commonly known as:  ACCUNEB  
3 mL by Nebulization route every four (4) hours as needed. amLODIPine 5 mg tablet Commonly known as:  Izetta Harder Take 1 Tab by mouth daily. TAKE ONE TABLET BY MOUTH EVERY DAY  
  
 amoxicillin-clavulanate 875-125 mg per tablet Commonly known as:  AUGMENTIN  
  
 arformoterol 15 mcg/2 mL Nebu neb solution Commonly known as:  [place] Luis 2 mL by Nebulization route two (2) times a day. Indications: BRONCHOSPASM PREVENTION WITH COPD  
  
 aspirin 81 mg tablet Take 81 mg by mouth daily. atorvastatin 10 mg tablet Commonly known as:  LIPITOR  
TAKE ONE TABLET BY MOUTH EVERY DAY  
  
 bipap machine kit 1 Each by Does Not Apply route. Started using BiPap Machine 01-15-15 [facility]  
  
 budesonide 1 mg/2 mL Nbsp Commonly known as:  PULMICORT  
2 mL by Nebulization route daily. Rinse your mouth after each use. File under Medicare Part B Dx code J44.9  Indications: Severe Chronic Obstructive Pulmonary Disease CALCIUM CITRATE + PO Take 100 mg by mouth once over twenty-four (24) hours. cholecalciferol (vitamin D3) 4,000 unit Cap Take 1 Cap by mouth daily. famotidine 20 mg tablet Commonly known as:  PEPCID Take 1 Tab by mouth two (2) times a day. FISH OIL PO Take 4,000 mg by mouth daily. fludrocortisone 0.1 mg tablet Commonly known as:  FLORINEF  
 Take 1 tablet by mouth daily. fluticasone 50 mcg/actuation nasal spray Commonly known as:  [place] Tiffanyks 2 Sprays by Both Nostrils route as needed for Rhinitis or Allergies. fluticasone furoate 200 mcg/actuation Dsdv inhaler Commonly known as:  ARNUITY ELLIPTA Take 1 Puff by inhalation daily. Rinse mouth thoroughly after each use  
  
 furosemide 20 mg tablet Commonly known as:  LASIX Take 1 Tab by mouth daily as needed for Other (Leg Swelling). guaiFENesin-dextromethorphan -30 mg per tablet Commonly known as:  HUMIBID DM Take 1 Tab by mouth every twelve (12) hours as needed for Cough. hydrocortisone 10 mg tablet Commonly known as:  CORTEF Take 20 mg by mouth daily. ipratropium 0.02 % Soln Commonly known as:  ATROVENT  
2.5 mL by Nebulization route four (4) times daily. Indications: BRONCHOSPASM PREVENTION WITH COPD  
  
 L-Methylfolate-A25-Lfgizfjgcr tablet Commonly known as:  Marielena Tapia Take 1 tablet by mouth daily. L. acidophilus,casei,rhamnosus 50 billion cell Cpdr  
Commonly known as:  BIO-K PLUS Take 1 tab by mouth daily  
  
 lisinopril 20 mg tablet Commonly known as:  PRINIVIL, ZESTRIL  
TAKE ONE TABLET BY MOUTH EVERY DAY  
  
 magnesium oxide 400 mg tablet Commonly known as:  MAG-OX Take 400 mg by mouth daily. Nebulizer & Compressor machine Commonly known as:  PORTABLE NEBULIZER SYSTEM  
1 Each by Does Not Apply route two (2) times a day. * nystatin powder Commonly known as:  MYCOSTATIN Apply  to affected area four (4) times daily. * nystatin 100,000 unit/mL suspension Commonly known as:  MYCOSTATIN Take 5 mL by mouth four (4) times daily. swish and spit OXcarbazepine 300 mg tablet Commonly known as:  TRILEPTAL Take 300 mg by mouth two (2) times a day. OXYGEN-AIR DELIVERY SYSTEMS  
3 L/min by Does Not Apply route continuous. [facility] O2 PARoxetine 30 mg tablet Commonly known as:  PAXIL TAKE ONE TABLET BY MOUTH EVERY DAY  
  
 potassium chloride 20 mEq tablet Commonly known as:  K-DUR, KLOR-CON  
TAKE ONE TABLET BY MOUTH 2 TIMES A DAY  
  
 SYNTHROID 112 mcg tablet Generic drug:  levothyroxine Take 112 mcg by mouth Daily (before breakfast). Vallisa Killings Commonly known as:  BARNEY ROLLING WALKER  
1 Device by Does Not Apply route daily. * Notice: This list has 4 medication(s) that are the same as other medications prescribed for you. Read the directions carefully, and ask your doctor or other care provider to review them with you. Prescriptions Sent to Pharmacy Refills  
 atorvastatin (LIPITOR) 10 mg tablet 3 Sig: TAKE ONE TABLET BY MOUTH EVERY DAY Class: Normal  
 Pharmacy: [address], [place] Ph #: [phone] PARoxetine (PAXIL) 30 mg tablet 5 Sig: TAKE ONE TABLET BY MOUTH EVERY DAY Class: Normal  
 Pharmacy: [address] [place] [address] RD Ph #: [phone]  
 lisinopril (PRINIVIL, ZESTRIL) 20 mg tablet 5 Sig: TAKE ONE TABLET BY MOUTH EVERY DAY Class: Normal  
 Pharmacy: [address] [place] [address] Ph #: [phone]  
 potassium chloride (K-DUR, KLOR-CON) 20 mEq tablet 3 Sig: TAKE ONE TABLET BY MOUTH 2 TIMES A DAY Class: Normal  
 Pharmacy: [address], [place] Ph #: [phone] To-Do List   
 08/07/2018 Lab:  METABOLIC PANEL, COMPREHENSIVE [facility]! Dear Sally Iniguez: 
Thank you for requesting a [employer] account. Our records indicate that you already have an active [employer] account. You can access your account anytime at [email or web address] Did you know that you can access your hospital and ER discharge instructions at any time in [employer]? You can also review all of your test results from your hospital stay or ER visit. Additional Information If you have questions, please visit the Frequently Asked Questions section of the [employer] website at [email or web address] Remember, [employer] is NOT to be used for urgent needs. For medical emergencies, dial 911. Now available from your iPhone and Android! Please provide this summary of care documentation to your next provider. Your primary care clinician is listed as Jeremy Dan. If you have any questions after today's visit, please call [phone].

## 2018-08-07 NOTE — PROGRESS NOTES
HISTORY OF PRESENT ILLNESS  Alice Reid is a 80 y.o. female. Chief Complaint   Patient presents with    Follow-up    Cholesterol Problem    Hypertension    Labs     completed on 6-28-18    Medication Refill       HPI  Patient is here for a  follow up of Htn, and cholesterol. Patient had labs on 6-28-18. Labs reviewed in detail with patient     Patient does need medication refills today. Patient requests electronic refills. New concerns today: Seen at [facility] on 7-3-18 due to a fall. DX with sprain of left ankle, contustion right elbow, and abrasion of left elbow. Patient states that she has bilateral leg weakness and leg cramps. For the past week, she has had b arm and leg pain. Patients daughter states that the patient has problems with her memory. Pt also has episodes where she gets very weak and has to lay down. This has occurred about 4-5 times in the last 2 wks. Pt is coherent during these episodes but feels weak and has to rest.  It now takes 2 hours to get ready to come for an appt. Pt will see neuro soon for f/u of robert. She has been having trouble with her cpap. Pt is only smoking one cigarette nightly now. Review of Systems   HENT: Negative. Respiratory: Negative. Cardiovascular: Negative. Musculoskeletal: Positive for joint pain. Neurological: Positive for weakness. All other systems reviewed and are negative. Physical Exam  Physical Exam   Nursing note and vitals reviewed. Constitutional: She is oriented to person, place, and time. She appears well-developed and well-nourished. HENT:   Head: Normocephalic and atraumatic. Right Ear: External ear normal.   Left Ear: External ear normal.   Nose: Nose normal.   Eyes: Conjunctivae and EOM are normal.   Neck: Normal range of motion. Neck supple. No JVD present. Carotid bruit is not present. No thyromegaly present.    Cardiovascular: Normal rate, regular rhythm, normal heart sounds and intact distal pulses. Exam reveals no gallop and no friction rub. No murmur heard. Pulmonary/Chest: Effort normal and breath sounds normal. She has no wheezes. She has no rhonchi. She has no rales. Abdominal: Soft. Bowel sounds are normal.   Musculoskeletal: Normal range of motion. Neurological: She is alert and oriented to person, place, and time. Coordination normal.   Skin: Skin is warm and dry. Psychiatric: She has a normal mood and affect. Her behavior is normal. Judgment and thought content normal.     ASSESSMENT and PLAN  Diagnoses and all orders for this visit:    1. Mixed hyperlipidemia  -     atorvastatin (LIPITOR) 10 mg tablet; TAKE ONE TABLET BY MOUTH EVERY DAY    2. Anxiety state  -     PARoxetine (PAXIL) 30 mg tablet; TAKE ONE TABLET BY MOUTH EVERY DAY  Stable, cont pres tx plan. 3. Depression, unspecified depression type  -     PARoxetine (PAXIL) 30 mg tablet; TAKE ONE TABLET BY MOUTH EVERY DAY  Stable, cont pres tx plan. 4. Essential hypertension, benign  -     lisinopril (PRINIVIL, ZESTRIL) 20 mg tablet; TAKE ONE TABLET BY MOUTH EVERY DAY  -     potassium chloride (K-DUR, KLOR-CON) 20 mEq tablet; TAKE ONE TABLET BY MOUTH 2 TIMES A DAY  Stable, cont pres tx plan. 5. Hypokalemia  -     potassium chloride (K-DUR, KLOR-CON) 20 mEq tablet; TAKE ONE TABLET BY MOUTH 2 TIMES A DAY  Stable, cont pres tx plan. 6. Weakness  -     METABOLIC PANEL, COMPREHENSIVE; Future    7. Neck mass  -     US HEAD NECK SOFT TISSUE;  Future      Follow-up Disposition: 3 months; sooner prn

## 2018-08-07 NOTE — PROGRESS NOTES
Chief Complaint   Patient presents with    Follow-up    Cholesterol Problem    Hypertension    Labs     completed on 6-28-18    Medication Refill     1. Have you been to the ER, urgent care clinic since your last visit? Hospitalized since your last visit? Yes When: 7-3-18 Where: [facility] Reason for visit: FALL     2. Have you seen or consulted any other health care providers outside of the [address] [place] [address] since your last visit? Include any pap smears or colon screening.  YES

## 2018-08-08 ENCOUNTER — TELEPHONE (OUTPATIENT)
Dept: FAMILY MEDICINE CLINIC | Age: 83
End: 2018-08-08

## 2018-08-08 NOTE — TELEPHONE ENCOUNTER
pts daughter called and stated that she needs a updated referral to see neurologist. Dr. Yoanna Hurt

## 2018-08-09 ENCOUNTER — TELEPHONE (OUTPATIENT)
Dept: FAMILY MEDICINE CLINIC | Age: 83
End: 2018-08-09

## 2018-08-09 NOTE — TELEPHONE ENCOUNTER
Daughter calling,said she made a mistake when talking with dr Chapis Bailey does not have an appt with neuro. Dr Jamie Henry. Said she called to make an appt and was told that dr lee had to send a referral for pt to be seen for memory loss.

## 2018-08-10 NOTE — TELEPHONE ENCOUNTER
Contacted DR. Jordan's office, spoke with Leonardo Gonzales, she states that the patient does not need a new referral and will call the patient to schedule an appointment. Patients daughter contacted, patient identified with two identifiers (Name & ). She is aware to call and speak with Leonardo Gonzales at [phone] to schedule the appointment if she doesn't receive a call.

## 2018-08-15 ENCOUNTER — HOSPITAL ENCOUNTER (OUTPATIENT)
Dept: ULTRASOUND IMAGING | Age: 83
Discharge: HOME | End: 2018-08-15
Attending: FAMILY MEDICINE
Payer: MEDICARE

## 2018-08-15 DIAGNOSIS — R22.1 NECK MASS: ICD-10-CM

## 2018-08-15 PROCEDURE — 76536 US EXAM OF HEAD AND NECK: CPT

## 2018-08-21 ENCOUNTER — APPOINTMENT (OUTPATIENT)
Dept: GENERAL RADIOLOGY | Age: 83
End: 2018-08-21
Attending: EMERGENCY MEDICINE
Payer: MEDICARE

## 2018-08-21 ENCOUNTER — TELEPHONE (OUTPATIENT)
Dept: FAMILY MEDICINE CLINIC | Age: 83
End: 2018-08-21

## 2018-08-21 ENCOUNTER — HOSPITAL ENCOUNTER (EMERGENCY)
Age: 83
Discharge: HOME | End: 2018-08-21
Attending: EMERGENCY MEDICINE
Payer: MEDICARE

## 2018-08-21 VITALS
RESPIRATION RATE: 12 BRPM | HEIGHT: 64 IN | WEIGHT: 154 LBS | OXYGEN SATURATION: 93 % | HEART RATE: 62 BPM | DIASTOLIC BLOOD PRESSURE: 113 MMHG | TEMPERATURE: 98.7 F | SYSTOLIC BLOOD PRESSURE: 135 MMHG | BODY MASS INDEX: 26.29 KG/M2

## 2018-08-21 DIAGNOSIS — E86.0 DEHYDRATION: Primary | ICD-10-CM

## 2018-08-21 LAB
ANION GAP SERPL CALC-SCNC: 1 MMOL/L (ref 3–18)
APPEARANCE UR: CLEAR
ATRIAL RATE: 62 BPM
BASOPHILS # BLD: 0 K/UL (ref 0–0.1)
BASOPHILS NFR BLD: 1 % (ref 0–2)
BILIRUB UR QL: NEGATIVE
BUN SERPL-MCNC: 13 MG/DL (ref 7–18)
BUN/CREAT SERPL: 12 (ref 12–20)
CALCIUM SERPL-MCNC: 9.1 MG/DL (ref 8.5–10.1)
CALCULATED P AXIS, ECG09: 90 DEGREES
CALCULATED R AXIS, ECG10: -73 DEGREES
CALCULATED T AXIS, ECG11: 2 DEGREES
CHLORIDE SERPL-SCNC: 103 MMOL/L (ref 100–108)
CO2 SERPL-SCNC: 35 MMOL/L (ref 21–32)
COLOR UR: YELLOW
CREAT SERPL-MCNC: 1.11 MG/DL (ref 0.6–1.3)
DIAGNOSIS, 93000: NORMAL
DIFFERENTIAL METHOD BLD: ABNORMAL
EOSINOPHIL # BLD: 0.3 K/UL (ref 0–0.4)
EOSINOPHIL NFR BLD: 6 % (ref 0–5)
ERYTHROCYTE [DISTWIDTH] IN BLOOD BY AUTOMATED COUNT: 12.4 % (ref 11.6–14.5)
GLUCOSE SERPL-MCNC: 96 MG/DL (ref 74–99)
GLUCOSE UR STRIP.AUTO-MCNC: NEGATIVE MG/DL
HCT VFR BLD AUTO: 36 % (ref 35–45)
HGB BLD-MCNC: 11.4 G/DL (ref 12–16)
HGB UR QL STRIP: NEGATIVE
KETONES UR QL STRIP.AUTO: NEGATIVE MG/DL
LEUKOCYTE ESTERASE UR QL STRIP.AUTO: NEGATIVE
LYMPHOCYTES # BLD: 2.7 K/UL (ref 0.9–3.6)
LYMPHOCYTES NFR BLD: 48 % (ref 21–52)
MAGNESIUM SERPL-MCNC: 2 MG/DL (ref 1.6–2.6)
MCH RBC QN AUTO: 30.4 PG (ref 24–34)
MCHC RBC AUTO-ENTMCNC: 31.7 G/DL (ref 31–37)
MCV RBC AUTO: 96 FL (ref 74–97)
MONOCYTES # BLD: 0.4 K/UL (ref 0.05–1.2)
MONOCYTES NFR BLD: 8 % (ref 3–10)
NEUTS SEG # BLD: 2.1 K/UL (ref 1.8–8)
NEUTS SEG NFR BLD: 37 % (ref 40–73)
NITRITE UR QL STRIP.AUTO: NEGATIVE
P-R INTERVAL, ECG05: 190 MS
PH UR STRIP: 6 [PH] (ref 5–8)
PLATELET # BLD AUTO: 274 K/UL (ref 135–420)
PMV BLD AUTO: 9.6 FL (ref 9.2–11.8)
POTASSIUM SERPL-SCNC: 4.5 MMOL/L (ref 3.5–5.5)
PROT UR STRIP-MCNC: NEGATIVE MG/DL
Q-T INTERVAL, ECG07: 454 MS
QRS DURATION, ECG06: 132 MS
QTC CALCULATION (BEZET), ECG08: 460 MS
RBC # BLD AUTO: 3.75 M/UL (ref 4.2–5.3)
SODIUM SERPL-SCNC: 139 MMOL/L (ref 136–145)
SP GR UR REFRACTOMETRY: 1.01 (ref 1–1.03)
TROPONIN I SERPL-MCNC: <0.02 NG/ML (ref 0–0.06)
UROBILINOGEN UR QL STRIP.AUTO: 0.2 EU/DL (ref 0.2–1)
VENTRICULAR RATE, ECG03: 62 BPM
WBC # BLD AUTO: 5.6 K/UL (ref 4.6–13.2)

## 2018-08-21 PROCEDURE — 80048 BASIC METABOLIC PNL TOTAL CA: CPT | Performed by: EMERGENCY MEDICINE

## 2018-08-21 PROCEDURE — 71046 X-RAY EXAM CHEST 2 VIEWS: CPT

## 2018-08-21 PROCEDURE — 99285 EMERGENCY DEPT VISIT HI MDM: CPT

## 2018-08-21 PROCEDURE — 74011250636 HC RX REV CODE- 250/636: Performed by: EMERGENCY MEDICINE

## 2018-08-21 PROCEDURE — 84484 ASSAY OF TROPONIN QUANT: CPT | Performed by: EMERGENCY MEDICINE

## 2018-08-21 PROCEDURE — 85025 COMPLETE CBC W/AUTO DIFF WBC: CPT | Performed by: EMERGENCY MEDICINE

## 2018-08-21 PROCEDURE — 81003 URINALYSIS AUTO W/O SCOPE: CPT | Performed by: EMERGENCY MEDICINE

## 2018-08-21 PROCEDURE — 93005 ELECTROCARDIOGRAM TRACING: CPT

## 2018-08-21 PROCEDURE — 96361 HYDRATE IV INFUSION ADD-ON: CPT

## 2018-08-21 PROCEDURE — 83735 ASSAY OF MAGNESIUM: CPT | Performed by: EMERGENCY MEDICINE

## 2018-08-21 PROCEDURE — 96360 HYDRATION IV INFUSION INIT: CPT

## 2018-08-21 RX ORDER — SODIUM CHLORIDE 9 MG/ML
500 INJECTION, SOLUTION INTRAVENOUS CONTINUOUS
Status: DISCONTINUED | OUTPATIENT
Start: 2018-08-21 | End: 2018-08-21 | Stop reason: HOSPADM

## 2018-08-21 RX ADMIN — SODIUM CHLORIDE 500 ML: 900 INJECTION, SOLUTION INTRAVENOUS at 14:22

## 2018-08-21 NOTE — TELEPHONE ENCOUNTER
Chief Complaint   Patient presents with    Extremity Weakness     UPPER AND LOWER EXT weakness with pain reported by patients daughter.  Rash    Lethargy     Received call from patients daughter with symptoms listed above. Patients daughter was instructed to bring the patient to the ER for further evaluation. Patients daughter (maya) verbalizes understanding.

## 2018-08-21 NOTE — ED PROVIDER NOTES
EMERGENCY DEPARTMENT HISTORY AND PHYSICAL EXAM    12:54 PM      Date: 8/21/2018  Patient Name: Levester Councilman    History of Presenting Illness     Chief Complaint   Patient presents with    Extremity Weakness    Skin Problem         History Provided By: Patient    Chief Complaint: Extremity Weakness   Duration: \"this morning\" Hours  Timing:  Acute  Location: arms and legs   Quality: weakness   Severity: Moderate  Modifying Factors: worse with ambulating   Associated Symptoms: mild back pain, rash (arms and legs)       Additional History (Context): Levester Councilman is a 80 y.o. female with PMHx of COPD, HTN, hematuria, hypothyroidism, kidney stone, stroke, TIA, and seizures who presents with an acute onset of moderate extremity weakness in her arms and legs that started \"this morning\". Pt states she was unable to get out of the bed \"this morning\". Per pt daughter it was very difficult for the pt to ambulate. Pt also c/o mild back pain, rash (arms and legs). Pt is on oxygen at home and has x 4 nebulizer treatment per day for Hx of COPD. Denies chest pain, abd pain, SOB. Denies any further complaints or symptoms at the moment. PCP: Emmanuel Agustin MD    Current Facility-Administered Medications   Medication Dose Route Frequency Provider Last Rate Last Dose    0.9% sodium chloride infusion 500 mL  500 mL IntraVENous CONTINUOUS Cy MD Jerry   500 mL at 08/21/18 1422     Current Outpatient Prescriptions   Medication Sig Dispense Refill    atorvastatin (LIPITOR) 10 mg tablet TAKE ONE TABLET BY MOUTH EVERY DAY 90 Tab 3    PARoxetine (PAXIL) 30 mg tablet TAKE ONE TABLET BY MOUTH EVERY DAY 30 Tab 5    lisinopril (PRINIVIL, ZESTRIL) 20 mg tablet TAKE ONE TABLET BY MOUTH EVERY DAY 30 Tab 5    potassium chloride (K-DUR, KLOR-CON) 20 mEq tablet TAKE ONE TABLET BY MOUTH 2 TIMES A  Tab 3    budesonide (PULMICORT) 1 mg/2 mL nbsp 2 mL by Nebulization route daily. Rinse your mouth after each use.  File under Medicare Part B Dx code J44.9  Indications: Severe Chronic Obstructive Pulmonary Disease 30 Each 5    amLODIPine (NORVASC) 5 mg tablet Take 1 Tab by mouth daily. TAKE ONE TABLET BY MOUTH EVERY DAY 90 Tab 3    fluticasone furoate (ARNUITY ELLIPTA) 200 mcg/actuation dsdv inhaler Take 1 Puff by inhalation daily. Rinse mouth thoroughly after each use 1 Inhaler 11    fluticasone (FLONASE) 50 mcg/actuation nasal spray 2 Sprays by Both Nostrils route as needed for Rhinitis or Allergies.  hydrocortisone (CORTEF) 10 mg tablet Take 20 mg by mouth daily.  arformoterol (BROVANA) 15 mcg/2 mL nebu neb solution 2 mL by Nebulization route two (2) times a day. Indications: BRONCHOSPASM PREVENTION WITH COPD 180 Vial 3    ipratropium (ATROVENT) 0.02 % soln 2.5 mL by Nebulization route four (4) times daily. Indications: BRONCHOSPASM PREVENTION WITH COPD 360 Vial 3    albuterol (ACCUNEB) 1.25 mg/3 mL nebu 3 mL by Nebulization route every four (4) hours as needed. 100 mL 0    nystatin (MYCOSTATIN) 100,000 unit/mL suspension Take 5 mL by mouth four (4) times daily. swish and spit 200 mL 0    L. acidophilus,casei,rhamnosus (BIO-K PLUS) 50 billion cell cpDR Take 1 tab by mouth daily 7 Cap 0    Nebulizer & Compressor (PORTABLE NEBULIZER SYSTEM) machine 1 Each by Does Not Apply route two (2) times a day. 1 Each 0    cholecalciferol, vitamin D3, 4,000 unit cap Take 1 Cap by mouth daily.  OXYGEN-AIR DELIVERY SYSTEMS 3 L/min by Does Not Apply route continuous. First Choice O2      nystatin (MYCOSTATIN) powder Apply  to affected area four (4) times daily. (Patient taking differently: Apply 100 g to affected area four (4) times daily. ) 1 Bottle 5    albuterol (PROAIR HFA) 90 mcg/actuation inhaler Take 2 Puffs by inhalation every four (4) hours as needed for Wheezing or Shortness of Breath. 1 Inhaler 5    SYNTHROID 112 mcg tablet Take 112 mcg by mouth Daily (before breakfast).       bipap machine kit 1 Each by Does Not Apply route. Started using BiPap Machine 01-15-15 [facility]      L-Methylfolate-G60-Blnzhflpbe (CEREFOLIN NAC) tablet Take 1 tablet by mouth daily. 30 tablet 5    fludrocortisone (FLORINEF) 0.1 mg tablet Take 1 tablet by mouth daily. 30 tablet 5    oxcarbazepine (TRILEPTAL) 300 mg tablet Take 300 mg by mouth two (2) times a day.  aspirin 81 mg tablet Take 81 mg by mouth daily.  DOCOSAHEXANOIC ACID/EPA (FISH OIL PO) Take 4,000 mg by mouth daily.  CALCIUM CITRATE/VITAMIN D3 (CALCIUM CITRATE + PO) Take 100 mg by mouth once over twenty-four (24) hours.  amoxicillin-clavulanate (AUGMENTIN) 875-125 mg per tablet       guaiFENesin-dextromethorphan SR (HUMIBID DM) 600-30 mg per tablet Take 1 Tab by mouth every twelve (12) hours as needed for Cough. 15 Tab 0    famotidine (PEPCID) 20 mg tablet Take 1 Tab by mouth two (2) times a day. 20 Tab 0    Walker (BARNEY ROLLING WALKER) misc 1 Device by Does Not Apply route daily. 1 Each 0    magnesium oxide (MAG-OX) 400 mg tablet Take 400 mg by mouth daily. Past History     Past Medical History:  Past Medical History:   Diagnosis Date    Abuse 1998    Alcohol    Adrenal insufficiency ([address]     Anxiety     Calculus of kidney 1988    Cardiac echocardiogram 07/31/2012    EF 60-65%. No WMA. Mild conc LVH. Gr 1 DDfx. RVSP 20-25 mmHg. Mild SAGAR. Mild TR,. Mild PAE.  Cardiovascular LE venous duplex 10/08/2012    No venous thrombosis or venous insufficiency in bilateral lower extremities.  Carotid duplex 10/09/2014    Mild < 50% bilateral ICA stenosis.       Chronic lung disease     Contact dermatitis and other eczema, due to unspecified cause     COPD (chronic obstructive pulmonary disease) (HCC)     Degenerative joint disease     Depression     Hematuria     Hypercholesteremia     Hypertension     Hypothyroidism 1998    Kidney stone     Neuropathy 3/2007    Orthostatic hypotension     Pituitary adenoma ([address] 2/1998    Pituitary tumor 1998    Pneumonia     Seizures [facility]     none recently    Severe obstructive sleep apnea 01-15-15     started using Bipap Machine    Stress     Stroke ([facility] [address] [facility] 2/2006    no residual    TIA (transient ischemic attack)     Trauma        Past Surgical History:  Past Surgical History:   Procedure Laterality Date    CYSTOSCOPY,INSERT URETERAL STENT  9/14/15    Dr. Yannick Aguila    HX APPENDECTOMY      HX INTRAOCULAR LENS PROSTHESIS      HX LITHOTRIPSY  9/14/15    Dr. Irene Polo    HX TUBAL LIGATION      HX WISDOM TEETH EXTRACTION      x4       Family History:  Family History   Problem Relation Age of Onset    Heart Disease Father     Hypertension Father     Hypertension Mother     Cancer Mother      skin    Elevated Lipids Sister     Heart Disease Sister     Cancer Maternal Grandmother      colon and stomach    Heart Disease Paternal Grandmother     Heart Attack Paternal Grandmother     Heart Attack Paternal Grandfather     Heart Disease Paternal Grandfather        Social History:  Social History   Substance Use Topics    Smoking status: Current Every Day Smoker     Packs/day: 1.50     Years: 65.00     Types: Cigarettes     Last attempt to quit: 3/9/2018    Smokeless tobacco: Never Used      Comment: 6/28/18 decreased amt. of cigarettes/day only    Alcohol use No      Comment: quit 1998 due to Alcohol Abuse       Allergies: Allergies   Allergen Reactions    Iodine Hives         Review of Systems       Review of Systems   Constitutional: Negative for chills, fatigue and fever. HENT: Negative. Negative for sore throat. Eyes: Negative. Respiratory: Negative for cough and shortness of breath. Cardiovascular: Negative for chest pain and palpitations. Gastrointestinal: Negative for abdominal pain, nausea and vomiting. Genitourinary: Negative for dysuria. Musculoskeletal: Positive for back pain. Skin: Positive for rash. Neurological: Positive for weakness.  Negative for dizziness, light-headedness and headaches. Psychiatric/Behavioral: Negative. All other systems reviewed and are negative. Physical Exam     Visit Vitals    /53    Pulse 60    Temp 98.7 °F (37.1 °C)    Resp 13    Ht 5' 4\" (1.626 m)    Wt 69.9 kg (154 lb)    SpO2 98%    BMI 26.43 kg/m2         Physical Exam   Constitutional: She is oriented to person, place, and time. She appears well-developed and well-nourished. HENT:   Head: Normocephalic and atraumatic. Mouth/Throat: Oropharynx is clear and moist.   Eyes: Conjunctivae are normal. Pupils are equal, round, and reactive to light. No scleral icterus. Neck: Normal range of motion. Neck supple. No JVD present. No tracheal deviation present. Cardiovascular: Normal rate, regular rhythm and normal heart sounds. Pulmonary/Chest: No respiratory distress. She has no wheezes. Positive for decrease breath sounds bilaterally    Abdominal: Soft. Bowel sounds are normal.   Musculoskeletal: Normal range of motion. Neurological: She is alert and oriented to person, place, and time. She has normal strength. Gait normal. GCS eye subscore is 4. GCS verbal subscore is 5. GCS motor subscore is 6. Skin: Skin is warm and dry. Positive for a yeast dermatitis below each breast   Psychiatric: She has a normal mood and affect. Pt is at her baseline dementia, unchanged according to family    Nursing note and vitals reviewed.         Diagnostic Study Results     Labs -  Recent Results (from the past 12 hour(s))   METABOLIC PANEL, BASIC    Collection Time: 08/21/18  2:05 PM   Result Value Ref Range    Sodium 139 136 - 145 mmol/L    Potassium 4.5 3.5 - 5.5 mmol/L    Chloride 103 100 - 108 mmol/L    CO2 35 (H) 21 - 32 mmol/L    Anion gap 1 (L) 3.0 - 18 mmol/L    Glucose 96 74 - 99 mg/dL    BUN 13 7.0 - 18 MG/DL    Creatinine 1.11 0.6 - 1.3 MG/DL    BUN/Creatinine ratio 12 12 - 20      GFR est AA 56 (L) >60 ml/min/1.73m2    GFR est non-AA 47 (L) >60 ml/min/1.73m2 Calcium 9.1 8.5 - 10.1 MG/DL   CBC WITH AUTOMATED DIFF    Collection Time: 08/21/18  2:05 PM   Result Value Ref Range    WBC 5.6 4.6 - 13.2 K/uL    RBC 3.75 (L) 4.20 - 5.30 M/uL    HGB 11.4 (L) 12.0 - 16.0 g/dL    HCT 36.0 35.0 - 45.0 %    MCV 96.0 74.0 - 97.0 FL    MCH 30.4 24.0 - 34.0 PG    MCHC 31.7 31.0 - 37.0 g/dL    RDW 12.4 11.6 - 14.5 %    PLATELET 542 883 - 973 K/uL    MPV 9.6 9.2 - 11.8 FL    NEUTROPHILS 37 (L) 40 - 73 %    LYMPHOCYTES 48 21 - 52 %    MONOCYTES 8 3 - 10 %    EOSINOPHILS 6 (H) 0 - 5 %    BASOPHILS 1 0 - 2 %    ABS. NEUTROPHILS 2.1 1.8 - 8.0 K/UL    ABS. LYMPHOCYTES 2.7 0.9 - 3.6 K/UL    ABS. MONOCYTES 0.4 0.05 - 1.2 K/UL    ABS. EOSINOPHILS 0.3 0.0 - 0.4 K/UL    ABS.  BASOPHILS 0.0 0.0 - 0.1 K/UL    DF AUTOMATED     MAGNESIUM    Collection Time: 08/21/18  2:05 PM   Result Value Ref Range    Magnesium 2.0 1.6 - 2.6 mg/dL   TROPONIN I    Collection Time: 08/21/18  2:05 PM   Result Value Ref Range    Troponin-I, Qt. <0.02 0.00 - 0.06 NG/ML   EKG, 12 LEAD, INITIAL    Collection Time: 08/21/18  2:17 PM   Result Value Ref Range    Ventricular Rate 62 BPM    Atrial Rate 62 BPM    P-R Interval 190 ms    QRS Duration 132 ms    Q-T Interval 454 ms    QTC Calculation (Bezet) 460 ms    Calculated P Axis 90 degrees    Calculated R Axis -73 degrees    Calculated T Axis 2 degrees    Diagnosis       Normal sinus rhythm  Right bundle branch block  Left anterior fascicular block  Bifascicular block  Abnormal ECG  When compared with ECG of 18-MAR-2018 19:49,  premature atrial complexes are no longer present  T wave inversion now evident in Inferior leads     URINALYSIS W/ RFLX MICROSCOPIC    Collection Time: 08/21/18  4:20 PM   Result Value Ref Range    Color YELLOW      Appearance CLEAR      Specific gravity 1.015 1.005 - 1.030      pH (UA) 6.0 5.0 - 8.0      Protein NEGATIVE  NEG mg/dL    Glucose NEGATIVE  NEG mg/dL    Ketone NEGATIVE  NEG mg/dL    Bilirubin NEGATIVE  NEG      Blood NEGATIVE  NEG Urobilinogen 0.2 0.2 - 1.0 EU/dL    Nitrites NEGATIVE  NEG      Leukocyte Esterase NEGATIVE  NEG         Radiologic Studies -   XR CHEST PA LAT   Final Result      IMPRESSION:     1. Left basilar streaky opacities, favors atelectasis. 2. Stable enlargement of the cardiac silhouette. 3. Hyperinflation, suggestive of COPD      Medical Decision Making   I am the first provider for this patient. I reviewed the vital signs, available nursing notes, past medical history, past surgical history, family history and social history. Vital Signs-Reviewed the patient's vital signs. Pulse Oximetry Analysis - 86 % on NC, below normal     EKG: Interpreted by the EP. Time Interpreted: 14:17   Rate: 62 bpm    Rhythm: NSR    Interpretation: no acute changes     Records Reviewed: Nursing Notes (Time of Review: 12:54 PM)    ED Course: Progress Notes, Reevaluation, and Consults:    Provider Notes (Medical Decision Making):     Diagnosis     Clinical Impression:   1. Dehydration        Disposition: DC     Follow-up Information     Follow up With Details Comments Contact Info    Dave Johnson MD Schedule an appointment as soon as possible for a visit in 1 week For Follow-Up [address] [address]  [phone]      [facility] EMERGENCY DEPT Go to As needed, If symptoms worsen [address] [place]  [phone]           Patient's Medications   Start Taking    No medications on file   Continue Taking    ALBUTEROL (ACCUNEB) 1.25 MG/3 ML NEBU    3 mL by Nebulization route every four (4) hours as needed. ALBUTEROL (PROAIR HFA) 90 MCG/ACTUATION INHALER    Take 2 Puffs by inhalation every four (4) hours as needed for Wheezing or Shortness of Breath. AMLODIPINE (NORVASC) 5 MG TABLET    Take 1 Tab by mouth daily.  TAKE ONE TABLET BY MOUTH EVERY DAY    AMOXICILLIN-CLAVULANATE (AUGMENTIN) 875-125 MG PER TABLET        ARFORMOTEROL (BROVANA) 15 MCG/2 ML NEBU NEB SOLUTION    2 mL by Nebulization route two (2) times a day. Indications: BRONCHOSPASM PREVENTION WITH COPD    ASPIRIN 81 MG TABLET    Take 81 mg by mouth daily. ATORVASTATIN (LIPITOR) 10 MG TABLET    TAKE ONE TABLET BY MOUTH EVERY DAY    BIPAP MACHINE KIT    1 Each by Does Not Apply route. Started using BiPap Machine 01-15-15 [facility]    BUDESONIDE (PULMICORT) 1 MG/2 ML NBSP    2 mL by Nebulization route daily. Rinse your mouth after each use. File under Medicare Part B Dx code J44.9  Indications: Severe Chronic Obstructive Pulmonary Disease    CALCIUM CITRATE/VITAMIN D3 (CALCIUM CITRATE + PO)    Take 100 mg by mouth once over twenty-four (24) hours. CHOLECALCIFEROL, VITAMIN D3, 4,000 UNIT CAP    Take 1 Cap by mouth daily. DOCOSAHEXANOIC ACID/EPA (FISH OIL PO)    Take 4,000 mg by mouth daily. FAMOTIDINE (PEPCID) 20 MG TABLET    Take 1 Tab by mouth two (2) times a day. FLUDROCORTISONE (FLORINEF) 0.1 MG TABLET    Take 1 tablet by mouth daily. FLUTICASONE (FLONASE) 50 MCG/ACTUATION NASAL SPRAY    2 Sprays by Both Nostrils route as needed for Rhinitis or Allergies. FLUTICASONE FUROATE (ARNUITY ELLIPTA) 200 MCG/ACTUATION DSDV INHALER    Take 1 Puff by inhalation daily. Rinse mouth thoroughly after each use    GUAIFENESIN-DEXTROMETHORPHAN SR (HUMIBID DM) 600-30 MG PER TABLET    Take 1 Tab by mouth every twelve (12) hours as needed for Cough. HYDROCORTISONE (CORTEF) 10 MG TABLET    Take 20 mg by mouth daily. IPRATROPIUM (ATROVENT) 0.02 % SOLN    2.5 mL by Nebulization route four (4) times daily. Indications: BRONCHOSPASM PREVENTION WITH COPD    L-METHYLFOLATE-I33-[id number] (CEREFOLIN NAC) TABLET    Take 1 tablet by mouth daily. L. ACIDOPHILUS,CASEI,RHAMNOSUS (BIO-K PLUS) 50 BILLION CELL CPDR    Take 1 tab by mouth daily    LISINOPRIL (PRINIVIL, ZESTRIL) 20 MG TABLET    TAKE ONE TABLET BY MOUTH EVERY DAY    MAGNESIUM OXIDE (MAG-OX) 400 MG TABLET    Take 400 mg by mouth daily.     NEBULIZER & COMPRESSOR (PORTABLE NEBULIZER SYSTEM) MACHINE    1 Each by Does Not Apply route two (2) times a day. NYSTATIN (MYCOSTATIN) 100,000 UNIT/ML SUSPENSION    Take 5 mL by mouth four (4) times daily. swish and spit    NYSTATIN (MYCOSTATIN) POWDER    Apply  to affected area four (4) times daily. OXCARBAZEPINE (TRILEPTAL) 300 MG TABLET    Take 300 mg by mouth two (2) times a day. OXYGEN-AIR DELIVERY SYSTEMS    3 L/min by Does Not Apply route continuous. [facility] Choice O2    PAROXETINE (PAXIL) 30 MG TABLET    TAKE ONE TABLET BY MOUTH EVERY DAY    POTASSIUM CHLORIDE (K-DUR, KLOR-CON) 20 MEQ TABLET    TAKE ONE TABLET BY MOUTH 2 TIMES A DAY    SYNTHROID 112 MCG TABLET    Take 112 mcg by mouth Daily (before breakfast). WALKER (BARNEY ROLLING WALKER) MISC    1 Device by Does Not Apply route daily. These Medications have changed    No medications on file   Stop Taking    FUROSEMIDE (LASIX) 20 MG TABLET    Take 1 Tab by mouth daily as needed for Other (Leg Swelling). _______________________________    Attestations:  Scribe Attestation     Alexi Larsen (Aj) acting as a scribe for and in the presence of Johan Marino MD      August 21, 2018 at 12:54 PM       Provider Attestation:      I personally performed the services described in the documentation, reviewed the documentation, as recorded by the scribe in my presence, and it accurately and completely records my words and actions. August 21, 2018 at 12:54 PM - Johan Marino MD    _______________________________    Pt is feeling and looking better after IVF, ready to go home. Family understands and agrees with dispo and F/U plan.   Alexi Nielsen  4:43 PM

## 2018-08-21 NOTE — ED TRIAGE NOTES
Patient had a difficult time getting up this morning very weak, patient have redness to her back, and possible yeast under her breast x 2-3 days.

## 2018-08-25 ENCOUNTER — HOSPITAL ENCOUNTER (EMERGENCY)
Age: 83
Discharge: HOME | End: 2018-08-25
Attending: EMERGENCY MEDICINE
Payer: MEDICARE

## 2018-08-25 ENCOUNTER — APPOINTMENT (OUTPATIENT)
Dept: GENERAL RADIOLOGY | Age: 83
End: 2018-08-25
Attending: EMERGENCY MEDICINE
Payer: MEDICARE

## 2018-08-25 ENCOUNTER — APPOINTMENT (OUTPATIENT)
Dept: CT IMAGING | Age: 83
End: 2018-08-25
Attending: EMERGENCY MEDICINE
Payer: MEDICARE

## 2018-08-25 VITALS
HEIGHT: 64 IN | DIASTOLIC BLOOD PRESSURE: 52 MMHG | HEART RATE: 71 BPM | SYSTOLIC BLOOD PRESSURE: 107 MMHG | RESPIRATION RATE: 20 BRPM | WEIGHT: 156 LBS | TEMPERATURE: 98.2 F | OXYGEN SATURATION: 92 % | BODY MASS INDEX: 26.63 KG/M2

## 2018-08-25 DIAGNOSIS — T14.8XXA ABRASION: ICD-10-CM

## 2018-08-25 DIAGNOSIS — S00.83XA FACIAL HEMATOMA, INITIAL ENCOUNTER: ICD-10-CM

## 2018-08-25 DIAGNOSIS — S50.01XA CONTUSION OF RIGHT ELBOW, INITIAL ENCOUNTER: ICD-10-CM
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
## 2018-08-25 DIAGNOSIS — S80.01XA CONTUSION OF RIGHT KNEE, INITIAL ENCOUNTER: ICD-10-CM

## 2018-08-25 PROCEDURE — 70150 X-RAY EXAM OF FACIAL BONES: CPT

## 2018-08-25 PROCEDURE — 99283 EMERGENCY DEPT VISIT LOW MDM: CPT

## 2018-08-25 PROCEDURE — 73080 X-RAY EXAM OF ELBOW: CPT

## 2018-08-25 PROCEDURE — 74011250637 HC RX REV CODE- 250/637: Performed by: EMERGENCY MEDICINE

## 2018-08-25 PROCEDURE — 70450 CT HEAD/BRAIN W/O DYE: CPT

## 2018-08-25 PROCEDURE — 73564 X-RAY EXAM KNEE 4 OR MORE: CPT

## 2018-08-25 RX ORDER — TRAMADOL HYDROCHLORIDE 50 MG/1
50 TABLET ORAL
Status: COMPLETED | OUTPATIENT
Start: 2018-08-25 | End: 2018-08-25

## 2018-08-25 RX ORDER — METHOCARBAMOL 500 MG/1
500 TABLET, FILM COATED ORAL
Qty: 12 TAB | Refills: 0 | Status: SHIPPED | OUTPATIENT
Start: 2018-08-25 | End: 2018-09-01

## 2018-08-25 RX ADMIN — TRAMADOL HYDROCHLORIDE 50 MG: 50 TABLET, FILM COATED ORAL at 14:12

## 2018-08-25 NOTE — ED TRIAGE NOTES
Pt states tripped and fell into a door jamb this morning. Has hematoma to forehead. ?? LOC. Also c/o right hand/elbow/knee pain. Pt arrives on room air, is supposed to be on 3L nc.  Family forgot

## 2018-08-25 NOTE — ED PROVIDER NOTES
EMERGENCY DEPARTMENT HISTORY AND PHYSICAL EXAM    12:58 PM      Date: 8/25/2018  Patient Name: Bria Renteria    History of Presenting Illness     Chief Complaint   Patient presents with    Fall    Head Injury         History Provided By: Patient and Patient's Daughter    Chief Complaint: Fall; Head Injury; Hand Pain; Elbow Pain; Knee Pain  Duration:  Hours  Timing:  Acute  Location: Forehead, right hand, right elbow, right knee  Quality: N/A  Severity: N/A  Modifying Factors: None  Associated Symptoms: denies any other associated signs or symptoms      Additional History (Context): Bria Renteria is a 80 y.o. female with PMHx of HTN, COPD, TIA, and stroke presenting to the ED with daughters at bedside c/o acute head injury and right hand pain, right elbow pain, and right knee pain s/p fall earlier this morning. Pt states she tripped on her oxygen cord and fell into a door jamb, pt has a hematoma to her forehead. Daughters deny loss of consciousness. Reports no modifying factors. Daughters also express concern that the pt's nose \"looks funny. \" Denies any other symptoms or complaints. PCP: Cindy Niño MD      Past History     Past Medical History:  Past Medical History:   Diagnosis Date    Abuse 1998    Alcohol    Adrenal insufficiency ([facility] [address]     Anxiety     Calculus of kidney 1988    Cardiac echocardiogram 07/31/2012    EF 60-65%. No WMA. Mild conc LVH. Gr 1 DDfx. RVSP 20-25 mmHg. Mild SAGAR. Mild TR,. Mild PAE.  Cardiovascular LE venous duplex 10/08/2012    No venous thrombosis or venous insufficiency in bilateral lower extremities.  Carotid duplex 10/09/2014    Mild < 50% bilateral ICA stenosis.       Chronic lung disease     Contact dermatitis and other eczema, due to unspecified cause     COPD (chronic obstructive pulmonary disease) (HCC)     Degenerative joint disease     Depression     Hematuria     Hypercholesteremia     Hypertension     Hypothyroidism 1998    Kidney stone  Neuropathy 3/2007    Orthostatic hypotension     Pituitary adenoma ([facility] [address] 2/1998    Pituitary tumor 1998    Pneumonia     Seizures ([facility] [address]     none recently    Severe obstructive sleep apnea 01-15-15     started using Bipap Machine    Stress     Stroke ([facility] [address] 2/2006    no residual    TIA (transient ischemic attack)     Trauma        Past Surgical History:  Past Surgical History:   Procedure Laterality Date    CYSTOSCOPY,INSERT URETERAL STENT  9/14/15    Dr. Socorro Esposito    HX APPENDECTOMY      HX INTRAOCULAR LENS PROSTHESIS      HX LITHOTRIPSY  9/14/15    Dr. Norma Peoples    HX TUBAL LIGATION      HX WISDOM TEETH EXTRACTION      x4       Family History:  Family History   Problem Relation Age of Onset    Heart Disease Father     Hypertension Father     Hypertension Mother     Cancer Mother      skin    Elevated Lipids Sister     Heart Disease Sister     Cancer Maternal Grandmother      colon and stomach    Heart Disease Paternal Grandmother     Heart Attack Paternal Grandmother     Heart Attack Paternal Grandfather     Heart Disease Paternal Grandfather        Social History:  Social History   Substance Use Topics    Smoking status: Current Some Day Smoker     Packs/day: 1.50     Years: 65.00     Types: Cigarettes     Last attempt to quit: 3/9/2018    Smokeless tobacco: Never Used      Comment: 6/28/18 decreased amt. of cigarettes/day only    Alcohol use No      Comment: quit 1998 due to Alcohol Abuse       Allergies: Allergies   Allergen Reactions    Iodine Hives         Review of Systems       Review of Systems   Constitutional: Negative for fever. HENT: Negative for sore throat.         + Hematoma to forehead   Eyes: Negative for redness. Respiratory: Negative for wheezing. Gastrointestinal: Negative for abdominal pain and nausea. Genitourinary: Negative for dysuria. Musculoskeletal: Positive for arthralgias (right hand, elbow, knee). Negative for neck stiffness.    Skin: Negative for pallor. Neurological: Negative for headaches. Hematological: Does not bruise/bleed easily. All other systems reviewed and are negative. Physical Exam     Visit Vitals    /52 (BP 1 Location: Left arm)    Pulse 71    Temp 98.2 °F (36.8 °C)    Resp 20    Ht 5' 4\" (1.626 m)    Wt 70.8 kg (156 lb)    SpO2 92%    BMI 26.78 kg/m2         Physical Exam   Constitutional: She is oriented to person, place, and time. She appears well-developed and well-nourished. No distress. HENT:   Head: Normocephalic. Mouth/Throat: Oropharynx is clear and moist.   Hematoma to forehead. Tenderness to palpation at bridge of nose and bilateral maxillary bone area. Eyes: Conjunctivae are normal. Pupils are equal, round, and reactive to light. No scleral icterus. Neck: Normal range of motion. Neck supple. Cardiovascular: Intact distal pulses. Good radial pulse. Capillary refill < 3 seconds   Pulmonary/Chest: Effort normal and breath sounds normal. No respiratory distress. She has no wheezes. Abdominal: Soft. Bowel sounds are normal. She exhibits no distension. There is no tenderness. Musculoskeletal: Normal range of motion. She exhibits no edema. Right elbow: Tenderness found. Right knee: Tenderness (medial, lateral, and anterior knee) found. Right hand: She exhibits no tenderness. No hip tenderness. Pelvis is stable. Trigger finger in right ring finger, which is old. Lymphadenopathy:     She has no cervical adenopathy. Neurological: She is alert and oriented to person, place, and time. No cranial nerve deficit. Sensation intact. Skin: Skin is warm and dry. She is not diaphoretic. Bruising to right elbow and right hand at 5th metacarpal area. Abrasion to right elbow and right knee. Nursing note and vitals reviewed. Diagnostic Study Results     Labs -  No results found for this or any previous visit (from the past 12 hour(s)).     Radiologic Studies -   CT HEAD WO CONT   Final Result   Impression:       No CT evidence of acute intracranial pathology. Right forehead scalp swelling/hematoma. No evidence of underlying calvarial   fracture. Prominent burden of presumed chronic white matter microvascular ischemic   changes. Bilateral basal ganglia lacunar infarcts. Chronic pituitary mass, similar since at least 2013. XR KNEE RT MIN 4 V    (Results Pending)   XR ELBOW RT MIN 3 V    (Results Pending)   XR FACIAL BONES MIN 3 V     Final Result   IMPRESSION  Impression:      Soft tissue swelling. No plain film evidence of acute facial bone fracture, as  described. XR KNEE RT   3:29 PM No fracture or dislocation. Preliminary read by Sena Clark DO     XR ELBOW RT   3:29 PM No fracture or dislocation. Preliminary read by Sena Clark DO       Medical Decision Making   I am the first provider for this patient. I reviewed the vital signs, available nursing notes, past medical history, past surgical history, family history and social history. Vital Signs-Reviewed the patient's vital signs. Records Reviewed: Nursing Notes and Old Medical Records (Time of Review: 12:58 PM)    Provider Notes (Medical Decision Making):  MDM  Number of Diagnoses or Management Options  Abrasion:   Contusion of right elbow, initial encounter:   Contusion of right knee, initial encounter:   Facial hematoma, initial encounter:   Fall, initial encounter:   Diagnosis management comments: DDx closed head injury, ICH, facial hematoma, facial fractures, elbow fracture or contusion, knee fracture or contusion, possible internal derangement of elbow and knee. Several abrasions. Will get CT head and X-ray of right knee, right elbow, and facial bones. Her tetanus is up to date.         Amount and/or Complexity of Data Reviewed  Tests in the radiology section of CPT®: ordered and reviewed  Tests in the medicine section of CPT®: ordered and reviewed  Review and summarize past medical records: yes  Independent visualization of images, tracings, or specimens: yes    Patient Progress  Patient progress: improved      Medications   traMADol (ULTRAM) tablet 50 mg (50 mg Oral Given 8/25/18 1412)     ED Course: Progress Notes, Reevaluation, and Consults:  4:15 PM Spoke with Dr. Claudia Raymond (Radiology). States lacunar infarcts are old along with other chronic findings. I have reassessed the patient. I have discussed the workup, results and plan with the patient and patient is in agreement. Patient is feeling better. Patient will be prescribed robaxin. Patient was discharge in stable condition. Patient was given outpatient follow up. Patient is to return to emergency department if any new or worsening condition. Diagnosis     Clinical Impression:   1. Fall, initial encounter    2. Facial hematoma, initial encounter    3. Contusion of right knee, initial encounter    4. Contusion of right elbow, initial encounter    5. Abrasion        Disposition: Discharged    Follow-up Information     Follow up With Details Comments [facility] [address], MD Schedule an appointment as soon as possible for a visit in 2 days  [address] [facility]      Noemi Contreras MD In 3 days  [address] [facility]  [phone]      [facility] EMERGENCY DEPT  As needed, If symptoms worsen [address] [facility]  [phone]           Patient's Medications   Start Taking    METHOCARBAMOL (ROBAXIN) 500 MG TABLET    Take 1 Tab by mouth two (2) times daily as needed for up to 7 days. Indications: pain; muscle spasms   Continue Taking    ALBUTEROL (ACCUNEB) 1.25 MG/3 ML NEBU    3 mL by Nebulization route every four (4) hours as needed. ALBUTEROL (PROAIR HFA) 90 MCG/ACTUATION INHALER    Take 2 Puffs by inhalation every four (4) hours as needed for Wheezing or Shortness of Breath.     AMLODIPINE (NORVASC) 5 MG TABLET    Take 1 Tab by mouth daily. TAKE ONE TABLET BY MOUTH EVERY DAY    ARFORMOTEROL (BROVANA) 15 MCG/2 ML NEBU NEB SOLUTION    2 mL by Nebulization route two (2) times a day. Indications: BRONCHOSPASM PREVENTION WITH COPD    ASPIRIN 81 MG TABLET    Take 81 mg by mouth daily. ATORVASTATIN (LIPITOR) 10 MG TABLET    TAKE ONE TABLET BY MOUTH EVERY DAY    BIPAP MACHINE KIT    1 Each by Does Not Apply route. Started using BiPap Machine 01-15-15 [facility]    BUDESONIDE (PULMICORT) 1 MG/2 ML NBSP    2 mL by Nebulization route daily. Rinse your mouth after each use. File under Medicare Part B Dx code J44.9  Indications: Severe Chronic Obstructive Pulmonary Disease    CALCIUM CITRATE/VITAMIN D3 (CALCIUM CITRATE + PO)    Take 100 mg by mouth once over twenty-four (24) hours. CHOLECALCIFEROL, VITAMIN D3, 4,000 UNIT CAP    Take 1 Cap by mouth daily. DOCOSAHEXANOIC ACID/EPA (FISH OIL PO)    Take 4,000 mg by mouth daily. FAMOTIDINE (PEPCID) 20 MG TABLET    Take 1 Tab by mouth two (2) times a day. FLUDROCORTISONE (FLORINEF) 0.1 MG TABLET    Take 1 tablet by mouth daily. FLUTICASONE FUROATE (ARNUITY ELLIPTA) 200 MCG/ACTUATION DSDV INHALER    Take 1 Puff by inhalation daily. Rinse mouth thoroughly after each use    GUAIFENESIN-DEXTROMETHORPHAN SR (HUMIBID DM) 600-30 MG PER TABLET    Take 1 Tab by mouth every twelve (12) hours as needed for Cough. IPRATROPIUM (ATROVENT) 0.02 % SOLN    2.5 mL by Nebulization route four (4) times daily. Indications: BRONCHOSPASM PREVENTION WITH COPD    L-METHYLFOLATE-K88-[id number] (CEREFOLIN NAC) TABLET    Take 1 tablet by mouth daily. L. ACIDOPHILUS,CASEI,RHAMNOSUS (BIO-K PLUS) 50 BILLION CELL CPDR    Take 1 tab by mouth daily    LISINOPRIL (PRINIVIL, ZESTRIL) 20 MG TABLET    TAKE ONE TABLET BY MOUTH EVERY DAY    MAGNESIUM OXIDE (MAG-OX) 400 MG TABLET    Take 400 mg by mouth daily.     NEBULIZER & COMPRESSOR (PORTABLE NEBULIZER SYSTEM) MACHINE    1 Each by Does Not Apply route two (2) times a day.    NYSTATIN (MYCOSTATIN) 100,000 UNIT/ML SUSPENSION    Take 5 mL by mouth four (4) times daily. swish and spit    NYSTATIN (MYCOSTATIN) POWDER    Apply  to affected area four (4) times daily. OXCARBAZEPINE (TRILEPTAL) 300 MG TABLET    Take 300 mg by mouth two (2) times a day. OXYGEN-AIR DELIVERY SYSTEMS    3 L/min by Does Not Apply route continuous. [employer] O2    PAROXETINE (PAXIL) 30 MG TABLET    TAKE ONE TABLET BY MOUTH EVERY DAY    POTASSIUM CHLORIDE (K-DUR, KLOR-CON) 20 MEQ TABLET    TAKE ONE TABLET BY MOUTH 2 TIMES A DAY    SYNTHROID 112 MCG TABLET    Take 112 mcg by mouth Daily (before breakfast). WALKER (BARNEY ROLLING WALKER) MISC    1 Device by Does Not Apply route daily. These Medications have changed    No medications on file   Stop Taking    AMOXICILLIN-CLAVULANATE (AUGMENTIN) 875-125 MG PER TABLET        FLUTICASONE (FLONASE) 50 MCG/ACTUATION NASAL SPRAY    2 Sprays by Both Nostrils route as needed for Rhinitis or Allergies. HYDROCORTISONE (CORTEF) 10 MG TABLET    Take 20 mg by mouth daily. _______________________________    Attestations:  Scribe Attestation     Amanda eHrnandez acting as a scribe for and in the presence of Ashlyn Mathew DO      August 25, 2018 at 12:58 PM       Provider Attestation:      I personally performed the services described in the documentation, reviewed the documentation, as recorded by the scribe in my presence, and it accurately and completely records my words and actions.  August 25, 2018 at 12:58 PM - Ashlyn Mathew DO    _______________________________

## 2018-08-25 NOTE — DISCHARGE INSTRUCTIONS
Contusion: Care Instructions  Your Care Instructions  Contusion is the medical term for a bruise. It is the result of a direct blow or an impact, such as a fall. Contusions are common sports injuries. Most people think of a bruise as a black-and-blue spot. This happens when small blood vessels get torn and leak blood under the skin. But bones, muscles, and organs can also get bruised. This may damage deep tissues but not cause a bruise you can see. The doctor will do a physical exam to find the location of your contusion. You may also have tests to make sure you do not have a more serious injury, such as a broken bone or nerve damage. These may include X-rays or other imaging tests like a CT scan or MRI. Deep-tissue contusions may cause pain and swelling. But if there is no serious damage, they will often get better in a few weeks with home treatment. The doctor has checked you carefully, but problems can develop later. If you notice any problems or new symptoms, get medical treatment right away. Follow-up care is a key part of your treatment and safety. Be sure to make and go to all appointments, and call your doctor if you are having problems. It's also a good idea to know your test results and keep a list of the medicines you take. How can you care for yourself at home? · Put ice or a cold pack on the sore area for 10 to 20 minutes at a time to stop swelling. Put a thin cloth between the ice pack and your skin. · Be safe with medicines. Read and follow all instructions on the label. ¨ If the doctor gave you a prescription medicine for pain, take it as prescribed. ¨ If you are not taking a prescription pain medicine, ask your doctor if you can take an over-the-counter medicine. · If you can, prop up the sore area on pillows as much as possible for the next few days. Try to keep the sore area above the level of your heart. When should you call for help?   Call your doctor now or seek immediate medical care if:  · Your pain gets worse. · You have new or worse swelling. · You have tingling, weakness, or numbness in the area near the contusion. · The area near the contusion is cold or pale. Watch closely for changes in your health, and be sure to contact your doctor if:  · You do not get better as expected. Where can you learn more? Go to [employer]  Enter [id number] in the search box to learn more about \"Contusion: Care Instructions. \"   © 6092-5034 [employer]. Care instructions adapted under license by [employer] (which disclaims liability or warranty for this information). This care instruction is for use with your licensed healthcare professional. If you have questions about a medical condition or this instruction, always ask your healthcare professional. [address] any warranty or liability for your use of this information. Content Version: 01.5.952243; Current as of: May 22, 2015             Preventing Falls: Care Instructions  Your Care Instructions    Getting around your home safely can be a challenge if you have injuries or health problems that make it easy for you to fall. Loose rugs and furniture in walkways are among the dangers for many older people who have problems walking or who have poor eyesight. People who have conditions such as arthritis, osteoporosis, or dementia also have to be careful not to fall. You can make your home safer with a few simple measures. Follow-up care is a key part of your treatment and safety. Be sure to make and go to all appointments, and call your doctor if you are having problems. It's also a good idea to know your test results and keep a list of the medicines you take. How can you care for yourself at home? Taking care of yourself  · You may get dizzy if you do not drink enough water. To prevent dehydration, drink plenty of fluids, enough so that your urine is light yellow or clear like water.  Choose water and other caffeine-free clear liquids. If you have kidney, heart, or liver disease and have to limit fluids, talk with your doctor before you increase the amount of fluids you drink. · Exercise regularly to improve your strength, muscle tone, and balance. Walk if you can. Swimming may be a good choice if you cannot walk easily. · Have your vision and hearing checked each year or any time you notice a change. If you have trouble seeing and hearing, you might not be able to avoid objects and could lose your balance. · Know the side effects of the medicines you take. Ask your doctor or pharmacist whether the medicines you take can affect your balance. Sleeping pills or sedatives can affect your balance. · Limit the amount of alcohol you drink. Alcohol can impair your balance and other senses. · Ask your doctor whether calluses or corns on your feet need to be removed. If you wear loose-fitting shoes because of calluses or corns, you can lose your balance and fall. · Talk to your doctor if you have numbness in your feet. Preventing falls at home  · Remove raised doorway thresholds, throw rugs, and clutter. Repair loose carpet or raised areas in the floor. · Move furniture and electrical cords to keep them out of walking paths. · Use nonskid floor wax, and wipe up spills right away, especially on ceramic tile floors. · If you use a walker or cane, put rubber tips on it. If you use crutches, clean the bottoms of them regularly with an abrasive pad, such as steel wool. · Keep your house well lit, especially [place], and outside walkways. Use night-lights in areas such as hallways and bathrooms. Add extra light switches or use remote switches (such as switches that go on or off when you clap your hands) to make it easier to turn lights on if you have to get up during the night. · Install sturdy handrails on stairways.   · Move items in your cabinets so that the things you use a lot are on the lower shelves (about waist level). · Keep a cordless phone and a flashlight with new batteries by your bed. If possible, put a phone in each of the main rooms of your house, or carry a cell phone in case you fall and cannot reach a phone. Or, you can wear a device around your neck or wrist. You push a button that sends a signal for help. · Wear low-heeled shoes that fit well and give your feet good support. Use footwear with nonskid soles. Check the heels and soles of your shoes for wear. Repair or replace worn heels or soles. · Do not wear socks without shoes on wood floors. · Walk on the grass when the sidewalks are slippery. If you live in an area that gets snow and ice in the winter, sprinkle salt on slippery steps and sidewalks. Preventing falls in the bath  · Install grab bars and nonskid mats inside and outside your shower or tub and near the toilet and sinks. · Use shower chairs and bath benches. · Use a hand-held shower head that will allow you to sit while showering. · Get into a tub or shower by putting the weaker leg in first. Get out of a tub or shower with your strong side first.  · Repair loose toilet seats and consider installing a raised toilet seat to make getting on and off the toilet easier. · Keep your bathroom door unlocked while you are in the shower. Where can you learn more? Go to http://casey-sameer.info/. Enter [id number] in the search box to learn more about \"Preventing Falls: Care Instructions. \"  Current as of: May 12, 2017  Content Version: 11.7  © 0742-9203 [employer]. Care instructions adapted under license by [employer] (which disclaims liability or warranty for this information). If you have questions about a medical condition or this instruction, always ask your healthcare professional. [address] any warranty or liability for your use of this information.          Head Injury: Care Instructions  Your Care Instructions    Most injuries to the head are minor. Bumps, cuts, and scrapes on the head and face usually heal well and can be treated the same as injuries to other parts of the body. Although it's rare, once in a while a more serious problem shows up after you are home. So it's good to be on the lookout for symptoms for a day or two. Follow-up care is a key part of your treatment and safety. Be sure to make and go to all appointments, and call your doctor if you are having problems. It's also a good idea to know your test results and keep a list of the medicines you take. How can you care for yourself at home? · Follow your doctor's instructions. He or she will tell you if you need someone to watch you closely for the next 24 hours or longer. · Take it easy for the next few days or more if you are not feeling well. · Ask your doctor when it's okay for you to go back to activities like driving a car, riding a bike, or operating machinery. When should you call for help? Call 911 anytime you think you may need emergency care. For example, call if:    · You have a seizure.     · You passed out (lost consciousness).     · You are confused or can't stay awake.    Call your doctor now or seek immediate medical care if:    · You have new or worse vomiting.     · You feel less alert.     · You have new weakness or numbness in any part of your body.    Watch closely for changes in your health, and be sure to contact your doctor if:    · You do not get better as expected.     · You have new symptoms, such as headaches, trouble concentrating, or changes in mood. Where can you learn more? Go to http://casey-sameer.info/. Enter Y645 in the search box to learn more about \"Head Injury: Care Instructions. \"  Current as of: October 9, 2017  Content Version: 11.7  © 3352-5027 [employer].  Care instructions adapted under license by [employer] (which disclaims liability or warranty for this information). If you have questions about a medical condition or this instruction, always ask your healthcare professional. [address] any warranty or liability for your use of this information. Hematoma: Care Instructions  Your Care Instructions    A hematoma is a bad bruise. It happens when an injury causes blood to collect and pool under the skin. The pooling blood gives the skin a spongy, rubbery, lumpy feel. A hematoma usually is not a cause for concern. It is not the same thing as a blood clot in a vein, and it does not cause blood clots. Follow-up care is a key part of your treatment and safety. Be sure to make and go to all appointments, and call your doctor if you are having problems. It's also a good idea to know your test results and keep a list of the medicines you take. How can you care for yourself at home? · Rest and protect the bruised area. · Put ice or a cold pack on the area for 10 to 20 minutes at a time. · Prop up the bruised area on a pillow when you ice it or anytime you sit or lie down during the next 3 days. Try to keep it above the level of your heart. This will help reduce swelling. · Wrapping the bruised area with an elastic bandage such as an Ace wrap will help decrease swelling. Don't wrap it too tightly, as this can cause more swelling below the affected area. · Take an over-the-counter pain medicine, such as acetaminophen (Tylenol), ibuprofen (Advil, Motrin), or naproxen (Aleve). · Do not take two or more pain medicines at the same time unless the doctor told you to. Many pain medicines have acetaminophen, which is Tylenol. Too much acetaminophen (Tylenol) can be harmful. When should you call for help? Call your doctor now or seek immediate medical care if:    · You have signs of skin infection, such as:  ¨ Increased pain, swelling, warmth, or redness. ¨ Red streaks leading from the area. ¨ Pus draining from the area.   ¨ A fever.    Watch closely for changes in your health, and be sure to contact your doctor if:    · The bruise lasts longer than 4 weeks.     · The bruise gets bigger or becomes more painful.     · You do not get better as expected. Where can you learn more? Go to http://casey-sameer.info/. Enter P911 in the search box to learn more about \"Hematoma: Care Instructions. \"  Current as of: November 20, 2017  Content Version: 11.7  © 9091-6589 [employer]. Care instructions adapted under license by [employer] (which disclaims liability or warranty for this information). If you have questions about a medical condition or this instruction, always ask your healthcare professional. [address] any warranty or liability for your use of this information.

## 2018-08-27 NOTE — PROGRESS NOTES
Patient verified with two identifiers (name and ). Patient notified of providers comments on results listed.

## 2018-08-30 ENCOUNTER — OFFICE VISIT (OUTPATIENT)
Dept: FAMILY MEDICINE CLINIC | Age: 83
End: 2018-08-30

## 2018-08-30 VITALS
HEART RATE: 70 BPM | OXYGEN SATURATION: 99 % | RESPIRATION RATE: 12 BRPM | TEMPERATURE: 98.1 F | SYSTOLIC BLOOD PRESSURE: 140 MMHG | HEIGHT: 64 IN | DIASTOLIC BLOOD PRESSURE: 75 MMHG
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
## 2018-08-30 DIAGNOSIS — M25.561 ACUTE PAIN OF RIGHT KNEE: ICD-10-CM

## 2018-08-30 DIAGNOSIS — J43.2 CENTRILOBULAR EMPHYSEMA (HCC): ICD-10-CM

## 2018-08-30 DIAGNOSIS — M79.674 TOE PAIN, RIGHT: ICD-10-CM

## 2018-08-30 DIAGNOSIS — S00.83XA CONTUSION OF FACE, INITIAL ENCOUNTER: Primary | ICD-10-CM

## 2018-08-30 DIAGNOSIS — F17.200 TOBACCO USE DISORDER: ICD-10-CM

## 2018-08-30 NOTE — MR AVS SNAPSHOT
[facility] 
 
 
 [address] [address] 
[phone] Patient: Rosy Wallace MRN: [id number] Visit Information Date & Time Provider Department Dept. Phone Encounter #  
 8/30/2018 10:00 AM Rex Alfredo MD [address] [id number] Your Appointments 11/6/2018  1:00 PM  
Follow Up with Rex Alfredo MD  
[facility] (--) Appt Note: Follow up Vita Messer [facility] [id number] [phone]  
  
   
 [address] [place] [id number]  
  
    
 6/4/2019 10:30 AM  
ULTRASOUND with Jacinto Solares [facility] Appt Note: FRANCES scan. [address] [facility] [address] [facility] [address]  
  
   
 [address] [facility] [address]  
  
    
  
 6/21/2019 10:15 AM  
Any with Ebenezer Sotomayor MD  
[facility] Appt Note: RTO in 1 year to review FRANCES scan prior. [address] [facility] [address] [facility] [address] [facility]  
  
   
 [address] [place] [address] [address] [facility] [address] [facility] Upcoming Health Maintenance Date Due Pneumococcal 65+ High/Highest Risk (1 of 2 - PCV13) 11/23/1996 BREAST CANCER SCRN MAMMOGRAM 4/13/2018 Influenza Age 5 to Adult 8/1/2018 GLAUCOMA SCREENING Q2Y 10/12/2019 DTaP/Tdap/Td series (2 - Td) 7/3/2028 Allergies as of 8/30/2018  Review Complete On: 8/30/2018 By: Jono Jaime LPN Severity Noted Reaction Type Reactions Iodine  10/02/2012   Intolerance Hives Current Immunizations  Reviewed on 4/20/2015 Name Date Tdap 7/3/2018  5:06 PM  
  
 Not reviewed this visit Vitals BP Pulse Temp Resp Height(growth percentile) SpO2  
 140/75 70 98.1 °F (36.7 °C) (Oral) 12 5' 4\" (1.626 m) 99% OB Status Smoking Status Postmenopausal Current Some Day Smoker Vitals History Preferred Pharmacy Pharmacy Name Phone [facility] Your Updated Medication List  
  
   
This list is accurate as of 8/30/18 11:17 AM.  Always use your most recent med list.  
  
  
  
  
 * albuterol 90 mcg/actuation inhaler Commonly known as:  PROAIR HFA Take 2 Puffs by inhalation every four (4) hours as needed for Wheezing or Shortness of Breath. * albuterol 1.25 mg/3 mL Nebu Commonly known as:  ACCUNEB  
3 mL by Nebulization route every four (4) hours as needed. amLODIPine 5 mg tablet Commonly known as:  Arlyss Fabrice Take 1 Tab by mouth daily. TAKE ONE TABLET BY MOUTH EVERY DAY  
  
 arformoterol 15 mcg/2 mL Nebu neb solution Commonly known as:  Wallie Blair 2 mL by Nebulization route two (2) times a day. Indications: BRONCHOSPASM PREVENTION WITH COPD  
  
 aspirin 81 mg tablet Take 81 mg by mouth daily. atorvastatin 10 mg tablet Commonly known as:  LIPITOR  
TAKE ONE TABLET BY MOUTH EVERY DAY  
  
 bipap machine kit 1 Each by Does Not Apply route. Started using BiPap Machine 01-15-15 [facility]  
  
 budesonide 1 mg/2 mL Nbsp Commonly known as:  PULMICORT  
2 mL by Nebulization route daily. Rinse your mouth after each use. File under Medicare Part B Dx code J44.9  Indications: Severe Chronic Obstructive Pulmonary Disease CALCIUM CITRATE + PO Take 100 mg by mouth once over twenty-four (24) hours. cholecalciferol (vitamin D3) 4,000 unit Cap Take 1 Cap by mouth daily. famotidine 20 mg tablet Commonly known as:  PEPCID Take 1 Tab by mouth two (2) times a day. FISH OIL PO Take 4,000 mg by mouth daily. fludrocortisone 0.1 mg tablet Commonly known as:  FLORINEF Take 1 tablet by mouth daily. fluticasone furoate 200 mcg/actuation Dsdv inhaler Commonly known as:  ARNUITY ELLIPTA Take 1 Puff by inhalation daily. Rinse mouth thoroughly after each use  
  
 guaiFENesin-dextromethorphan -30 mg per tablet Commonly known as:  HUMIBID DM Take 1 Tab by mouth every twelve (12) hours as needed for Cough. ipratropium 0.02 % Soln Commonly known as:  ATROVENT  
2.5 mL by Nebulization route four (4) times daily. Indications: BRONCHOSPASM PREVENTION WITH COPD  
  
 L-Methylfolate-T61-Bpqjjpbjyz tablet Commonly known as:  Melisa Legacy Take 1 tablet by mouth daily. L. acidophilus,casei,rhamnosus 50 billion cell Cpdr  
Commonly known as:  BIO-K PLUS Take 1 tab by mouth daily  
  
 lisinopril 20 mg tablet Commonly known as:  PRINIVIL, ZESTRIL  
TAKE ONE TABLET BY MOUTH EVERY DAY  
  
 magnesium oxide 400 mg tablet Commonly known as:  MAG-OX Take 400 mg by mouth daily. methocarbamol 500 mg tablet Commonly known as:  ROBAXIN Take 1 Tab by mouth two (2) times daily as needed for up to 7 days. Indications: pain; muscle spasms Nebulizer & Compressor machine Commonly known as:  PORTABLE NEBULIZER SYSTEM  
1 Each by Does Not Apply route two (2) times a day. * nystatin powder Commonly known as:  MYCOSTATIN Apply  to affected area four (4) times daily. * nystatin 100,000 unit/mL suspension Commonly known as:  MYCOSTATIN Take 5 mL by mouth four (4) times daily. swish and spit OXcarbazepine 300 mg tablet Commonly known as:  TRILEPTAL Take 300 mg by mouth two (2) times a day. OXYGEN-AIR DELIVERY SYSTEMS  
3 L/min by Does Not Apply route continuous. First Choice O2 PARoxetine 30 mg tablet Commonly known as:  PAXIL TAKE ONE TABLET BY MOUTH EVERY DAY  
  
 potassium chloride 20 mEq tablet Commonly known as:  K-DUR, KLOR-CON  
TAKE ONE TABLET BY MOUTH 2 TIMES A DAY  
  
 SYNTHROID 112 mcg tablet Generic drug:  levothyroxine Take 112 mcg by mouth Daily (before breakfast). Kevin Leroy Commonly known as:  BARNEY ROLLING WALKER  
1 Device by Does Not Apply route daily. * Notice: This list has 4 medication(s) that are the same as other medications prescribed for you. Read the directions carefully, and ask your doctor or other care provider to review them with you. Patient Instructions Preventing Falls: Care Instructions Your Care Instructions Getting around your home safely can be a challenge if you have injuries or health problems that make it easy for you to fall. Loose rugs and furniture in walkways are among the dangers for many older people who have problems walking or who have poor eyesight. People who have conditions such as arthritis, osteoporosis, or dementia also have to be careful not to fall. You can make your home safer with a few simple measures. Follow-up care is a key part of your treatment and safety. Be sure to make and go to all appointments, and call your doctor if you are having problems. It's also a good idea to know your test results and keep a list of the medicines you take. How can you care for yourself at home? Taking care of yourself · You may get dizzy if you do not drink enough water. To prevent dehydration, drink plenty of fluids, enough so that your urine is light yellow or clear like water. Choose water and other caffeine-free clear liquids. If you have kidney, heart, or liver disease and have to limit fluids, talk with your doctor before you increase the amount of fluids you drink. · Exercise regularly to improve your strength, muscle tone, and balance. Walk if you can. Swimming may be a good choice if you cannot walk easily. · Have your vision and hearing checked each year or any time you notice a change. If you have trouble seeing and hearing, you might not be able to avoid objects and could lose your balance. · Know the side effects of the medicines you take.  Ask your doctor or pharmacist whether the medicines you take can affect your balance. Sleeping pills or sedatives can affect your balance. · Limit the amount of alcohol you drink. Alcohol can impair your balance and other senses. · Ask your doctor whether calluses or corns on your feet need to be removed. If you wear loose-fitting shoes because of calluses or corns, you can lose your balance and fall. · Talk to your doctor if you have numbness in your feet. Preventing falls at home · Remove raised doorway thresholds, throw rugs, and clutter. Repair loose carpet or raised areas in the floor. · Move furniture and electrical cords to keep them out of walking paths. · Use nonskid floor wax, and wipe up spills right away, especially on ceramic tile floors. · If you use a walker or cane, put rubber tips on it. If you use crutches, clean the bottoms of them regularly with an abrasive pad, such as steel wool. · Keep your house well lit, especially [place], and outside walkways. Use night-lights in areas such as hallways and bathrooms. Add extra light switches or use remote switches (such as switches that go on or off when you clap your hands) to make it easier to turn lights on if you have to get up during the night. · Install sturdy handrails on stairways. · Move items in your cabinets so that the things you use a lot are on the lower shelves (about waist level). · Keep a cordless phone and a flashlight with new batteries by your bed. If possible, put a phone in each of the main rooms of your house, or carry a cell phone in case you fall and cannot reach a phone. Or, you can wear a device around your neck or wrist. You push a button that sends a signal for help. · Wear low-heeled shoes that fit well and give your feet good support. Use footwear with nonskid soles. Check the heels and soles of your shoes for wear. Repair or replace worn heels or soles. · Do not wear socks without shoes on wood floors. · Walk on the grass when the sidewalks are slippery. If you live in an area that gets snow and ice in the winter, sprinkle salt on slippery steps and sidewalks. Preventing falls in the bath · Install grab bars and nonskid mats inside and outside your shower or tub and near the toilet and sinks. · Use shower chairs and bath benches. · Use a hand-held shower head that will allow you to sit while showering. · Get into a tub or shower by putting the weaker leg in first. Get out of a tub or shower with your strong side first. 
· Repair loose toilet seats and consider installing a raised toilet seat to make getting on and off the toilet easier. · Keep your bathroom door unlocked while you are in the shower. Where can you learn more? Go to http://casey-sameer.info/. Enter [phone] in the search box to learn more about \"Preventing Falls: Care Instructions. \" Current as of: May 12, 2017 Content Version: 11.7 © 4388-2226 FanChatter. Care instructions adapted under license by Where (which disclaims liability or warranty for this information). If you have questions about a medical condition or this instruction, always ask your healthcare professional. [address] any warranty or liability for your use of this information. Please contact our office if you have any questions about your visit today. Introducing [facility]! Dear Good Ruby: 
Thank you for requesting a [employer] account. Our records indicate that you already have an active [employer] account. You can access your account anytime at [email or web address] Did you know that you can access your hospital and ER discharge instructions at any time in Ar[employer]? You can also review all of your test results from your hospital stay or ER visit. Additional Information If you have questions, please visit the Frequently Asked Questions section of the Inn[employer] website at [email or web address] [facility]. [email or web address] Remember, Inn[employer] is NOT to be used for urgent needs. For medical emergencies, dial 911. Now available from your iPhone and Android! Please provide this summary of care documentation to your next provider. Your primary care clinician is listed as Yvette Reyna. If you have any questions after today's visit, please call [phone].

## 2018-08-30 NOTE — PATIENT INSTRUCTIONS

## 2018-08-30 NOTE — PROGRESS NOTES
Chief Complaint Patient presents with  ED Follow-up Patient seen at [facility] ER on 8-21-18 due to weakness and diagnosed with dehydration. ER visit on 8-25-18 due to a fall. DX Contusion of right knee, and right elbow. Face and head injury.  Toe Pain Patient reports having right foot second toe pain since the fall. Pain level overall today is a 3/10. HISTORY OF PRESENT ILLNESS Alhaji Heller is a 80 y.o. female. HPI Pt here for f/u of ER visit for fall with facial contusions, r elbow pain, and r knee pain. Pt had a mechanical fall at home; her family thinks she may have gotten tangled in the hosing from her O2 tank. She was evaluated at the ER with extensive imaging. Pt did not have any fractures. Her facial bruising has evolved and is now greenish yellow. Pt reports that her R knee is still painful. Her R 2nd toe is also painful. Pt's dtr would like for me to emphasize today that pt needs to stop smoking. She is still smoking 1-3 cigarettes per day. Pt is on supplemental oxygen due to her copd. She is using her meds regularly. Review of Systems Constitutional: Negative. HENT: Negative. Respiratory: Negative. Cardiovascular: Negative. Musculoskeletal: Positive for joint pain. Skin:  
     Facial bruising 
r knee bruise All other systems reviewed and are negative. Physical Exam  
Constitutional: She is oriented to person, place, and time. She appears well-developed and well-nourished. No distress. HENT:  
Head: Normocephalic. Head is with contusion. Right Ear: External ear normal.  
Left Ear: External ear normal.  
Nose: Nose normal.  
Eyes: Conjunctivae and EOM are normal.  
Neck: Normal range of motion. Neck supple. Cardiovascular: Normal rate, regular rhythm and normal heart sounds. Exam reveals no gallop and no friction rub. No murmur heard.  
Pulmonary/Chest: Effort normal and breath sounds normal. She has no wheezes. She has no rhonchi. She has no rales. Musculoskeletal: Normal range of motion. Right knee: Tenderness found. MCL and patellar tendon tenderness noted. R knee contusion R 2nd toe + ttp; no edema, no erythema Neurological: She is alert and oriented to person, place, and time. Coordination normal.  
Skin: Skin is warm and dry. Psychiatric: She has a normal mood and affect. Her behavior is normal. Judgment and thought content normal.  
Nursing note and vitals reviewed. ASSESSMENT and PLAN Diagnoses and all orders for this visit: 1. Contusion of face, initial encounter Improving. 2. Acute pain of right knee Suspect pt may have had a twisting injury of the knee when falling. Ok to use heat/ice prn. 
 
3. Toe pain, right Offered xray. Pt declines. Ok to use heat/ice. 4. Fall, initial encounter Encouraged routine use of walker, even when at home. 5. Tobacco use disorder Discussed need for complete cessation. Pt understands and will try to quit. Pt advised that quitting now will help to reduce incidence of bronchitis/pna during the winter. Pt declines flu shot. Pt will consider pneumonia vaccination. Handout given. 6. Centrilobular emphysema ([address] Cont supplemental O2, cont copd meds. Doing well today. Follow-up Disposition: keep next previously scheduled appt

## 2018-08-30 NOTE — PROGRESS NOTES
Colby Meckel presents today for Chief Complaint Patient presents with  ED Follow-up Patient seen at [facility] ER on 8-21-18 due to weakness and diagnosed with dehydration. ER visit on 8-25-18 due to a fall. DX Contusion of right knee, and right elbow. Face and head injury.  Toe Pain Patient reports having right foot second toe pain since the fall. Pain level overall today is a 3/10. Bulmaro Villar preferred language for health care discussion is english/other. Is someone accompanying this pt? YES DAUGHTER Is the patient using any DME equipment during OV? YES WALKER Depression Screening: PHQ over the last two weeks 6/28/2018 PHQ Not Done - Little interest or pleasure in doing things Not at all Feeling down, depressed, irritable, or hopeless Not at all Total Score PHQ 2 0 Trouble falling or staying asleep, or sleeping too much - Feeling tired or having little energy - Poor appetite, weight loss, or overeating - Feeling bad about yourself - or that you are a failure or have let yourself or your family down - Trouble concentrating on things such as school, work, reading, or watching TV - Moving or speaking so slowly that other people could have noticed; or the opposite being so fidgety that others notice - Thoughts of being better off dead, or hurting yourself in some way - How difficult have these problems made it for you to do your work, take care of your home and get along with others - Learning Assessment: 
Learning Assessment 8/30/2018 PRIMARY LEARNER Patient HIGHEST LEVEL OF EDUCATION - PRIMARY LEARNER  GRADUATED HIGH SCHOOL OR GED  
BARRIERS PRIMARY LEARNER NONE  
CO-LEARNER CAREGIVER No  
CO-LEARNER NAME -  
[address] LEVEL OF EDUCATION -  
Maliha Elias 10 -  
PRIMARY LANGUAGE ENGLISH  
PRIMARY LANGUAGE CO-LEARNER -  
 NEED -  
LEARNER PREFERENCE PRIMARY DEMONSTRATION  
  LISTENING  
LEARNER PREFERENCE CO-LEARNER -  
 LEARNING SPECIAL TOPICS -  
ANSWERED BY PATIENT  
RELATIONSHIP SELF Abuse Screening: 
Abuse Screening Questionnaire 6/26/2018 Do you ever feel afraid of your partner? Jaden Da Silva Are you in a relationship with someone who physically or mentally threatens you? Jaden Da Silva Is it safe for you to go home? Ramirez Morrell Fall Risk Fall Risk Assessment, last 12 mths 8/30/2018 Able to walk? Yes Fall in past 12 months? Yes Fall with injury? Yes  
Number of falls in past 12 months 2 Fall Risk Score 3 Coordination of Care: 1. Have you been to the ER, urgent care clinic since your last visit? Hospitalized since your last visit? YES  
 
2. Have you seen or consulted any other health care providers outside of the [address] [place] [address] since your last visit? Include any pap smears or colon screening.  NO

## 2018-09-11 ENCOUNTER — OFFICE VISIT (OUTPATIENT)
Dept: FAMILY MEDICINE CLINIC | Age: 83
End: 2018-09-11

## 2018-09-11 VITALS
WEIGHT: 156 LBS | HEART RATE: 71 BPM | HEIGHT: 64 IN | SYSTOLIC BLOOD PRESSURE: 128 MMHG | DIASTOLIC BLOOD PRESSURE: 73 MMHG | TEMPERATURE: 97.8 F | BODY MASS INDEX: 26.63 KG/M2 | RESPIRATION RATE: 18 BRPM

## 2018-09-11 DIAGNOSIS — B35.4 TINEA CORPORIS: Primary | ICD-10-CM

## 2018-09-11 NOTE — PROGRESS NOTES
Hemalatha Owens presents today for Chief Complaint Patient presents with  Shingles Patient stated that she had a rash under right breast for over a month. Patient stated that she been using ointment, baby powder, corn starch and baking soda. Is someone accompanying this pt? Yes Maldonadoluisa Harvey Is the patient using any DME equipment during OV? No 
 
Depression Screening: PHQ over the last two weeks 6/28/2018 PHQ Not Done - Little interest or pleasure in doing things Not at all Feeling down, depressed, irritable, or hopeless Not at all Total Score PHQ 2 0 Trouble falling or staying asleep, or sleeping too much - Feeling tired or having little energy - Poor appetite, weight loss, or overeating - Feeling bad about yourself - or that you are a failure or have let yourself or your family down - Trouble concentrating on things such as school, work, reading, or watching TV - Moving or speaking so slowly that other people could have noticed; or the opposite being so fidgety that others notice - Thoughts of being better off dead, or hurting yourself in some way - How difficult have these problems made it for you to do your work, take care of your home and get along with others - Learning Assessment: 
Learning Assessment 8/30/2018 PRIMARY LEARNER Patient HIGHEST LEVEL OF EDUCATION - PRIMARY LEARNER  GRADUATED HIGH SCHOOL OR GED  
BARRIERS PRIMARY LEARNER NONE  
CO-LEARNER CAREGIVER No  
CO-LEARNER NAME -  
-[id number] LEVEL OF EDUCATION -  
Maliha Elias 10 -  
PRIMARY LANGUAGE ENGLISH  
PRIMARY LANGUAGE CO-LEARNER -  
 NEED -  
LEARNER PREFERENCE PRIMARY DEMONSTRATION  
  LISTENING  
LEARNER PREFERENCE CO-LEARNER -  
LEARNING SPECIAL TOPICS -  
ANSWERED BY PATIENT  
RELATIONSHIP SELF Abuse Screening: 
Abuse Screening Questionnaire 6/26/2018 Do you ever feel afraid of your partner?  Faye Porter  
 Are you in a relationship with someone who physically or mentally threatens you? Leena Harrison Is it safe for you to go home? Austin Valdovinos Health Maintenance Due Topic Date Due  Pneumococcal 65+ High/Highest Risk (1 of 2 - PCV13) 11/23/1996  BREAST CANCER SCRN MAMMOGRAM  04/13/2018  Influenza Age 5 to Adult  08/01/2018  MEDICARE YEARLY EXAM  08/30/2018 Chelle Leigh Health Maintenance reviewed and discussed and ordered per Provider. Brian Gonzalez is updated on all  Coordination of Care: 1. Have you been to the ER, urgent care clinic since your last visit? Hospitalized since your last visit? no 
 
2. Have you seen or consulted any other health care providers outside of the [address] [place] [address] since your last visit? Include any pap smears or colon screening.  no

## 2018-09-27 NOTE — PROGRESS NOTES
Chief Complaint Patient presents with  Shingles Patient stated that she had a rash under right breast for over a month. Patient stated that she been using ointment, baby powder, corn starch and baking soda. HISTORY OF PRESENT ILLNESS Scout Tomas is a 80 y.o. female. HPI Pt here for eval of rash under the right breast x 1 month. She has tried several topicals, including corn starch and baking soda. It has not improved. There is some itching as well as some burning. Review of Systems Constitutional: Negative. HENT: Negative. Respiratory: Negative. Cardiovascular: Negative. Skin: Positive for itching. Area of broken skin under r breast  
All other systems reviewed and are negative. Physical Exam 
Physical Exam  
Nursing note and vitals reviewed. Constitutional: She is oriented to person, place, and time. She appears well-developed and well-nourished. HENT:  
Head: Normocephalic and atraumatic. Right Ear: External ear normal.  
Left Ear: External ear normal.  
Nose: Nose normal.  
Eyes: Conjunctivae and EOM are normal.  
Neck: Normal range of motion. Neck supple. No JVD present. Carotid bruit is not present. No thyromegaly present. Cardiovascular: Normal rate, regular rhythm, normal heart sounds and intact distal pulses. Exam reveals no gallop and no friction rub. No murmur heard. Pulmonary/Chest: Effort normal and breath sounds normal. She has no wheezes. She has no rhonchi. She has no rales. Abdominal: Soft. Bowel sounds are normal.  
Musculoskeletal: Normal range of motion. Neurological: She is alert and oriented to person, place, and time. Coordination normal.  
Skin: Skin is warm and dry. Erythematous area of broken skin under R breast; no rashes, no secondary infection Psychiatric: She has a normal mood and affect. Her behavior is normal. Judgment and thought content normal.  
 
ASSESSMENT and PLAN Diagnoses and all orders for this visit: 
 
 1. Tinea corporis Pt reassured. Recommend lamisil or lotrimen AAA bid x 2 wks; discussed careful drying of the affected area and suspected etiology. Pt and dtr understand and agree with tx plan. All questions answered Follow-up Disposition:  prn

## 2018-11-04 ENCOUNTER — APPOINTMENT (OUTPATIENT)
Dept: GENERAL RADIOLOGY | Age: 83
End: 2018-11-04
Attending: EMERGENCY MEDICINE
Payer: MEDICARE

## 2018-11-04 ENCOUNTER — APPOINTMENT (OUTPATIENT)
Dept: CT IMAGING | Age: 83
End: 2018-11-04
Attending: EMERGENCY MEDICINE
Payer: MEDICARE

## 2018-11-04 ENCOUNTER — HOSPITAL ENCOUNTER (EMERGENCY)
Age: 83
Discharge: HOME | End: 2018-11-04
Attending: EMERGENCY MEDICINE
Payer: MEDICARE

## 2018-11-04 VITALS
DIASTOLIC BLOOD PRESSURE: 58 MMHG | WEIGHT: 155 LBS | HEART RATE: 67 BPM | OXYGEN SATURATION: 100 % | SYSTOLIC BLOOD PRESSURE: 120 MMHG | BODY MASS INDEX: 26.46 KG/M2 | TEMPERATURE: 98.2 F | HEIGHT: 64 IN | RESPIRATION RATE: 22 BRPM

## 2018-11-04 DIAGNOSIS — S80.01XA CONTUSION OF RIGHT KNEE, INITIAL ENCOUNTER: ICD-10-CM

## 2018-11-04 DIAGNOSIS — S09.90XA CLOSED HEAD INJURY, INITIAL ENCOUNTER: Primary | ICD-10-CM

## 2018-11-04 DIAGNOSIS — T14.8XXA ABRASION: ICD-10-CM

## 2018-11-04 DIAGNOSIS — R29.6 RECURRENT FALLS WHILE WALKING: ICD-10-CM

## 2018-11-04 PROCEDURE — 99284 EMERGENCY DEPT VISIT MOD MDM: CPT

## 2018-11-04 PROCEDURE — 74011250637 HC RX REV CODE- 250/637: Performed by: EMERGENCY MEDICINE

## 2018-11-04 PROCEDURE — 73560 X-RAY EXAM OF KNEE 1 OR 2: CPT

## 2018-11-04 PROCEDURE — 73521 X-RAY EXAM HIPS BI 2 VIEWS: CPT

## 2018-11-04 PROCEDURE — 70450 CT HEAD/BRAIN W/O DYE: CPT

## 2018-11-04 RX ORDER — ACETAMINOPHEN 325 MG/1
650 TABLET ORAL
Status: COMPLETED | OUTPATIENT
Start: 2018-11-04 | End: 2018-11-04

## 2018-11-04 RX ADMIN — ACETAMINOPHEN 650 MG: 325 TABLET ORAL at 12:16

## 2018-11-04 NOTE — ED PROVIDER NOTES
EMERGENCY DEPARTMENT HISTORY AND PHYSICAL EXAM 
 
1:21 PM 
 
 
Date: 11/4/2018 Patient Name: Maribel Zamorano History of Presenting Illness Chief Complaint Patient presents with  Fall History Provided By: Patient, Patient's Daughter and Patient's Sister Chief Complaint: Radha Countess Duration: Today Hours Timing:  Acute Location: N/A Quality: Not obtained Severity: N/A Modifying Factors: No modifying or aggravating factors were reported. Associated Symptoms: Facial laceration, right knee pain, nose pain, right shoulder pain Additional History (Context): 1:23 PM Maribel Zamorano is a 80 y.o. female with h/o COPD, HTN, stroke, seizure, and other noted PMHx who presents to ED complaining of an acute fall onset today, with associated facial laceration, right knee pain, nose pain, and right shoulder pain. The patient reports that she fell after turning around in her living room. Per the patient's family, the patient probably tripped over her O2 tube. The patient's daughter notes that the patient fell on August 25th in the same spot in the house. She states that the patient has been using a cane instead of a walker. The patient's family notes that the patient is not taking a blood thinner or pain medication. The patient denies nausea, dizziness, double vision, and LOC. No other concerns or symptoms at this time. PCP: Eliel Frances MD 
 
 
 
Past History Past Medical History: 
Past Medical History:  
Diagnosis Date [address]. Alcohol  Adrenal insufficiency ([facility] [address]  Anxiety  Calculus of kidney 1988  Cardiac echocardiogram 07/31/2012 EF 60-65%. No WMA. Mild conc LVH. Gr 1 DDfx. RVSP 20-25 mmHg. Mild SAGAR. Mild TR,. Mild PAE.  Cardiovascular LE venous duplex 10/08/2012 No venous thrombosis or venous insufficiency in bilateral lower extremities.  Carotid duplex 10/09/2014 Mild < 50% bilateral ICA stenosis.  Chronic lung disease  Contact dermatitis and other eczema, due to unspecified cause  COPD (chronic obstructive pulmonary disease) (HCC)  Degenerative joint disease  Depression  Hematuria  Hypercholesteremia  Hypertension  Hypothyroidism   Kidney stone  Neuropathy 3/2007  Orthostatic hypotension  Pituitary adenoma ([facility] [address] 1998  Pituitary tumor   Pneumonia  Seizures ([address]   
 none recently  Severe obstructive sleep apnea 01-15-15   
 started using [place] [facility]  Stress  Stroke ([facility] [address] 2006  
 no residual  
 TIA (transient ischemic attack)  Trauma Past Surgical History: 
Past Surgical History:  
Procedure Laterality Date  CYSTOSCOPY,INSERT URETERAL STENT  9/14/15 Dr. Rashid Vigil  HX APPENDECTOMY  HX INTRAOCULAR LENS PROSTHESIS    
 HX LITHOTRIPSY  9/14/15 Dr. Rose Mary Graf  HX TUBAL LIGATION    
 HX WISDOM TEETH EXTRACTION    
 x4 Family History: 
Family History Problem Relation Age of Onset  Heart Disease Father  Hypertension Father  Hypertension Mother  Cancer Mother   
     skin  Elevated Lipids Sister  Heart Disease Sister  Cancer Maternal Grandmother   
     colon and stomach  
 Heart Disease Paternal Grandmother  Heart Attack Paternal Grandmother  Heart Attack Paternal Grandfather  Heart Disease Paternal Grandfather Social History: 
Social History Tobacco Use  Smoking status: Current Some Day Smoker Packs/day: 1.50 Years: 65.00 Pack years: 97.50 Types: Cigarettes Last attempt to quit: 3/9/2018 Years since quittin.6  Smokeless tobacco: Never Used  Tobacco comment: 18 decreased amt. of cigarettes/day only Substance Use Topics  Alcohol use: No  
  Alcohol/week: 0.0 oz  
  Comment: quit  due to Alcohol Abuse  Drug use: No  
 
 
Allergies: Allergies Allergen Reactions  Iodine Hives Review of Systems Review of Systems Constitutional: Negative for activity change, fatigue and fever. HENT: Negative for congestion and rhinorrhea. Eyes: Negative for visual disturbance. Respiratory: Negative for shortness of breath. Cardiovascular: Negative for chest pain and palpitations. Gastrointestinal: Negative for abdominal pain, diarrhea, nausea and vomiting. Genitourinary: Negative for dysuria and hematuria. Musculoskeletal: Positive for arthralgias. Negative for back pain. Skin: Positive for wound. Negative for rash. Neurological: Negative for dizziness, weakness and light-headedness. All other systems reviewed and are negative. Physical Exam  
 
Visit Vitals /58 Pulse 67 Temp 98.2 °F (36.8 °C) Resp 22 Ht 5' 4\" (1.626 m) Wt 70.3 kg (155 lb) SpO2 100% BMI 26.61 kg/m² Physical Exam  
Constitutional: She is oriented to person, place, and time. She appears well-developed and well-nourished. No distress. Frontal contusion noted HENT:  
Head: Normocephalic and atraumatic. Right Ear: External ear normal.  
Left Ear: External ear normal.  
Nose: Nose normal.  
Mouth/Throat: Oropharynx is clear and moist.  
Mild swelling of the nasal dorsum, dried blood noted R, no septal deformity Eyes: Conjunctivae and EOM are normal. Pupils are equal, round, and reactive to light. No scleral icterus. Neck: Normal range of motion. Neck supple. No JVD present. No tracheal deviation present. No thyromegaly present. No midline neck pain   
Cardiovascular: Normal rate, regular rhythm, normal heart sounds and intact distal pulses. Exam reveals no gallop and no friction rub. No murmur heard. Pulmonary/Chest: Effort normal and breath sounds normal. She exhibits no tenderness. Abdominal: Soft. Bowel sounds are normal. She exhibits no distension. There is no tenderness. There is no rebound and no guarding. Musculoskeletal: Normal range of motion.  She exhibits edema and tenderness. She exhibits no deformity. R knee with pain, pain with passive ROM, no step off noted, ROM limited to hips due to knee pain Lymphadenopathy:  
  She has no cervical adenopathy. Neurological: She is alert and oriented to person, place, and time. No cranial nerve deficit. Coordination normal.  
Gait not observed, symmetric strength, gait not observed Skin: Skin is warm and dry. Psychiatric: She has a normal mood and affect. Her behavior is normal. Judgment and thought content normal.  
Nursing note and vitals reviewed. Diagnostic Study Results Labs - No results found for this or any previous visit (from the past 12 hour(s)). Radiologic Studies -  
XR KNEE RT MAX 2 VWS Final Result XR HIPS BI W AP PELV Final Result CT HEAD WO CONT Final Result Preliminary reading XR KNEE, per ED provider: Soft tissue swelling, DJD. No fracture. Preliminary reading XR HIPS, per ED provider: No fracture. CT HEAD: 
IMPRESSION:   
  
No CT evidence of acute intracranial pathology.  No significant interval change. 
  
Small right forehead scalp swelling/hematoma, similar. Medical Decision Making I am the first provider for this patient. I reviewed the vital signs, available nursing notes, past medical history, past surgical history, family history and social history. Vital Signs-Reviewed the patient's vital signs. Pulse Oximetry Analysis -  100% on room air (Interpretation) WNL Cardiac Monitor: 
Rate: 62 Rhythm:  Normal Sinus Rhythm Records Reviewed: Nursing Notes (Time of Review: 1:21 PM) Provider Notes (Medical Decision Making): [facility] Pt is an 79yo female with a hx of adrenal insufficiency, COPD, on oxygen, depression now with recurrent falls presents to the ED with complaint of frontal HA and R knee pain after a fall. Family suspects the patient got caught in her oxygen tubing.   Pt has mild HA and will CT head, XR hips, R knee clean her wound then reevaluate. Elidaphney Rodriguez, DO 1:41 PM 
 
 
Medications  
acetaminophen (TYLENOL) tablet 650 mg (650 mg Oral Given 11/4/18 1216) Diagnosis Clinical Impression: 1. Closed head injury, initial encounter 2. Contusion of right knee, initial encounter 3. Abrasion 4. Recurrent falls while walking Disposition: DC Follow-up Information Follow up With Specialties Details Why Contact Info Barbra Atkins MD Family Practice Schedule an appointment as soon as possible for a visit in 2 days For follow-up [address] [place] [id number] [address] [address] [phone] [id number] [facility] EMERGENCY DEPT Emergency Medicine Go to As needed, If symptoms worsen [facility] [address] [address] [phone] Medication List  
  
ASK your doctor about these medications * albuterol 90 mcg/actuation inhaler Commonly known as:  PROAIR HFA Take 2 Puffs by inhalation every four (4) hours as needed for Wheezing or Shortness of Breath. * albuterol 1.25 mg/3 mL Nebu Commonly known as:  ACCUNEB 
3 mL by Nebulization route every four (4) hours as needed. amLODIPine 5 mg tablet Commonly known as:  Dunia Morales Take 1 Tab by mouth daily. TAKE ONE TABLET BY MOUTH EVERY DAY 
  
arformoterol 15 mcg/2 mL Nebu neb solution Commonly known as:  Billye Ben 2 mL by Nebulization route two (2) times a day. Indications: BRONCHOSPASM PREVENTION WITH COPD 
  
aspirin 81 mg tablet 
  
atorvastatin 10 mg tablet Commonly known as:  LIPITOR 
TAKE ONE TABLET BY MOUTH EVERY DAY 
  
bipap machine kit 
  
budesonide 1 mg/2 mL Nbsp Commonly known as:  PULMICORT 
2 mL by Nebulization route daily. Rinse your mouth after each use. File under Medicare Part B Dx code J44.9  Indications: Severe Chronic Obstructive Pulmonary Disease CALCIUM CITRATE + PO 
  
cholecalciferol (vitamin D3) 4,000 unit Cap 
  
famotidine 20 mg tablet Commonly known as:  PEPCID 
 Take 1 Tab by mouth two (2) times a day. FISH OIL PO 
  
fludrocortisone 0.1 mg tablet Commonly known as:  FLORINEF Take 1 tablet by mouth daily. fluticasone furoate 200 mcg/actuation Dsdv inhaler Commonly known as:  ARNUITY ELLIPTA Take 1 Puff by inhalation daily. Rinse mouth thoroughly after each use 
  
guaiFENesin-dextromethorphan -30 mg per tablet Commonly known as:  HUMIBID DM Take 1 Tab by mouth every twelve (12) hours as needed for Cough. ipratropium 0.02 % Soln Commonly known as:  ATROVENT 
2.5 mL by Nebulization route four (4) times daily. Indications: BRONCHOSPASM PREVENTION WITH COPD 
  
L-Methylfolate-B85-Rpucyzrtjf tablet Commonly known as:  Kathren Danelle Take 1 tablet by mouth daily. L. acidophilus,casei,rhamnosus 50 billion cell Cpdr 
Commonly known as:  BIO-K PLUS Take 1 tab by mouth daily 
  
lisinopril 20 mg tablet Commonly known as:  PRINIVIL, ZESTRIL 
TAKE ONE TABLET BY MOUTH EVERY DAY 
  
magnesium oxide 400 mg tablet Commonly known as:  MAG-OX Nebulizer & Compressor machine Commonly known as:  PORTABLE NEBULIZER SYSTEM 
1 Each by Does Not Apply route two (2) times a day. * nystatin powder Commonly known as:  MYCOSTATIN Apply  to affected area four (4) times daily. * nystatin 100,000 unit/mL suspension Commonly known as:  MYCOSTATIN Take 5 mL by mouth four (4) times daily. swish and spit OXcarbazepine 300 mg tablet Commonly known as:  TRILEPTAL OXYGEN-AIR DELIVERY SYSTEMS PARoxetine 30 mg tablet Commonly known as:  PAXIL TAKE ONE TABLET BY MOUTH EVERY DAY 
  
potassium chloride 20 mEq tablet Commonly known as:  K-DUR, KLOR-CON 
TAKE ONE TABLET BY MOUTH 2 TIMES A DAY 
  
SYNTHROID 112 mcg tablet Generic drug:  levothyroxine Namita Slade Commonly known as:  BARNEY ROLLING WALKER 
1 Device by Does Not Apply route daily.  
  
  
 * This list has 4 medication(s) that are the same as other medications prescribed for you. Read the directions carefully, and ask your doctor or other care provider to review them with you.  
  
  
  
 
_______________________________ Attestations: 
Scribe Attestation David Thompson acting as a scribe for and in the presence of Terrie Carrel, MD     
November 04, 2018 at [facility] AT [facility] 
    
Provider Attestation:     
I personally performed the services described in the documentation, reviewed the documentation, as recorded by the scribe in my presence, and it accurately and completely records my words and actions. November 04, 2018 at 1:21 PM - Terrie Carrel, MD   
_______________________________

## 2018-11-04 NOTE — ED TRIAGE NOTES
Patient had a ground level fall. Put her hands out to catch herself and hit her head. Patient has a laceration to middle forehead.

## 2018-11-04 NOTE — DISCHARGE INSTRUCTIONS

## 2018-11-04 NOTE — ED NOTES
Michael Stanton is a 80 y.o. female that was discharged in stable condition. The patients diagnosis, condition and treatment were explained to  patient and aftercare instructions were given. The patient verbalized understanding. Patient armband removed and shredded.

## 2018-11-27 ENCOUNTER — OFFICE VISIT (OUTPATIENT)
Dept: FAMILY MEDICINE CLINIC | Age: 83
End: 2018-11-27

## 2018-11-27 VITALS
BODY MASS INDEX: 24.34 KG/M2 | SYSTOLIC BLOOD PRESSURE: 111 MMHG | HEART RATE: 87 BPM | RESPIRATION RATE: 20 BRPM | HEIGHT: 64 IN | OXYGEN SATURATION: 92 % | WEIGHT: 142.6 LBS | DIASTOLIC BLOOD PRESSURE: 70 MMHG | TEMPERATURE: 98 F

## 2018-11-27 DIAGNOSIS — R53.81 PHYSICAL DECONDITIONING: ICD-10-CM

## 2018-11-27 DIAGNOSIS — I10 ESSENTIAL HYPERTENSION: Primary | ICD-10-CM

## 2018-11-27 DIAGNOSIS — R26.9 GAIT DISORDER: ICD-10-CM

## 2018-11-27 DIAGNOSIS — E78.5 DYSLIPIDEMIA: ICD-10-CM

## 2018-11-27 DIAGNOSIS — J43.2 CENTRILOBULAR EMPHYSEMA (HCC): ICD-10-CM
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
## 2018-11-27 NOTE — PROGRESS NOTES
Chief Complaint Patient presents with  Hypertension  
  follow up  Thyroid Problem  Cholesterol Problem [facility] Follow Up  
  11/4/18 fall HISTORY OF PRESENT ILLNESS Judith Strickland is a 80 y.o. female. HPI Patient is here for a 3 mo follow up of htn, hld. Pt reports that she has been having falls at home. She admits that although she got stronger with PT in the past, she did not cont her exercises and has gotten a bit deconditioned. Pt would be open to having home health PT again. Her most recent fall was Nov 4, 2018. An additional risk factor for her falls is the length of tubing to her oxygen tank; she can get tangled in it as she walks through the house. Patient does not need medication refills today. Review of Systems Constitutional: Negative. HENT: Negative. Respiratory: Negative. Cardiovascular: Negative. Musculoskeletal: Positive for falls. All other systems reviewed and are negative. Physical Exam 
Physical Exam  
Nursing note and vitals reviewed. Constitutional: She is oriented to person, place, and time. She appears well-developed and well-nourished. HENT:  
Head: Normocephalic and atraumatic. Right Ear: External ear normal.  
Left Ear: External ear normal.  
Nose: Nose normal.  
Eyes: Conjunctivae and EOM are normal.  
Neck: Normal range of motion. Neck supple. No JVD present. Carotid bruit is not present. No thyromegaly present. Cardiovascular: Normal rate, regular rhythm, normal heart sounds and intact distal pulses. Exam reveals no gallop and no friction rub. No murmur heard. Pulmonary/Chest: Effort normal and breath sounds normal. She has no wheezes. She has no rhonchi. She has no rales. Abdominal: Soft. Bowel sounds are normal.  
Musculoskeletal: Normal range of motion. Neurological: She is alert and oriented to person, place, and time. Coordination normal.  
Skin: Skin is warm and dry. Psychiatric: She has a normal mood and affect. Her behavior is normal. Judgment and thought content normal.  
 
ASSESSMENT and PLAN Diagnoses and all orders for this visit: 1. Essential hypertension -     METABOLIC PANEL, COMPREHENSIVE; Future -     LIPID PANEL; Future Stable, cont pres tx plan. 2. Fall, subsequent encounter 
-     [address] Pt advised to use her walker all the time at home. Consider using a small o2 tank that she can attach to her walker instead of the large tank with lengthy tubing that she can get tangled in at home. 3. Gait disorder 
-     [address] 4. Physical deconditioning 
-     [address] 5. Dyslipidemia -     METABOLIC PANEL, COMPREHENSIVE; Future -     LIPID PANEL; Future 6. Centrilobular emphysema ([address] Stable, cont pres tx plan. Follow-up Disposition: 
Return in about 3 months (around 2/27/2019) for high blood pressure, high cholesterol, copd.

## 2018-11-27 NOTE — PROGRESS NOTES
Michael Stanton presents today for Chief Complaint Patient presents with  Hypertension  
  follow up  Thyroid Problem  Cholesterol Problem [facility] Follow Up  
  11/4/18 fall Is someone accompanying this pt? no 
 
Is the patient using any DME equipment during [address]? Yes walker Depression Screening: PHQ over the last two weeks 6/28/2018 PHQ Not Done - Little interest or pleasure in doing things Not at all Feeling down, depressed, irritable, or hopeless Not at all Total Score PHQ 2 0 Trouble falling or staying asleep, or sleeping too much - Feeling tired or having little energy - Poor appetite, weight loss, or overeating - Feeling bad about yourself - or that you are a failure or have let yourself or your family down - Trouble concentrating on things such as school, work, reading, or watching TV - Moving or speaking so slowly that other people could have noticed; or the opposite being so fidgety that others notice - Thoughts of being better off dead, or hurting yourself in some way - How difficult have these problems made it for you to do your work, take care of your home and get along with others - Learning Assessment: 
Learning Assessment 8/30/2018 PRIMARY LEARNER Patient HIGHEST LEVEL OF EDUCATION - PRIMARY LEARNER  GRADUATED HIGH SCHOOL OR GED  
BARRIERS PRIMARY LEARNER NONE  
CO-LEARNER CAREGIVER No  
CO-LEARNER NAME -  
[id number] LEVEL OF EDUCATION -  
Maliha Elias 10 -  
PRIMARY LANGUAGE ENGLISH  
PRIMARY LANGUAGE CO-LEARNER -  
 NEED -  
LEARNER PREFERENCE PRIMARY DEMONSTRATION  
  LISTENING  
LEARNER PREFERENCE CO-LEARNER -  
LEARNING SPECIAL TOPICS -  
ANSWERED BY PATIENT  
RELATIONSHIP SELF Abuse Screening: 
Abuse Screening Questionnaire 6/26/2018 Do you ever feel afraid of your partner? Eliel Murphy Are you in a relationship with someone who physically or mentally threatens you? Eliel Murphy Is it safe for you to go home?  Elvia Szymanski  
 
 
 
 
 Health Maintenance Due Topic Date Due  Shingrix Vaccine Age 50> (1 of 2) 11/23/1981  Pneumococcal 65+ High/Highest Risk (1 of 2 - PCV13) 11/23/1996  BREAST CANCER SCRN MAMMOGRAM  04/13/2018  Influenza Age 5 to Adult  08/01/2018  MEDICARE YEARLY EXAM  08/30/2018 Luzsamantha Damon Health Maintenance reviewed and discussed and ordered per Provider. Carson Grigsby is updated on all  Coordination of Care: 1. Have you been to the ER, urgent care clinic since your last visit? Hospitalized since your last visit? Yes [facility] for fall on 11/4/18 2. Have you seen or consulted any other health care providers outside of the [address] [place] [address] since your last visit? Include any pap smears or colon screening. no 
 
 
 
 
Advance Directive: 1. Do you have an advance directive in place? Patient Reply:no 2. If not, would you like material regarding how to put one in place?  Patient Reply: no

## 2018-11-28 ENCOUNTER — HOME HEALTH ADMISSION (OUTPATIENT)
Dept: HOME HEALTH SERVICES | Facility: HOME HEALTH | Age: 83
End: 2018-11-28
Payer: MEDICARE

## 2018-11-29 ENCOUNTER — HOME CARE VISIT (OUTPATIENT)
Dept: HOME HEALTH SERVICES | Facility: HOME HEALTH | Age: 83
End: 2018-11-29

## 2018-11-29 ENCOUNTER — HOME CARE VISIT (OUTPATIENT)
Dept: SCHEDULING | Facility: HOME HEALTH | Age: 83
End: 2018-11-29
Payer: MEDICARE

## 2018-11-29 PROCEDURE — 3331090002 HH PPS REVENUE DEBIT

## 2018-11-29 PROCEDURE — 400013 HH SOC

## 2018-11-29 PROCEDURE — 3331090001 HH PPS REVENUE CREDIT

## 2018-11-29 PROCEDURE — G0151 HHCP-SERV OF PT,EA 15 MIN: HCPCS

## 2018-11-30 ENCOUNTER — HOME CARE VISIT (OUTPATIENT)
Dept: HOME HEALTH SERVICES | Facility: HOME HEALTH | Age: 83
End: 2018-11-30
Payer: MEDICARE

## 2018-11-30 ENCOUNTER — HOME CARE VISIT (OUTPATIENT)
Dept: SCHEDULING | Facility: HOME HEALTH | Age: 83
End: 2018-11-30
Payer: MEDICARE

## 2018-11-30 VITALS
OXYGEN SATURATION: 98 % | SYSTOLIC BLOOD PRESSURE: 114 MMHG | TEMPERATURE: 98.3 F | HEART RATE: 70 BPM | DIASTOLIC BLOOD PRESSURE: 59 MMHG

## 2018-11-30 PROCEDURE — 3331090002 HH PPS REVENUE DEBIT

## 2018-11-30 PROCEDURE — G0157 HHC PT ASSISTANT EA 15: HCPCS

## 2018-11-30 PROCEDURE — 3331090001 HH PPS REVENUE CREDIT

## 2018-12-01 PROCEDURE — 3331090001 HH PPS REVENUE CREDIT

## 2018-12-01 PROCEDURE — 3331090002 HH PPS REVENUE DEBIT

## 2018-12-02 PROCEDURE — 3331090001 HH PPS REVENUE CREDIT

## 2018-12-02 PROCEDURE — 3331090002 HH PPS REVENUE DEBIT

## 2018-12-03 ENCOUNTER — HOME CARE VISIT (OUTPATIENT)
Dept: SCHEDULING | Facility: HOME HEALTH | Age: 83
End: 2018-12-03
Payer: MEDICARE

## 2018-12-03 PROCEDURE — 3331090001 HH PPS REVENUE CREDIT

## 2018-12-03 PROCEDURE — G0157 HHC PT ASSISTANT EA 15: HCPCS

## 2018-12-03 PROCEDURE — 3331090002 HH PPS REVENUE DEBIT

## 2018-12-04 ENCOUNTER — HOME CARE VISIT (OUTPATIENT)
Dept: SCHEDULING | Facility: HOME HEALTH | Age: 83
End: 2018-12-04
Payer: MEDICARE

## 2018-12-04 PROCEDURE — 3331090001 HH PPS REVENUE CREDIT

## 2018-12-04 PROCEDURE — 3331090002 HH PPS REVENUE DEBIT

## 2018-12-04 PROCEDURE — G0156 HHCP-SVS OF AIDE,EA 15 MIN: HCPCS

## 2018-12-05 PROCEDURE — 3331090002 HH PPS REVENUE DEBIT

## 2018-12-05 PROCEDURE — 3331090001 HH PPS REVENUE CREDIT

## 2018-12-06 ENCOUNTER — HOME CARE VISIT (OUTPATIENT)
Dept: HOME HEALTH SERVICES | Facility: HOME HEALTH | Age: 83
End: 2018-12-06
Payer: MEDICARE

## 2018-12-06 ENCOUNTER — HOME CARE VISIT (OUTPATIENT)
Dept: SCHEDULING | Facility: HOME HEALTH | Age: 83
End: 2018-12-06
Payer: MEDICARE

## 2018-12-06 VITALS
HEART RATE: 66 BPM | TEMPERATURE: 98.7 F | OXYGEN SATURATION: 98 % | DIASTOLIC BLOOD PRESSURE: 60 MMHG | SYSTOLIC BLOOD PRESSURE: 117 MMHG

## 2018-12-06 PROCEDURE — G0156 HHCP-SVS OF AIDE,EA 15 MIN: HCPCS

## 2018-12-06 PROCEDURE — G0157 HHC PT ASSISTANT EA 15: HCPCS

## 2018-12-06 PROCEDURE — 3331090001 HH PPS REVENUE CREDIT

## 2018-12-06 PROCEDURE — 3331090002 HH PPS REVENUE DEBIT

## 2018-12-07 ENCOUNTER — HOME CARE VISIT (OUTPATIENT)
Dept: SCHEDULING | Facility: HOME HEALTH | Age: 83
End: 2018-12-07
Payer: MEDICARE

## 2018-12-07 PROCEDURE — 3331090001 HH PPS REVENUE CREDIT

## 2018-12-07 PROCEDURE — G0157 HHC PT ASSISTANT EA 15: HCPCS

## 2018-12-07 PROCEDURE — 3331090002 HH PPS REVENUE DEBIT

## 2018-12-08 PROCEDURE — 3331090001 HH PPS REVENUE CREDIT

## 2018-12-08 PROCEDURE — 3331090002 HH PPS REVENUE DEBIT

## 2018-12-09 PROCEDURE — 3331090002 HH PPS REVENUE DEBIT

## 2018-12-09 PROCEDURE — 3331090001 HH PPS REVENUE CREDIT

## 2018-12-10 ENCOUNTER — HOME CARE VISIT (OUTPATIENT)
Dept: HOME HEALTH SERVICES | Facility: HOME HEALTH | Age: 83
End: 2018-12-10
Payer: MEDICARE

## 2018-12-10 ENCOUNTER — HOME CARE VISIT (OUTPATIENT)
Dept: SCHEDULING | Facility: HOME HEALTH | Age: 83
End: 2018-12-10
Payer: MEDICARE

## 2018-12-10 VITALS
TEMPERATURE: 98.5 F | HEART RATE: 70 BPM | OXYGEN SATURATION: 97 % | SYSTOLIC BLOOD PRESSURE: 121 MMHG | DIASTOLIC BLOOD PRESSURE: 65 MMHG

## 2018-12-10 PROCEDURE — G0157 HHC PT ASSISTANT EA 15: HCPCS

## 2018-12-10 PROCEDURE — 3331090002 HH PPS REVENUE DEBIT

## 2018-12-10 PROCEDURE — 3331090001 HH PPS REVENUE CREDIT

## 2018-12-10 NOTE — TELEPHONE ENCOUNTER
We received a fax from [employer] for the pt's [place] [employer]. On the chart she was using [facility]. I left a message for the daughter to [facility] which pharmacy they are using. I also mentioned that it is time to make her f/u appt. with Dr. Lorna Ontiveros.

## 2018-12-11 ENCOUNTER — HOME CARE VISIT (OUTPATIENT)
Dept: HOME HEALTH SERVICES | Facility: HOME HEALTH | Age: 83
End: 2018-12-11
Payer: MEDICARE

## 2018-12-11 ENCOUNTER — HOME CARE VISIT (OUTPATIENT)
Dept: SCHEDULING | Facility: HOME HEALTH | Age: 83
End: 2018-12-11
Payer: MEDICARE

## 2018-12-11 VITALS
HEART RATE: 74 BPM | TEMPERATURE: 97.8 F | SYSTOLIC BLOOD PRESSURE: 109 MMHG | DIASTOLIC BLOOD PRESSURE: 75 MMHG | OXYGEN SATURATION: 99 %

## 2018-12-11 PROCEDURE — 3331090001 HH PPS REVENUE CREDIT

## 2018-12-11 PROCEDURE — 3331090002 HH PPS REVENUE DEBIT

## 2018-12-11 PROCEDURE — G0151 HHCP-SERV OF PT,EA 15 MIN: HCPCS

## 2018-12-11 NOTE — TELEPHONE ENCOUNTER
Pt returning call regarding prescription refill. Stated both nebulizer scripts are to be sent to [facility]. Also scheduled a FU appt for 1/16/19. Please advise.

## 2018-12-12 ENCOUNTER — HOME CARE VISIT (OUTPATIENT)
Dept: HOME HEALTH SERVICES | Facility: HOME HEALTH | Age: 83
End: 2018-12-12
Payer: MEDICARE

## 2018-12-12 ENCOUNTER — HOME CARE VISIT (OUTPATIENT)
Dept: SCHEDULING | Facility: HOME HEALTH | Age: 83
End: 2018-12-12
Payer: MEDICARE

## 2018-12-12 VITALS
OXYGEN SATURATION: 97 % | TEMPERATURE: 97.4 F | DIASTOLIC BLOOD PRESSURE: 75 MMHG | HEART RATE: 63 BPM | SYSTOLIC BLOOD PRESSURE: 90 MMHG

## 2018-12-12 PROCEDURE — G0152 HHCP-SERV OF OT,EA 15 MIN: HCPCS

## 2018-12-12 PROCEDURE — 3331090001 HH PPS REVENUE CREDIT

## 2018-12-12 PROCEDURE — 3331090002 HH PPS REVENUE DEBIT

## 2018-12-12 RX ORDER — ARFORMOTEROL TARTRATE 15 UG/2ML
15 SOLUTION RESPIRATORY (INHALATION) 2 TIMES DAILY
Qty: 180 VIAL | Refills: 3 | Status: SHIPPED | OUTPATIENT
Start: 2018-12-12 | End: 2018-12-13 | Stop reason: SDUPTHER

## 2018-12-13 ENCOUNTER — HOME CARE VISIT (OUTPATIENT)
Dept: HOME HEALTH SERVICES | Facility: HOME HEALTH | Age: 83
End: 2018-12-13
Payer: MEDICARE

## 2018-12-13 ENCOUNTER — HOME CARE VISIT (OUTPATIENT)
Dept: SCHEDULING | Facility: HOME HEALTH | Age: 83
End: 2018-12-13
Payer: MEDICARE

## 2018-12-13 PROCEDURE — G0156 HHCP-SVS OF AIDE,EA 15 MIN: HCPCS

## 2018-12-13 PROCEDURE — 3331090002 HH PPS REVENUE DEBIT

## 2018-12-13 PROCEDURE — 3331090001 HH PPS REVENUE CREDIT

## 2018-12-13 RX ORDER — ARFORMOTEROL TARTRATE 15 UG/2ML
15 SOLUTION RESPIRATORY (INHALATION) 2 TIMES DAILY
Qty: 180 VIAL | Refills: 3 | Status: SHIPPED | OUTPATIENT
Start: 2018-12-13 | End: 2020-05-27 | Stop reason: SDUPTHER

## 2018-12-13 NOTE — TELEPHONE ENCOUNTER
Per Micheal Peters at [place], states prescription for Sinda Jake needs ICD-10 code on it. Please call [phone].

## 2018-12-14 ENCOUNTER — HOME CARE VISIT (OUTPATIENT)
Dept: SCHEDULING | Facility: HOME HEALTH | Age: 83
End: 2018-12-14
Payer: MEDICARE

## 2018-12-14 VITALS
DIASTOLIC BLOOD PRESSURE: 68 MMHG | TEMPERATURE: 98.4 F | HEART RATE: 61 BPM | SYSTOLIC BLOOD PRESSURE: 128 MMHG | OXYGEN SATURATION: 100 %

## 2018-12-14 PROCEDURE — 3331090001 HH PPS REVENUE CREDIT

## 2018-12-14 PROCEDURE — G0157 HHC PT ASSISTANT EA 15: HCPCS

## 2018-12-14 PROCEDURE — 3331090002 HH PPS REVENUE DEBIT

## 2018-12-15 PROCEDURE — 3331090001 HH PPS REVENUE CREDIT

## 2018-12-15 PROCEDURE — 3331090002 HH PPS REVENUE DEBIT

## 2018-12-16 PROCEDURE — 3331090001 HH PPS REVENUE CREDIT

## 2018-12-16 PROCEDURE — 3331090002 HH PPS REVENUE DEBIT

## 2018-12-17 ENCOUNTER — HOME CARE VISIT (OUTPATIENT)
Dept: SCHEDULING | Facility: HOME HEALTH | Age: 83
End: 2018-12-17
Payer: MEDICARE

## 2018-12-17 VITALS
OXYGEN SATURATION: 97 % | DIASTOLIC BLOOD PRESSURE: 64 MMHG | SYSTOLIC BLOOD PRESSURE: 116 MMHG | TEMPERATURE: 98.4 F | HEART RATE: 87 BPM

## 2018-12-17 PROCEDURE — 3331090001 HH PPS REVENUE CREDIT

## 2018-12-17 PROCEDURE — G0157 HHC PT ASSISTANT EA 15: HCPCS

## 2018-12-17 PROCEDURE — 3331090002 HH PPS REVENUE DEBIT

## 2018-12-18 ENCOUNTER — HOME CARE VISIT (OUTPATIENT)
Dept: SCHEDULING | Facility: HOME HEALTH | Age: 83
End: 2018-12-18
Payer: MEDICARE

## 2018-12-18 PROCEDURE — 3331090002 HH PPS REVENUE DEBIT

## 2018-12-18 PROCEDURE — G0156 HHCP-SVS OF AIDE,EA 15 MIN: HCPCS

## 2018-12-18 PROCEDURE — 3331090001 HH PPS REVENUE CREDIT

## 2018-12-18 PROCEDURE — G0158 HHC OT ASSISTANT EA 15: HCPCS

## 2018-12-19 VITALS
SYSTOLIC BLOOD PRESSURE: 92 MMHG | DIASTOLIC BLOOD PRESSURE: 62 MMHG | TEMPERATURE: 97.6 F | HEART RATE: 72 BPM | OXYGEN SATURATION: 98 %

## 2018-12-19 PROCEDURE — 3331090002 HH PPS REVENUE DEBIT

## 2018-12-19 PROCEDURE — 3331090001 HH PPS REVENUE CREDIT

## 2018-12-20 ENCOUNTER — HOME CARE VISIT (OUTPATIENT)
Dept: SCHEDULING | Facility: HOME HEALTH | Age: 83
End: 2018-12-20
Payer: MEDICARE

## 2018-12-20 PROCEDURE — 3331090002 HH PPS REVENUE DEBIT

## 2018-12-20 PROCEDURE — 3331090001 HH PPS REVENUE CREDIT

## 2018-12-20 PROCEDURE — G0158 HHC OT ASSISTANT EA 15: HCPCS

## 2018-12-20 PROCEDURE — G0156 HHCP-SVS OF AIDE,EA 15 MIN: HCPCS

## 2018-12-21 ENCOUNTER — HOME CARE VISIT (OUTPATIENT)
Dept: SCHEDULING | Facility: HOME HEALTH | Age: 83
End: 2018-12-21
Payer: MEDICARE

## 2018-12-21 PROCEDURE — 3331090002 HH PPS REVENUE DEBIT

## 2018-12-21 PROCEDURE — 3331090001 HH PPS REVENUE CREDIT

## 2018-12-21 PROCEDURE — G0151 HHCP-SERV OF PT,EA 15 MIN: HCPCS

## 2018-12-22 VITALS
RESPIRATION RATE: 16 BRPM | DIASTOLIC BLOOD PRESSURE: 60 MMHG | SYSTOLIC BLOOD PRESSURE: 121 MMHG | HEART RATE: 74 BPM | TEMPERATURE: 96.8 F | OXYGEN SATURATION: 100 %

## 2018-12-22 PROCEDURE — 3331090001 HH PPS REVENUE CREDIT

## 2018-12-22 PROCEDURE — 3331090002 HH PPS REVENUE DEBIT

## 2018-12-23 VITALS — SYSTOLIC BLOOD PRESSURE: 124 MMHG | HEART RATE: 50 BPM | OXYGEN SATURATION: 99 % | DIASTOLIC BLOOD PRESSURE: 68 MMHG

## 2018-12-23 PROCEDURE — 3331090002 HH PPS REVENUE DEBIT

## 2018-12-23 PROCEDURE — 3331090001 HH PPS REVENUE CREDIT

## 2018-12-24 ENCOUNTER — HOME CARE VISIT (OUTPATIENT)
Dept: SCHEDULING | Facility: HOME HEALTH | Age: 83
End: 2018-12-24
Payer: MEDICARE

## 2018-12-24 PROCEDURE — 3331090001 HH PPS REVENUE CREDIT

## 2018-12-24 PROCEDURE — 3331090002 HH PPS REVENUE DEBIT

## 2018-12-24 PROCEDURE — G0158 HHC OT ASSISTANT EA 15: HCPCS

## 2018-12-25 PROCEDURE — 3331090002 HH PPS REVENUE DEBIT

## 2018-12-25 PROCEDURE — 3331090001 HH PPS REVENUE CREDIT

## 2018-12-26 PROCEDURE — 3331090001 HH PPS REVENUE CREDIT

## 2018-12-26 PROCEDURE — 3331090002 HH PPS REVENUE DEBIT

## 2018-12-27 ENCOUNTER — HOME CARE VISIT (OUTPATIENT)
Dept: SCHEDULING | Facility: HOME HEALTH | Age: 83
End: 2018-12-27
Payer: MEDICARE

## 2018-12-27 VITALS — DIASTOLIC BLOOD PRESSURE: 66 MMHG | OXYGEN SATURATION: 83 % | SYSTOLIC BLOOD PRESSURE: 116 MMHG | HEART RATE: 165 BPM

## 2018-12-27 PROCEDURE — 3331090002 HH PPS REVENUE DEBIT

## 2018-12-27 PROCEDURE — G0158 HHC OT ASSISTANT EA 15: HCPCS

## 2018-12-27 PROCEDURE — 3331090001 HH PPS REVENUE CREDIT

## 2018-12-28 VITALS — DIASTOLIC BLOOD PRESSURE: 67 MMHG | HEART RATE: 71 BPM | OXYGEN SATURATION: 94 % | SYSTOLIC BLOOD PRESSURE: 118 MMHG

## 2018-12-28 PROCEDURE — 3331090002 HH PPS REVENUE DEBIT

## 2018-12-28 PROCEDURE — 3331090001 HH PPS REVENUE CREDIT

## 2018-12-29 PROCEDURE — 3331090001 HH PPS REVENUE CREDIT

## 2018-12-29 PROCEDURE — 3331090002 HH PPS REVENUE DEBIT

## 2018-12-30 PROCEDURE — 3331090002 HH PPS REVENUE DEBIT

## 2018-12-30 PROCEDURE — 3331090001 HH PPS REVENUE CREDIT

## 2018-12-31 PROCEDURE — 3331090001 HH PPS REVENUE CREDIT

## 2018-12-31 PROCEDURE — 3331090002 HH PPS REVENUE DEBIT

## 2019-01-01 PROCEDURE — 3331090002 HH PPS REVENUE DEBIT

## 2019-01-01 PROCEDURE — 3331090001 HH PPS REVENUE CREDIT

## 2019-01-02 ENCOUNTER — HOSPITAL ENCOUNTER (EMERGENCY)
Age: 84
Discharge: HOME | End: 2019-01-02
Attending: EMERGENCY MEDICINE
Payer: MEDICARE

## 2019-01-02 VITALS
HEART RATE: 73 BPM | OXYGEN SATURATION: 98 % | BODY MASS INDEX: 25.75 KG/M2 | TEMPERATURE: 98.5 F | DIASTOLIC BLOOD PRESSURE: 55 MMHG | SYSTOLIC BLOOD PRESSURE: 144 MMHG | RESPIRATION RATE: 18 BRPM | WEIGHT: 150 LBS

## 2019-01-02 DIAGNOSIS — S51.811A SKIN TEAR OF RIGHT FOREARM WITHOUT COMPLICATION, INITIAL ENCOUNTER: Primary | ICD-10-CM
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
## 2019-01-02 PROCEDURE — 99283 EMERGENCY DEPT VISIT LOW MDM: CPT

## 2019-01-02 PROCEDURE — 3331090002 HH PPS REVENUE DEBIT

## 2019-01-02 PROCEDURE — 3331090001 HH PPS REVENUE CREDIT

## 2019-01-02 NOTE — ED TRIAGE NOTES
Patient daughter states she fell around 1500 today and has wound to right arm, stating skin is peeled back. Arm is wrapped with a bandage.

## 2019-01-02 NOTE — ED PROVIDER NOTES
[facility] Treatment Report Patient: Tacho Mann Age: 80 y.o. Sex: female YOB: 1931 Admit Date: 1/2/2019 PCP: Mirlande May MD  
MRN: [id number]  CSN: [id number]  Attending: Thad Harris DO Room: HER08/08 Time Dictated: 4:41 PM PAKipC: JENNIFER Salinas Chief Complaint RT arm injury, fall History of Present Illness 4:42 PM  
80 y.o. female presents to ED C/O skin tears with bruising to her RT forearm. She states that she was walking to the couch in her living room, tripped and fell. She isn't sure what she hit her arm on but there are two skin tears. One of them bled for a long time and the other stopped quickly. The open wounds burn and sting a lot and she noticed the skin under and around the wounds are bruised. She has full motion of her wrist and elbow and is certain she didn't break anything but is worried about the wounds. She is UTD on tetanus immunization. She is not on any blood thinners. Patient denies fever, chills, LOC, head injury, or any other symptoms or complaints. The history is provided by the patient. Review of Systems Review of Systems Constitutional: Negative for appetite change, chills and fever. HENT: Negative for congestion, ear pain and sore throat. Eyes: Negative for discharge and redness. Respiratory: Negative for cough, chest tightness, shortness of breath and wheezing. Cardiovascular: Negative for chest pain. Gastrointestinal: Negative for abdominal pain, constipation, diarrhea, nausea and vomiting. Endocrine: Negative for polyuria. Genitourinary: Negative for dysuria, frequency, hematuria, pelvic pain, urgency, vaginal bleeding, vaginal discharge and vaginal pain. Musculoskeletal: Negative for back pain, joint swelling and neck pain. Skin: Positive for wound (RT forearm skin tears). Negative for rash. Allergic/Immunologic: Negative for immunocompromised state. Neurological: Negative for dizziness, syncope, light-headedness, numbness and headaches. Hematological: Negative for adenopathy. Psychiatric/Behavioral: Negative for agitation and confusion. The patient is not nervous/anxious. Past Medical/Surgical History Past Medical History:  
Diagnosis Date [address]. 49. Alcohol  Adrenal insufficiency ([facility] [address]  Anxiety  Calculus of kidney 1988  Cardiac echocardiogram 07/31/2012 EF 60-65%. No WMA. Mild conc LVH. Gr 1 DDfx. RVSP 20-25 mmHg. Mild SAGAR. Mild TR,. Mild PAE.  Cardiovascular LE venous duplex 10/08/2012 No venous thrombosis or venous insufficiency in bilateral lower extremities.  Carotid duplex 10/09/2014 Mild < 50% bilateral ICA stenosis.  Chronic lung disease  Contact dermatitis and other eczema, due to unspecified cause  COPD (chronic obstructive pulmonary disease) (HCC)  Degenerative joint disease  Depression  Hematuria  Hypercholesteremia  Hypertension  Hypothyroidism 1998  Kidney stone  Neuropathy 3/2007  Orthostatic hypotension  Pituitary adenoma ([facility] [address] 2/1998  Pituitary tumor 1998  Pneumonia  Seizures ([facility] [address]   
 none recently  Severe obstructive sleep apnea 01-15-15   
 started using [place] [employer]  Stress  Stroke ([facility] [address] 2/2006  
 no residual  
 TIA (transient ischemic attack)  Trauma Past Surgical History:  
Procedure Laterality Date  CYSTOSCOPY,INSERT URETERAL STENT  9/14/15 Dr. Hong Boyle  HX APPENDECTOMY  HX INTRAOCULAR LENS PROSTHESIS    
 HX LITHOTRIPSY  9/14/15 Dr. Emanuel See  HX TUBAL LIGATION    
 HX WISDOM TEETH EXTRACTION    
 x4 Social History Social History Socioeconomic History  Marital status:  Spouse name: Not on file  Number of children: Not on file  Years of education: Not on file  Highest education level: Not on file Social Needs  Financial resource strain: Not on file  Food insecurity - worry: Not on file  Food insecurity - inability: Not on file  Transportation needs - medical: Not on file  Transportation needs - non-medical: Not on file Occupational History  Not on file Tobacco Use  Smoking status: Current Some Day Smoker Packs/day: 1.50 Years: 65.00 Pack years: 97.50 Types: Cigarettes Last attempt to quit: 3/9/2018 Years since quittin.8  Smokeless tobacco: Never Used  Tobacco comment: 18 decreased amt. of cigarettes/day only Substance and Sexual Activity  Alcohol use: No  
  Alcohol/week: 0.0 oz  
  Comment: quit  due to Alcohol Abuse  Drug use: No  
 Sexual activity: No  
Other Topics Concern  Not on file Social History Narrative  Not on file Family History Family History Problem Relation Age of Onset  Heart Disease Father  Hypertension Father  Hypertension Mother  Cancer Mother   
     skin  Elevated Lipids Sister  Heart Disease Sister  Cancer Maternal Grandmother   
     colon and stomach  
 Heart Disease Paternal Grandmother  Heart Attack Paternal Grandmother  Heart Attack Paternal Grandfather  Heart Disease Paternal Grandfather Current Medications Prior to Admission Medications Prescriptions Last Dose Informant Patient Reported? Taking? CALCIUM CITRATE/VITAMIN D3 (CALCIUM CITRATE + PO)   Yes No  
Sig: Take 600 mg by mouth once over twenty-four (24) hours. DOCOSAHEXANOIC ACID/EPA (FISH OIL PO)   Yes No  
Sig: Take 4,000 mg by mouth daily. L-Methylfolate-I80-Bbwmywhoxg (CEREFOLIN NAC) tablet   No No  
Sig: Take 1 tablet by mouth daily. Patient taking differently: Take 1 Tab by mouth daily. L. acidophilus,casei,rhamnosus (BIO-K PLUS) 50 billion cell cpDR   No No  
Sig: Take 1 tab by mouth daily Nebulizer & Compressor (PORTABLE NEBULIZER SYSTEM) machine   No No  
 Si Each by Does Not Apply route two (2) times a day. Patient taking differently: 1 Each by Does Not Apply route two (2) times a day. OXYGEN-AIR DELIVERY SYSTEMS   Yes No  
Sig: 3 L/min by Nasal route continuous. First Choice O2 PARoxetine (PAXIL) 30 mg tablet   No No  
Sig: TAKE ONE TABLET BY MOUTH EVERY DAY Patient taking differently: Take 30 mg by mouth daily. TAKE ONE TABLET BY MOUTH EVERY DAY  
SYNTHROID 112 mcg tablet   Yes No  
Sig: Take 112 mcg by mouth Daily (before breakfast). Walker (BARNEY ROLLING WALKER) misc   No No  
Si Device by Does Not Apply route daily. albuterol (ACCUNEB) 1.25 mg/3 mL nebu   No No  
Sig: 3 mL by Nebulization route every four (4) hours as needed. Patient taking differently: 3 mL by Nebulization route every four (4) hours as needed for Cough. albuterol (PROAIR HFA) 90 mcg/actuation inhaler   No No  
Sig: Take 2 Puffs by inhalation every four (4) hours as needed for Wheezing or Shortness of Breath. Patient taking differently: Take 2 Puffs by inhalation every four (4) hours as needed for Wheezing or Shortness of Breath. amLODIPine (NORVASC) 5 mg tablet   No No  
Sig: Take 1 Tab by mouth daily. TAKE ONE TABLET BY MOUTH EVERY DAY Patient taking differently: Take 5 mg by mouth daily. TAKE ONE TABLET BY MOUTH EVERY DAY  
arformoterol (BROVANA) 15 mcg/2 mL nebu neb solution   No No  
Si mL by Nebulization route two (2) times a day. File Under Medicare B, ICD J44.9  
aspirin 81 mg tablet   Yes No  
Sig: Take 81 mg by mouth daily. atorvastatin (LIPITOR) 10 mg tablet   No No  
Sig: TAKE ONE TABLET BY MOUTH EVERY DAY Patient taking differently: Take 10 mg by mouth daily. TAKE ONE TABLET BY MOUTH EVERY DAY  
bipap machine kit   Yes No  
Si Each by Does Not Apply route. Started using BiPap Machine 01-15-15 [facility]  
budesonide (PULMICORT) 1 mg/2 mL nbsp   No No  
Si mL by Nebulization route daily. Rinse your mouth after each use. File under Medicare Part B Dx code J44.9  Indications: Severe Chronic Obstructive Pulmonary Disease  
cholecalciferol, vitamin D3, 4,000 unit cap   Yes No  
Sig: Take 1 Cap by mouth daily. famotidine (PEPCID) 20 mg tablet   No No  
Sig: Take 1 Tab by mouth two (2) times a day. fludrocortisone (FLORINEF) 0.1 mg tablet   No No  
Sig: Take 1 tablet by mouth daily. Patient taking differently: Take 1 Tab by mouth daily. fluticasone furoate (ARNUITY ELLIPTA) 200 mcg/actuation dsdv inhaler   No No  
Sig: Take 1 Puff by inhalation daily. Rinse mouth thoroughly after each use  
guaiFENesin-dextromethorphan SR (HUMIBID DM) 600-30 mg per tablet   No No  
Sig: Take 1 Tab by mouth every twelve (12) hours as needed for Cough. Patient taking differently: Take 1 Tab by mouth every twelve (12) hours as needed for Cough. hydrocortisone (CORTEF) 10 mg tablet   Yes No  
Sig: Take 10 mg by mouth daily. ipratropium (ATROVENT) 0.02 % soln   No No  
Si.5 mL by Nebulization route four (4) times daily. Indications: BRONCHOSPASM PREVENTION WITH COPD  
lisinopril (PRINIVIL, ZESTRIL) 20 mg tablet   No No  
Sig: TAKE ONE TABLET BY MOUTH EVERY DAY Patient taking differently: Take 20 mg by mouth daily. TAKE ONE TABLET BY MOUTH EVERY DAY  
magnesium oxide (MAG-OX) 400 mg tablet   Yes No  
Sig: Take 400 mg by mouth daily. nystatin (MYCOSTATIN) 100,000 unit/mL suspension   No No  
Sig: Take 5 mL by mouth four (4) times daily. swish and spit  
nystatin (MYCOSTATIN) powder   No No  
Sig: Apply  to affected area four (4) times daily. Patient taking differently: Apply 100 g to affected area four (4) times daily. omega 3-DHA-EPA-fish oil (FISH OIL) 1,000 mg (120 mg-180 mg) capsule   Yes No  
Sig: Take 1 Cap by mouth daily. oxcarbazepine (TRILEPTAL) 300 mg tablet   Yes No  
Sig: Take 300 mg by mouth two (2) times a day.   
potassium chloride (K-DUR, KLOR-CON) 20 mEq tablet   No No  
Sig: TAKE ONE TABLET BY MOUTH 2 TIMES A DAY  
 Patient taking differently: Take 20 mEq by mouth two (2) times a day. TAKE ONE TABLET BY MOUTH 2 TIMES A DAY Facility-Administered Medications: None Allergies Allergies Allergen Reactions  Iodine Hives Physical Exam  
 
Patient Vitals for the past 8 hrs: 
 Temp Pulse Resp BP SpO2  
01/02/19 1618 98.5 °F (36.9 °C) 73 18 144/55 98 % Physical Exam  
Constitutional: She is oriented to person, place, and time. She appears well-developed and well-nourished. No distress. HENT:  
Head: Normocephalic and atraumatic. Right Ear: External ear normal.  
Left Ear: External ear normal.  
Nose: Nose normal.  
Mouth/Throat: Oropharynx is clear and moist. No oropharyngeal exudate. No rhinorrhea. Normal turbinates. No sinus TTP. Bilateral ear canals are clear and normal. Bilateral TM's are normal. Normal oropharynx, no erythema, or edema, tonsillar hypertrophy, or exudates. Eyes: Conjunctivae are normal. Right eye exhibits no discharge. Left eye exhibits no discharge. Neck: Normal range of motion. Neck supple. Cardiovascular: Normal rate, regular rhythm, normal heart sounds and intact distal pulses. Exam reveals no gallop and no friction rub. No murmur heard. Pulmonary/Chest: Effort normal and breath sounds normal. No respiratory distress. She has no wheezes. She has no rales. She exhibits no tenderness. Musculoskeletal: Normal range of motion. She exhibits no edema, tenderness or deformity. Distal pulses, motor, and sensation are intact. Strength is 5/5. 2 skin tears to the RT forearm. One the skin is broken and unrepairable. The other is more distal, a little deeper but skin is still intact and can be closed with steri-strips. Underlying ecchymosis with mild edema to areas under and surrounding both skin tears. No active bleeding. More proximal skin tear is circular and has a radius of 10 cm. The more distal skin tear is an L shape and measures approximately 10 cm Lymphadenopathy:  
  She has no cervical adenopathy. Neurological: She is alert and oriented to person, place, and time. Skin: Skin is warm and dry. No rash noted. She is not diaphoretic. Psychiatric: She has a normal mood and affect. Her behavior is normal. Judgment and thought content normal.  
Nursing note and vitals reviewed. Diagnostic Studies RESULTS: 
 
No orders to display Labs Reviewed - No data to display No results found for this or any previous visit (from the past 12 hour(s)). MEDICATIONS GIVEN: 
Medications - No data to display Procedures Wound Closure by Adhesive Date/Time: 1/2/2019 5:12 PM 
Performed by: JENNIFER Balbuena Authorized by: JENNIFER Balbuena Consent:  
  Consent obtained:  Verbal 
  Consent given by:  Patient Risks discussed:  Infection, pain and need for additional repair Alternatives discussed:  No treatment and delayed treatment Anesthesia (see MAR for exact dosages): Anesthesia method:  None Laceration details: Location:  Shoulder/arm Shoulder/arm location:  R lower arm Length (cm):  20 Depth (mm):  1 Repair type:  
  Repair type:  Simple Pre-procedure details: Preparation:  Patient was prepped and draped in usual sterile fashion Exploration: Wound exploration: wound explored through full range of motion and entire depth of wound probed and visualized Contaminated: no   
Treatment:  
  Area cleansed with:  Gena Amount of cleaning:  Standard Irrigation solution:  Sterile saline Irrigation method:  Tap Visualized foreign bodies/material removed: no   
Skin repair:  
  Repair method:  Steri-Strips Approximation:  
  Approximation:  Loose Vermilion border: well-aligned Post-procedure details:  
  Dressing:  Non-adherent dressing Patient tolerance of procedure: Tolerated well, no immediate complications Impression / ED Course / Medical Decision Making MDM Number of Diagnoses or Management Options Fall, initial encounter: minor Skin tear of right forearm without complication, initial encounter: minor Diagnosis management comments: DDx: Fall, skin tear, arm injury, arm contusion, arm fracture, arm pain. Impression: Patient is upset that she fell and has a bandage on her RT forearm. Management Plan: Evaluate and treat RT arm injury after a fall. Cleaned wounds with shur-clens and sterile saline. Cut skin edges off of proximal wound to allow better healing then applied a xeroform dressing with gauze and an ace wrap. Second wound was closed with Tincture of Benzoin and steri-strips. Advised to wash open wound with soap and water and re-apply bandage until it starts to heal, they leave open to allow faster healing, advised to leave wound with steri-strips in place for at least 1 week, and advised to F/U with PCP as needed. Patient was in agreement with discharge plan and discharged in good condition. Risk of Complications, Morbidity, and/or Mortality Presenting problems: low Diagnostic procedures: minimal 
Management options: low Patient Progress Patient progress: stable PROGRESS NOTE:  
4:42 PM  
Initial assessment completed. DISCHARGE NOTE: 
5:17 PM  
Checo Villar's  results have been reviewed with her. She has been counseled regarding her diagnosis, treatment, and plan. She verbally conveys understanding and agreement of the signs, symptoms, diagnosis, treatment and prognosis and additionally agrees to follow up as discussed. She also agrees with the care-plan and conveys that all of her questions have been answered. I have also provided discharge instructions for her that include: educational information regarding their diagnosis and treatment, and list of reasons why they would want to return to the ED prior to their follow-up appointment, should her condition change. Final Diagnosis 1. Skin tear of right forearm without complication, initial encounter 2. Fall, initial encounter Disposition Current Discharge Medication List  
  
CONTINUE these medications which have NOT CHANGED Details  
arformoterol (BROVANA) 15 mcg/2 mL nebu neb solution 2 mL by Nebulization route two (2) times a day. File Under Medicare B, ICD J44.9 Qty: 180 Vial, Refills: 3  
  
!! omega 3-DHA-EPA-fish oil (FISH OIL) 1,000 mg (120 mg-180 mg) capsule Take 1 Cap by mouth daily. hydrocortisone (CORTEF) 10 mg tablet Take 10 mg by mouth daily. atorvastatin (LIPITOR) 10 mg tablet TAKE ONE TABLET BY MOUTH EVERY DAY Qty: 90 Tab, Refills: 3 Associated Diagnoses: Mixed hyperlipidemia PARoxetine (PAXIL) 30 mg tablet TAKE ONE TABLET BY MOUTH EVERY DAY Qty: 30 Tab, Refills: 5 Associated Diagnoses: Anxiety state; Depression, unspecified depression type  
  
lisinopril (PRINIVIL, ZESTRIL) 20 mg tablet TAKE ONE TABLET BY MOUTH EVERY DAY Qty: 30 Tab, Refills: 5 Associated Diagnoses: Essential hypertension, benign  
  
potassium chloride (K-DUR, KLOR-CON) 20 mEq tablet TAKE ONE TABLET BY MOUTH 2 TIMES A DAY Qty: 180 Tab, Refills: 3 Associated Diagnoses: Essential hypertension, benign; Hypokalemia  
  
budesonide (PULMICORT) 1 mg/2 mL nbsp 2 mL by Nebulization route daily. Rinse your mouth after each use. File under Medicare Part B Dx code J44.9  Indications: Severe Chronic Obstructive Pulmonary Disease Qty: 30 Each, Refills: 5 Comments: File under Medicare Part B. May substitute for generic. Associated Diagnoses: Acute bronchitis with chronic obstructive pulmonary disease (COPD) ([address] Centrilobular emphysema ([address] Shortness of breath; Dyspnea on exertion; Chronic respiratory failure with hypoxia and hypercapnia (HCC) amLODIPine (NORVASC) 5 mg tablet Take 1 Tab by mouth daily. TAKE ONE TABLET BY MOUTH EVERY DAY Qty: 90 Tab, Refills: 3 Associated Diagnoses: Essential hypertension, benign  
  
fluticasone furoate (ARNUITY ELLIPTA) 200 mcg/actuation dsdv inhaler Take 1 Puff by inhalation daily. Rinse mouth thoroughly after each use Qty: 1 Inhaler, Refills: 11  
  
ipratropium (ATROVENT) 0.02 % soln 2.5 mL by Nebulization route four (4) times daily. Indications: BRONCHOSPASM PREVENTION WITH COPD Qty: 360 Vial, Refills: 3 Comments: Please file under Medicare part B. May substitute for generic Associated Diagnoses: Acute bronchitis with chronic obstructive pulmonary disease (COPD) ([address] Centrilobular emphysema ([address] Shortness of breath; Dyspnea on exertion; Chronic respiratory failure with hypoxia and hypercapnia (HCC)  
  
albuterol (ACCUNEB) 1.25 mg/3 mL nebu 3 mL by Nebulization route every four (4) hours as needed. Qty: 100 mL, Refills: 0  
  
guaiFENesin-dextromethorphan SR (HUMIBID DM) 600-30 mg per tablet Take 1 Tab by mouth every twelve (12) hours as needed for Cough. Qty: 15 Tab, Refills: 0  
  
nystatin (MYCOSTATIN) 100,000 unit/mL suspension Take 5 mL by mouth four (4) times daily. swish and spit Qty: 200 mL, Refills: 0  
  
L. acidophilus,casei,rhamnosus (BIO-K PLUS) 50 billion cell cpDR Take 1 tab by mouth daily 
Qty: 7 Cap, Refills: 0  
  
famotidine (PEPCID) 20 mg tablet Take 1 Tab by mouth two (2) times a day. Qty: 20 Tab, Refills: 0 Nebulizer & Compressor (PORTABLE NEBULIZER SYSTEM) machine 1 Each by Does Not Apply route two (2) times a day. Qty: 1 Each, Refills: 0  
  
cholecalciferol, vitamin D3, 4,000 unit cap Take 1 Cap by mouth daily. OXYGEN-AIR DELIVERY SYSTEMS 3 L/min by Nasal route continuous. First Choice O2  
  
nystatin (MYCOSTATIN) powder Apply  to affected area four (4) times daily. Qty: 1 Bottle, Refills: 5 Associated Diagnoses: Tinea corporis  
  
albuterol (PROAIR HFA) 90 mcg/actuation inhaler Take 2 Puffs by inhalation every four (4) hours as needed for Wheezing or Shortness of Breath. Qty: 1 Inhaler, Refills: 5 Associated Diagnoses: Chronic obstructive pulmonary disease with acute exacerbation ([facility] [address] SYNTHROID 112 mcg tablet Take 112 mcg by mouth Daily (before breakfast). Walker (BARNEY LCIFTON WALKER) misc 1 Device by Does Not Apply route daily. Qty: 1 Each, Refills: 0  
  
bipap machine kit 1 Each by Does Not Apply route. Started using BiPap Machine 01-15-15 [facility]  
  
L-Methylfolate-M95-Qcrifsidou (CEREFOLIN NAC) tablet Take 1 tablet by mouth daily. Qty: 30 tablet, Refills: 5  
  
fludrocortisone (FLORINEF) 0.1 mg tablet Take 1 tablet by mouth daily. Qty: 30 tablet, Refills: 5  
  
oxcarbazepine (TRILEPTAL) 300 mg tablet Take 300 mg by mouth two (2) times a day. magnesium oxide (MAG-OX) 400 mg tablet Take 400 mg by mouth daily. aspirin 81 mg tablet Take 81 mg by mouth daily. !! DOCOSAHEXANOIC ACID/EPA (FISH OIL PO) Take 4,000 mg by mouth daily. CALCIUM CITRATE/VITAMIN D3 (CALCIUM CITRATE + PO) Take 600 mg by mouth once over twenty-four (24) hours. !! - Potential duplicate medications found. Please discuss with provider. Follow-up Information Follow up With Specialties Details Why Contact Info Flora Ramírez MD [facility] Go in 1 week If no improvement [address] [place] [id number] [phone] Nursing notes have been reviewed by the physician/ advanced practice   
Clinician. Abigail oRjo PA-C January 2, 2019

## 2019-01-02 NOTE — DISCHARGE INSTRUCTIONS
Patient Education        Preventing Falls: Care Instructions  Your Care Instructions    Getting around your home safely can be a challenge if you have injuries or health problems that make it easy for you to fall. Loose rugs and furniture in walkways are among the dangers for many older people who have problems walking or who have poor eyesight. People who have conditions such as arthritis, osteoporosis, or dementia also have to be careful not to fall. You can make your home safer with a few simple measures. Follow-up care is a key part of your treatment and safety. Be sure to make and go to all appointments, and call your doctor if you are having problems. It's also a good idea to know your test results and keep a list of the medicines you take. How can you care for yourself at home? Taking care of yourself  · You may get dizzy if you do not drink enough water. To prevent dehydration, drink plenty of fluids, enough so that your urine is light yellow or clear like water. Choose water and other caffeine-free clear liquids. If you have kidney, heart, or liver disease and have to limit fluids, talk with your doctor before you increase the amount of fluids you drink. · Exercise regularly to improve your strength, muscle tone, and balance. Walk if you can. Swimming may be a good choice if you cannot walk easily. · Have your vision and hearing checked each year or any time you notice a change. If you have trouble seeing and hearing, you might not be able to avoid objects and could lose your balance. · Know the side effects of the medicines you take. Ask your doctor or pharmacist whether the medicines you take can affect your balance. Sleeping pills or sedatives can affect your balance. · Limit the amount of alcohol you drink. Alcohol can impair your balance and other senses. · Ask your doctor whether calluses or corns on your feet need to be removed.  If you wear loose-fitting shoes because of calluses or corns, you can lose your balance and fall. · Talk to your doctor if you have numbness in your feet. Preventing falls at home  · Remove raised doorway thresholds, throw rugs, and clutter. Repair loose carpet or raised areas in the floor. · Move furniture and electrical cords to keep them out of walking paths. · Use nonskid floor wax, and wipe up spills right away, especially on ceramic tile floors. · If you use a walker or cane, put rubber tips on it. If you use crutches, clean the bottoms of them regularly with an abrasive pad, such as steel wool. · Keep your house well lit, especially Atilio [employer], and outside walkways. Use night-lights in areas such as hallways and bathrooms. Add extra light switches or use remote switches (such as switches that go on or off when you clap your hands) to make it easier to turn lights on if you have to get up during the night. · Install sturdy handrails on stairways. · Move items in your cabinets so that the things you use a lot are on the lower shelves (about waist level). · Keep a cordless phone and a flashlight with new batteries by your bed. If possible, put a phone in each of the main rooms of your house, or carry a cell phone in case you fall and cannot reach a phone. Or, you can wear a device around your neck or wrist. You push a button that sends a signal for help. · Wear low-heeled shoes that fit well and give your feet good support. Use footwear with nonskid soles. Check the heels and soles of your shoes for wear. Repair or replace worn heels or soles. · Do not wear socks without shoes on wood floors. · Walk on the grass when the sidewalks are slippery. If you live in an area that gets snow and ice in the winter, sprinkle salt on slippery steps and sidewalks. Preventing falls in the bath  · Install grab bars and nonskid mats inside and outside your shower or tub and near the toilet and sinks. · Use shower chairs and bath benches.   · Use a hand-held shower head that will allow you to sit while showering. · Get into a tub or shower by putting the weaker leg in first. Get out of a tub or shower with your strong side first.  · Repair loose toilet seats and consider installing a raised toilet seat to make getting on and off the toilet easier. · Keep your bathroom door unlocked while you are in the shower. Where can you learn more? Go to http://casey-sameer.info/. Enter [id number] [id number] in the search box to learn more about \"Preventing Falls: Care Instructions. \"  Current as of: March 16, 2018  Content Version: 11.8  © 4937-4197 [employer]. Care instructions adapted under license by [employer] (which disclaims liability or warranty for this information). If you have questions about a medical condition or this instruction, always ask your healthcare professional. [place] [address] any warranty or liability for your use of this information. Patient Education     Skin Tears: Care Instructions  Your Care Instructions  As we get older, our skin gets drier and more fragile. Sometimes this can cause the outer layers of skin to split and tear open. Skin tears are treated in different ways. In some cases, doctors use pieces of tape called Steri-Strips to pull the skin together and help it heal. Other times, it's best to leave the tear open and cover it with a special wound-care bandage. Skin tears are usually not serious. They usually heal in a few weeks. But how long you take to heal depends on your body and the type of tear you have. Sometimes the torn piece of skin is used to protect the wound while it heals. But that piece of skin does not heal. It may fall off on its own. Or the doctor may remove it. As your tear heals, it's important to keep it clean to help prevent infection. The doctor has checked you carefully, but problems can develop later.  If you notice any problems or new symptoms, get medical treatment right away.  Follow-up care is a key part of your treatment and safety. Be sure to make and go to all appointments, and call your doctor if you are having problems. It's also a good idea to know your test results and keep a list of the medicines you take. How can you care for yourself at home? · If you have pain, ask your doctor if you can take an over-the-counter pain medicine, such as acetaminophen (Tylenol), ibuprofen (Advil, Motrin), or naproxen (Aleve). Be safe with medicines. Read and follow all instructions on the label. · If you have a bandage, follow your doctor's instructions for changing it. · If you have Steri-Strips, leave them on until they fall off. · Follow your doctor's instructions about bathing. · Gently wash the skin tear with plain water 2 times a day. Do not rub the area. · Let the area air dry. Or you can pat it carefully with a soft towel. When should you call for help? Call your doctor now or seek immediate medical care if:  · You have signs of infection, such as:  ¨ Increased pain, swelling, warmth, or redness around the tear. ¨ Red streaks leading from the tear. ¨ Pus draining from the tear. ¨ A fever. · The tear starts to bleed a lot. Small amounts of blood are normal.  Watch closely for changes in your health, and be sure to contact your doctor if:  · You do not get better as expected. Where can you learn more? Go to Homejoy.be  Enter W950 in the search box to learn more about \"Skin Tears: Care Instructions. \"   © 0238-7754 [employer]. Care instructions adapted under license by [employer] (which disclaims liability or warranty for this information). This care instruction is for use with your licensed healthcare professional. If you have questions about a medical condition or this instruction, always ask your healthcare professional. [place] [address] any warranty or liability for your use of this information.   Content Version: 75.1.657423; Current as of: May 22, 2015

## 2019-01-02 NOTE — ED NOTES
Tacho Mann is a 80 y.o. female that was discharged in stable condition. The patients diagnosis, condition and treatment were explained to  patient and aftercare instructions were given. The patient verbalized understanding. Patient armband removed and shredded.

## 2019-01-03 ENCOUNTER — HOSPITAL ENCOUNTER (EMERGENCY)
Age: 84
Discharge: STILL ADMITTED OR ADMINISTRATIVE | End: 2019-01-03
Attending: EMERGENCY MEDICINE
Payer: MEDICARE

## 2019-01-03 PROCEDURE — 3331090002 HH PPS REVENUE DEBIT

## 2019-01-03 PROCEDURE — 75810000275 HC EMERGENCY DEPT VISIT NO LEVEL OF CARE

## 2019-01-03 PROCEDURE — 3331090001 HH PPS REVENUE CREDIT

## 2019-01-04 ENCOUNTER — HOME CARE VISIT (OUTPATIENT)
Dept: SCHEDULING | Facility: HOME HEALTH | Age: 84
End: 2019-01-04
Payer: MEDICARE

## 2019-01-04 PROCEDURE — 3331090001 HH PPS REVENUE CREDIT

## 2019-01-04 PROCEDURE — G0158 HHC OT ASSISTANT EA 15: HCPCS

## 2019-01-04 PROCEDURE — 3331090002 HH PPS REVENUE DEBIT

## 2019-01-05 PROCEDURE — 3331090001 HH PPS REVENUE CREDIT

## 2019-01-05 PROCEDURE — 3331090002 HH PPS REVENUE DEBIT

## 2019-01-06 ENCOUNTER — HOME CARE VISIT (OUTPATIENT)
Dept: SCHEDULING | Facility: HOME HEALTH | Age: 84
End: 2019-01-06
Payer: MEDICARE

## 2019-01-06 PROCEDURE — 3331090002 HH PPS REVENUE DEBIT

## 2019-01-06 PROCEDURE — 3331090001 HH PPS REVENUE CREDIT

## 2019-01-07 PROCEDURE — 3331090002 HH PPS REVENUE DEBIT

## 2019-01-07 PROCEDURE — 3331090001 HH PPS REVENUE CREDIT

## 2019-01-08 PROCEDURE — 3331090002 HH PPS REVENUE DEBIT

## 2019-01-08 PROCEDURE — 3331090001 HH PPS REVENUE CREDIT

## 2019-01-09 ENCOUNTER — OFFICE VISIT (OUTPATIENT)
Dept: FAMILY MEDICINE CLINIC | Age: 84
End: 2019-01-09

## 2019-01-09 VITALS
TEMPERATURE: 98.3 F | HEIGHT: 64 IN | HEART RATE: 80 BPM | SYSTOLIC BLOOD PRESSURE: 88 MMHG | BODY MASS INDEX: 25.75 KG/M2 | RESPIRATION RATE: 20 BRPM | DIASTOLIC BLOOD PRESSURE: 58 MMHG

## 2019-01-09 DIAGNOSIS — E87.6 HYPOKALEMIA: ICD-10-CM

## 2019-01-09 DIAGNOSIS — E78.5 DYSLIPIDEMIA: ICD-10-CM

## 2019-01-09 DIAGNOSIS — N18.30 ANEMIA DUE TO STAGE 3 CHRONIC KIDNEY DISEASE (HCC): ICD-10-CM

## 2019-01-09 DIAGNOSIS — D63.1 ANEMIA DUE TO STAGE 3 CHRONIC KIDNEY DISEASE (HCC): ICD-10-CM

## 2019-01-09 DIAGNOSIS — T31.10 BURNS INVOLVING 10-19% OF BODY SURFACE: ICD-10-CM

## 2019-01-09 DIAGNOSIS — R33.9 URINARY RETENTION: ICD-10-CM

## 2019-01-09 DIAGNOSIS — E03.8 OTHER SPECIFIED HYPOTHYROIDISM: ICD-10-CM

## 2019-01-09 DIAGNOSIS — Z72.0 TOBACCO ABUSE: ICD-10-CM

## 2019-01-09 DIAGNOSIS — J43.2 CENTRILOBULAR EMPHYSEMA (HCC): ICD-10-CM

## 2019-01-09 DIAGNOSIS — S40.811S: ICD-10-CM

## 2019-01-09 DIAGNOSIS — N18.30 STAGE 3 CHRONIC KIDNEY DISEASE (HCC): ICD-10-CM

## 2019-01-09 DIAGNOSIS — I10 ESSENTIAL HYPERTENSION, BENIGN: ICD-10-CM
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
## 2019-01-09 DIAGNOSIS — I95.9 HYPOTENSION, UNSPECIFIED HYPOTENSION TYPE: ICD-10-CM

## 2019-01-09 PROBLEM — N17.9 ACUTE RENAL FAILURE (HCC): Status: RESOLVED | Noted: 2018-03-09 | Resolved: 2019-01-09

## 2019-01-09 PROCEDURE — 3331090001 HH PPS REVENUE CREDIT

## 2019-01-09 PROCEDURE — 3331090002 HH PPS REVENUE DEBIT
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
## 2019-01-10 PROCEDURE — 3331090002 HH PPS REVENUE DEBIT

## 2019-01-10 PROCEDURE — 3331090001 HH PPS REVENUE CREDIT

## 2019-01-11 ENCOUNTER — HOME CARE VISIT (OUTPATIENT)
Dept: HOME HEALTH SERVICES | Facility: HOME HEALTH | Age: 84
End: 2019-01-11
Payer: MEDICARE

## 2019-01-11 PROCEDURE — 3331090001 HH PPS REVENUE CREDIT

## 2019-01-11 PROCEDURE — 3331090002 HH PPS REVENUE DEBIT

## 2019-01-12 PROCEDURE — 3331090002 HH PPS REVENUE DEBIT

## 2019-01-12 PROCEDURE — 3331090001 HH PPS REVENUE CREDIT

## 2019-01-13 PROCEDURE — 3331090001 HH PPS REVENUE CREDIT

## 2019-01-13 PROCEDURE — 3331090002 HH PPS REVENUE DEBIT

## 2019-01-14 PROCEDURE — 3331090001 HH PPS REVENUE CREDIT

## 2019-01-14 PROCEDURE — 3331090002 HH PPS REVENUE DEBIT

## 2019-01-15 PROCEDURE — 3331090002 HH PPS REVENUE DEBIT

## 2019-01-15 PROCEDURE — 3331090001 HH PPS REVENUE CREDIT

## 2019-01-16 PROCEDURE — 3331090002 HH PPS REVENUE DEBIT

## 2019-01-16 PROCEDURE — 3331090001 HH PPS REVENUE CREDIT

## 2019-01-17 ENCOUNTER — TELEPHONE (OUTPATIENT)
Dept: FAMILY MEDICINE CLINIC | Age: 84
End: 2019-01-17

## 2019-01-17 PROCEDURE — 3331090002 HH PPS REVENUE DEBIT

## 2019-01-17 PROCEDURE — 3331090001 HH PPS REVENUE CREDIT
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
## 2019-01-18 ENCOUNTER — OFFICE VISIT (OUTPATIENT)
Dept: FAMILY MEDICINE CLINIC | Age: 84
End: 2019-01-18

## 2019-01-18 VITALS
WEIGHT: 141 LBS | DIASTOLIC BLOOD PRESSURE: 67 MMHG | BODY MASS INDEX: 24.07 KG/M2 | HEIGHT: 64 IN | HEART RATE: 59 BPM | OXYGEN SATURATION: 100 % | RESPIRATION RATE: 18 BRPM | TEMPERATURE: 98.7 F | SYSTOLIC BLOOD PRESSURE: 119 MMHG

## 2019-01-18 DIAGNOSIS — R26.81 GAIT INSTABILITY: Primary | ICD-10-CM

## 2019-01-18 DIAGNOSIS — N30.00 ACUTE CYSTITIS WITHOUT HEMATURIA: ICD-10-CM

## 2019-01-18 DIAGNOSIS — I10 ESSENTIAL HYPERTENSION, BENIGN: ICD-10-CM

## 2019-01-18 DIAGNOSIS — R41.0 CONFUSION: ICD-10-CM

## 2019-01-18 DIAGNOSIS — T31.11 BURN (ANY DEGREE) INVOLVING 10-19 PERCENT OF BODY SURFACE WITH THIRD DEGREE BURN OF 10-19% (HCC): ICD-10-CM

## 2019-01-18 PROCEDURE — 3331090002 HH PPS REVENUE DEBIT

## 2019-01-18 PROCEDURE — 3331090001 HH PPS REVENUE CREDIT

## 2019-01-18 NOTE — PROGRESS NOTES
Nato Neil presents today for Chief Complaint Patient presents with  
[facility] Follow Up  
  1/9/19 [facility] Low BP Is someone accompanying this pt? Yes daughter Is the patient using any DME equipment during OV? Yes wheelchair Depression Screening: PHQ over the last two weeks 1/18/2019 PHQ Not Done Medical Reason (indicate in comments) Little interest or pleasure in doing things Not at all Feeling down, depressed, irritable, or hopeless Not at all Total Score PHQ 2 0 Trouble falling or staying asleep, or sleeping too much - Feeling tired or having little energy - Poor appetite, weight loss, or overeating - Feeling bad about yourself - or that you are a failure or have let yourself or your family down - Trouble concentrating on things such as school, work, reading, or watching TV - Moving or speaking so slowly that other people could have noticed; or the opposite being so fidgety that others notice - Thoughts of being better off dead, or hurting yourself in some way - How difficult have these problems made it for you to do your work, take care of your home and get along with others - Learning Assessment: 
Learning Assessment 1/18/2019 PRIMARY LEARNER Patient HIGHEST LEVEL OF EDUCATION - PRIMARY LEARNER  GRADUATED HIGH SCHOOL OR GED  
BARRIERS PRIMARY LEARNER [id number] [facility] CAREGIVER Yes CO-LEARNER NAME Daughter CO-LEARNER HIGHEST LEVEL OF EDUCATION GRADUATED HIGH SCHOOL OR GED  
BARRIERS CO-LEARNER NONE PRIMARY LANGUAGE ENGLISH  
PRIMARY LANGUAGE CO-LEARNER ENGLISH  NEED -  
LEARNER PREFERENCE PRIMARY LISTENING  
  -  
LEARNER PREFERENCE CO-LEARNER LISTENING  
LEARNING SPECIAL TOPICS -  
ANSWERED BY self RELATIONSHIP SELF Abuse Screening: 
Abuse Screening Questionnaire 1/18/2019 Do you ever feel afraid of your partner? Rima Juarez Are you in a relationship with someone who physically or mentally threatens you?  Rima Juarez  
 Is it safe for you to go home? Himanshu Guido Health Maintenance Due Topic Date Due  Shingrix Vaccine Age 50> (1 of 2) [birth year]/23/1981  Pneumococcal 65+ Low/Medium Risk (2 of 2 - PPSV23) 03/29/2018  BREAST CANCER SCRN MAMMOGRAM  04/13/2018  MEDICARE YEARLY EXAM  08/30/2018 [facility] Alcala Health Maintenance reviewed and discussed and ordered per Provider. Sloan Reynoso is updated on all  Coordination of Care 1. Have you been to the ER, urgent care clinic since your last visit? Hospitalized since your last visit? Yes 1/9/19 [facility] Low Blood Pressure 2. Have you seen or consulted any other health care providers outside of the [address] [place] [address] since your last visit? Include any pap smears or colon screening. No 
 
Per-Dr. Biju Banks ok medication not taking to be deleted. Advance Directive: 1. Do you have an advance directive in place? Patient Reply:no 2. If not, would you like material regarding how to put one in place?  Patient Reply: no

## 2019-01-18 NOTE — PATIENT INSTRUCTIONS
Kumar: Care Instructions Your Care Instructions Kumareven minor onescan be very painful. A minor burn may heal within several days, while a more serious burn may take weeks or even months to heal completely. You may notice that the burned area feels tight and hard while it is healing. It is important to continue to move the area as the burn heals to prevent loss of motion or loss of function in the area. When your skin is damaged by a burn, you have a greater risk of infection. Keep the wound clean and change the bandages regularly to prevent infection and help the burn heal. 
Burns can leave permanent scars. Taking good care of the burn as it heals may help prevent bad scars. The doctor has checked you carefully, but problems can develop later. If you notice any problems or new symptoms, get medical treatment right away. Follow-up care is a key part of your treatment and safety. Be sure to make and go to all appointments, and call your doctor if you are having problems. It's also a good idea to know your test results and keep a list of the medicines you take. How can you care for yourself at home? · If your doctor told you how to care for your burn, follow your doctor's instructions. If you did not get instructions, follow this general advice: 
? Wash the burn with clean water 2 times a day. Don't use hydrogen peroxide or alcohol, which can slow healing. ? Gently pat the burn dry after you wash it. 
? You may cover the burn with a thin layer of petroleum jelly, such as Vaseline, and a nonstick bandage. ? Apply more petroleum jelly and replace the bandage as needed. · Protect your burn while it is healing. Cover your burn if you are going out in the cold or the sun. ? Wear long sleeves if the burn is on your hands or arms. ? Wear a hat if the burn is on your face. ? Wear socks and shoes if the burn is on your feet. · Do not break blisters open. This increases the chance of infection.  If a blister breaks open by itself, blot up the liquid, and leave the skin that covered the blister. This helps protect the new skin. · If your doctor prescribed antibiotics, take them as directed. Do not stop taking them just because you feel better. You need to take the full course of antibiotics. For pain and itching · Take pain medicines exactly as directed. ? If the doctor gave you a prescription medicine for pain, take it as prescribed. ? If you are not taking a prescription pain medicine, ask your doctor if you can take an over-the-counter medicine. · If the burn itches, try not to scratch it. Try an over-the-counter antihistamine such as diphenhydramine (Benadryl) or loratadine (Claritin). Read and follow all instructions on the label. When should you call for help? Call your doctor now or seek immediate medical care if: 
  · Your pain gets worse.  
  · You have symptoms of infection, such as: 
? Increased pain, swelling, warmth, or redness near the burn. ? Red streaks leading from the burn. ? Pus draining from the burn. ? A fever.  
 Watch closely for changes in your health, and be sure to contact your doctor if: 
  · You do not get better as expected. Where can you learn more? Go to http://casey-sameer.info/. Enter G423 in the search box to learn more about \"Burns: Care Instructions. \" Current as of: September 23, 2018 Content Version: 11.9 © 2455-4788 [employer]. Care instructions adapted under license by [employer] (which disclaims liability or warranty for this information). If you have questions about a medical condition or this instruction, always ask your healthcare professional. [place] [address] any warranty or liability for your use of this information.
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
## 2019-01-19 PROCEDURE — 3331090002 HH PPS REVENUE DEBIT

## 2019-01-19 PROCEDURE — 3331090001 HH PPS REVENUE CREDIT

## 2019-01-20 PROCEDURE — 3331090001 HH PPS REVENUE CREDIT

## 2019-01-20 PROCEDURE — 3331090002 HH PPS REVENUE DEBIT

## 2019-01-21 ENCOUNTER — HOME CARE VISIT (OUTPATIENT)
Dept: SCHEDULING | Facility: HOME HEALTH | Age: 84
End: 2019-01-21
Payer: MEDICARE

## 2019-01-21 VITALS
TEMPERATURE: 97.8 F | OXYGEN SATURATION: 99 % | SYSTOLIC BLOOD PRESSURE: 104 MMHG | DIASTOLIC BLOOD PRESSURE: 62 MMHG | HEART RATE: 67 BPM

## 2019-01-21 PROCEDURE — 3331090001 HH PPS REVENUE CREDIT

## 2019-01-21 PROCEDURE — 3331090002 HH PPS REVENUE DEBIT

## 2019-01-21 PROCEDURE — G0151 HHCP-SERV OF PT,EA 15 MIN: HCPCS

## 2019-01-22 ENCOUNTER — HOME CARE VISIT (OUTPATIENT)
Dept: HOME HEALTH SERVICES | Facility: HOME HEALTH | Age: 84
End: 2019-01-22
Payer: MEDICARE

## 2019-01-22 ENCOUNTER — HOME CARE VISIT (OUTPATIENT)
Dept: SCHEDULING | Facility: HOME HEALTH | Age: 84
End: 2019-01-22
Payer: MEDICARE

## 2019-01-22 VITALS
RESPIRATION RATE: 18 BRPM | SYSTOLIC BLOOD PRESSURE: 110 MMHG | OXYGEN SATURATION: 98 % | TEMPERATURE: 98.1 F | DIASTOLIC BLOOD PRESSURE: 50 MMHG | HEART RATE: 62 BPM

## 2019-01-22 PROCEDURE — G0299 HHS/HOSPICE OF RN EA 15 MIN: HCPCS

## 2019-01-22 PROCEDURE — 3331090002 HH PPS REVENUE DEBIT

## 2019-01-22 PROCEDURE — 3331090001 HH PPS REVENUE CREDIT

## 2019-01-23 ENCOUNTER — HOME CARE VISIT (OUTPATIENT)
Dept: HOME HEALTH SERVICES | Facility: HOME HEALTH | Age: 84
End: 2019-01-23
Payer: MEDICARE

## 2019-01-23 ENCOUNTER — HOSPITAL ENCOUNTER (OUTPATIENT)
Dept: LAB | Age: 84
Discharge: HOME | End: 2019-01-23
Payer: MEDICARE

## 2019-01-23 DIAGNOSIS — N30.00 ACUTE CYSTITIS WITHOUT HEMATURIA: ICD-10-CM

## 2019-01-23 LAB
BILIRUB UR QL STRIP: NEGATIVE
GLUCOSE UR-MCNC: NEGATIVE MG/DL
KETONES P FAST UR STRIP-MCNC: NEGATIVE MG/DL
PH UR STRIP: 7 [PH] (ref 4.6–8)
PROT UR QL STRIP: NEGATIVE
SP GR UR STRIP: 1.01 (ref 1–1.03)
UA UROBILINOGEN AMB POC: NORMAL (ref 0.2–1)
URINALYSIS CLARITY POC: CLEAR
URINALYSIS COLOR POC: YELLOW
URINE BLOOD POC: NORMAL
URINE LEUKOCYTES POC: NORMAL
URINE NITRITES POC: NEGATIVE

## 2019-01-23 PROCEDURE — 87086 URINE CULTURE/COLONY COUNT: CPT

## 2019-01-23 PROCEDURE — 3331090002 HH PPS REVENUE DEBIT

## 2019-01-23 PROCEDURE — 3331090001 HH PPS REVENUE CREDIT

## 2019-01-23 NOTE — PROGRESS NOTES
UA result reviewed - will await UCx results since there is 1+ LE and trace blood in the UA after recent cath removal.

## 2019-01-24 ENCOUNTER — HOME CARE VISIT (OUTPATIENT)
Dept: HOME HEALTH SERVICES | Facility: HOME HEALTH | Age: 84
End: 2019-01-24
Payer: MEDICARE

## 2019-01-24 ENCOUNTER — HOME CARE VISIT (OUTPATIENT)
Dept: SCHEDULING | Facility: HOME HEALTH | Age: 84
End: 2019-01-24
Payer: MEDICARE

## 2019-01-24 PROCEDURE — 3331090001 HH PPS REVENUE CREDIT

## 2019-01-24 PROCEDURE — G0157 HHC PT ASSISTANT EA 15: HCPCS

## 2019-01-24 PROCEDURE — G0299 HHS/HOSPICE OF RN EA 15 MIN: HCPCS

## 2019-01-24 PROCEDURE — 3331090002 HH PPS REVENUE DEBIT

## 2019-01-25 ENCOUNTER — HOME CARE VISIT (OUTPATIENT)
Dept: SCHEDULING | Facility: HOME HEALTH | Age: 84
End: 2019-01-25
Payer: MEDICARE

## 2019-01-25 VITALS
TEMPERATURE: 98.1 F | RESPIRATION RATE: 18 BRPM | HEART RATE: 66 BPM | SYSTOLIC BLOOD PRESSURE: 104 MMHG | DIASTOLIC BLOOD PRESSURE: 60 MMHG | OXYGEN SATURATION: 96 %

## 2019-01-25 VITALS
HEART RATE: 69 BPM | OXYGEN SATURATION: 99 % | DIASTOLIC BLOOD PRESSURE: 67 MMHG | SYSTOLIC BLOOD PRESSURE: 111 MMHG | TEMPERATURE: 98.3 F

## 2019-01-25 VITALS — SYSTOLIC BLOOD PRESSURE: 114 MMHG | OXYGEN SATURATION: 99 % | HEART RATE: 59 BPM | DIASTOLIC BLOOD PRESSURE: 63 MMHG

## 2019-01-25 LAB
BACTERIA SPEC CULT: ABNORMAL
SERVICE CMNT-IMP: ABNORMAL

## 2019-01-25 PROCEDURE — 3331090002 HH PPS REVENUE DEBIT

## 2019-01-25 PROCEDURE — G0152 HHCP-SERV OF OT,EA 15 MIN: HCPCS

## 2019-01-25 PROCEDURE — G0151 HHCP-SERV OF PT,EA 15 MIN: HCPCS

## 2019-01-25 PROCEDURE — 3331090001 HH PPS REVENUE CREDIT

## 2019-01-25 PROCEDURE — G0156 HHCP-SVS OF AIDE,EA 15 MIN: HCPCS

## 2019-01-25 NOTE — PROGRESS NOTES
The following message was sent via V-Key to inform patient of lab results: 
 
 
Dear Ms. Villar, Your urine showed no infection. There was a small amount of bacteria in the sample but it is not a UTI. It is bacteria from the skin due to not wiping well enough when the urine sample was collected. You do not need any antibiotics to treat this.  
 
Sincerely, 
Dr. Ghanshyam Bueno

## 2019-01-26 VITALS
OXYGEN SATURATION: 100 % | DIASTOLIC BLOOD PRESSURE: 64 MMHG | TEMPERATURE: 97.8 F | SYSTOLIC BLOOD PRESSURE: 122 MMHG | HEART RATE: 66 BPM

## 2019-01-26 PROCEDURE — 3331090001 HH PPS REVENUE CREDIT

## 2019-01-26 PROCEDURE — 3331090002 HH PPS REVENUE DEBIT

## 2019-01-27 PROCEDURE — 3331090003 HH PPS REVENUE ADJ

## 2019-01-27 PROCEDURE — 3331090001 HH PPS REVENUE CREDIT

## 2019-01-27 PROCEDURE — 3331090002 HH PPS REVENUE DEBIT

## 2019-01-28 ENCOUNTER — HOME CARE VISIT (OUTPATIENT)
Dept: SCHEDULING | Facility: HOME HEALTH | Age: 84
End: 2019-01-28
Payer: MEDICARE

## 2019-01-28 PROCEDURE — 3331090002 HH PPS REVENUE DEBIT

## 2019-01-28 PROCEDURE — 3331090001 HH PPS REVENUE CREDIT

## 2019-01-28 PROCEDURE — 400014 HH F/U

## 2019-01-28 PROCEDURE — G0157 HHC PT ASSISTANT EA 15: HCPCS

## 2019-01-29 ENCOUNTER — HOME CARE VISIT (OUTPATIENT)
Dept: SCHEDULING | Facility: HOME HEALTH | Age: 84
End: 2019-01-29
Payer: MEDICARE

## 2019-01-29 VITALS
TEMPERATURE: 97.7 F | SYSTOLIC BLOOD PRESSURE: 115 MMHG | DIASTOLIC BLOOD PRESSURE: 63 MMHG | HEART RATE: 70 BPM | OXYGEN SATURATION: 98 %

## 2019-01-29 VITALS
TEMPERATURE: 98.3 F | OXYGEN SATURATION: 99 % | DIASTOLIC BLOOD PRESSURE: 60 MMHG | SYSTOLIC BLOOD PRESSURE: 110 MMHG | HEART RATE: 69 BPM

## 2019-01-29 PROCEDURE — 3331090002 HH PPS REVENUE DEBIT

## 2019-01-29 PROCEDURE — 3331090001 HH PPS REVENUE CREDIT

## 2019-01-29 PROCEDURE — G0299 HHS/HOSPICE OF RN EA 15 MIN: HCPCS

## 2019-01-30 PROCEDURE — 3331090002 HH PPS REVENUE DEBIT

## 2019-01-30 PROCEDURE — 3331090001 HH PPS REVENUE CREDIT

## 2019-01-31 ENCOUNTER — HOME CARE VISIT (OUTPATIENT)
Dept: SCHEDULING | Facility: HOME HEALTH | Age: 84
End: 2019-01-31
Payer: MEDICARE

## 2019-01-31 VITALS
TEMPERATURE: 98.4 F | SYSTOLIC BLOOD PRESSURE: 140 MMHG | HEART RATE: 62 BPM | DIASTOLIC BLOOD PRESSURE: 66 MMHG | OXYGEN SATURATION: 100 %

## 2019-01-31 VITALS — SYSTOLIC BLOOD PRESSURE: 115 MMHG | DIASTOLIC BLOOD PRESSURE: 72 MMHG | HEART RATE: 68 BPM | TEMPERATURE: 97.5 F

## 2019-01-31 PROCEDURE — G0156 HHCP-SVS OF AIDE,EA 15 MIN: HCPCS

## 2019-01-31 PROCEDURE — 3331090002 HH PPS REVENUE DEBIT

## 2019-01-31 PROCEDURE — G0157 HHC PT ASSISTANT EA 15: HCPCS

## 2019-01-31 PROCEDURE — G0158 HHC OT ASSISTANT EA 15: HCPCS

## 2019-01-31 PROCEDURE — 3331090001 HH PPS REVENUE CREDIT

## 2019-02-01 ENCOUNTER — HOME CARE VISIT (OUTPATIENT)
Dept: SCHEDULING | Facility: HOME HEALTH | Age: 84
End: 2019-02-01
Payer: MEDICARE

## 2019-02-01 PROCEDURE — 3331090001 HH PPS REVENUE CREDIT

## 2019-02-01 PROCEDURE — G0157 HHC PT ASSISTANT EA 15: HCPCS

## 2019-02-01 PROCEDURE — G0158 HHC OT ASSISTANT EA 15: HCPCS

## 2019-02-01 PROCEDURE — 3331090002 HH PPS REVENUE DEBIT

## 2019-02-02 PROCEDURE — 3331090002 HH PPS REVENUE DEBIT

## 2019-02-02 PROCEDURE — 3331090001 HH PPS REVENUE CREDIT

## 2019-02-03 VITALS
TEMPERATURE: 97.3 F | OXYGEN SATURATION: 100 % | SYSTOLIC BLOOD PRESSURE: 129 MMHG | HEART RATE: 74 BPM | DIASTOLIC BLOOD PRESSURE: 66 MMHG

## 2019-02-03 VITALS — DIASTOLIC BLOOD PRESSURE: 60 MMHG | SYSTOLIC BLOOD PRESSURE: 114 MMHG | HEART RATE: 67 BPM | TEMPERATURE: 98.9 F

## 2019-02-03 PROCEDURE — 3331090001 HH PPS REVENUE CREDIT

## 2019-02-03 PROCEDURE — 3331090002 HH PPS REVENUE DEBIT

## 2019-02-04 ENCOUNTER — HOME CARE VISIT (OUTPATIENT)
Dept: SCHEDULING | Facility: HOME HEALTH | Age: 84
End: 2019-02-04
Payer: MEDICARE

## 2019-02-04 PROCEDURE — 3331090001 HH PPS REVENUE CREDIT

## 2019-02-04 PROCEDURE — 3331090002 HH PPS REVENUE DEBIT

## 2019-02-04 PROCEDURE — G0157 HHC PT ASSISTANT EA 15: HCPCS

## 2019-02-05 ENCOUNTER — HOME CARE VISIT (OUTPATIENT)
Dept: SCHEDULING | Facility: HOME HEALTH | Age: 84
End: 2019-02-05
Payer: MEDICARE

## 2019-02-05 VITALS
DIASTOLIC BLOOD PRESSURE: 71 MMHG | TEMPERATURE: 98.7 F | SYSTOLIC BLOOD PRESSURE: 135 MMHG | OXYGEN SATURATION: 100 % | HEART RATE: 76 BPM

## 2019-02-05 VITALS
OXYGEN SATURATION: 97 % | DIASTOLIC BLOOD PRESSURE: 62 MMHG | HEART RATE: 68 BPM | TEMPERATURE: 98.1 F | RESPIRATION RATE: 18 BRPM | SYSTOLIC BLOOD PRESSURE: 140 MMHG

## 2019-02-05 VITALS
OXYGEN SATURATION: 100 % | SYSTOLIC BLOOD PRESSURE: 145 MMHG | TEMPERATURE: 97.9 F | HEART RATE: 66 BPM | DIASTOLIC BLOOD PRESSURE: 74 MMHG

## 2019-02-05 PROCEDURE — 3331090001 HH PPS REVENUE CREDIT

## 2019-02-05 PROCEDURE — G0156 HHCP-SVS OF AIDE,EA 15 MIN: HCPCS

## 2019-02-05 PROCEDURE — G0158 HHC OT ASSISTANT EA 15: HCPCS

## 2019-02-05 PROCEDURE — G0299 HHS/HOSPICE OF RN EA 15 MIN: HCPCS

## 2019-02-05 PROCEDURE — 3331090002 HH PPS REVENUE DEBIT

## 2019-02-06 PROCEDURE — 3331090001 HH PPS REVENUE CREDIT

## 2019-02-06 PROCEDURE — 3331090002 HH PPS REVENUE DEBIT

## 2019-02-07 ENCOUNTER — HOME CARE VISIT (OUTPATIENT)
Dept: SCHEDULING | Facility: HOME HEALTH | Age: 84
End: 2019-02-07
Payer: MEDICARE

## 2019-02-07 PROCEDURE — 3331090002 HH PPS REVENUE DEBIT

## 2019-02-07 PROCEDURE — G0158 HHC OT ASSISTANT EA 15: HCPCS

## 2019-02-07 PROCEDURE — 3331090001 HH PPS REVENUE CREDIT

## 2019-02-08 ENCOUNTER — HOME CARE VISIT (OUTPATIENT)
Dept: SCHEDULING | Facility: HOME HEALTH | Age: 84
End: 2019-02-08
Payer: MEDICARE

## 2019-02-08 PROCEDURE — G0157 HHC PT ASSISTANT EA 15: HCPCS

## 2019-02-08 PROCEDURE — 3331090002 HH PPS REVENUE DEBIT

## 2019-02-08 PROCEDURE — 3331090001 HH PPS REVENUE CREDIT

## 2019-02-09 VITALS
SYSTOLIC BLOOD PRESSURE: 134 MMHG | OXYGEN SATURATION: 97 % | HEART RATE: 57 BPM | DIASTOLIC BLOOD PRESSURE: 70 MMHG | TEMPERATURE: 98.7 F

## 2019-02-09 PROCEDURE — 3331090002 HH PPS REVENUE DEBIT

## 2019-02-09 PROCEDURE — 3331090001 HH PPS REVENUE CREDIT

## 2019-02-10 VITALS
SYSTOLIC BLOOD PRESSURE: 128 MMHG | HEART RATE: 80 BPM | DIASTOLIC BLOOD PRESSURE: 70 MMHG | TEMPERATURE: 97 F | OXYGEN SATURATION: 98 %

## 2019-02-10 PROCEDURE — 3331090001 HH PPS REVENUE CREDIT

## 2019-02-10 PROCEDURE — 3331090002 HH PPS REVENUE DEBIT

## 2019-02-11 ENCOUNTER — HOME CARE VISIT (OUTPATIENT)
Dept: SCHEDULING | Facility: HOME HEALTH | Age: 84
End: 2019-02-11
Payer: MEDICARE

## 2019-02-11 PROCEDURE — 3331090001 HH PPS REVENUE CREDIT

## 2019-02-11 PROCEDURE — 3331090002 HH PPS REVENUE DEBIT

## 2019-02-11 PROCEDURE — G0157 HHC PT ASSISTANT EA 15: HCPCS

## 2019-02-12 ENCOUNTER — HOME CARE VISIT (OUTPATIENT)
Dept: SCHEDULING | Facility: HOME HEALTH | Age: 84
End: 2019-02-12
Payer: MEDICARE

## 2019-02-12 VITALS
SYSTOLIC BLOOD PRESSURE: 129 MMHG | DIASTOLIC BLOOD PRESSURE: 73 MMHG | HEART RATE: 68 BPM | OXYGEN SATURATION: 98 % | TEMPERATURE: 99.5 F

## 2019-02-12 PROCEDURE — G0156 HHCP-SVS OF AIDE,EA 15 MIN: HCPCS

## 2019-02-12 PROCEDURE — 3331090002 HH PPS REVENUE DEBIT

## 2019-02-12 PROCEDURE — 3331090001 HH PPS REVENUE CREDIT

## 2019-02-13 ENCOUNTER — HOME CARE VISIT (OUTPATIENT)
Dept: SCHEDULING | Facility: HOME HEALTH | Age: 84
End: 2019-02-13
Payer: MEDICARE

## 2019-02-13 VITALS
DIASTOLIC BLOOD PRESSURE: 79 MMHG | TEMPERATURE: 97.5 F | HEART RATE: 71 BPM | SYSTOLIC BLOOD PRESSURE: 141 MMHG | OXYGEN SATURATION: 96 %

## 2019-02-13 PROCEDURE — 3331090002 HH PPS REVENUE DEBIT

## 2019-02-13 PROCEDURE — G0151 HHCP-SERV OF PT,EA 15 MIN: HCPCS

## 2019-02-13 PROCEDURE — G0158 HHC OT ASSISTANT EA 15: HCPCS

## 2019-02-13 PROCEDURE — 3331090001 HH PPS REVENUE CREDIT

## 2019-02-14 ENCOUNTER — HOME CARE VISIT (OUTPATIENT)
Dept: SCHEDULING | Facility: HOME HEALTH | Age: 84
End: 2019-02-14
Payer: MEDICARE

## 2019-02-14 VITALS
OXYGEN SATURATION: 93 % | SYSTOLIC BLOOD PRESSURE: 112 MMHG | DIASTOLIC BLOOD PRESSURE: 60 MMHG | HEART RATE: 72 BPM | TEMPERATURE: 98 F

## 2019-02-14 VITALS
RESPIRATION RATE: 18 BRPM | DIASTOLIC BLOOD PRESSURE: 70 MMHG | OXYGEN SATURATION: 95 % | HEART RATE: 61 BPM | SYSTOLIC BLOOD PRESSURE: 120 MMHG | TEMPERATURE: 97.8 F

## 2019-02-14 PROCEDURE — 3331090002 HH PPS REVENUE DEBIT

## 2019-02-14 PROCEDURE — 3331090001 HH PPS REVENUE CREDIT

## 2019-02-14 PROCEDURE — G0299 HHS/HOSPICE OF RN EA 15 MIN: HCPCS

## 2019-02-15 ENCOUNTER — HOME CARE VISIT (OUTPATIENT)
Dept: SCHEDULING | Facility: HOME HEALTH | Age: 84
End: 2019-02-15
Payer: MEDICARE

## 2019-02-15 PROCEDURE — 3331090001 HH PPS REVENUE CREDIT

## 2019-02-15 PROCEDURE — 3331090002 HH PPS REVENUE DEBIT

## 2019-02-15 PROCEDURE — G0158 HHC OT ASSISTANT EA 15: HCPCS

## 2019-02-15 PROCEDURE — G0156 HHCP-SVS OF AIDE,EA 15 MIN: HCPCS

## 2019-02-16 PROCEDURE — 3331090001 HH PPS REVENUE CREDIT

## 2019-02-16 PROCEDURE — 3331090002 HH PPS REVENUE DEBIT

## 2019-02-17 VITALS
OXYGEN SATURATION: 96 % | SYSTOLIC BLOOD PRESSURE: 121 MMHG | TEMPERATURE: 97.6 F | HEART RATE: 92 BPM | DIASTOLIC BLOOD PRESSURE: 64 MMHG

## 2019-02-17 PROCEDURE — 3331090002 HH PPS REVENUE DEBIT

## 2019-02-17 PROCEDURE — 3331090001 HH PPS REVENUE CREDIT

## 2019-02-18 DIAGNOSIS — J96.11 CHRONIC RESPIRATORY FAILURE WITH HYPOXIA AND HYPERCAPNIA (HCC): ICD-10-CM

## 2019-02-18 DIAGNOSIS — J44.0 ACUTE BRONCHITIS WITH CHRONIC OBSTRUCTIVE PULMONARY DISEASE (COPD) (HCC): ICD-10-CM

## 2019-02-18 DIAGNOSIS — J96.12 CHRONIC RESPIRATORY FAILURE WITH HYPOXIA AND HYPERCAPNIA (HCC): ICD-10-CM

## 2019-02-18 DIAGNOSIS — J43.2 CENTRILOBULAR EMPHYSEMA (HCC): ICD-10-CM

## 2019-02-18 DIAGNOSIS — J20.9 ACUTE BRONCHITIS WITH CHRONIC OBSTRUCTIVE PULMONARY DISEASE (COPD) (HCC): ICD-10-CM

## 2019-02-18 DIAGNOSIS — R06.09 DYSPNEA ON EXERTION: ICD-10-CM

## 2019-02-18 DIAGNOSIS — R06.02 SHORTNESS OF BREATH: ICD-10-CM

## 2019-02-18 PROCEDURE — 3331090002 HH PPS REVENUE DEBIT

## 2019-02-18 PROCEDURE — 3331090001 HH PPS REVENUE CREDIT

## 2019-02-18 RX ORDER — BUDESONIDE 1 MG/2ML
1000 INHALANT ORAL DAILY
Qty: 30 EACH | Refills: 5 | Status: SHIPPED | OUTPATIENT
Start: 2019-02-18 | End: 2020-05-27 | Stop reason: SDUPTHER

## 2019-02-19 ENCOUNTER — HOME CARE VISIT (OUTPATIENT)
Dept: SCHEDULING | Facility: HOME HEALTH | Age: 84
End: 2019-02-19
Payer: MEDICARE

## 2019-02-19 ENCOUNTER — OFFICE VISIT (OUTPATIENT)
Dept: FAMILY MEDICINE CLINIC | Age: 84
End: 2019-02-19

## 2019-02-19 ENCOUNTER — HOSPITAL ENCOUNTER (OUTPATIENT)
Dept: LAB | Age: 84
Discharge: HOME | End: 2019-02-19
Payer: MEDICARE

## 2019-02-19 VITALS
DIASTOLIC BLOOD PRESSURE: 72 MMHG | TEMPERATURE: 97.6 F | HEIGHT: 64 IN | HEART RATE: 79 BPM | WEIGHT: 132.4 LBS | SYSTOLIC BLOOD PRESSURE: 116 MMHG | RESPIRATION RATE: 20 BRPM | BODY MASS INDEX: 22.61 KG/M2

## 2019-02-19 DIAGNOSIS — E78.5 DYSLIPIDEMIA: ICD-10-CM

## 2019-02-19 DIAGNOSIS — I10 ESSENTIAL HYPERTENSION, BENIGN: Primary | ICD-10-CM

## 2019-02-19 DIAGNOSIS — E87.6 HYPOKALEMIA: ICD-10-CM

## 2019-02-19 DIAGNOSIS — E03.8 OTHER SPECIFIED HYPOTHYROIDISM: ICD-10-CM

## 2019-02-19 DIAGNOSIS — N18.30 STAGE 3 CHRONIC KIDNEY DISEASE (HCC): ICD-10-CM

## 2019-02-19 DIAGNOSIS — R45.4 IRRITABILITY: ICD-10-CM

## 2019-02-19 DIAGNOSIS — R68.89 OTHER GENERAL SYMPTOMS AND SIGNS: ICD-10-CM

## 2019-02-19 DIAGNOSIS — R22.0 NASAL SWELLING: ICD-10-CM

## 2019-02-19 DIAGNOSIS — R82.90 ABNORMAL FINDING IN URINE: ICD-10-CM

## 2019-02-19 DIAGNOSIS — D64.9 ANEMIA, UNSPECIFIED TYPE: ICD-10-CM

## 2019-02-19 DIAGNOSIS — Z23 ENCOUNTER FOR IMMUNIZATION: ICD-10-CM

## 2019-02-19 LAB
ALBUMIN SERPL-MCNC: 3.8 G/DL (ref 3.4–5)
ALBUMIN/GLOB SERPL: 1.1 {RATIO} (ref 0.8–1.7)
ALP SERPL-CCNC: 93 U/L (ref 45–117)
ALT SERPL-CCNC: 19 U/L (ref 13–56)
ANION GAP SERPL CALC-SCNC: 7 MMOL/L (ref 3–18)
AST SERPL-CCNC: 14 U/L (ref 15–37)
BASOPHILS # BLD: 0.1 K/UL (ref 0–0.1)
BASOPHILS NFR BLD: 1 % (ref 0–2)
BILIRUB SERPL-MCNC: 0.4 MG/DL (ref 0.2–1)
BUN SERPL-MCNC: 21 MG/DL (ref 7–18)
BUN/CREAT SERPL: 20 (ref 12–20)
CALCIUM SERPL-MCNC: 9.4 MG/DL (ref 8.5–10.1)
CHLORIDE SERPL-SCNC: 100 MMOL/L (ref 100–108)
CO2 SERPL-SCNC: 29 MMOL/L (ref 21–32)
CREAT SERPL-MCNC: 1.07 MG/DL (ref 0.6–1.3)
DIFFERENTIAL METHOD BLD: ABNORMAL
EOSINOPHIL # BLD: 0.4 K/UL (ref 0–0.4)
EOSINOPHIL NFR BLD: 5 % (ref 0–5)
ERYTHROCYTE [DISTWIDTH] IN BLOOD BY AUTOMATED COUNT: 12.8 % (ref 11.6–14.5)
FERRITIN SERPL-MCNC: 36 NG/ML (ref 8–388)
FOLATE SERPL-MCNC: >20 NG/ML (ref 3.1–17.5)
GLOBULIN SER CALC-MCNC: 3.4 G/DL (ref 2–4)
GLUCOSE SERPL-MCNC: 94 MG/DL (ref 74–99)
HCT VFR BLD AUTO: 33.2 % (ref 35–45)
HGB BLD-MCNC: 10.4 G/DL (ref 12–16)
IRON SATN MFR SERPL: 20 %
IRON SERPL-MCNC: 52 UG/DL (ref 50–175)
LYMPHOCYTES # BLD: 1.7 K/UL (ref 0.9–3.6)
LYMPHOCYTES NFR BLD: 20 % (ref 21–52)
MAGNESIUM SERPL-MCNC: 2.4 MG/DL (ref 1.6–2.6)
MCH RBC QN AUTO: 29.9 PG (ref 24–34)
MCHC RBC AUTO-ENTMCNC: 31.3 G/DL (ref 31–37)
MCV RBC AUTO: 95.4 FL (ref 74–97)
MONOCYTES # BLD: 0.6 K/UL (ref 0.05–1.2)
MONOCYTES NFR BLD: 7 % (ref 3–10)
NEUTS SEG # BLD: 5.9 K/UL (ref 1.8–8)
NEUTS SEG NFR BLD: 67 % (ref 40–73)
PHOSPHATE SERPL-MCNC: 3.4 MG/DL (ref 2.5–4.9)
PLATELET # BLD AUTO: 497 K/UL (ref 135–420)
PMV BLD AUTO: 10.1 FL (ref 9.2–11.8)
POTASSIUM SERPL-SCNC: 4.5 MMOL/L (ref 3.5–5.5)
PROT SERPL-MCNC: 7.2 G/DL (ref 6.4–8.2)
RBC # BLD AUTO: 3.48 M/UL (ref 4.2–5.3)
SODIUM SERPL-SCNC: 136 MMOL/L (ref 136–145)
T4 FREE SERPL-MCNC: 1 NG/DL (ref 0.7–1.5)
TIBC SERPL-MCNC: 265 UG/DL (ref 250–450)
TSH SERPL DL<=0.05 MIU/L-ACNC: 0.04 UIU/ML (ref 0.36–3.74)
VIT B12 SERPL-MCNC: >2000 PG/ML (ref 211–911)
WBC # BLD AUTO: 8.8 K/UL (ref 4.6–13.2)

## 2019-02-19 PROCEDURE — 83540 ASSAY OF IRON: CPT

## 2019-02-19 PROCEDURE — 80053 COMPREHEN METABOLIC PANEL: CPT

## 2019-02-19 PROCEDURE — 36415 COLL VENOUS BLD VENIPUNCTURE: CPT

## 2019-02-19 PROCEDURE — 83735 ASSAY OF MAGNESIUM: CPT

## 2019-02-19 PROCEDURE — 3331090001 HH PPS REVENUE CREDIT

## 2019-02-19 PROCEDURE — G0158 HHC OT ASSISTANT EA 15: HCPCS

## 2019-02-19 PROCEDURE — 84439 ASSAY OF FREE THYROXINE: CPT

## 2019-02-19 PROCEDURE — 84100 ASSAY OF PHOSPHORUS: CPT

## 2019-02-19 PROCEDURE — 82607 VITAMIN B-12: CPT

## 2019-02-19 PROCEDURE — 85025 COMPLETE CBC W/AUTO DIFF WBC: CPT

## 2019-02-19 PROCEDURE — 82728 ASSAY OF FERRITIN: CPT

## 2019-02-19 PROCEDURE — 3331090002 HH PPS REVENUE DEBIT
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
## 2019-02-19 NOTE — PROGRESS NOTES
Lauren Kane Villar 11/23/1931 female who presents for routine immunizations. Patient denies any symptoms , reactions or allergies that would exclude them from being immunized today. Risks and adverse reactions were discussed and the VIS was given to them. All questions were addressed. Order placed for HD Flu and Pneu 13,  per Verbal Order from Dr. Srinivas Goetz with read back. Patient was observed for 15 min post injection. There were no reactions observed.  
 
Gregoria Cowan
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
## 2019-02-19 NOTE — PATIENT INSTRUCTIONS
High Cholesterol: Care Instructions Your Care Instructions Cholesterol is a type of fat in your blood. It is needed for many body functions, such as making new cells. Cholesterol is made by your body. It also comes from food you eat. High cholesterol means that you have too much of the fat in your blood. This raises your risk of a heart attack and stroke. LDL and HDL are part of your total cholesterol. LDL is the \"bad\" cholesterol. High LDL can raise your risk for heart disease, heart attack, and stroke. HDL is the \"good\" cholesterol. It helps clear bad cholesterol from the body. High HDL is linked with a lower risk of heart disease, heart attack, and stroke. Your cholesterol levels help your doctor find out your risk for having a heart attack or stroke. You and your doctor can talk about whether you need to lower your risk and what treatment is best for you. A heart-healthy lifestyle along with medicines can help lower your cholesterol and your risk. The way you choose to lower your risk will depend on how high your risk is for heart attack and stroke. It will also depend on how you feel about taking medicines. Follow-up care is a key part of your treatment and safety. Be sure to make and go to all appointments, and call your doctor if you are having problems. It's also a good idea to know your test results and keep a list of the medicines you take. How can you care for yourself at home? · Eat a variety of foods every day. Good choices include fruits, vegetables, whole grains (like oatmeal), dried beans and peas, nuts and seeds, soy products (like tofu), and fat-free or low-fat dairy products. · Replace butter, margarine, and hydrogenated or partially hydrogenated oils with olive and canola oils. (Canola oil margarine without trans fat is fine.) · Replace red meat with fish, poultry, and soy protein (like tofu). · Limit processed and packaged foods like chips, crackers, and cookies. · Bake, broil, or steam foods. Don't au them. · Be physically active. Get at least 30 minutes of exercise on most days of the week. Walking is a good choice. You also may want to do other activities, such as running, swimming, cycling, or playing tennis or team sports. · Stay at a healthy weight or lose weight by making the changes in eating and physical activity listed above. Losing just a small amount of weight, even 5 to 10 pounds, can reduce your risk for having a heart attack or stroke. · Do not smoke. When should you call for help? Watch closely for changes in your health, and be sure to contact your doctor if: 
  · You need help making lifestyle changes.  
  · You have questions about your medicine. Where can you learn more? Go to http://caseyLumara Healthsameer.info/. Enter K861 in the search box to learn more about \"High Cholesterol: Care Instructions. \" Current as of: July 22, 2018 Content Version: 11.9 © 3770-2071 [employer]. Care instructions adapted under license by [employer] (which disclaims liability or warranty for this information). If you have questions about a medical condition or this instruction, always ask your healthcare professional. [address] any warranty or liability for your use of this information. Hypothyroidism: Care Instructions Your Care Instructions You have hypothyroidism, which means that your body is not making enough thyroid hormone. This hormone helps your body use energy. If your thyroid level is low, you may feel tired, be constipated, have an increase in your blood pressure, or have dry skin or memory problems. You may also get cold easily, even when it is warm. Women with low thyroid levels may have heavy menstrual periods. A blood test to find your thyroid-stimulating hormone (TSH) level is used to check for hypothyroidism.  A high TSH level may mean that you have low thyroid. When your body is not making enough thyroid hormone, TSH levels rise in an effort to make the body produce more. The treatment for hypothyroidism is to take thyroid hormone pills. You should start to feel better in 1 to 2 weeks. But it can take several months to see changes in the TSH level. You will need regular visits with your doctor to make sure you have the right dose of medicine. Most people need treatment for the rest of their lives. You will need to see your doctor regularly to have blood tests and to make sure you are doing well. Follow-up care is a key part of your treatment and safety. Be sure to make and go to all appointments, and call your doctor if you are having problems. It's also a good idea to know your test results and keep a list of the medicines you take. How can you care for yourself at home? · Take your thyroid hormone medicine exactly as prescribed. Call your doctor if you think you are having a problem with your medicine. Most people do not have side effects if they take the right amount of medicine regularly. ? Take the medicine 30 minutes before breakfast, and do not take it with calcium, vitamins, or iron. ? Do not take extra doses of your thyroid medicine. It will not help you get better any faster, and it may cause side effects. ? If you forget to take a dose, do NOT take a double dose of medicine. Take your usual dose the next day. · Tell your doctor about all prescription, herbal, or over-the-counter products you take. · Take care of yourself. Eat a healthy diet, get enough sleep, and get regular exercise. When should you call for help? Call 911 anytime you think you may need emergency care. For example, call if: 
  · You passed out (lost consciousness).  
  · You have severe trouble breathing.  
  · You have a very slow heartbeat (less than 60 beats a minute).  
  · You have a low body temperature (95°F or below).  Call your doctor now or seek immediate medical care if: 
  · You feel tired, sluggish, or weak.  
  · You have trouble remembering things or concentrating.  
  · You do not begin to feel better 2 weeks after starting your medicine.  
 Watch closely for changes in your health, and be sure to contact your doctor if you have any problems. Where can you learn more? Go to http://casey-sameer.info/. Enter L093 in the search box to learn more about \"Hypothyroidism: Care Instructions. \" Current as of: March 14, 2018 Content Version: 11.9 © 3675-5919 [employer]. Care instructions adapted under license by [employer] (which disclaims liability or warranty for this information). If you have questions about a medical condition or this instruction, always ask your healthcare professional. [address] any warranty or liability for your use of this information. Vaccine Information Statement Influenza (Flu) Vaccine (Inactivated or Recombinant): What you need to know Many Vaccine Information Statements are available in Faroese and other languages. See www.immunize.org/vis Hojas de Información Sobre Vacunas están disponibles en Español y en muchos otros idiomas. Visite www.immunize.org/vis 1. Why get vaccinated? Influenza (flu) is a contagious disease that spreads around the [place] every year, usually between October and May. Flu is caused by influenza viruses, and is spread mainly by coughing, sneezing, and close contact. Anyone can get flu. Flu strikes suddenly and can last several days. Symptoms vary by age, but can include: 
 fever/chills  sore throat  muscle aches  fatigue  cough  headache  runny or stuffy nose Flu can also lead to pneumonia and blood infections, and cause diarrhea and seizures in children. If you have a medical condition, such as heart or lung disease, flu can make it worse. Flu is more dangerous for some people. Infants and young children, people 72years of age and older, pregnant women, and people with certain health conditions or a weakened immune system are at greatest risk. Each year thousands of people in the [facility] die from flu, and many more are hospitalized. Flu vaccine can: 
 keep you from getting flu, 
 make flu less severe if you do get it, and 
 keep you from spreading flu to your family and other people. 2. Inactivated and recombinant flu vaccines A dose of flu vaccine is recommended every flu season. Children 6 months through 6years of age may need two doses during the same flu season. Everyone else needs only one dose each flu season. Some inactivated flu vaccines contain a very small amount of a mercury-based preservative called thimerosal. Studies have not shown thimerosal in vaccines to be harmful, but flu vaccines that do not contain thimerosal are available. There is no live flu virus in flu shots. They cannot cause the flu. There are many flu viruses, and they are always changing. Each year a new flu vaccine is made to protect against three or four viruses that are likely to cause disease in the upcoming flu season. But even when the vaccine doesnt exactly match these viruses, it may still provide some protection Flu vaccine cannot prevent: 
 flu that is caused by a virus not covered by the vaccine, or 
 illnesses that look like flu but are not. It takes about 2 weeks for protection to develop after vaccination, and protection lasts through the flu season. 3. Some people should not get this vaccine Tell the person who is giving you the vaccine:  If you have any severe, life-threatening allergies.    
If you ever had a life-threatening allergic reaction after a dose of flu vaccine, or have a severe allergy to any part of this vaccine, you may be advised not to get vaccinated. Most, but not all, types of flu vaccine contain a small amount of egg protein.  If you ever had Guillain-Barré Syndrome (also called GBS). Some people with a history of GBS should not get this vaccine. This should be discussed with your doctor.  If you are not feeling well. It is usually okay to get flu vaccine when you have a mild illness, but you might be asked to come back when you feel better. 4. Risks of a vaccine reaction With any medicine, including vaccines, there is a chance of reactions. These are usually mild and go away on their own, but serious reactions are also possible. Most people who get a flu shot do not have any problems with it. Minor problems following a flu shot include:  
 soreness, redness, or swelling where the shot was given  hoarseness  sore, red or itchy eyes  cough  fever  aches  headache  itching  fatigue If these problems occur, they usually begin soon after the shot and last 1 or 2 days. More serious problems following a flu shot can include the following:  There may be a small increased risk of Guillain-Barré Syndrome (GBS) after inactivated flu vaccine. This risk has been estimated at 1 or 2 additional cases per million people vaccinated. This is much lower than the risk of severe complications from flu, which can be prevented by flu vaccine.  Young children who get the flu shot along with pneumococcal vaccine (PCV13) and/or DTaP vaccine at the same time might be slightly more likely to have a seizure caused by fever. Ask your doctor for more information. Tell your doctor if a child who is getting flu vaccine has ever had a seizure. Problems that could happen after any injected vaccine:  People sometimes faint after a medical procedure, including vaccination.  Sitting or lying down for about 15 minutes can help prevent fainting, and injuries caused by a fall. Tell your doctor if you feel dizzy, or have vision changes or ringing in the ears.  Some people get severe pain in the shoulder and have difficulty moving the arm where a shot was given. This happens very rarely.  Any medication can cause a severe allergic reaction. Such reactions from a vaccine are very rare, estimated at about 1 in a million doses, and would happen within a few minutes to a few hours after the vaccination. As with any medicine, there is a very remote chance of a vaccine causing a serious injury or death. The safety of vaccines is always being monitored. For more information, visit: www.cdc.gov/vaccinesafety/ 
 
 
The [facility] Vaccine Injury Compensation Program (VICP) is a federal program that was created to compensate people who may have been injured by certain vaccines.  
 
Persons who believe they may have been injured by a vaccine can learn about the program and about filing a claim by calling 9-661.369.7811 or visiting the [employer] website at www.[facility].gov/vaccinecompensation. There is a time limit to file a claim for compensation. 7. How can I learn more?  Ask your healthcare provider. He or she can give you the vaccine package insert or suggest other sources of information.  Call your local or state health department.  Contact the Centers for Disease Control and Prevention (CDC): 
- Call 8-869.970.2106 (1-800-CDC-INFO) or 
- Visit CDCs website at www.cdc.gov/flu Vaccine Information Statement Inactivated Influenza Vaccine 8/7/2015 
42 [facility] 404OU-61 Department of [facility] and [employer] Centers for Disease Control and Prevention Office Use Only

## 2019-02-20 PROCEDURE — 3331090001 HH PPS REVENUE CREDIT

## 2019-02-20 PROCEDURE — 3331090002 HH PPS REVENUE DEBIT

## 2019-02-20 NOTE — PROGRESS NOTES
The following message was sent via [employer] to inform patient of lab results: 
 
 
Dear Ms. Villar, Your Electrolytes and liver function are normal. Your kidney function is abnormal but is stable from previous and expected for age. Please avoid anti-inflammatories such as advil, aleve, naproxen, naprosyn, Ibuprofen since they can make kidney dysfunction worse. Your thyroid blood tests show that your TSH thyroid test is low but it has been low previously. Discuss this with your endocrinologist Dr. Amelie Baxter to determine if he wants you to stay on the same dose of your levothyroxine. Your Magnesium and phosphorus levels are normal. Your iron panel is normal. Your vitamin B12 and folate are normal. You have a mild anemia which is most likely due to your kidney dysfunction and it is stable so we will continue to monitor for now. Your platelet counts are high so I recommend that you drink more water 8-10 glasses per day as dehydration can contribute to a high platelet count. Please keep your appointment with Dr. León Tate as scheduled on 2/27/19 at 11AM and she can discuss these results in 
more detail with you at that time.   
 
Sincerely, 
Dr. Enrique Waller

## 2019-02-21 VITALS
HEART RATE: 65 BPM | OXYGEN SATURATION: 94 % | DIASTOLIC BLOOD PRESSURE: 67 MMHG | SYSTOLIC BLOOD PRESSURE: 136 MMHG | TEMPERATURE: 97.5 F

## 2019-02-21 PROCEDURE — 3331090002 HH PPS REVENUE DEBIT

## 2019-02-21 PROCEDURE — 3331090001 HH PPS REVENUE CREDIT

## 2019-02-22 ENCOUNTER — HOSPITAL ENCOUNTER (OUTPATIENT)
Dept: LAB | Age: 84
Discharge: HOME | End: 2019-02-22
Payer: MEDICARE

## 2019-02-22 DIAGNOSIS — R45.4 IRRITABILITY: ICD-10-CM

## 2019-02-22 DIAGNOSIS — R82.90 ABNORMAL FINDING IN URINE: ICD-10-CM

## 2019-02-22 LAB
BILIRUB UR QL STRIP: NEGATIVE
GLUCOSE UR-MCNC: NEGATIVE MG/DL
KETONES P FAST UR STRIP-MCNC: NEGATIVE MG/DL
PH UR STRIP: 7 [PH] (ref 4.6–8)
PROT UR QL STRIP: NEGATIVE
SP GR UR STRIP: 1.01 (ref 1–1.03)
UA UROBILINOGEN AMB POC: NORMAL (ref 0.2–1)
URINALYSIS CLARITY POC: CLEAR
URINALYSIS COLOR POC: YELLOW
URINE BLOOD POC: NEGATIVE
URINE LEUKOCYTES POC: NORMAL
URINE NITRITES POC: NEGATIVE

## 2019-02-22 PROCEDURE — 3331090002 HH PPS REVENUE DEBIT

## 2019-02-22 PROCEDURE — 3331090001 HH PPS REVENUE CREDIT

## 2019-02-22 PROCEDURE — 87086 URINE CULTURE/COLONY COUNT: CPT

## 2019-02-22 NOTE — PROGRESS NOTES
UA is improved - trace LE as opposed to 1+ LE previously - await UCx results prior to informing patient.

## 2019-02-23 PROCEDURE — 3331090002 HH PPS REVENUE DEBIT

## 2019-02-23 PROCEDURE — 3331090001 HH PPS REVENUE CREDIT

## 2019-02-24 LAB
BACTERIA SPEC CULT: NORMAL
SERVICE CMNT-IMP: NORMAL

## 2019-02-24 PROCEDURE — 3331090001 HH PPS REVENUE CREDIT

## 2019-02-24 PROCEDURE — 3331090002 HH PPS REVENUE DEBIT

## 2019-02-25 ENCOUNTER — HOME CARE VISIT (OUTPATIENT)
Dept: SCHEDULING | Facility: HOME HEALTH | Age: 84
End: 2019-02-25
Payer: MEDICARE

## 2019-02-25 ENCOUNTER — HOME CARE VISIT (OUTPATIENT)
Dept: HOME HEALTH SERVICES | Facility: HOME HEALTH | Age: 84
End: 2019-02-25
Payer: MEDICARE

## 2019-02-25 VITALS — HEART RATE: 67 BPM | SYSTOLIC BLOOD PRESSURE: 109 MMHG | OXYGEN SATURATION: 99 % | DIASTOLIC BLOOD PRESSURE: 78 MMHG

## 2019-02-25 PROCEDURE — 3331090003 HH PPS REVENUE ADJ

## 2019-02-25 PROCEDURE — 3331090001 HH PPS REVENUE CREDIT

## 2019-02-25 PROCEDURE — G0152 HHCP-SERV OF OT,EA 15 MIN: HCPCS

## 2019-02-25 PROCEDURE — 3331090002 HH PPS REVENUE DEBIT

## 2019-02-25 NOTE — PROGRESS NOTES
The following message was sent via [employer] to inform patient of lab results: 
 
 
Dear Ms. Villar, Your urine culture is negative. You do NOT have a UTI at this time.  
 
Sincerely, 
Dr. Laurel Jackson

## 2019-02-26 PROCEDURE — 3331090001 HH PPS REVENUE CREDIT

## 2019-02-26 PROCEDURE — 3331090002 HH PPS REVENUE DEBIT

## 2019-02-27 ENCOUNTER — OFFICE VISIT (OUTPATIENT)
Dept: FAMILY MEDICINE CLINIC | Age: 84
End: 2019-02-27

## 2019-02-27 VITALS
HEIGHT: 64 IN | SYSTOLIC BLOOD PRESSURE: 139 MMHG | BODY MASS INDEX: 22.33 KG/M2 | WEIGHT: 130.8 LBS | DIASTOLIC BLOOD PRESSURE: 80 MMHG | RESPIRATION RATE: 20 BRPM | HEART RATE: 79 BPM | TEMPERATURE: 98.3 F | OXYGEN SATURATION: 100 %

## 2019-02-27 DIAGNOSIS — Z71.89 ACP (ADVANCE CARE PLANNING): ICD-10-CM

## 2019-02-27 DIAGNOSIS — T20.00XD: ICD-10-CM

## 2019-02-27 DIAGNOSIS — F41.9 ANXIETY: ICD-10-CM

## 2019-02-27 DIAGNOSIS — Z51.81 MEDICATION MONITORING ENCOUNTER: ICD-10-CM

## 2019-02-27 DIAGNOSIS — Z00.00 MEDICARE ANNUAL WELLNESS VISIT, SUBSEQUENT: Primary | ICD-10-CM

## 2019-02-27 DIAGNOSIS — F32.A DEPRESSION, UNSPECIFIED DEPRESSION TYPE: ICD-10-CM

## 2019-02-27 PROCEDURE — 3331090001 HH PPS REVENUE CREDIT

## 2019-02-27 PROCEDURE — 3331090002 HH PPS REVENUE DEBIT

## 2019-02-27 RX ORDER — LORAZEPAM 0.5 MG/1
.25-.5 TABLET ORAL
Qty: 40 TAB | Refills: 0 | Status: SHIPPED | OUTPATIENT
Start: 2019-02-27 | End: 2020-02-10

## 2019-02-27 RX ORDER — PAROXETINE HYDROCHLORIDE 40 MG/1
TABLET, FILM COATED ORAL
Qty: 30 TAB | Refills: 5 | Status: SHIPPED | OUTPATIENT
Start: 2019-02-27 | End: 2019-10-15 | Stop reason: SDUPTHER
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
## 2019-02-27 NOTE — PROGRESS NOTES
Tacho Mann presents today for Chief Complaint Patient presents with [facility] Annual Wellness Visit  Labs Follow Up 2-19-19 and 2-22-19 Is someone accompanying this pt? Yes; Daughter Is the patient using any DME equipment during OV? Maliha Carmona Depression Screening: 
3 most recent PHQ Screens 1/18/2019 PHQ Not Done Medical Reason (indicate in comments) Little interest or pleasure in doing things Not at all Feeling down, depressed, irritable, or hopeless Not at all Total Score PHQ 2 0 Trouble falling or staying asleep, or sleeping too much - Feeling tired or having little energy - Poor appetite, weight loss, or overeating - Feeling bad about yourself - or that you are a failure or have let yourself or your family down - Trouble concentrating on things such as school, work, reading, or watching TV - Moving or speaking so slowly that other people could have noticed; or the opposite being so fidgety that others notice - Thoughts of being better off dead, or hurting yourself in some way - How difficult have these problems made it for you to do your work, take care of your home and get along with others - Learning Assessment: 
Learning Assessment 1/18/2019 PRIMARY LEARNER Patient HIGHEST LEVEL OF EDUCATION - PRIMARY LEARNER  GRADUATED HIGH SCHOOL OR GED  
BARRIERS PRIMARY LEARNER [id number] [facility] CAREGIVER Yes CO-LEARNER NAME Daughter CO-LEARNER HIGHEST LEVEL OF EDUCATION GRADUATED HIGH SCHOOL OR GED  
BARRIERS CO-LEARNER NONE PRIMARY LANGUAGE ENGLISH  
PRIMARY LANGUAGE CO-LEARNER ENGLISH  NEED -  
LEARNER PREFERENCE PRIMARY LISTENING  
  -  
LEARNER PREFERENCE CO-LEARNER LISTENING  
LEARNING SPECIAL TOPICS -  
ANSWERED BY self RELATIONSHIP SELF Abuse Screening: 
Abuse Screening Questionnaire 1/18/2019 Do you ever feel afraid of your partner? Clearnce Beams Are you in a relationship with someone who physically or mentally threatens you?  Clearnce Beams  
 Is it safe for you to go home? Sheila Kruse Health Maintenance Due Topic Date Due  Shingrix Vaccine Age 50> (1 of 2) 11/23/1981  BREAST CANCER SCRN MAMMOGRAM  04/13/2018  MEDICARE YEARLY EXAM  08/30/2018 [facility] reviewed and discussed and ordered per Provider. Dhaval Blue is updated on all  Coordination of Care 1. Have you been to the ER, urgent care clinic since your last visit? Hospitalized since your last visit? No 
 
2. Have you seen or consulted any other health care providers outside of the [address] [place] [address] since your last visit? Include any pap smears or colon screening. No 
 
Per-Dr. Tc kaufman medication not taking to be deleted. Advance Directive: 1. Do you have an advance directive in place? Patient Reply:No 
 
2. If not, would you like material regarding how to put one in place?  Patient Reply: Carolyn Silverman

## 2019-02-27 NOTE — PROGRESS NOTES
TEnd-of-Life planning (with patient's consent)his is the Subsequent Medicare Annual Wellness Exam, performed 12 months or more after the Initial AWV or the last Subsequent AWV I have reviewed the patient's medical history in detail and updated the computerized patient record. History Past Medical History:  
Diagnosis Date Tom[address]. 49. Alcohol  Acute renal failure ([facility] 3/9/2018  Adrenal insufficiency ([facility]  Anxiety  Calculus of kidney 1988  Cardiac echocardiogram 07/31/2012 EF 60-65%. No WMA. Mild conc LVH. Gr 1 DDfx. RVSP 20-25 mmHg. Mild SAGAR. Mild TR,. Mild PAE.  Cardiovascular LE venous duplex 10/08/2012 No venous thrombosis or venous insufficiency in bilateral lower extremities.  Carotid duplex 10/09/2014 Mild < 50% bilateral ICA stenosis.  Chronic lung disease  Contact dermatitis and other eczema, due to unspecified cause  COPD (chronic obstructive pulmonary disease) (HCC)  Degenerative joint disease  Depression  Hematuria  Hypercholesteremia  Hypertension  Hypothyroidism 1998  Kidney stone  Neuropathy 3/2007  Orthostatic hypotension  Pituitary adenoma ([facility] [address] 2/1998  Pituitary tumor 1998  Pneumonia  Seizures ([facility] [address] [facility]   
 none recently  Severe obstructive sleep apnea 01-15-15   
 started using Mineral Eusebio  Stress  Stroke ([facility] [address] 2/2006  
 no residual  
 TIA (transient ischemic attack)  Trauma Past Surgical History:  
Procedure Laterality Date  CYSTOSCOPY,INSERT URETERAL STENT  9/14/15 Dr. Ward Sherman  HX APPENDECTOMY  HX INTRAOCULAR LENS PROSTHESIS    
 HX LITHOTRIPSY  9/14/15 Dr. Srinivas Lam  HX TUBAL LIGATION    
 HX WISDOM TEETH EXTRACTION    
 x4 Current Outpatient Medications Medication Sig Dispense Refill  LORazepam (ATIVAN) 0.5 mg tablet Take 0.5-1 Tabs by mouth two (2) times daily as needed for Anxiety.  Max Daily Amount: 1 mg. 40 Tab 0  
  PARoxetine (PAXIL) 40 mg tablet TAKE ONE TABLET BY MOUTH EVERY DAY 30 Tab 5  
 budesonide (PULMICORT) 1 mg/2 mL nbsp 2 mL by Nebulization route daily. Rinse your mouth after each use. File under Medicare Part B Dx code J44.9 30 Each 5  Wheel Chair cal Manual wheelchair 1 Each 0  
 arformoterol (BROVANA) 15 mcg/2 mL nebu neb solution 2 mL by Nebulization route two (2) times a day. File Under Medicare B, ICD J44.9 180 Vial 3  
 hydrocortisone (CORTEF) 10 mg tablet Take 10 mg by mouth daily.  atorvastatin (LIPITOR) 10 mg tablet TAKE ONE TABLET BY MOUTH EVERY DAY (Patient taking differently: Take 10 mg by mouth daily. TAKE ONE TABLET BY MOUTH EVERY DAY) 90 Tab 3  
 lisinopril (PRINIVIL, ZESTRIL) 20 mg tablet TAKE ONE TABLET BY MOUTH EVERY DAY (Patient taking differently: Take 20 mg by mouth daily. TAKE ONE TABLET BY MOUTH EVERY DAY) 30 Tab 5  potassium chloride (K-DUR, KLOR-CON) 20 mEq tablet TAKE ONE TABLET BY MOUTH 2 TIMES A DAY (Patient taking differently: Take 20 mEq by mouth two (2) times a day. TAKE ONE TABLET BY MOUTH 2 TIMES A DAY) 180 Tab 3  
 amLODIPine (NORVASC) 5 mg tablet Take 1 Tab by mouth daily. TAKE ONE TABLET BY MOUTH EVERY DAY (Patient taking differently: Take 5 mg by mouth daily. TAKE ONE TABLET BY MOUTH EVERY DAY) 90 Tab 3  
 fluticasone furoate (ARNUITY ELLIPTA) 200 mcg/actuation dsdv inhaler Take 1 Puff by inhalation daily. Rinse mouth thoroughly after each use 1 Inhaler 11  
 ipratropium (ATROVENT) 0.02 % soln 2.5 mL by Nebulization route four (4) times daily. Indications: BRONCHOSPASM PREVENTION WITH COPD 360 Vial 3  
 albuterol (ACCUNEB) 1.25 mg/3 mL nebu 3 mL by Nebulization route every four (4) hours as needed. (Patient taking differently: 3 mL by Nebulization route every four (4) hours as needed for Cough. cough) 100 mL 0  
 nystatin (MYCOSTATIN) 100,000 unit/mL suspension Take 5 mL by mouth four (4) times daily.  swish and spit 200 mL 0  
  L. acidophilus,casei,rhamnosus (BIO-K PLUS) 50 billion cell cpDR Take 1 tab by mouth daily 7 Cap 0  
 famotidine (PEPCID) 20 mg tablet Take 1 Tab by mouth two (2) times a day. 20 Tab 0  
 Nebulizer & Compressor (PORTABLE NEBULIZER SYSTEM) machine 1 Each by Does Not Apply route two (2) times a day. (Patient taking differently: 1 Each by Does Not Apply route two (2) times a day.) 1 Each 0  
 cholecalciferol, vitamin D3, 4,000 unit cap Take 1 Cap by mouth daily.  OXYGEN-AIR DELIVERY SYSTEMS 3 L/min by Nasal route continuous. First Choice O2    
 nystatin (MYCOSTATIN) powder Apply  to affected area four (4) times daily. (Patient taking differently: Apply 100 g to affected area four (4) times daily. ) 1 Bottle 5  
 albuterol (PROAIR HFA) 90 mcg/actuation inhaler Take 2 Puffs by inhalation every four (4) hours as needed for Wheezing or Shortness of Breath. (Patient taking differently: Take 2 Puffs by inhalation every four (4) hours as needed for Wheezing or Shortness of Breath.) 1 Inhaler 5  
 SYNTHROID 112 mcg tablet Take 112 mcg by mouth Daily (before breakfast).  Walker (BARNEY CLIFTON WALKER) misc 1 Device by Does Not Apply route daily. 1 Each 0  
 bipap machine kit 1 Each by Does Not Apply route. Started using BiPap Machine 01-15-15 [facility]    
 L-Methylfolate-D45-Qghlonpglm (CEREFOLIN NAC) tablet Take 1 tablet by mouth daily. (Patient taking differently: Take 1 Tab by mouth daily.) 30 tablet 5  
 fludrocortisone (FLORINEF) 0.1 mg tablet Take 1 tablet by mouth daily. (Patient taking differently: Take 1 Tab by mouth daily.) 30 tablet 5  
 oxcarbazepine (TRILEPTAL) 300 mg tablet Take 300 mg by mouth two (2) times a day.  magnesium oxide (MAG-OX) 400 mg tablet Take 400 mg by mouth daily.  aspirin 81 mg tablet Take 81 mg by mouth daily.  DOCOSAHEXANOIC ACID/EPA (FISH OIL PO) Take 4,000 mg by mouth daily.     
 CALCIUM CITRATE/VITAMIN D3 (CALCIUM CITRATE + PO) Take 600 mg by mouth once over twenty-four (24) hours.  bacitracin zinc (BACITRACIN) ointment Apply 1 Dose to affected area daily. Apply thin layer to burns on face daily Allergies Allergen Reactions  Iodine Hives Family History Problem Relation Age of Onset  Heart Disease Father  Hypertension Father  Hypertension Mother  Cancer Mother   
     skin  Elevated Lipids Sister  Heart Disease Sister  Cancer Maternal Grandmother   
     colon and stomach  
 Heart Disease Paternal Grandmother  Heart Attack Paternal Grandmother  Heart Attack Paternal Grandfather  Heart Disease Paternal Grandfather Social History Tobacco Use  Smoking status: Current Some Day Smoker Packs/day: 1.50 Years: 65.00 Pack years: 97.50 Types: Cigarettes Last attempt to quit: 3/9/2018 Years since quittin.9  Smokeless tobacco: Never Used  Tobacco comment: 18 decreased amt. of cigarettes/day only Substance Use Topics  Alcohol use: No  
  Alcohol/week: 0.0 oz  
  Comment: quit  due to Alcohol Abuse Patient Active Problem List  
Diagnosis Code  Essential hypertension, benign I10  Dyslipidemia E78.5  Tobacco abuse Z72.0  Hypothyroidism E03.9  COPD (chronic obstructive pulmonary disease) (HCC) J44.9  Pituitary adenoma ([address] D35.2  Nausea and vomiting R11.2  Acute upper respiratory infection J06.9  Nausea alone R11.0  Vasovagal reaction R55  Pancreatitis K85.90  
 XUAN (obstructive sleep apnea) G47.33  
 Flank pain R10.9  Kidney stone N20.0  Lumbar spinal stenosis M48.061  
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  
 
3 most recent PHQ Screens 1/18/2019 PHQ Not Done Medical Reason (indicate in comments) Little interest or pleasure in doing things Not at all Feeling down, depressed, irritable, or hopeless Not at all Total Score PHQ 2 0 Trouble falling or staying asleep, or sleeping too much - Feeling tired or having little energy - Poor appetite, weight loss, or overeating - Feeling bad about yourself - or that you are a failure or have let yourself or your family down - Trouble concentrating on things such as school, work, reading, or watching TV - Moving or speaking so slowly that other people could have noticed; or the opposite being so fidgety that others notice - Thoughts of being better off dead, or hurting yourself in some way - How difficult have these problems made it for you to do your work, take care of your home and get along with others - Alcohol Risk Factor Screening: You do not drink alcohol or very rarely. Functional Ability and Level of Safety:  
Hearing Loss Hearing is mildly impaired. Activities of Daily Living The home contains: grab bars Patient needs help with:  transportation, housework, dressing, bathing, hygiene and bathroom needs Fall Risk Fall Risk Assessment, last 12 mths 1/18/2019 Able to walk? Yes Fall in past 12 months? No  
Fall with injury? -  
Number of falls in past 12 months - Fall Risk Score -  
 
 
Abuse Screen Patient is not abused Cognitive Screening Evaluation of Cognitive Function: 
Has your family/caregiver stated any concerns about your memory: yes Clock Drawing test 
 
Patient Care Team  
Patient Care Team: 
Kirill Talavera MD as PCP - General (Family Practice) John Pabon MD (Neurology) Dipak Abdi MD (Endocrinology) Julianne Herron MD (Nephrology) Chio Ponce MD (Pulmonary Disease) Dez Wilkerson MD (Cardiology) Jessica Lopez MD (Pulmonary Disease) Assessment/Plan Education and counseling provided: 
End-of-Life planning (with patient's consent) Diagnoses and all orders for this visit: 
 
1. Medicare annual wellness visit, subsequent 2. ACP (advance care planning) Health Maintenance Due Topic Date Due  Shingrix Vaccine Age 50> (1 of 2) 11/23/1981  BREAST CANCER SCRN MAMMOGRAM  04/13/2018

## 2019-02-27 NOTE — PROGRESS NOTES
Advance Care Planning (ACP) Provider Note - Comprehensive Date of ACP Conversation: 02/27/19 Persons included in Conversation:  patient Length of ACP Conversation in minutes:  16 minutes Authorized Decision Maker (if patient is incapable of making informed decisions): This person is: 
Healthcare Agent/Medical Power of  under Advance Directive General ACP for ALL Patients with Decision Making Capacity: 
 Importance of advance care planning, including choosing a healthcare agent to communicate patient's healthcare decisions if patient lost the ability to make decisions, such as after a sudden illness or accident Understanding of the healthcare agent role was assessed and information provided Exploration of values, goals, and preferences if recovery is not expected, even with continued medical treatment in the event of: Imminent death Severe, permanent brain injury \"In these circumstances, what matters most to you? \"  Care focused more on comfort or quality of life. Review of Existing Advance Directive: 
pt does not have an AD For Serious or Chronic Illness: 
Understanding of medical condition Interventions Provided: 
Recommended completion of Advance Directive form after review of ACP materials and conversation with prospective healthcare agent

## 2019-02-27 NOTE — PATIENT INSTRUCTIONS
Medicare Wellness Visit, Female The best way to live healthy is to have a lifestyle where you eat a well-balanced diet, exercise regularly, limit alcohol use, and quit all forms of tobacco/nicotine, if applicable. Regular preventive services are another way to keep healthy. Preventive services (vaccines, screening tests, monitoring & exams) can help personalize your care plan, which helps you manage your own care. Screening tests can find health problems at the earliest stages, when they are easiest to treat. [facility] follows the current, evidence-based guidelines published by the [facility] Symone ([facility]) when recommending preventive services for our patients. Because we follow these guidelines, sometimes recommendations change over time as research supports it. (For example, mammograms used to be recommended annually. Even though Medicare will still pay for an annual mammogram, the newer guidelines recommend a mammogram every two years for women of average risk.) Of course, you and your doctor may decide to screen more often for some diseases, based on your risk and your health status. Preventive services for you include: - Medicare offers their members a free annual wellness visit, which is time for you and your primary care provider to discuss and plan for your preventive service needs. Take advantage of this benefit every year! 
-All adults over the age of 72 should receive the recommended pneumonia vaccines. Current USPSTF guidelines recommend a series of two vaccines for the best pneumonia protection.  
-All adults should have a flu vaccine yearly and a tetanus vaccine every 10 years. All adults age 61 and older should receive a shingles vaccine once in their lifetime.   
-A bone mass density test is recommended when a woman turns 65 to screen for osteoporosis. This test is only recommended one time, as a screening. Some providers will use this same test as a disease monitoring tool if you already have osteoporosis. -All adults age 38-68 who are overweight should have a diabetes screening test once every three years.  
-Other screening tests and preventive services for persons with diabetes include: an eye exam to screen for diabetic retinopathy, a kidney function test, a foot exam, and stricter control over your cholesterol.  
-Cardiovascular screening for adults with routine risk involves an electrocardiogram (ECG) at intervals determined by your doctor.  
-Colorectal cancer screenings should be done for adults age 54-65 with no increased risk factors for colorectal cancer. There are a number of acceptable methods of screening for this type of cancer. Each test has its own benefits and drawbacks. Discuss with your doctor what is most appropriate for you during your annual wellness visit. The different tests include: colonoscopy (considered the best screening method), a fecal occult blood test, a fecal DNA test, and sigmoidoscopy. -Breast cancer screenings are recommended every other year for women of normal risk, age 54-69. 
-Cervical cancer screenings for women over age 72 are only recommended with certain risk factors.  
-All adults born between [facility] should be screened once for Hepatitis C. Here is a list of your current Health Maintenance items (your personalized list of preventive services) with a due date: 
Health Maintenance Due Topic Date Due  Shingles Vaccine (1 of 2) 11/23/1981  Breast Cancer Screening  04/13/2018 Luis Angel Thomas Annual Well Visit  08/30/2018

## 2019-03-27 ENCOUNTER — HOSPITAL ENCOUNTER (OUTPATIENT)
Dept: LAB | Age: 84
Discharge: HOME | End: 2019-03-27
Attending: PSYCHIATRY & NEUROLOGY
Payer: MEDICARE

## 2019-03-27 ENCOUNTER — HOSPITAL ENCOUNTER (OUTPATIENT)
Age: 84
Discharge: HOME | End: 2019-03-27
Attending: PSYCHIATRY & NEUROLOGY
Payer: MEDICARE

## 2019-03-27 DIAGNOSIS — G93.40 ENCEPHALOPATHY, UNSPECIFIED: ICD-10-CM

## 2019-03-27 DIAGNOSIS — G40.209 COMPLEX PARTIAL SEIZURES WITH CONSCIOUSNESS IMPAIRED (HCC): ICD-10-CM

## 2019-03-27 PROCEDURE — 70551 MRI BRAIN STEM W/O DYE: CPT

## 2019-03-28 ENCOUNTER — HOSPITAL ENCOUNTER (OUTPATIENT)
Dept: LAB | Age: 84
Discharge: HOME | End: 2019-03-28
Payer: MEDICARE

## 2019-03-28 LAB
ANION GAP SERPL CALC-SCNC: 5 MMOL/L (ref 3–18)
BUN SERPL-MCNC: 22 MG/DL (ref 7–18)
BUN/CREAT SERPL: 19 (ref 12–20)
CALCIUM SERPL-MCNC: 9.7 MG/DL (ref 8.5–10.1)
CHLORIDE SERPL-SCNC: 101 MMOL/L (ref 100–108)
CO2 SERPL-SCNC: 32 MMOL/L (ref 21–32)
CREAT SERPL-MCNC: 1.15 MG/DL (ref 0.6–1.3)
FOLATE SERPL-MCNC: >20 NG/ML (ref 3.1–17.5)
GLUCOSE SERPL-MCNC: 95 MG/DL (ref 74–99)
POTASSIUM SERPL-SCNC: 4.7 MMOL/L (ref 3.5–5.5)
SODIUM SERPL-SCNC: 138 MMOL/L (ref 136–145)
T4 FREE SERPL-MCNC: 0.8 NG/DL (ref 0.7–1.5)
TSH SERPL DL<=0.05 MIU/L-ACNC: 0.03 UIU/ML (ref 0.36–3.74)
VIT B12 SERPL-MCNC: >2000 PG/ML (ref 211–911)

## 2019-03-28 PROCEDURE — 80048 BASIC METABOLIC PNL TOTAL CA: CPT

## 2019-03-28 PROCEDURE — 82607 VITAMIN B-12: CPT

## 2019-03-28 PROCEDURE — 84439 ASSAY OF FREE THYROXINE: CPT

## 2019-03-28 PROCEDURE — 84207 ASSAY OF VITAMIN B-6: CPT

## 2019-03-28 PROCEDURE — 84446 ASSAY OF VITAMIN E: CPT

## 2019-03-28 PROCEDURE — 36415 COLL VENOUS BLD VENIPUNCTURE: CPT

## 2019-03-29 LAB — VIT B6 SERPL-MCNC: NORMAL UG/L

## 2019-03-31 LAB
VITAMIN E/ALPHA-TOCOPHEROL: 11.5 MG/L (ref 9–29)
VITAMIN E/GAMMA-TOCOPHEROL: 0.6 MG/L (ref 0.5–4.9)

## 2019-04-02 ENCOUNTER — OFFICE VISIT (OUTPATIENT)
Dept: FAMILY MEDICINE CLINIC | Age: 84
End: 2019-04-02

## 2019-04-02 ENCOUNTER — HOSPITAL ENCOUNTER (OUTPATIENT)
Dept: GENERAL RADIOLOGY | Age: 84
Discharge: HOME | End: 2019-04-02
Payer: MEDICARE

## 2019-04-02 VITALS
BODY MASS INDEX: 21.58 KG/M2 | WEIGHT: 126.4 LBS | OXYGEN SATURATION: 97 % | DIASTOLIC BLOOD PRESSURE: 75 MMHG | TEMPERATURE: 98.8 F | RESPIRATION RATE: 20 BRPM | SYSTOLIC BLOOD PRESSURE: 156 MMHG | HEIGHT: 64 IN | HEART RATE: 66 BPM

## 2019-04-02 DIAGNOSIS — I10 ESSENTIAL HYPERTENSION, BENIGN: ICD-10-CM

## 2019-04-02 DIAGNOSIS — Z01.818 PREOPERATIVE CLEARANCE: Primary | ICD-10-CM

## 2019-04-02 DIAGNOSIS — N18.30 STAGE 3 CHRONIC KIDNEY DISEASE (HCC): ICD-10-CM

## 2019-04-02 DIAGNOSIS — E78.5 DYSLIPIDEMIA: ICD-10-CM

## 2019-04-02 DIAGNOSIS — J43.9 PULMONARY EMPHYSEMA, UNSPECIFIED EMPHYSEMA TYPE (HCC): ICD-10-CM

## 2019-04-02 DIAGNOSIS — G47.33 OSA (OBSTRUCTIVE SLEEP APNEA): ICD-10-CM

## 2019-04-02 DIAGNOSIS — Z01.818 PREOPERATIVE CLEARANCE: ICD-10-CM

## 2019-04-02 DIAGNOSIS — Z99.81 SUPPLEMENTAL OXYGEN DEPENDENT: ICD-10-CM

## 2019-04-02 DIAGNOSIS — G31.84 MILD COGNITIVE IMPAIRMENT: ICD-10-CM

## 2019-04-02 DIAGNOSIS — I45.2 RBBB (RIGHT BUNDLE BRANCH BLOCK WITH LEFT ANTERIOR FASCICULAR BLOCK): ICD-10-CM

## 2019-04-02 DIAGNOSIS — T20.30XD: ICD-10-CM

## 2019-04-02 PROBLEM — E86.0 DEHYDRATION: Status: RESOLVED | Noted: 2018-03-09 | Resolved: 2019-04-02

## 2019-04-02 PROBLEM — R79.89 ELEVATED LACTIC ACID LEVEL: Status: RESOLVED | Noted: 2018-03-09 | Resolved: 2019-04-02

## 2019-04-02 PROBLEM — J18.9 HCAP (HEALTHCARE-ASSOCIATED PNEUMONIA): Status: RESOLVED | Noted: 2018-03-19 | Resolved: 2019-04-02

## 2019-04-02 PROBLEM — R06.2 WHEEZING: Status: RESOLVED | Noted: 2018-03-09 | Resolved: 2019-04-02

## 2019-04-02 PROBLEM — N30.01 ACUTE CYSTITIS WITH HEMATURIA: Status: RESOLVED | Noted: 2019-01-09 | Resolved: 2019-04-02

## 2019-04-02 PROBLEM — T20.00XA FACE BURNS: Status: ACTIVE | Noted: 2019-01-04

## 2019-04-02 PROBLEM — J18.9 PNEUMONIA: Status: RESOLVED | Noted: 2018-03-18 | Resolved: 2019-04-02

## 2019-04-02 PROBLEM — K52.9 GASTROENTERITIS: Status: RESOLVED | Noted: 2018-03-09 | Resolved: 2019-04-02

## 2019-04-02 PROBLEM — E87.6 HYPOKALEMIA: Status: RESOLVED | Noted: 2018-06-26 | Resolved: 2019-04-02

## 2019-04-02 PROBLEM — J18.9 RIGHT LOWER LOBE PNEUMONIA: Status: RESOLVED | Noted: 2018-03-18 | Resolved: 2019-04-02

## 2019-04-02 PROBLEM — N30.01 ACUTE CYSTITIS WITH HEMATURIA: Status: ACTIVE | Noted: 2019-01-09

## 2019-04-02 LAB — VIT B6 SERPL-MCNC: 3.5 UG/L (ref 2–32.8)

## 2019-04-02 PROCEDURE — 71046 X-RAY EXAM CHEST 2 VIEWS: CPT

## 2019-04-02 NOTE — PROGRESS NOTES
Scout Hemp presents today for Chief Complaint Patient presents with  Pre-op Exam  
  Clearance Is someone accompanying this pt? Yes; Daughter Gudelia Guevara Is the patient using any DME equipment during OV? Yes; Iron Monroy Depression Screening: 
3 most recent PHQ Screens 1/18/2019 PHQ Not Done Medical Reason (indicate in comments) Little interest or pleasure in doing things Not at all Feeling down, depressed, irritable, or hopeless Not at all Total Score PHQ 2 0 Trouble falling or staying asleep, or sleeping too much - Feeling tired or having little energy - Poor appetite, weight loss, or overeating - Feeling bad about yourself - or that you are a failure or have let yourself or your family down - Trouble concentrating on things such as school, work, reading, or watching TV - Moving or speaking so slowly that other people could have noticed; or the opposite being so fidgety that others notice - Thoughts of being better off dead, or hurting yourself in some way - How difficult have these problems made it for you to do your work, take care of your home and get along with others - Learning Assessment: 
Learning Assessment 1/18/2019 PRIMARY LEARNER Patient HIGHEST LEVEL OF EDUCATION - PRIMARY LEARNER  GRADUATED HIGH SCHOOL OR GED  
BARRIERS PRIMARY LEARNER [id number] CAREGIVER Yes CO-LEARNER NAME Daughter CO-LEARNER HIGHEST LEVEL OF EDUCATION GRADUATED HIGH SCHOOL OR GED  
BARRIERS CO-LEARNER NONE PRIMARY LANGUAGE ENGLISH  
PRIMARY LANGUAGE CO-LEARNER ENGLISH  NEED -  
LEARNER PREFERENCE PRIMARY LISTENING  
  -  
LEARNER PREFERENCE CO-LEARNER LISTENING  
LEARNING SPECIAL TOPICS -  
ANSWERED BY self RELATIONSHIP SELF Abuse Screening: 
Abuse Screening Questionnaire 1/18/2019 Do you ever feel afraid of your partner? Fernandez Londono Are you in a relationship with someone who physically or mentally threatens you?  Fernandez Londono  
 Is it safe for you to go home? James Solis Fall Screening Fall Risk Assessment, last 12 mths 1/18/2019 Able to walk? Yes Fall in past 12 months? No  
Fall with injury? -  
Number of falls in past 12 months - Fall Risk Score - Health Maintenance Due Topic Date Due  Shingrix Vaccine Age 50> (1 of 2) 11/23/1981  BREAST CANCER SCRN MAMMOGRAM  04/13/2018 [facility] reviewed and discussed and ordered per Provider. Gino Ascencio is updated on all  Coordination of Care 1. Have you been to the ER, urgent care clinic since your last visit? Hospitalized since your last visit? No 
 
2. Have you seen or consulted any other health care providers outside of the [address] [place] [address] since your last visit? Include any pap smears or colon screening. No 
 
 
Advance Directive: 1. Do you have an advance directive in place? Patient Reply:No 
 
2. If not, would you like material regarding how to put one in place?  Patient Reply: No

## 2019-04-03 ENCOUNTER — TELEPHONE (OUTPATIENT)
Dept: FAMILY MEDICINE CLINIC | Age: 84
End: 2019-04-03

## 2019-04-03 NOTE — TELEPHONE ENCOUNTER
Spoke with Claire at Dr Devorah Irene office to discuss concerning chest x-ray results, and that pt needs to be cleared by pulmonary before having surgery. She verbalized that she will communicate this with the surgeon and pt's family.

## 2019-04-03 NOTE — TELEPHONE ENCOUNTER
Called patient and spoke with blanka Carpneter and notified her that patient has an appointment scheduled with Pulmonology Dr. Luis Robertson office April 5, 2019 at 8:30 am for full breathing test and 11:30 for an appointment. Blanka Carpenter stated okay that was fine.

## 2019-04-05 ENCOUNTER — OFFICE VISIT (OUTPATIENT)
Dept: PULMONOLOGY | Age: 84
End: 2019-04-05

## 2019-04-05 VITALS
WEIGHT: 129 LBS | RESPIRATION RATE: 21 BRPM | HEIGHT: 64 IN | DIASTOLIC BLOOD PRESSURE: 70 MMHG | OXYGEN SATURATION: 94 % | SYSTOLIC BLOOD PRESSURE: 120 MMHG | HEART RATE: 68 BPM | TEMPERATURE: 98 F | BODY MASS INDEX: 22.02 KG/M2

## 2019-04-05 DIAGNOSIS — Z01.811 ENCOUNTER FOR PREOPERATIVE PULMONARY EXAMINATION: ICD-10-CM

## 2019-04-05 DIAGNOSIS — J96.11 CHRONIC RESPIRATORY FAILURE WITH HYPOXIA (HCC): ICD-10-CM

## 2019-04-05 DIAGNOSIS — R91.8 ABNORMAL X-RAY OF LUNG: Primary | ICD-10-CM

## 2019-04-05 DIAGNOSIS — Z87.891 PERSONAL HISTORY OF TOBACCO USE, PRESENTING HAZARDS TO HEALTH: ICD-10-CM

## 2019-04-05 DIAGNOSIS — J44.9 COPD, SEVERE (HCC): ICD-10-CM

## 2019-04-05 DIAGNOSIS — R06.02 SHORTNESS OF BREATH: ICD-10-CM

## 2019-04-05 DIAGNOSIS — R06.09 DYSPNEA ON EXERTION: ICD-10-CM

## 2019-04-05 NOTE — PROGRESS NOTES
Patient continues to not be able to preform PFT due to inability to follow directions and not able to fully exhale during testing.

## 2019-04-05 NOTE — PROGRESS NOTES
Verbal Order with read back per Dr. Malu Coelho MD  For PFT smart panel. AMB POC PFT complete w/ bronchodilator AMB POC PFT complete w/o bronchodilator Gas Dilute/ wash out lung vol w/wo distrib vet & vol 
Diffusing capacity Dr. Malu Coelho MD will co-sign the orders.

## 2019-04-05 NOTE — LETTER
4/9/19 Patient: Margarette Khan YOB: 1931 Date of Visit: 4/5/2019 Lacie Addison MD 
[facility] [id number] [address] [address] VIA In Basket Kiki Toledo MD 
[address] [place] [id number] VIA Facsimile: [phone] Dear MD Kiki Rendon Ala, MD, Thank you for referring Ms. Jeff Ruiz to [address] [place] [address] for evaluation. My notes for this consultation are attached. If you have questions, please do not hesitate to call me. I look forward to following your patient along with you. Sincerely, Aretha Leyden MD/MPH Pulmonary, Critical Care Medicine [facility]

## 2019-04-05 NOTE — PROGRESS NOTES
The pt. Is accompanied by two daughters. They are involved in mother's care. They express concern that due to the pt's nose injury she cannot tolerate the nasal cannula on O2 and is now using a facial mask. The mask is creating a lot of skin irritation in the mouth and nose area. The pt. Is pending plastic surgery for the nose via Dr. Hilaria Blas. Her PCP's N. P. Saw the pre surg. CXR and  was concerned over the result. She denied Medical Clearance and referred the pt. To us.
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
## 2019-04-05 NOTE — PROGRESS NOTES
[facility] [address] [facility] [place] [address] [place], [address] [address],[place] [address] [facility]) [phone] ([address]) [phone] Simple walk test done in office today. Qualifying O2 sats:  
 
O2 Sat at rest on room air is:95 %, Pulse 63, SOB scale 0/10 Walked: 29   m on Room Air : 92 %, Pulse 94, SOB scale 0/10 
    68   m on Room Air : 91 %, Pulse 96, SOB scale 0/10  Pt. C/o skeletal pain and becoming somewhat wobbly. 80 m on Room Air : 95%, Pulse 99, SOB scale 3/10  Pt. Couldn't ambulate further. O2 Sat at rest One Minute is: 95%  P  103  SOB scale 3/10 Two Minutes is: 99%  P  72  SOB scale 0/10 Tech comments regarding testing: the pt. Could only tolerate 76 m of exercise but, was persuaded to walk another 34 m. She did not desat on 102 m of ambulation and was not in resp. Distress.  
 
Stephen Strickland, GIDEONN

## 2019-04-09 ENCOUNTER — TELEPHONE (OUTPATIENT)
Dept: PULMONOLOGY | Age: 84
End: 2019-04-09

## 2019-04-09 NOTE — TELEPHONE ENCOUNTER
[facility] requested a 6 Min. Walk be done on the pt's f/u appt. 4/5/19. The follow up note is faxed to them in explanation of why the 6 Min. Walk couldn't be done at this time.

## 2019-06-18 RX ORDER — IPRATROPIUM BROMIDE 0.5 MG/2.5ML
0.5 SOLUTION RESPIRATORY (INHALATION) 4 TIMES DAILY
Qty: 900 ML | Refills: 4 | Status: SHIPPED | OUTPATIENT
Start: 2019-06-18

## 2019-06-25 ENCOUNTER — PATIENT OUTREACH (OUTPATIENT)
Dept: FAMILY MEDICINE CLINIC | Age: 84
End: 2019-06-25
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
## 2019-07-02 ENCOUNTER — OFFICE VISIT (OUTPATIENT)
Dept: FAMILY MEDICINE CLINIC | Age: 84
End: 2019-07-02

## 2019-07-02 VITALS
OXYGEN SATURATION: 94 % | BODY MASS INDEX: 21.34 KG/M2 | HEART RATE: 74 BPM | WEIGHT: 125 LBS | HEIGHT: 64 IN | TEMPERATURE: 96.9 F | DIASTOLIC BLOOD PRESSURE: 65 MMHG | SYSTOLIC BLOOD PRESSURE: 108 MMHG | RESPIRATION RATE: 20 BRPM

## 2019-07-02 DIAGNOSIS — Z99.81 SUPPLEMENTAL OXYGEN DEPENDENT: ICD-10-CM

## 2019-07-02 DIAGNOSIS — J96.11 CHRONIC RESPIRATORY FAILURE WITH HYPOXIA (HCC): ICD-10-CM

## 2019-07-02 DIAGNOSIS — R56.9 SEIZURES (HCC): ICD-10-CM

## 2019-07-02 DIAGNOSIS — G31.84 MILD COGNITIVE IMPAIRMENT: ICD-10-CM

## 2019-07-02 DIAGNOSIS — R55 VASOVAGAL REACTION: Primary | ICD-10-CM

## 2019-07-02 NOTE — PROGRESS NOTES
Duran Lance presents today for   Chief Complaint   Patient presents with   [facility] Follow Up     Syncope episodes; [facility] request for extension of services       Is someone accompanying this pt? Yes; Daughter; Jackie Fontanez    Is the patient using any DME equipment during OV? Yes; walker    Depression Screening:  3 most recent PHQ Screens 4/5/2019   PHQ Not Done Medical Reason (indicate in comments)   Little interest or pleasure in doing things Several days   Feeling down, depressed, irritable, or hopeless Several days   Total Score PHQ 2 2   Trouble falling or staying asleep, or sleeping too much -   Feeling tired or having little energy -   Poor appetite, weight loss, or overeating -   Feeling bad about yourself - or that you are a failure or have let yourself or your family down -   Trouble concentrating on things such as school, work, reading, or watching TV -   Moving or speaking so slowly that other people could have noticed; or the opposite being so fidgety that others notice -   Thoughts of being better off dead, or hurting yourself in some way -   How difficult have these problems made it for you to do your work, take care of your home and get along with others -       Learning Assessment:  Learning Assessment 1/18/2019   PRIMARY LEARNER Patient   HIGHEST LEVEL OF EDUCATION - PRIMARY LEARNER  GRADUATED HIGH SCHOOL OR GED   BARRIERS PRIMARY LEARNER COGNITIVE   CO-LEARNER CAREGIVER Yes   CO-LEARNER NAME Daughter   [facility]    NEED -   LEARNER PREFERENCE PRIMARY LISTENING     -   LEARNER Leonel 9 -   ANSWERED BY self   RELATIONSHIP SELF       Abuse Screening:  Abuse Screening Questionnaire 1/18/2019   Do you ever feel afraid of your partner?  N   Are you in a relationship with someone who physically or mentally threatens you? N   Is it safe for you to go home? Y       Fall Screening:  Fall Risk Assessment, last 12 mths 4/5/2019   Able to walk? Yes   Fall in past 12 months? Yes   Fall with injury? Yes   Number of falls in past 12 months 2   Fall Risk Score 3         Health Maintenance Due   Topic Date Due    Shingrix Vaccine Age 49> (1 of 2) 11/23/1981    BREAST CANCER SCRN MAMMOGRAM  04/13/2018   . Health Maintenance reviewed and discussed and ordered per Provider. Coordination of Care  1. Have you been to the ER, urgent care clinic since your last visit? Hospitalized since your last visit? Yes; Syncope; [facility]    2. Have you seen or consulted any other health care providers outside of the [address] [place] [address] [place] since your last visit? Include any pap smears or colon screening. Dr. Jackson Frame Directive:  1. Do you have an advance directive in place? Patient Reply:No    2. If not, would you like material regarding how to put one in place?  Patient Reply: No
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
## 2019-07-10 ENCOUNTER — PATIENT OUTREACH (OUTPATIENT)
Dept: FAMILY MEDICINE CLINIC | Age: 84
End: 2019-07-10

## 2019-07-10 NOTE — PROGRESS NOTES
Follow up Dx:  [facility] -19 for syncope. Nurse Navigator(NN) contacted the patient's daughter by telephone to perform follow up assessment. NN identified self and explained reason for this outreach. Verified  and address with Mrs. Gomez as identifiers. Mrs. Gomez states that patient has had no further tonic clonic movements or syncopal episodes since discharge from hospital.   Mrs. Gomez requested that NN contact patient at her home number [phone]. NN attempted to contact patient at this number, no answer to phone. Medication:   Medication reconciliation was not performed with Mrs. Gomez at this time. PCP/Specialist follow up: Attended PCP follow up appointment on 19. Mrs. Gomez reports that patient attended appointment with Dr. Melendez Led week and everything is fine\".

## 2019-07-19 ENCOUNTER — PATIENT OUTREACH (OUTPATIENT)
Dept: FAMILY MEDICINE CLINIC | Age: 84
End: 2019-07-19
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
## 2019-07-23 ENCOUNTER — OFFICE VISIT (OUTPATIENT)
Dept: PULMONOLOGY | Age: 84
End: 2019-07-23

## 2019-07-23 VITALS
SYSTOLIC BLOOD PRESSURE: 136 MMHG | BODY MASS INDEX: 21.24 KG/M2 | RESPIRATION RATE: 16 BRPM | OXYGEN SATURATION: 97 % | DIASTOLIC BLOOD PRESSURE: 82 MMHG | WEIGHT: 124.4 LBS | HEART RATE: 62 BPM | TEMPERATURE: 97.5 F | HEIGHT: 64 IN

## 2019-07-23 DIAGNOSIS — J96.11 CHRONIC HYPOXEMIC RESPIRATORY FAILURE (HCC): ICD-10-CM

## 2019-07-23 DIAGNOSIS — J44.9 COPD, GROUP C, BY GOLD 2017 CLASSIFICATION (HCC): Primary | ICD-10-CM

## 2019-07-23 DIAGNOSIS — Z87.891 PERSONAL HISTORY OF TOBACCO USE, PRESENTING HAZARDS TO HEALTH: ICD-10-CM
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
## 2019-07-25 ENCOUNTER — PATIENT OUTREACH (OUTPATIENT)
Dept: FAMILY MEDICINE CLINIC | Age: 84
End: 2019-07-25

## 2019-07-25 NOTE — PROGRESS NOTES
Patient has graduated from the Transitions of Care Coordination  program on 7/25/19. Patient's symptoms are stable at this time. Patient/family has the ability to self-manage. Care management goals have been completed at this time. No further nurse navigator follow up scheduled. Goals Addressed                 This Visit's Progress     COMPLETED: Attends follow-up appointments as directed.  COMPLETED: Prevent complications post hospitalization. Patient will attend all doctors appointments as scheduled  Nurse Navigator will contact patient weekly for at least 4 weeks after discharge            Pt has nurse navigator's contact information for any further questions, concerns, or needs. Patients upcoming visits:  No future appointments.

## 2019-09-20 PROBLEM — Z01.818 PREOPERATIVE CLEARANCE: Status: RESOLVED | Noted: 2019-04-02 | Resolved: 2019-09-20

## 2019-10-15 DIAGNOSIS — F32.A DEPRESSION, UNSPECIFIED DEPRESSION TYPE: ICD-10-CM

## 2019-10-15 DIAGNOSIS — F41.9 ANXIETY: ICD-10-CM

## 2019-10-15 RX ORDER — PAROXETINE HYDROCHLORIDE 40 MG/1
TABLET, FILM COATED ORAL
Qty: 30 TAB | Refills: 12 | Status: SHIPPED | OUTPATIENT
Start: 2019-10-15 | End: 2020-03-29

## 2019-10-28 ENCOUNTER — OFFICE VISIT (OUTPATIENT)
Dept: FAMILY MEDICINE CLINIC | Age: 84
End: 2019-10-28

## 2019-10-28 VITALS
HEIGHT: 64 IN | TEMPERATURE: 98.3 F | OXYGEN SATURATION: 95 % | SYSTOLIC BLOOD PRESSURE: 134 MMHG | RESPIRATION RATE: 20 BRPM | HEART RATE: 73 BPM | BODY MASS INDEX: 20.9 KG/M2 | DIASTOLIC BLOOD PRESSURE: 78 MMHG | WEIGHT: 122.4 LBS

## 2019-10-28 DIAGNOSIS — Z12.31 ENCOUNTER FOR SCREENING MAMMOGRAM FOR MALIGNANT NEOPLASM OF BREAST: ICD-10-CM

## 2019-10-28 DIAGNOSIS — E78.5 DYSLIPIDEMIA: ICD-10-CM

## 2019-10-28 DIAGNOSIS — F03.90 DEMENTIA WITHOUT BEHAVIORAL DISTURBANCE, UNSPECIFIED DEMENTIA TYPE: ICD-10-CM

## 2019-10-28 DIAGNOSIS — D64.9 ANEMIA, UNSPECIFIED TYPE: ICD-10-CM

## 2019-10-28 DIAGNOSIS — I10 ESSENTIAL HYPERTENSION, BENIGN: Primary | ICD-10-CM

## 2019-10-28 DIAGNOSIS — E87.6 HYPOKALEMIA: ICD-10-CM

## 2019-10-28 NOTE — PROGRESS NOTES
Chief Complaint   Patient presents with    Hypertension     follow up    Anemia         HPI    Frederica Holter is a 80 y.o. female presenting today for follow up of htn, hld, anemia. Pt's family notes that her dementia is continuing to progress. No recent labs. Patient does not need medication refills today. New concerns today: none    Pt cont to f/u with ent for her facial reconstruction. She has allowed her L nare to become stenotic again. Review of Systems   Constitutional: Negative. HENT: Positive for congestion. Respiratory: Negative. Cardiovascular: Negative. Psychiatric/Behavioral: Positive for memory loss. All other systems reviewed and are negative. Physical Exam    Diagnoses and all orders for this visit:    1. Essential hypertension, benign  -     METABOLIC PANEL, COMPREHENSIVE; Future  -     LIPID PANEL; Future  Stable, cont pres tx plan. 2. Hypokalemia  -     METABOLIC PANEL, COMPREHENSIVE; Future    3. Dyslipidemia  -     METABOLIC PANEL, COMPREHENSIVE; Future  -     LIPID PANEL; Future    4. Anemia, unspecified type  -     CBC WITH AUTOMATED DIFF; Future    5. Encounter for screening mammogram for malignant neoplasm of breast  -     FE MAMMO BI SCREENING INCL CAD; Future    6. Dementia without behavioral disturbance, unspecified dementia type ([facility] [address]  Pt has good social support. Cont pres tx plan. Follow-up and Dispositions    · Return in about 3 months (around 1/28/2020) for high blood pressure, high cholesterol, hypokalemia, dementia, lab review.

## 2019-10-28 NOTE — PROGRESS NOTES
Liane Shepherd presents today for   Chief Complaint   Patient presents with    Hypertension     follow up    Anemia       Is someone accompanying this pt? Yes daughter    Is the patient using any DME equipment during OV? Yes walker    Depression Screening:  3 most recent PHQ Screens 7/23/2019   PHQ Not Done -   Little interest or pleasure in doing things Several days   Feeling down, depressed, irritable, or hopeless Several days   Total Score PHQ 2 2   Trouble falling or staying asleep, or sleeping too much -   Feeling tired or having little energy -   Poor appetite, weight loss, or overeating -   Feeling bad about yourself - or that you are a failure or have let yourself or your family down -   Trouble concentrating on things such as school, work, reading, or watching TV -   Moving or speaking so slowly that other people could have noticed; or the opposite being so fidgety that others notice -   Thoughts of being better off dead, or hurting yourself in some way -   How difficult have these problems made it for you to do your work, take care of your home and get along with others -       Learning Assessment:  Learning Assessment 1/18/2019   PRIMARY LEARNER Patient   HIGHEST LEVEL OF EDUCATION - PRIMARY LEARNER  GRADUATED HIGH SCHOOL OR GED   BARRIERS PRIMARY LEARNER COGNITIVE   CO-LEARNER CAREGIVER Yes   CO-LEARNER NAME Daughter   [address] [facility]    NEED -   LEARNER PREFERENCE PRIMARY LISTENING     -   LEARNER Leonel 9 -   ANSWERED BY self   RELATIONSHIP SELF       Abuse Screening:  Abuse Screening Questionnaire 1/18/2019   Do you ever feel afraid of your partner? N   Are you in a relationship with someone who physically or mentally threatens you? N   Is it safe for you to go home?  Y       Fall Screening  Fall Risk Assessment, last 12 mths 7/23/2019   Able to walk? Yes   Fall in past 12 months? Yes   Fall with injury? Yes   Number of falls in past 12 months 3   Fall Risk Score 4       Generalized Anxiety  No flowsheet data found. Health Maintenance Due   Topic Date Due    Shingrix Vaccine Age 49> (1 of 2) 11/23/1981    BREAST CANCER SCRN MAMMOGRAM  04/13/2018    Influenza Age 9 to Adult  08/01/2019    GLAUCOMA SCREENING Q2Y  10/12/2019   . Health Maintenance reviewed and discussed and ordered per Provider. Esmer Bell is updated on all     Coordination of Care  1. Have you been to the ER, urgent care clinic since your last visit? Hospitalized since your last visit? Yes 7/25/19 foreign body in nose and 8/23/19 Epistaxis and urinary complaint. 2. Have you seen or consulted any other health care providers outside of the [address] [place] [address] since your last visit? Include any pap smears or colon screening. no      Advance Directive:  1. Do you have an advance directive in place? Patient Reply:no    2. If not, would you like material regarding how to put one in place?  Patient Reply: no

## 2019-11-12 ENCOUNTER — HOSPITAL ENCOUNTER (OUTPATIENT)
Dept: LAB | Age: 84
Discharge: HOME | End: 2019-11-12
Payer: MEDICARE

## 2019-11-12 DIAGNOSIS — D64.9 ANEMIA, UNSPECIFIED TYPE: ICD-10-CM

## 2019-11-12 DIAGNOSIS — I10 ESSENTIAL HYPERTENSION, BENIGN: ICD-10-CM

## 2019-11-12 DIAGNOSIS — E87.6 HYPOKALEMIA: ICD-10-CM

## 2019-11-12 DIAGNOSIS — E78.5 DYSLIPIDEMIA: ICD-10-CM

## 2019-11-12 LAB
ALBUMIN SERPL-MCNC: 3.5 G/DL (ref 3.4–5)
ALBUMIN/GLOB SERPL: 0.9 {RATIO} (ref 0.8–1.7)
ALP SERPL-CCNC: 82 U/L (ref 45–117)
ALT SERPL-CCNC: 15 U/L (ref 13–56)
ANION GAP SERPL CALC-SCNC: 6 MMOL/L (ref 3–18)
AST SERPL-CCNC: 19 U/L (ref 10–38)
BASOPHILS # BLD: 0 K/UL (ref 0–0.1)
BASOPHILS NFR BLD: 1 % (ref 0–2)
BILIRUB SERPL-MCNC: 0.4 MG/DL (ref 0.2–1)
BUN SERPL-MCNC: 23 MG/DL (ref 7–18)
BUN/CREAT SERPL: 19 (ref 12–20)
CALCIUM SERPL-MCNC: 9.5 MG/DL (ref 8.5–10.1)
CHLORIDE SERPL-SCNC: 103 MMOL/L (ref 100–111)
CHOLEST SERPL-MCNC: 150 MG/DL
CO2 SERPL-SCNC: 31 MMOL/L (ref 21–32)
CREAT SERPL-MCNC: 1.24 MG/DL (ref 0.6–1.3)
DIFFERENTIAL METHOD BLD: ABNORMAL
EOSINOPHIL # BLD: 0.3 K/UL (ref 0–0.4)
EOSINOPHIL NFR BLD: 5 % (ref 0–5)
ERYTHROCYTE [DISTWIDTH] IN BLOOD BY AUTOMATED COUNT: 12.7 % (ref 11.6–14.5)
GLOBULIN SER CALC-MCNC: 3.7 G/DL (ref 2–4)
GLUCOSE SERPL-MCNC: 92 MG/DL (ref 74–99)
HCT VFR BLD AUTO: 36.2 % (ref 35–45)
HDLC SERPL-MCNC: 55 MG/DL (ref 40–60)
HDLC SERPL: 2.7 {RATIO} (ref 0–5)
HGB BLD-MCNC: 11.9 G/DL (ref 12–16)
LDLC SERPL CALC-MCNC: 70.4 MG/DL (ref 0–100)
LIPID PROFILE,FLP: NORMAL
LYMPHOCYTES # BLD: 3.2 K/UL (ref 0.9–3.6)
LYMPHOCYTES NFR BLD: 58 % (ref 21–52)
MCH RBC QN AUTO: 30.5 PG (ref 24–34)
MCHC RBC AUTO-ENTMCNC: 32.9 G/DL (ref 31–37)
MCV RBC AUTO: 92.8 FL (ref 74–97)
MONOCYTES # BLD: 0.3 K/UL (ref 0.05–1.2)
MONOCYTES NFR BLD: 6 % (ref 3–10)
NEUTS SEG # BLD: 1.7 K/UL (ref 1.8–8)
NEUTS SEG NFR BLD: 30 % (ref 40–73)
PLATELET # BLD AUTO: 267 K/UL (ref 135–420)
PMV BLD AUTO: 10.4 FL (ref 9.2–11.8)
POTASSIUM SERPL-SCNC: 3.9 MMOL/L (ref 3.5–5.5)
PROT SERPL-MCNC: 7.2 G/DL (ref 6.4–8.2)
RBC # BLD AUTO: 3.9 M/UL (ref 4.2–5.3)
SODIUM SERPL-SCNC: 140 MMOL/L (ref 136–145)
TRIGL SERPL-MCNC: 123 MG/DL (ref ?–150)
VLDLC SERPL CALC-MCNC: 24.6 MG/DL
WBC # BLD AUTO: 5.6 K/UL (ref 4.6–13.2)

## 2019-11-12 PROCEDURE — 36415 COLL VENOUS BLD VENIPUNCTURE: CPT

## 2019-11-12 PROCEDURE — 85025 COMPLETE CBC W/AUTO DIFF WBC: CPT

## 2019-11-12 PROCEDURE — 80061 LIPID PANEL: CPT

## 2019-11-12 PROCEDURE — 80053 COMPREHEN METABOLIC PANEL: CPT

## 2019-11-29 DIAGNOSIS — E78.2 MIXED HYPERLIPIDEMIA: ICD-10-CM

## 2019-11-29 RX ORDER — ATORVASTATIN CALCIUM 10 MG/1
TABLET, FILM COATED ORAL
Qty: 90 TAB | Refills: 3 | Status: CANCELLED | OUTPATIENT
Start: 2019-11-29

## 2019-11-29 NOTE — TELEPHONE ENCOUNTER
PCP: Eusebia Santos MD 
 
Last appt: 10/28/2019 Future Appointments Date Time Provider Marcelo Flores 1/28/2020 12:15 PM Eusebia Santos MD [facility] None Requested Prescriptions Pending Prescriptions Disp Refills  atorvastatin (LIPITOR) 10 mg tablet 90 Tab 3   Sig: TAKE ONE TABLET BY MOUTH EVERY DAY

## 2019-12-02 RX ORDER — ATORVASTATIN CALCIUM 10 MG/1
TABLET, FILM COATED ORAL
Qty: 90 TAB | Refills: 3 | Status: SHIPPED | OUTPATIENT
Start: 2019-12-02 | End: 2020-08-10 | Stop reason: SDUPTHER

## 2019-12-24 ENCOUNTER — PATIENT OUTREACH (OUTPATIENT)
Dept: FAMILY MEDICINE CLINIC | Age: 84
End: 2019-12-24
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
## 2020-01-02 ENCOUNTER — PATIENT OUTREACH (OUTPATIENT)
Dept: INTERNAL MEDICINE CLINIC | Age: 85
End: 2020-01-02

## 2020-01-02 NOTE — PROGRESS NOTES
Social Work Assessment         Date of referral: 2019  Referral received from: [employer]  Reason for referral: Chart review and to assist family with resources. Previous SW Referral:  No  If yes, brief summary and outcome:  N/A      Income Information (if needed): n/a  Support System: Four daughters  Medication Cost assistance needed: n/a  Referral to LTC/Medicaid needed: n/a  Referral to Medicaid extra Help/LIS program needed: n/a  Small home repair needed: n/a    Other: n/a    HISTORY OF PRESENT ILLNESS  Scout Tomas is a 80 y.o. female. ASSESSMENT and PLAN  Ms Balbina Velasquez is a 80year old female who resides in the community with her daughter. MSW received referral from [employer] to assist family with providing resource information for placement options. MSW contacted the patient's daughter Gilbert David who verified PHI. Sangeetha Anthony informed MSW that another daughter resides in the home with the patient. She stated her concerns on that the patient can no longer be left alone due to safety. Ms Kanu Pizarrojeannette indicated that the patient does not qualify for Medicaid due to her assets and was uncertain of her options. The patient's daughter inquired on what would happen when the patient's monetary assets have depleted. MSW informed her that she could request from [facility] to have the patient screened for Medicaid at that time. 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 MSW provided the patient's daughter with information to [address] [place] [address] with memory care units. MSW encouraged Sangeetha Anthony to contact the facilities to set up a tour or to contact \"[facility]\" for assistance. MSW educated her on the nursing facilities as an option, as well as the [employer] and Assistance Program available to [place] and their spouses.   The patient's daughter informed MSW that she will request assistance from her sisters in communicating with the facilities and programs available. The patient's daughter expressed her appreciation for the information she received. MSW encouraged to call for any additional information needed. MSW will follow to assist as needed.

## 2020-01-07 ENCOUNTER — PATIENT OUTREACH (OUTPATIENT)
Dept: FAMILY MEDICINE CLINIC | Age: 85
End: 2020-01-07

## 2020-01-14 ENCOUNTER — PATIENT OUTREACH (OUTPATIENT)
Dept: FAMILY MEDICINE CLINIC | Age: 85
End: 2020-01-14

## 2020-01-22 ENCOUNTER — PATIENT OUTREACH (OUTPATIENT)
Dept: FAMILY MEDICINE CLINIC | Age: 85
End: 2020-01-22

## 2020-01-28 ENCOUNTER — OFFICE VISIT (OUTPATIENT)
Dept: FAMILY MEDICINE CLINIC | Age: 85
End: 2020-01-28

## 2020-01-28 VITALS
SYSTOLIC BLOOD PRESSURE: 130 MMHG | HEART RATE: 69 BPM | HEIGHT: 64 IN | RESPIRATION RATE: 18 BRPM | DIASTOLIC BLOOD PRESSURE: 78 MMHG | TEMPERATURE: 97.8 F | BODY MASS INDEX: 21.34 KG/M2 | WEIGHT: 125 LBS

## 2020-01-28 DIAGNOSIS — E87.6 HYPOKALEMIA: ICD-10-CM

## 2020-01-28 DIAGNOSIS — E23.0 PANHYPOPITUITARISM (HCC): ICD-10-CM

## 2020-01-28 DIAGNOSIS — E78.2 MIXED HYPERLIPIDEMIA: ICD-10-CM

## 2020-01-28 DIAGNOSIS — I10 ESSENTIAL HYPERTENSION, BENIGN: Primary | ICD-10-CM

## 2020-01-28 DIAGNOSIS — F03.90 DEMENTIA WITHOUT BEHAVIORAL DISTURBANCE, UNSPECIFIED DEMENTIA TYPE: ICD-10-CM

## 2020-01-28 DIAGNOSIS — D35.2 PITUITARY ADENOMA (HCC): ICD-10-CM

## 2020-01-28 DIAGNOSIS — J44.9 COPD, GROUP C, BY GOLD 2017 CLASSIFICATION (HCC): ICD-10-CM

## 2020-01-28 DIAGNOSIS — R56.9 SEIZURES (HCC): ICD-10-CM

## 2020-01-28 NOTE — PROGRESS NOTES
Cinthya Ramirez presents today for   Chief Complaint   Patient presents with    Hypertension     follow up    Cholesterol Problem    Dementia    Other     Hypokalemia    Labs     completed 11/12/19       Is someone accompanying this pt? Yes daughter    Is the patient using any DME equipment during [address]? no    Depression Screening:  3 most recent PHQ Screens 1/28/2020   PHQ Not Done -   Little interest or pleasure in doing things Not at all   Feeling down, depressed, irritable, or hopeless Not at all   Total Score PHQ 2 0   Trouble falling or staying asleep, or sleeping too much -   Feeling tired or having little energy -   Poor appetite, weight loss, or overeating -   Feeling bad about yourself - or that you are a failure or have let yourself or your family down -   Trouble concentrating on things such as school, work, reading, or watching TV -   Moving or speaking so slowly that other people could have noticed; or the opposite being so fidgety that others notice -   Thoughts of being better off dead, or hurting yourself in some way -   How difficult have these problems made it for you to do your work, take care of your home and get along with others -       Learning Assessment:  Learning Assessment 1/28/2020   PRIMARY LEARNER Patient   HIGHEST LEVEL OF EDUCATION - PRIMARY LEARNER  GRADUATED HIGH SCHOOL OR GED   BARRIERS PRIMARY LEARNER COGNITIVE   CO-LEARNER CAREGIVER Yes   CO-LEARNER NAME Daughter   [address] [facility]    NEED -   LEARNER PREFERENCE PRIMARY LISTENING     -   LEARNER Leonel 9 -   ANSWERED BY self   RELATIONSHIP SELF       Abuse Screening:  Abuse Screening Questionnaire 1/28/2020   Do you ever feel afraid of your partner?  N   Are you in a relationship with someone who physically or mentally threatens you? N   Is it safe for you to go home? Y       Fall Screening  Fall Risk Assessment, last 12 mths 1/28/2020   Able to walk? Yes   Fall in past 12 months? No   Fall with injury? -   Number of falls in past 12 months -   Fall Risk Score -       Generalized Anxiety  No flowsheet data found. Health Maintenance Due   Topic Date Due    Shingrix Vaccine Age 49> (1 of 2) 11/23/1981    BREAST CANCER SCRN MAMMOGRAM  04/13/2018    GLAUCOMA SCREENING Q2Y  10/12/2019    MEDICARE YEARLY EXAM  02/28/2020   . Health Maintenance reviewed and discussed and ordered per Provider. Alhaji Heller is updated on all     Coordination of Care  1. Have you been to the ER, urgent care clinic since your last visit? Hospitalized since your last visit? no    2. Have you seen or consulted any other health care providers outside of the [address] [place] [address] since your last visit? Include any pap smears or colon screening. no      Advance Directive:  1. Do you have an advance directive in place? Patient Reply:no    2. If not, would you like material regarding how to put one in place?  Patient Reply: no

## 2020-01-28 NOTE — PROGRESS NOTES
Chief Complaint   Patient presents with    Hypertension     follow up    Cholesterol Problem    Dementia    Other     Hypokalemia    Labs     completed 11/12/19         HPI    Cinthya Ramirez is a 80 y.o. female presenting today for 3 months  follow up of htn, hld, hypoK, dementia. Patient had labs on 11/12/19. Labs reviewed in detail with patient     Patient does not need medication refills today. New concerns today: none      Pt cont to f/u with endo for her pituitary issue. Pt reports that she still craves cigarettes. Her dtr notes that she does not have access to them. Pt's dementia is progressing slowly. She does still have questions about her lip s/p surgical repair. Pt reassured that all of her repairs are complete. ROS  Review of Systems   Constitutional: Negative. HENT: Negative. Respiratory: Negative. Cardiovascular: Negative. All other systems reviewed and are negative. Physical Exam  Physical Exam   Nursing note and vitals reviewed. Constitutional: She is oriented to person, place, and time. She appears well-developed and well-nourished. HENT:   Head: Normocephalic and atraumatic. Right Ear: External ear normal.   Left Ear: External ear normal.   Nose: Nose normal.   Eyes: Conjunctivae and EOM are normal.   Neck: Normal range of motion. Neck supple. No JVD present. Carotid bruit is not present. No thyromegaly present. Cardiovascular: Normal rate, regular rhythm, normal heart sounds and intact distal pulses. Exam reveals no gallop and no friction rub. No murmur heard. Pulmonary/Chest: Effort normal and breath sounds normal. She has no wheezes. She has no rhonchi. She has no rales. Abdominal: Soft. Bowel sounds are normal.   Musculoskeletal: Normal range of motion. Neurological: She is alert and oriented to person, place, and time. Coordination normal.   Skin: Skin is warm and dry. Psychiatric: She has a normal mood and affect.  Her behavior is normal. Judgment and thought content normal.     Diagnoses and all orders for this visit:    1. Essential hypertension, benign  Stable, cont pres tx plan. 2. Mixed hyperlipidemia  Stable, cont pres tx plan. 3. Hypokalemia  Stable, cont pres tx plan. 4. Dementia without behavioral disturbance, unspecified dementia type ([facility] [address]  Slowly progressive. The family cont to explore options for when she is no longer safe to remain at home    5. COPD, group C, by GOLD 2017 classification ([facility] [address]  Care as per pulm    6. Seizures ([facility] [address]  Care as per neuro    7. Panhypopituitarism ([facility]  8. Pituitary adenoma ([facility] [address]  Care as per endo    Follow-up and Dispositions    · Return in about 3 months (around 4/28/2020) for high blood pressure, high cholesterol, hypokalemia.

## 2020-01-29 ENCOUNTER — PATIENT OUTREACH (OUTPATIENT)
Dept: FAMILY MEDICINE CLINIC | Age: 85
End: 2020-01-29

## 2020-01-29 NOTE — PROGRESS NOTES
Patient attended appointment on 1/29/20 with Marcos Carlin MD.  EMR reviewed. Will continue to monitor and will contact patient at time of next scheduled outreach. Goals Addressed                 This Visit's Progress     Patient will attend all scheduled appointments through 2/24/20 1/29/20 Patient attended appt yesterdat with PCP, Dr. Shelby Akbar.

## 2020-01-30 PROBLEM — F03.90 DEMENTIA WITHOUT BEHAVIORAL DISTURBANCE (HCC): Status: ACTIVE | Noted: 2020-01-30

## 2020-02-06 ENCOUNTER — PATIENT OUTREACH (OUTPATIENT)
Dept: FAMILY MEDICINE CLINIC | Age: 85
End: 2020-02-06

## 2020-02-06 NOTE — PROGRESS NOTES
Transitions of Care     Hospital Discharge Follow-Up    Care Transitions Nurse spoke with patient's daughter, Mrs. Lauro Reyna, as verified on release of information form. Date/Time:  2020 10:51 AM    Patient was admitted to [facility] on 2020 and discharged on 2020 for sepsis per discharge summary of 2020 from [facility].        The physician discharge summary was available at the time of outreach. Patient was contacted within  business days of discharge. Top Challenges reviewed with the provider   - Medication Review/reconciliation could not be completed with patient's daughter via telephone call. - Please see Care Everywhere for list of discharge medications. Advance Care Planning:   Does patient have an Advance Directive:  not on file   CTN will attempt to follow up during the transition of care period         Was this a readmission? no   Patient stated reason for the readmission: n/a     Care Transition Nurse (CTN) contacted the family  by telephone to perform post hospital discharge assessment. Verified name and  with family  as identifiers. Provided introduction to self, and explanation of the CTN role. Family received hospital discharge instructions. CTN reviewed discharge instructions and red flags with family who verbalized understanding. Family given an opportunity to ask questions and does not have any further questions or concerns at this time. The family agrees to contact the PCP office for questions related to their healthcare. CTN provided contact information for future reference. Patient's daughter reports patient is \" doing very well\"     Patients top risk factors for readmission:   - Patient s/p hospital discharge       Home Health orders at discharge: Yes       [address]: [facility]    Date of initial visit:   To be Determined. Durable Medical Equipment ordered at discharge:   -none noted at this time.      Durable [address]: n/a  Durable Medical Equipment received:   N/a     Medication(s):   New Medications at Discharge:   - Amoxicillin - Clauvenate ( Augmentin)   - Doxycycline     Changed Medications at Discharge: none noted   Discontinued Medications at Discharge: none noted     Medication reconciliation was attempted with patient's family member, Jan Sarmiento. Mrs. Gomez states she does not have patient's list of medications for review. Mrs. Gomez reports that the family keeps a current medication list of patient's medications and this was taken with patient to the hospital.  Mrs. Gomez reports that new medications were obtained. Mrs. Gomez does not have any concerns or questions regarding patients medications at this time. Family verbalizes understanding of administration of home medications. There were no barriers to obtaining medications identified at this time. Referral to Pharm D needed: no     Current Outpatient Medications   Medication Sig    atorvastatin (LIPITOR) 10 mg tablet TAKE ONE TABLET BY MOUTH EVERY DAY    PARoxetine (PAXIL) 40 mg tablet TAKE ONE TABLET BY MOUTH EVERY DAY    potassium chloride (K-DUR, KLOR-CON) 20 mEq tablet TAKE ONE TABLET BY MOUTH 2 TIMES A DAY    ipratropium (ATROVENT) 0.02 % soln 2.5 mL by Nebulization route four (4) times daily. COPD J44.9    LORazepam (ATIVAN) 0.5 mg tablet Take 0.5-1 Tabs by mouth two (2) times daily as needed for Anxiety. Max Daily Amount: 1 mg.  budesonide (PULMICORT) 1 mg/2 mL nbsp 2 mL by Nebulization route daily. Rinse your mouth after each use. File under Medicare Part B Dx code J44.9    Wheel Chair cal Manual wheelchair    arformoterol (BROVANA) 15 mcg/2 mL nebu neb solution 2 mL by Nebulization route two (2) times a day. File Under Medicare B, ICD J44.9    hydrocortisone (CORTEF) 10 mg tablet Take 10 mg by mouth daily.     albuterol (ACCUNEB) 1.25 mg/3 mL nebu 3 mL by Nebulization route every four (4) hours as needed. (Patient taking differently: 3 mL by Nebulization route every four (4) hours as needed for Cough. cough)    Nebulizer & Compressor (PORTABLE NEBULIZER SYSTEM) machine 1 Each by Does Not Apply route two (2) times a day. (Patient taking differently: 1 Each by Does Not Apply route two (2) times a day.)    cholecalciferol, vitamin D3, 4,000 unit cap Take 1 Cap by mouth daily.  OXYGEN-AIR DELIVERY SYSTEMS 3 L/min by Nasal route continuous. First Choice O2    albuterol (PROAIR HFA) 90 mcg/actuation inhaler Take 2 Puffs by inhalation every four (4) hours as needed for Wheezing or Shortness of Breath. (Patient taking differently: Take 2 Puffs by inhalation every four (4) hours as needed for Wheezing or Shortness of Breath.)    SYNTHROID 112 mcg tablet Take 112 mcg by mouth Daily (before breakfast).  Walker (BARNEY ROLLING WALKER) misc 1 Device by Does Not Apply route daily.  L-Methylfolate-C83-Mmriygyqgd (CEREFOLIN NAC) tablet Take 1 tablet by mouth daily. (Patient taking differently: Take 1 Tab by mouth daily.)    fludrocortisone (FLORINEF) 0.1 mg tablet Take 1 tablet by mouth daily. (Patient taking differently: Take 1 Tab by mouth daily.)    oxcarbazepine (TRILEPTAL) 300 mg tablet Take 300 mg by mouth two (2) times a day.  magnesium oxide (MAG-OX) 400 mg tablet Take 400 mg by mouth daily.  aspirin 81 mg tablet Take 81 mg by mouth daily.  DOCOSAHEXANOIC ACID/EPA (FISH OIL PO) Take 4,000 mg by mouth daily.  CALCIUM CITRATE/VITAMIN D3 (CALCIUM CITRATE + PO) Take 600 mg by mouth once over twenty-four (24) hours. No current facility-administered medications for this visit. There are no discontinued medications.     [facility] follow up appointment(s):   Future Appointments   Date Time Provider Marcelo Sandra   2/17/2020 12:30 PM MD RAFFY Prieto None   4/28/2020  1:15 PM MD MOISES PrietoP None      Non-[facility] follow up appointment(s):     Goals Addressed                 This Visit's Progress     Attends follow-up appointments as directed. Target Date:  3/4/2020    2/6/2020   Patient will attend PCP follow up appointment.  Prevent complications post hospitalization. Target Date:  3/4/2020    2/6/2020   Patient and/or family members were alerted to the availability of physician or other advanced practioners after hours, weekends, and holidays. Please call the PCP office phone number and speak with the answering service for assistance. Please call 911 for emergency assistance as needed. Nurse Navigator contact information provided for follow up as needed.  Understands red flags post discharge. Target Date:  3/4/2020    2/6/2020   Reviewed red flags with patient's daughter, Irasema Mcneil, as follows:   Please call 911 for symptoms such as:  - chest pain  - shortness of breath   - Passing out   Patient's family member voiced understanding  of this information.

## 2020-02-06 NOTE — PROGRESS NOTES
Transitions of Care     Hospital Discharge Follow-Up    Care Transitions Nurse spoke with patient's daughter, Mrs. Chin Craft as verified on release of information form. Date/Time:  2020 10:51 AM     Patient was admitted to [facility] on 2020 and discharged on 2020  for sepsis per discharage summary of 2020 from [address] [facility] discharge summary was available at the time of outreach. Patient's family  was contacted within 1 business days of discharge. Top Challenges reviewed with the provider   - Medication Review/reconciliation could not be completed with patient's daughter via telephone call. - Please see Care Everywhere for list of discharge medications. Advance Care Planning:   Does patient have an Advance Directive:  not on file   CTN will attempt to follow up during transition of care period. Was this a readmission? no   Patient stated reason for the readmission: n/a     Care Transition Nurse (CTN) contacted the family by telephone to perform post hospital discharge assessment. Verified name and  with family as identifiers. Provided introduction to self, and explanation of the CTN role. Family received hospital discharge instructions. CTN reviewed discharge instructions and red flags with family who verbalized understanding. Family given an opportunity to ask questions and does not have any further questions or concerns at this time. The family agrees to contact the PCP office for questions related to their healthcare. CTN provided contact information for future reference. Patient's daughter reports patient is \"doing very well\".      Patients top risk factors for readmission:   - Patient s/p hospital discharge    Home Health orders at discharge:   Yes     [address]: Jacinto Beckett  Date of initial visit:   To Be Determined    Durable Medical Equipment ordered at discharge:   - None noted at this time       Durable [address]: n/a   Durable Medical Equipment received: n/a     Medication(s):   New Medications at Discharge:   Changed Medications at Discharge:  Discontinued Medications at Discharge:     Please see Care Everywhere for list of Discharge Medications. Medication reconciliation was attempted  with family, who verbalizes understanding of administration of home medications. There were no barriers to obtaining medications identified at this time. Referral to Pharm D needed: no     Current Outpatient Medications   Medication Sig    atorvastatin (LIPITOR) 10 mg tablet TAKE ONE TABLET BY MOUTH EVERY DAY    PARoxetine (PAXIL) 40 mg tablet TAKE ONE TABLET BY MOUTH EVERY DAY    potassium chloride (K-DUR, KLOR-CON) 20 mEq tablet TAKE ONE TABLET BY MOUTH 2 TIMES A DAY    ipratropium (ATROVENT) 0.02 % soln 2.5 mL by Nebulization route four (4) times daily. COPD J44.9    LORazepam (ATIVAN) 0.5 mg tablet Take 0.5-1 Tabs by mouth two (2) times daily as needed for Anxiety. Max Daily Amount: 1 mg.  budesonide (PULMICORT) 1 mg/2 mL nbsp 2 mL by Nebulization route daily. Rinse your mouth after each use. File under Medicare Part B Dx code J44.9    Wheel Chair cal Manual wheelchair    arformoterol (BROVANA) 15 mcg/2 mL nebu neb solution 2 mL by Nebulization route two (2) times a day. File Under Medicare B, ICD J44.9    hydrocortisone (CORTEF) 10 mg tablet Take 10 mg by mouth daily.  albuterol (ACCUNEB) 1.25 mg/3 mL nebu 3 mL by Nebulization route every four (4) hours as needed. (Patient taking differently: 3 mL by Nebulization route every four (4) hours as needed for Cough. cough)    Nebulizer & Compressor (PORTABLE NEBULIZER SYSTEM) machine 1 Each by Does Not Apply route two (2) times a day. (Patient taking differently: 1 Each by Does Not Apply route two (2) times a day.)    cholecalciferol, vitamin D3, 4,000 unit cap Take 1 Cap by mouth daily.     OXYGEN-AIR DELIVERY SYSTEMS 3 L/min by Nasal route continuous. First Choice O2    albuterol (PROAIR HFA) 90 mcg/actuation inhaler Take 2 Puffs by inhalation every four (4) hours as needed for Wheezing or Shortness of Breath. (Patient taking differently: Take 2 Puffs by inhalation every four (4) hours as needed for Wheezing or Shortness of Breath.)    SYNTHROID 112 mcg tablet Take 112 mcg by mouth Daily (before breakfast).  Walker (BARNEY ROLLING WALKER) misc 1 Device by Does Not Apply route daily.  L-Methylfolate-B20-Jjjkttypdi (CEREFOLIN NAC) tablet Take 1 tablet by mouth daily. (Patient taking differently: Take 1 Tab by mouth daily.)    fludrocortisone (FLORINEF) 0.1 mg tablet Take 1 tablet by mouth daily. (Patient taking differently: Take 1 Tab by mouth daily.)    oxcarbazepine (TRILEPTAL) 300 mg tablet Take 300 mg by mouth two (2) times a day.  magnesium oxide (MAG-OX) 400 mg tablet Take 400 mg by mouth daily.  aspirin 81 mg tablet Take 81 mg by mouth daily.  DOCOSAHEXANOIC ACID/EPA (FISH OIL PO) Take 4,000 mg by mouth daily.  CALCIUM CITRATE/VITAMIN D3 (CALCIUM CITRATE + PO) Take 600 mg by mouth once over twenty-four (24) hours. No current facility-administered medications for this visit. There are no discontinued medications. [facility] follow up appointment(s):   Future Appointments   Date Time Provider Marcelo Flores   2/17/2020 12:30 PM MD [facility] None   4/28/2020  1:15 PM Bossman Staples MD [facility] None      Non-[facility] follow up appointment(s): none noted at this time     Goals Addressed                 This Visit's Progress     Attends follow-up appointments as directed. Target Date:  3/4/2020    2/6/2020   Patient will attend PCP follow up appointment.  Prevent complications post hospitalization. Target Date:  3/4/2020    2/6/2020   Patient and/or family members were alerted to the availability of physician or other advanced practioners after hours, weekends, and holidays. Please call the PCP office phone number and speak with the answering service for assistance. Please call 911 for emergency assistance as needed. Nurse Navigator contact information provided for follow up as needed.  Understands red flags post discharge. Target Date:  3/4/2020    2/6/2020   Reviewed red flags with patient's daughter, Carlos Alberto Lyle, as follows:   Please call 911 for symptoms such as:  - chest pain  - shortness of breath   - Passing out   Patient's family member voiced understanding  of this information.

## 2020-02-10 ENCOUNTER — OFFICE VISIT (OUTPATIENT)
Dept: FAMILY MEDICINE CLINIC | Age: 85
End: 2020-02-10

## 2020-02-10 VITALS
TEMPERATURE: 96.6 F | HEART RATE: 82 BPM | BODY MASS INDEX: 21 KG/M2 | SYSTOLIC BLOOD PRESSURE: 132 MMHG | HEIGHT: 64 IN | OXYGEN SATURATION: 95 % | DIASTOLIC BLOOD PRESSURE: 64 MMHG | RESPIRATION RATE: 22 BRPM | WEIGHT: 123 LBS

## 2020-02-10 DIAGNOSIS — F03.90 DEMENTIA WITHOUT BEHAVIORAL DISTURBANCE, UNSPECIFIED DEMENTIA TYPE: ICD-10-CM

## 2020-02-10 DIAGNOSIS — J44.9 CHRONIC OBSTRUCTIVE PULMONARY DISEASE, UNSPECIFIED COPD TYPE (HCC): ICD-10-CM

## 2020-02-10 DIAGNOSIS — Z99.81 SUPPLEMENTAL OXYGEN DEPENDENT: ICD-10-CM

## 2020-02-10 DIAGNOSIS — J18.9 PNEUMONIA OF RIGHT LOWER LOBE DUE TO INFECTIOUS ORGANISM: Primary | ICD-10-CM

## 2020-02-10 PROBLEM — A41.9 SEPSIS (HCC): Status: ACTIVE | Noted: 2020-01-31

## 2020-02-10 RX ORDER — AMOXICILLIN AND CLAVULANATE POTASSIUM 875; 125 MG/1; MG/1
TABLET, FILM COATED ORAL
COMMUNITY
Start: 2020-02-04 | End: 2020-08-10 | Stop reason: ALTCHOICE

## 2020-02-10 RX ORDER — DOXYCYCLINE 100 MG/1
CAPSULE ORAL
COMMUNITY
Start: 2020-02-04 | End: 2020-08-10 | Stop reason: ALTCHOICE
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
## 2020-02-10 NOTE — PROGRESS NOTES
Colby Meckel presents today for   Chief Complaint   Patient presents with   [facility] Follow Up     Recent discharge from [facility] 2/4/20 related to pneumonia. Bulmaro Villar preferred language for health care discussion is english/other. Is someone accompanying this pt? Yes Daugher, Newton Moritz )    Is the patient using any DME equipment during [address]? No    Depression Screening:  3 most recent PHQ Screens 1/28/2020   PHQ Not Done -   Little interest or pleasure in doing things Not at all   Feeling down, depressed, irritable, or hopeless Not at all   Total Score PHQ 2 0   Trouble falling or staying asleep, or sleeping too much -   Feeling tired or having little energy -   Poor appetite, weight loss, or overeating -   Feeling bad about yourself - or that you are a failure or have let yourself or your family down -   Trouble concentrating on things such as school, work, reading, or watching TV -   Moving or speaking so slowly that other people could have noticed; or the opposite being so fidgety that others notice -   Thoughts of being better off dead, or hurting yourself in some way -   How difficult have these problems made it for you to do your work, take care of your home and get along with others -       Learning Assessment:  Learning Assessment 1/28/2020   PRIMARY LEARNER Patient   HIGHEST LEVEL OF EDUCATION - PRIMARY LEARNER  GRADUATED HIGH SCHOOL OR GED   BARRIERS PRIMARY LEARNER COGNITIVE   CO-LEARNER CAREGIVER Yes   CO-LEARNER NAME Daughter   [facility]    NEED -   LEARNER PREFERENCE PRIMARY LISTENING     -   LEARNER Leonel 9 -   ANSWERED BY self   RELATIONSHIP SELF       Abuse Screening:  Abuse Screening Questionnaire 1/28/2020   Do you ever feel afraid of your partner?  N   Are you in a relationship with someone who physically or mentally threatens you? N   Is it safe for you to go home? Y       Fall Risk  Fall Risk Assessment, last 12 mths 1/28/2020   Able to walk? Yes   Fall in past 12 months? No   Fall with injury? -   Number of falls in past 12 months -   Fall Risk Score -         Coordination of Care:  1. Have you been to the ER, urgent care clinic since your last visit? Hospitalized since your last visit? [facility] 1/31/-2/4/2020.     2. Have you seen or consulted any other health care providers outside of the [facility] since your last visit? Include any pap smears or colon screening. Noted above. Advance Directive:  1. Do you have an advance directive in place? Patient Reply:No    2. If not, would you like material regarding how to put one in place?  Patient Reply: No

## 2020-02-12 ENCOUNTER — PATIENT OUTREACH (OUTPATIENT)
Dept: FAMILY MEDICINE CLINIC | Facility: CLINIC | Age: 85
End: 2020-02-12

## 2020-02-12 NOTE — PROGRESS NOTES
Transition Of Care Follow Up    Patient seen by pcp on 2/10/2020  Goals Addressed                 This Visit's Progress     Attends follow-up appointments as directed. On track     Target Date:  3/4/2020    2/6/2020   Patient will attend PCP follow up appointment.  Patient will attend all scheduled appointments through 2/24/20   On track     1/29/20 Patient attended appt yesterdat with PCP, Dr. Miriam Romero.           Future Appointments   Date Time Provider Marcelo Flores   4/28/2020  1:15 PM [facility]

## 2020-02-19 ENCOUNTER — PATIENT OUTREACH (OUTPATIENT)
Dept: FAMILY MEDICINE CLINIC | Facility: CLINIC | Age: 85
End: 2020-02-19

## 2020-02-19 NOTE — PROGRESS NOTES
Transition Of Care Follow Up    Care Coordinator(CC) contacted the patient by telephone to perform post hospital discharge assessment. Verified name and  with patient as identifiers. Provided introduction to self, and explanation of the Care Coordinator role. I spoke with Rylie Cedeno lauren) patient's daughter. She states her mother is doing wonderful. She states she hasn't talk to her but both her sisters that live with states she is doing great. No assistance is needed at this time. My contact information was given to daughter. Daughter reminded that there are physicians on call 24 hours a day / 7 days a week (M-F 5pm to 8am and from Friday 5pm until Monday 8a for the weekend) should the patient have questions or concerns. Daughter reminded to call 911 if situation is emergent or patient feels the situation is emergent. Daughter verbalizes understanding. Goals      Attends follow-up appointments as directed. Target Date:  3/4/2020    2020   Patient will attend PCP follow up appointment.  Patient will attend all scheduled appointments through 20 Patient attended appt yesterdat with PCP, Dr. Miriam Romero.  Prevent complications post hospitalization. Target Date:  3/4/2020    2020   Patient and/or family members were alerted to the availability of physician or other advanced practioners after hours, weekends, and holidays. Please call the PCP office phone number and speak with the answering service for assistance. Please call 911 for emergency assistance as needed. Nurse Navigator contact information provided for follow up as needed.  Understands red flags post discharge. Target Date:  3/4/2020    2020   Reviewed red flags with patient's daughter, Gaston Burk, as follows:   Please call 911 for symptoms such as:  - chest pain  - shortness of breath   - Passing out   Patient's family member voiced understanding  of this information.             Future Appointments   Date Time Provider Marcelo Flores   4/28/2020  1:15 PM Alek Lipscomb MD [facility] None

## 2020-02-27 ENCOUNTER — PATIENT OUTREACH (OUTPATIENT)
Dept: CASE MANAGEMENT | Age: 85
End: 2020-02-27

## 2020-03-09 ENCOUNTER — PATIENT OUTREACH (OUTPATIENT)
Dept: FAMILY MEDICINE CLINIC | Facility: CLINIC | Age: 85
End: 2020-03-09

## 2020-03-09 NOTE — PROGRESS NOTES
Patient has graduated from the Transitions of Care Coordination  program on 3/9/2020. Patient's symptoms are stable at this time. Patient/family has the ability to self-manage. Care management goals have been completed at this time. No further care coordinator follow up scheduled. Goals Addressed                 This Visit's Progress     COMPLETED: Attends follow-up appointments as directed. Target Date:  3/4/2020    2/6/2020   Patient will attend PCP follow up appointment.  COMPLETED: Patient will attend all scheduled appointments through 2/24/20 1/29/20 Patient attended appt yesterdat with PCP, Dr. David Najera.  COMPLETED: Prevent complications post hospitalization. Target Date:  3/4/2020    2/6/2020   Patient and/or family members were alerted to the availability of physician or other advanced practioners after hours, weekends, and holidays. Please call the PCP office phone number and speak with the answering service for assistance. Please call 911 for emergency assistance as needed. Nurse Navigator contact information provided for follow up as needed.  COMPLETED: Understands red flags post discharge. Target Date:  3/4/2020    2/6/2020   Reviewed red flags with patient's daughter, Gemini Guo, as follows:   Please call 911 for symptoms such as:  - chest pain  - shortness of breath   - Passing out   Patient's family member voiced understanding  of this information. Pt has care coordinator contact information for any further questions, concerns, or needs.   Patients upcoming visits:    Future Appointments   Date Time Provider Marcelo Flores   4/28/2020  1:15 PM [facility]

## 2020-03-12 ENCOUNTER — PATIENT OUTREACH (OUTPATIENT)
Dept: CASE MANAGEMENT | Age: 85
End: 2020-03-12

## 2020-03-12 NOTE — PROGRESS NOTES
MSW received referral on 12/24/2019 to assist the patient's family with community resources [facility] with memory care units.      MSW provided the patient's daughter Duy Valdes with list of facilities to contact in order to schedule a tour. MSW contacted the patient's daughter Gemini Guo [facility]). MSW introduced self, role and reason for call. The patient's daughter stated that she has not contacted the facilities to re-schedule a tour because the patient and the daughters that resides with her are getting along very well at this time. She informed MSW that whenever the patient is acting out and exhibiting negative behaviors, her sisters no longer wants to care for the patient but as long as she is well behaved  they are okay with her staying in the house with them. Gemini Guo mentioned that her sisters are not employed and does not have income of their own and relies on the patient's income to maintain the patient, themselves and the home. The patient's daughter expressed that it can be frustrating sometimes. Ms Duy Valdes stated that she does not need MSW to continue to call weekly nor biweekly since all is going well and shared that she would prefer to call if there is a need for assistance. There are no additional needs identified at this time. MSW will be available to assist for any future needs.

## 2020-03-17 DIAGNOSIS — J18.9 PNEUMONIA OF RIGHT LOWER LOBE DUE TO INFECTIOUS ORGANISM: ICD-10-CM

## 2020-03-18 DIAGNOSIS — J44.9 COPD, GROUP C, BY GOLD 2017 CLASSIFICATION (HCC): Primary | ICD-10-CM

## 2020-03-18 NOTE — PROGRESS NOTES
Order placed for Overnight Pulse Ox on room Air, per Verbal Order from Dr. Doris Keating on 3/18/2020. Last office visit: 7/23/2019  Follow up Visit: 3/19/2020 - CXD due to COVID-19 pandemic, will reschedule to June 2020. Provider is aware of last office visit and follow up. No further action requested from provider.

## 2020-03-28 DIAGNOSIS — F41.9 ANXIETY: ICD-10-CM

## 2020-03-28 DIAGNOSIS — F32.A DEPRESSION, UNSPECIFIED DEPRESSION TYPE: ICD-10-CM

## 2020-03-29 RX ORDER — PAROXETINE HYDROCHLORIDE 40 MG/1
TABLET, FILM COATED ORAL
Qty: 30 TAB | Refills: 12 | Status: SHIPPED | OUTPATIENT
Start: 2020-03-29

## 2020-05-04 ENCOUNTER — TELEPHONE (OUTPATIENT)
Dept: PULMONOLOGY | Age: 85
End: 2020-05-04

## 2020-05-04 NOTE — TELEPHONE ENCOUNTER
Spoke with Jama Jane. They did the overnight and have the results in their system. They do not need anything from us that she can see. Cee is working on this. Sujatha Banks will talk with Cee and let her know to contact us if she needs anything

## 2020-05-18 ENCOUNTER — VIRTUAL VISIT (OUTPATIENT)
Dept: PULMONOLOGY | Age: 85
End: 2020-05-18

## 2020-05-18 DIAGNOSIS — T17.908S ASPIRATION INTO AIRWAY, SEQUELA: ICD-10-CM

## 2020-05-18 DIAGNOSIS — J69.0 PNEUMONITIS DUE TO INHALATION OF FOOD AND VOMIT (HCC): ICD-10-CM

## 2020-05-18 DIAGNOSIS — J44.9 COPD, GROUP D, BY GOLD 2017 CLASSIFICATION (HCC): Primary | ICD-10-CM

## 2020-05-18 DIAGNOSIS — Z87.891 PERSONAL HISTORY OF TOBACCO USE, PRESENTING HAZARDS TO HEALTH: ICD-10-CM

## 2020-05-18 DIAGNOSIS — R06.09 DYSPNEA ON EXERTION: ICD-10-CM

## 2020-05-18 DIAGNOSIS — R06.02 SHORTNESS OF BREATH: ICD-10-CM

## 2020-05-18 DIAGNOSIS — J44.1 CHRONIC OBSTRUCTIVE PULMONARY DISEASE WITH ACUTE EXACERBATION (HCC): ICD-10-CM

## 2020-05-18 RX ORDER — LANOLIN ALCOHOL/MO/W.PET/CERES
400 CREAM (GRAM) TOPICAL DAILY
COMMUNITY

## 2020-05-18 RX ORDER — ALBUTEROL SULFATE 90 UG/1
2 AEROSOL, METERED RESPIRATORY (INHALATION)
Qty: 1 INHALER | Refills: 5 | Status: SHIPPED | OUTPATIENT
Start: 2020-05-18

## 2020-05-18 NOTE — PROGRESS NOTES
[address] [facility] [place] [facility]  Pulmonary, Critical Care, and Sleep Medicine    Pulmonary Office F/U  Name: Bobby Mace 80 y.o. female  MRN: [id number]  : 1931  Service Date: 20   Chief Complaint:   Chief Complaint   Patient presents with    Follow Up COPD       Bobby Mace is a 80 y.o. female who was seen by synchronous (real-time) audio-video technology on 2020. Consent:  She and/or her healthcare decision maker is aware that this patient-initiated Telehealth encounter is a billable service, with coverage as determined by her insurance carrier. She is aware that she may receive a bill and has provided verbal consent to proceed: Yes    I was in the office while conducting this encounter. History of Present Illness:  Hx given by oldest daughter  Bobby Mace is a 80 y.o. female, who presents to Pulmonary clinic for followup with regards to COPD. Pt was last seen on 2019. In the interval, they report that the patient was admitted to [facility] with aspiration pneumonia at the end of January into February. Patient was treated with antibiotics and discharged. Since that time, they report that the patient has had very intermittent aspiration symptoms. They note that the patient has occasional cough with intake of food or liquids, 1-2 times every month or so. They report that the patient is doing well otherwise. Patient continues to remain smoke-free although she is still requesting cigarettes. With regards to her supplemental oxygen, patient underwent recent nocturnal pulse oximetry by her [employer] company. They report that the patient only wears oxygen with exertion and at night. They report with regards to COPD, patient is doing relatively well, continues to have dyspnea with mild exertion, unchanged. Pt uses pulmicort nebulizer and ipratropium once daily.   Pt does not have to use PRN albuterol often. No other modifying factors  They deny fevers, chills, night sweats, chest pain, hemoptysis. Past Medical History:   Diagnosis Date    Abuse 1998    Alcohol    Acute renal failure ([facility] [address] 3/9/2018    Adrenal insufficiency (HCC)     Anxiety     Calculus of kidney 1988    Cardiac echocardiogram 07/31/2012    EF 60-65%. No WMA. Mild conc LVH. Gr 1 DDfx. RVSP 20-25 mmHg. Mild SAGAR. Mild TR,. Mild PAE.  Cardiovascular LE venous duplex 10/08/2012    No venous thrombosis or venous insufficiency in bilateral lower extremities.  Carotid duplex 10/09/2014    Mild < 50% bilateral ICA stenosis.       Chronic lung disease     Contact dermatitis and other eczema, due to unspecified cause     COPD [facility]     Degenerative joint disease     Dementia ([facility])     Depression     Hematuria     Hypercholesteremia     Hypertension     Hypothyroidism 1998    Kidney stone     Neuropathy 3/2007    Orthostatic hypotension     Pituitary adenoma ([facility] [address] 2/1998    Pituitary tumor 1998    Pneumonia     Seizures ([facility] [address]     none recently    Severe obstructive sleep apnea 01-15-15     started using Bipap Machine    Stress     Stroke ([facility] [address] 2/2006    no residual    TIA (transient ischemic attack)     Trauma      Past Surgical History:   Procedure Laterality Date    CYSTOSCOPY,INSERT URETERAL STENT  9/14/15    Dr. Marisel Gaming    HX APPENDECTOMY      HX Odell Santiago HX LITHOTRIPSY  9/14/15    Dr. Maliha Fernandez HX WISDOM TEETH EXTRACTION      x4     Family History   Problem Relation Age of Onset    Heart Disease Father     Hypertension Father     Hypertension Mother     Cancer Mother         skin    Elevated Lipids Sister     Heart Disease Sister     Cancer Maternal Grandmother         colon and stomach    Heart Disease Paternal Grandmother     Heart Attack Paternal Grandmother     Heart Attack Paternal Grandfather  Heart Disease Paternal Grandfather      Social History     Socioeconomic History    Marital status:      Spouse name: Not on file    Number of children: Not on file    Years of education: Not on file    Highest education level: Not on file   Occupational History    Not on file   Social Needs    Financial resource strain: Not on file    Food insecurity     Worry: Not on file     Inability: Not on file    Transportation needs     Medical: Not on file     Non-medical: Not on file   Tobacco Use    Smoking status: Former Smoker     Packs/day: 1.50     Years: 65.00     Pack years: 97.50     Types: Cigarettes     Last attempt to quit: 3/9/2018     Years since quittin.1    Smokeless tobacco: Never Used    Tobacco comment: 18 decreased amt.  of cigarettes/day only   Substance and Sexual Activity    Alcohol use: No     Alcohol/week: 0.0 standard drinks     Comment: quit  due to Alcohol Abuse    Drug use: No     Types: Prescription, OTC    Sexual activity: Never   Lifestyle    Physical activity     Days per week: Not on file     Minutes per session: Not on file    Stress: Not on file   Relationships    Social connections     Talks on phone: Not on file     Gets together: Not on file     Attends Adventism service: Not on file     Active member of club or organization: Not on file     Attends meetings of clubs or organizations: Not on file     Relationship status: Not on file    Intimate partner violence     Fear of current or ex partner: Not on file     Emotionally abused: Not on file     Physically abused: Not on file     Forced sexual activity: Not on file   Other Topics Concern    Not on file   Social History Narrative    Not on file     Allergies: I have reviewed the allergy hx  Allergies   Allergen Reactions    Iodine Hives     Other reaction(s): Unknown       Medications:  I have reviewed the patient's medications  Prior to Admission medications    Medication Sig Start Date End Date Taking? Authorizing Provider   magnesium oxide (MAG-OX) 400 mg tablet Take 400 mg by mouth daily. Yes Provider, Historical   PARoxetine (PAXIL) 40 mg tablet TAKE ONE TABLET BY MOUTH EVERY DAY 3/29/20  Yes Rebel Song MD   atorvastatin (LIPITOR) 10 mg tablet TAKE ONE TABLET BY MOUTH EVERY DAY 12/2/19  Yes Nicky Song MD   potassium chloride (K-DUR, KLOR-CON) 20 mEq tablet TAKE ONE TABLET BY MOUTH 2 TIMES A DAY 8/21/19  Yes Nicky Song MD   ipratropium (ATROVENT) 0.02 % soln 2.5 mL by Nebulization route four (4) times daily. COPD J44.9 6/18/19  Yes Jabari Contreras MD   budesonide (PULMICORT) 1 mg/2 mL nbsp 2 mL by Nebulization route daily. Rinse your mouth after each use. File under Medicare Part B Dx code J44.9 2/18/19  Yes Jabari Contreras MD   Wheel Chair cal Manual wheelchair 1/18/19  Yes uYsef Bah MD   arformoterol [facility]) 15 mcg/2 mL nebu neb solution 2 mL by Nebulization route two (2) times a day. File Under Medicare B, ICD J44.9 12/13/18  Yes Jabari Contreras MD   hydrocortisone (CORTEF) 10 mg tablet Take 10 mg by mouth daily. Yes Provider, Historical   albuterol (ACCUNEB) 1.25 mg/3 mL nebu 3 mL by Nebulization route every four (4) hours as needed. Patient taking differently: 3 mL by Nebulization route every four (4) hours as needed for Cough. cough 3/22/18  Yes Gracie Manzo PA-C   Nebulizer & Compressor (PORTABLE NEBULIZER SYSTEM) machine 1 Each by Does Not Apply route two (2) times a day. Patient taking differently: 1 Each by Does Not Apply route two (2) times a day. 3/22/18  Yes Hyun Manzo PA-C   cholecalciferol, vitamin D3, 4,000 unit cap Take 1 Cap by mouth daily. Yes Provider, Historical   OXYGEN-AIR DELIVERY SYSTEMS 3 L/min by Nasal route continuous. [employer] O2   Yes Provider, Historical   SYNTHROID 112 mcg tablet Take 112 mcg by mouth Daily (before breakfast).  7/19/16  Yes Provider, Historical   Walker (BARNEY ROLLING WALKER) misc 1 Device by Does Not Apply route daily. 8/31/15  Yes Paolo Cuevas   Q-Ebrrjylgubmy-S53-Acetylcyst (CEREFOLIN NAC) tablet Take 1 tablet by mouth daily. Patient taking differently: Take 1 Tab by mouth daily. 10/30/14  Yes Elvi Amaral MD   fludrocortisone (FLORINEF) 0.1 mg tablet Take 1 tablet by mouth daily. Patient taking differently: Take 1 Tab by mouth daily. 10/30/14  Yes Elvi Amaral MD   oxcarbazepine (TRILEPTAL) 300 mg tablet Take 300 mg by mouth two (2) times a day. Yes Provider, Historical   aspirin 81 mg tablet Take 81 mg by mouth daily. Yes Provider, Historical   DOCOSAHEXANOIC ACID/EPA (FISH OIL PO) Take 4,000 mg by mouth daily. Yes Provider, Historical   CALCIUM CITRATE/VITAMIN D3 (CALCIUM CITRATE + PO) Take 600 mg by mouth once over twenty-four (24) hours. Yes Provider, Historical   albuterol (ProAir HFA) 90 mcg/actuation inhaler Take 2 Puffs by inhalation every four (4) hours as needed for Wheezing or Shortness of Breath. 5/18/20   Maryana Monroe MD   amoxicillin-clavulanate (AUGMENTIN) 875-125 mg per tablet  2/4/20   Provider, Historical   doxycycline (VIBRAMYCIN) 100 mg capsule  2/4/20   Provider, Historical       Immunizations:  I have reviewed the patient's immunizations  Immunization History   Administered Date(s) Administered    Influenza Vaccine (Tri) Adjuvanted 02/19/2019    Pneumococcal Conjugate (PCV-13) 02/19/2019    Tdap 07/03/2018       Review of Systems:  A complete review of systems was performed as stated in the HPI, all others are negative. Objective:    Physical Exam:  There were no vitals taken for this visit.     This is a virtual visit    Constitutional: [x] Appears well-developed and well-nourished [x] No apparent distress      [] Abnormal  (Limited exam due to video visit)    Mental status: [x] Alert and awake  [x] Oriented to person/place/time [x] Able to follow commands    [] Abnormal -     Eyes:   EOM    [x]  Normal    [] Abnormal -   Sclera  [x] Normal    [] Abnormal -          Discharge [x]  None visible   [] Abnormal   (limited exam due to video visit)     HENT: [x] Normocephalic, atraumatic, extra ocular movement appears intact, nasal bridge midline, no nasal drainage   [] Abnormal  [x] Mouth/Throat: Mucous membranes are moist    External Ears [x] Normal, no drainage or discharge[] Abnormal -  (limited exam due to video visit)     Neck: [x] No visualized mass, trachea midline [] Abnormal -   (limited exam due to video visit)     Pulmonary/Chest: [x] Respiratory effort normal   [x] No visualized signs of difficulty breathing or respiratory distress        [] Abnormal  (limited exam due to video visit)      Musculoskeletal:   [x] Normal movement of arms with no signs of ataxia         [x] Normal range of motion of neck        [] Abnormal  (limited exam due to video visit)     Neurological:        [x] No Facial Asymmetry (Cranial nerve 7 motor function) (limited exam due to video visit)          [x] No gaze palsy        [] Abnormal  (limited exam due to video visit)         Skin:        [x] No significant exanthematous lesions or discoloration noted on facial skin         [] Abnormal   (limited exam due to video visit)            Psychiatric:       [x] Normal Affect, thought content, processing  [] Abnormal  [x] No Hallucinations        Labs:   I have reviewed the patient's available labs  Lab Results   Component Value Date/Time    WBC 5.6 11/12/2019 11:26 AM    HGB 11.9 (L) 11/12/2019 11:26 AM    HCT 36.2 11/12/2019 11:26 AM    PLATELET 154 54/84/6978 11:26 AM    MCV 92.8 11/12/2019 11:26 AM     Lab Results   Component Value Date/Time    Sodium 140 11/12/2019 11:26 AM    Potassium 3.9 11/12/2019 11:26 AM    Chloride 103 11/12/2019 11:26 AM    CO2 31 11/12/2019 11:26 AM    Anion gap 6 11/12/2019 11:26 AM    Glucose 92 11/12/2019 11:26 AM    BUN 23 (H) 11/12/2019 11:26 AM    Creatinine 1.24 11/12/2019 11:26 AM    BUN/Creatinine ratio 19 11/12/2019 11:26 AM GFR est AA 50 (L) 11/12/2019 11:26 AM    GFR est non-AA 41 (L) 11/12/2019 11:26 AM    Calcium 9.5 11/12/2019 11:26 AM    Bilirubin, total 0.4 11/12/2019 11:26 AM    AST (SGOT) 19 11/12/2019 11:26 AM    Alk. phosphatase 82 11/12/2019 11:26 AM    Protein, total 7.2 11/12/2019 11:26 AM    Albumin 3.5 11/12/2019 11:26 AM    Globulin 3.7 11/12/2019 11:26 AM    A-G Ratio 0.9 11/12/2019 11:26 AM    ALT (SGPT) 15 11/12/2019 11:26 AM       Imaging:  I have personally reviewed patient's imaging --no new imaging      PFTs: April 2019-- attempted to perform today, pt unable to perform maneuvers    TTE:  I have reviewed the patient's TTE results  No results found for this or any previous visit. Assessment and Plan:  80 y.o. female with:    Impression:  1. Severe COPD  2. Chronic hypoxia:  Latest overnight pulse oximetry was negative  3. History of nicotine dependence to cigarettes: Patient was smoking up until her injury in Jan 2019  4. Dyspnea/shortness of breath: Etiology due to above. Stable  5. Aspiration/dysphagia:  Pt had hospitalization to [facility] at end of January, CXR showed RLL infiltrate. Repeat CXR in March showed clearance of pneumonia. Pt has ongoing occasional sx of aspiration    Plan:  -Reviewed ongoing sx of suspected aspiration, counseled regarding aspiration lifestyle precautions. Ordered modified barium swallow to rule out overt aspiration. If positive, advised them that we will refer patient to SLP for pharyngeal strengthening exercises. -Continue supplemental oxygen 3LNC with exertion and QHS. For recertification, discussed with ancillary staff, they spoke with pt's [employer] company and will have pt do walk for desaturation at home with home pulse oximeter.  -Continue current nebulizers:  Pulmicort, Brovana, ipratropium, and albuterol PRN  -Advised to remain active  -Congratulated on smoking abstinence.   Advised patient to avoid any conditions worsening her supplemental oxygen due to repeat fire/explosion risk  -Immunizations reviewed, pneumococcal vaccinations up-to-date        Follow-up and Dispositions    · Return in about 6 months (around 11/18/2020). We discussed the expected course, resolution and complications of the diagnosis(es) in detail. Medication risks, benefits, costs, interactions, and alternatives were discussed as indicated. I advised her to contact the office if her condition worsens, changes or fails to improve as anticipated. She expressed understanding with the diagnosis(es) and plan. Pursuant to the emergency declaration under the [facility] waiver authority and the [employer] Act, this Virtual  Visit was conducted, with patient's consent, to reduce the patient's risk of exposure to COVID-19 and provide continuity of care for an established patient. Services were provided through a video synchronous discussion virtually to substitute for in-person clinic visit.     Patti Frazier MD/MPH     Pulmonary, Critical Care Medicine  [facility]

## 2020-05-27 ENCOUNTER — TELEPHONE (OUTPATIENT)
Dept: PULMONOLOGY | Age: 85
End: 2020-05-27

## 2020-05-27 DIAGNOSIS — I27.9 PULMONARY HEART DISEASE, UNSPECIFIED (HCC): ICD-10-CM

## 2020-05-27 DIAGNOSIS — J96.12 CHRONIC RESPIRATORY FAILURE WITH HYPOXIA AND HYPERCAPNIA (HCC): ICD-10-CM

## 2020-05-27 DIAGNOSIS — J43.2 CENTRILOBULAR EMPHYSEMA (HCC): ICD-10-CM

## 2020-05-27 DIAGNOSIS — R06.09 DYSPNEA ON EXERTION: ICD-10-CM

## 2020-05-27 DIAGNOSIS — J96.11 CHRONIC HYPOXEMIC RESPIRATORY FAILURE (HCC): Primary | ICD-10-CM

## 2020-05-27 DIAGNOSIS — J44.0 ACUTE BRONCHITIS WITH CHRONIC OBSTRUCTIVE PULMONARY DISEASE (COPD) (HCC): ICD-10-CM

## 2020-05-27 DIAGNOSIS — J20.9 ACUTE BRONCHITIS WITH CHRONIC OBSTRUCTIVE PULMONARY DISEASE (COPD) (HCC): ICD-10-CM

## 2020-05-27 DIAGNOSIS — R06.02 SHORTNESS OF BREATH: ICD-10-CM

## 2020-05-27 DIAGNOSIS — J96.11 CHRONIC RESPIRATORY FAILURE WITH HYPOXIA AND HYPERCAPNIA (HCC): ICD-10-CM

## 2020-05-27 RX ORDER — BUDESONIDE 1 MG/2ML
1000 INHALANT ORAL DAILY
Qty: 30 EACH | Refills: 5 | Status: SHIPPED | OUTPATIENT
Start: 2020-05-27

## 2020-05-27 RX ORDER — ARFORMOTEROL TARTRATE 15 UG/2ML
15 SOLUTION RESPIRATORY (INHALATION) 2 TIMES DAILY
Qty: 60 VIAL | Refills: 5 | Status: SHIPPED | OUTPATIENT
Start: 2020-05-27

## 2020-05-27 NOTE — TELEPHONE ENCOUNTER
----- Message from [address] [place] [address] sent at 5/27/2020  9:57 AM EDT ----- Yes, has to be within 30 day period. They will do it, just need new order. Thanks 
----- Message ----- From: Teresa Galvan MD 
Sent: 5/26/2020   9:27 AM EDT To: [address] [place] [address] We did an overnight pulse ox 2-3 months ago. Do they want another? 
 
----- Message ----- From: Bel Starr Sent: 5/21/2020   2:17 PM EDT To: Юлия Mckoy MD 
 
Pt's daughter did home 6 min walk, which is scanned in computer. I also faxed to [employer], but [employer] would like you to order overnight since she uses O2 at night. Can you put in order?

## 2020-06-16 ENCOUNTER — TELEPHONE (OUTPATIENT)
Dept: PULMONOLOGY | Age: 85
End: 2020-06-16

## 2020-06-16 NOTE — TELEPHONE ENCOUNTER
Pt's daughter, Cyndi ASH([phone]). [facility] told pt that they were going to deliver equipment and she has not heard anything from our office. So please check to see if Dr Keegan Nolan has ordered equipment.

## 2020-06-16 NOTE — TELEPHONE ENCOUNTER
Pt is on nighttime oxygen and also was on portable which they did 6 min walk at there home due to covid 19 and she did not drop, her sats where about 90. [employer] call them to advise they were going to change out there home concentrator. [employer] did the overnight testing and she still needs the nighttime o2. Family not sure why they need to change out. They do have the smaller suitcase style on wheels and that maybe the reason. I called [facility] and will check for family why they are changing out

## 2020-06-18 NOTE — TELEPHONE ENCOUNTER
Spoke with Shayla with [employer]. She sees the concentrator was switched out on 6/16 but doesn't know why. As long as she has her needed oxygen concentrator we will not worry about the reason.

## 2020-07-14 NOTE — IP AVS SNAPSHOT
[address] Patient: Jose Miguel Alva MRN: [id number] MDK:[id number] A check alma indicates which time of day the medication should be taken. My Medications START taking these medications Instructions Each Dose to Equal  
 Morning Noon Evening Bedtime  
 amoxicillin-clavulanate 875-125 mg per tablet Commonly known as:  AUGMENTIN Your last dose was: Your next dose is: Take 1 Tab by mouth every twelve (12) hours. 1 Tab  
    
   
   
   
  
 arformoterol 15 mcg/2 mL Nebu neb solution Commonly known as:  Read Bulla Your last dose was: Your next dose is:    
   
   
 2 mL by Nebulization route two (2) times a day. 15 mcg  
    
   
   
   
  
 budesonide 1 mg/2 mL Nbsp Commonly known as:  PULMICORT Your last dose was: Your next dose is:    
   
   
 2 mL by Nebulization route two (2) times a day. 1000 mcg  
    
   
   
   
  
 doxycycline 100 mg capsule Commonly known as:  Curly Esters Your last dose was: Your next dose is: Take 1 Cap by mouth two (2) times a day for 7 days. 100 mg  
    
   
   
   
  
 famotidine 20 mg tablet Commonly known as:  PEPCID Your last dose was: Your next dose is: Take 1 Tab by mouth two (2) times a day. 20 mg  
    
   
   
   
  
 guaiFENesin-dextromethorphan -30 mg per tablet Commonly known as:  HUMIBID DM Your last dose was: Your next dose is: Take 1 Tab by mouth every twelve (12) hours as needed for Cough. 1 Tab  
    
   
   
   
  
 L. acidophilus,casei,rhamnosus 50 billion cell Cpdr  
Commonly known as:  BIO-K PLUS Your last dose was: Your next dose is: Take 1 tab by mouth daily Nebulizer & Compressor machine Commonly known as:  PORTABLE NEBULIZER SYSTEM  
   
 SS following up with discharge planning. SS reviewed pt chart and discussed with pt RN. Pt 
working with PT. Pt max assist times two and dependent. Pt white count improving. Pt on TPN, 
IV Cipro, IV Micafungin, and IV Meropenem. Chest tube and J tube remain. All drains remain 
at this time. Pt on trach collar. SS will continue to follow for discharge planning. Your last dose was: Your next dose is:    
   
   
 1 Each by Does Not Apply route two (2) times a day. 1 Each  
    
   
   
   
  
 predniSONE 10 mg tablet Commonly known as:  Fco Slaughter Your last dose was: Your next dose is: Take 4 tabs for 2 days, then 3 tabs for 3 days, then 2 tabs for 3 days, then 1 tab for 3 days. CHANGE how you take these medications Instructions Each Dose to Equal  
 Morning Noon Evening Bedtime * albuterol 90 mcg/actuation inhaler Commonly known as:  PROAIR HFA What changed:  Another medication with the same name was added. Make sure you understand how and when to take each. Your last dose was: Your next dose is: Take 2 Puffs by inhalation every four (4) hours as needed for Wheezing or Shortness of Breath. 2 Puff * albuterol 1.25 mg/3 mL Nebu Commonly known as:  Morelia Jacob What changed: You were already taking a medication with the same name, and this prescription was added. Make sure you understand how and when to take each. Your last dose was: Your next dose is:    
   
   
 3 mL by Nebulization route every four (4) hours as needed. 1.25 mg  
    
   
   
   
  
 cholecalciferol (vitamin D3) 4,000 unit Cap What changed:  Another medication with the same name was removed. Continue taking this medication, and follow the directions you see here. Your last dose was: Your next dose is: Take 1 Cap by mouth daily. 1 Cap * nystatin powder Commonly known as:  MYCOSTATIN What changed:  Another medication with the same name was added. Make sure you understand how and when to take each. Your last dose was: Your next dose is:    
   
   
 Apply  to affected area four (4) times daily. * nystatin 100,000 unit/mL suspension Commonly known as:  MYCOSTATIN  
 What changed: You were already taking a medication with the same name, and this prescription was added. Make sure you understand how and when to take each. Your last dose was: Your next dose is: Take 5 mL by mouth four (4) times daily. swish and spit [id number] Units  
    
   
   
   
  
 potassium chloride 20 mEq tablet Commonly known as:  K-DUR, KLOR-CON What changed:   
- how much to take 
- how to take this - when to take this 
- additional instructions Your last dose was: Your next dose is: TAKE ONE TABLET BY MOUTH 2 TIMES A DAY * Notice: This list has 4 medication(s) that are the same as other medications prescribed for you. Read the directions carefully, and ask your doctor or other care provider to review them with you. CONTINUE taking these medications Instructions Each Dose to Equal  
 Morning Noon Evening Bedtime ALPRAZolam 0.5 mg tablet Commonly known as:  Katelin Cardenas Your last dose was: Your next dose is: Take 0.5 mg by mouth nightly as needed for Anxiety or Sleep. 0.5 mg  
    
   
   
   
  
 amLODIPine 10 mg tablet Commonly known as:  Ashley Regalado Your last dose was: Your next dose is: TAKE ONE TABLET BY MOUTH EVERY DAY  
     
   
   
   
  
 aspirin 81 mg tablet Your last dose was: Your next dose is: Take 81 mg by mouth daily. 81 mg  
    
   
   
   
  
 atorvastatin 10 mg tablet Commonly known as:  LIPITOR Your last dose was: Your next dose is: TAKE ONE TABLET BY MOUTH EVERY DAY  
     
   
   
   
  
 bipap machine kit Your last dose was: Your next dose is:    
   
   
 1 each by Does Not Apply route. Started using BiPap Machine 01-15-15  
 1 Each CALCIUM CITRATE + PO Your last dose was: Your next dose is: Take  by mouth once over twenty-four (24) hours. CO Q-10 200 mg capsule Generic drug:  coenzyme Q-10 Your last dose was: Your next dose is: Take  by mouth daily. FISH OIL 1,000 mg (120 mg-180 mg) capsule Generic drug:  omega 3-DHA-EPA-fish oil Your last dose was: Your next dose is: Take 4 Caps by mouth daily. 4 Cap FISH OIL PO Your last dose was: Your next dose is: Take 4,000 mg by mouth daily. 4000 mg  
    
   
   
   
  
 fludrocortisone 0.1 mg tablet Commonly known as:  FLORINEF Your last dose was: Your next dose is: Take 1 tablet by mouth daily. 0.1 mg  
    
   
   
   
  
 fluticasone 50 mcg/actuation nasal spray Commonly known as:  Marsa Albe Your last dose was: Your next dose is: 2 Sprays by Both Nostrils route daily. 2 Spray  
    
   
   
   
  
 furosemide 20 mg tablet Commonly known as:  LASIX Your last dose was: Your next dose is: Take 1 Tab by mouth daily as needed for Other (Leg Swelling). 20 mg  
    
   
   
   
  
 hydrocortisone 10 mg tablet Commonly known as:  CORTEF Your last dose was: Your next dose is: Take 10 mg by mouth daily. 10 mg  
    
   
   
   
  
 L-Methylfolate-X27-Mlcbovbrlu tablet Commonly known as:  Barbara Coffee Your last dose was: Your next dose is: Take 1 tablet by mouth daily. 1 Tab  
    
   
   
   
  
 lisinopril 20 mg tablet Commonly known as:  Willis Boor Your last dose was: Your next dose is: TAKE ONE TABLET BY MOUTH EVERY DAY  
     
   
   
   
  
 magnesium oxide 400 mg tablet Commonly known as:  MAG-OX Your last dose was: Your next dose is: Take 400 mg by mouth daily.   
 400 mg  
    
   
 OXcarbazepine 300 mg tablet Commonly known as:  TRILEPTAL Your last dose was: Your next dose is: Take 300 mg by mouth two (2) times a day. 300 mg OXYGEN-AIR DELIVERY SYSTEMS Your last dose was: Your next dose is:    
   
   
 3 L/min by Does Not Apply route continuous. 3 L/min PARoxetine 30 mg tablet Commonly known as:  PAXIL Your last dose was: Your next dose is: TAKE ONE TABLET BY MOUTH EVERY DAY  
     
   
   
   
  
 SYNTHROID 112 mcg tablet Generic drug:  levothyroxine Your last dose was: Your next dose is: Take 112 mcg by mouth Daily (before breakfast). 112 mcg VOLTAREN 1 % Gel Generic drug:  diclofenac Your last dose was: Your next dose is:    
   
   
 Apply 2 g to affected area nightly as needed (pain). 2 g Courtney Inoue Commonly known as:  [employer] Your last dose was: Your next dose is:    
   
   
 1 Device by Does Not Apply route daily. 1 Device STOP taking these medications COQ10 (LIPOSOMAL UBIQUINOL) PO  
   
  
 hydroCHLOROthiazide 12.5 mg capsule Commonly known as:  MICROZIDE  
   
  
 methylPREDNISolone 4 mg tablet Commonly known as:  MEDROL (NIRAJ) metoprolol tartrate 50 mg tablet Commonly known as:  LOPRESSOR  
   
  
 umeclidinium-vilanterol 62.5-25 mcg/actuation inhaler Commonly known as:  Maris Sheth Where to Get Your Medications Information on where to get these meds will be given to you by the nurse or doctor. ! Ask your nurse or doctor about these medications  
  albuterol 1.25 mg/3 mL Nebu  
 amoxicillin-clavulanate 875-125 mg per tablet  
 arformoterol 15 mcg/2 mL Nebu neb solution  
 budesonide 1 mg/2 mL Nbsp  
 doxycycline 100 mg capsule famotidine 20 mg tablet  
 guaiFENesin-dextromethorphan -30 mg per tablet L. acidophilus,casei,rhamnosus 50 billion cell Cpdr  
 Nebulizer & Compressor machine  
 nystatin 100,000 unit/mL suspension  
 predniSONE 10 mg tablet

## 2020-08-06 NOTE — TELEPHONE ENCOUNTER
Why are they requesting this med? Was this an error? Also, needs f/u appt unless she has a new pcp. Ok to sched as VV or telephone visit. Pt has dementia; one of her dtrs will need to be with her for her appt.

## 2020-08-10 ENCOUNTER — VIRTUAL VISIT (OUTPATIENT)
Dept: FAMILY MEDICINE CLINIC | Age: 85
End: 2020-08-10

## 2020-08-10 DIAGNOSIS — F03.90 DEMENTIA WITHOUT BEHAVIORAL DISTURBANCE, UNSPECIFIED DEMENTIA TYPE: ICD-10-CM

## 2020-08-10 DIAGNOSIS — E78.2 MIXED HYPERLIPIDEMIA: Primary | ICD-10-CM

## 2020-08-10 DIAGNOSIS — J44.9 CHRONIC OBSTRUCTIVE PULMONARY DISEASE, UNSPECIFIED COPD TYPE (HCC): ICD-10-CM

## 2020-08-10 DIAGNOSIS — E87.6 HYPOKALEMIA: ICD-10-CM

## 2020-08-10 RX ORDER — ATORVASTATIN CALCIUM 10 MG/1
TABLET, FILM COATED ORAL
Qty: 90 TAB | Refills: 3 | Status: SHIPPED | OUTPATIENT
Start: 2020-09-02

## 2020-08-10 RX ORDER — VIT B12/LEVOMEFOLATE/VIT B6/B2 1-6-50-5MG
TABLET ORAL
COMMUNITY

## 2020-08-10 NOTE — PROGRESS NOTES
Cm Doe presents today for   Chief Complaint   Patient presents with    Hypertension     Follow up    Cholesterol Problem     Follow up    Other     hypokalemia    Dementia     Follow up       Cm Doe preferred language for health care discussion is english/other. Is someone accompanying this pt? Daughter Rupa Sanches)    Is the patient using any DME equipment during OV? This is a virtual visit.        Depression Screening:  3 most recent PHQ Screens 2/10/2020   PHQ Not Done -   Little interest or pleasure in doing things Not at all   Feeling down, depressed, irritable, or hopeless Not at all   Total Score PHQ 2 0   Trouble falling or staying asleep, or sleeping too much -   Feeling tired or having little energy -   Poor appetite, weight loss, or overeating -   Feeling bad about yourself - or that you are a failure or have let yourself or your family down -   Trouble concentrating on things such as school, work, reading, or watching TV -   Moving or speaking so slowly that other people could have noticed; or the opposite being so fidgety that others notice -   Thoughts of being better off dead, or hurting yourself in some way -   How difficult have these problems made it for you to do your work, take care of your home and get along with others -       Learning Assessment:  Learning Assessment 1/28/2020   PRIMARY LEARNER Patient   HIGHEST LEVEL OF EDUCATION - PRIMARY LEARNER  GRADUATED HIGH SCHOOL OR GED   BARRIERS PRIMARY LEARNER [address] CAREGIVER Yes   CO-LEARNER NAME Daughter   [address]    NEED -   LEARNER PREFERENCE PRIMARY LISTENING     -   LEARNER Leonel 9 -   ANSWERED BY self   RELATIONSHIP SELF       Abuse Screening:  Abuse Screening Questionnaire 2/10/2020   Do you ever feel afraid of your partner? N   Are you in a relationship with someone who physically or mentally threatens you? N   Is it safe for you to go home? Y       Fall Risk  Fall Risk Assessment, last 12 mths 1/28/2020   Able to walk? Yes   Fall in past 12 months? No   Fall with injury? -   Number of falls in past 12 months -   Fall Risk Score -         Coordination of Care:  1. Have you been to the ER, urgent care clinic since your last visit? Hospitalized since your last visit? No    2. Have you seen or consulted any other health care providers outside of the [address] [place] [address] since your last visit? Include any pap smears or colon screening. No      Advance Directive:  1. Do you have an advance directive in place? Patient Reply:No    2. If not, would you like material regarding how to put one in place?  Patient Reply: Liz Atkins
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
## 2020-08-14 RX ORDER — HYDROCORTISONE 10 MG/1
10 TABLET ORAL DAILY
OUTPATIENT
Start: 2020-08-14

## 2020-08-31 ENCOUNTER — PATIENT OUTREACH (OUTPATIENT)
Dept: CASE MANAGEMENT | Age: 85
End: 2020-08-31

## 2020-08-31 NOTE — PROGRESS NOTES
CTN called patient's listed phone number in regard to Transition of Care. Patient's daughter Venancio Rogers answered the phone call and asked Care Transitions Nurse ( CTN) to please call the house phone number at [phone]. Patient's daughter  Arabella Bustamante should be there. CTN called the provided home phone number. A female answered the phone and identified herself as patient;s daughter Mrs. Mariah Bueno. Patient was admitted to [facility] on 2002  and discharged on 2020  For:  Syncopal Episode. Please see Discharge Summary from [facility] for additional discharge diagnoses and/information. Patient/family was contacted within 1 business days of discharge. Top Discharge Challenges to be reviewed by the provider   Additional needs identified to be addressed with provider no    Discussed COVID-19 related testing which was available at this time. Test results were negative. Patient informed of results, if available? no   Lab results were not pending at time of discharge. Please see Care Everywhere for verification of Lab results from [facility]   Patient's family declined discussion re: Covid-19 guidelines etc.  At this time. Method of communication with provider : none       Advance Care Planning:   Does patient have an Advance Directive:  not on file per chart review at this time. Inpatient Readmission Risk score:   Not available through [facility]. Was this a readmission? no   Patient stated reason for the admission: n/a     Patients top risk factors for readmission: medical condition and advanced age. Also, possible fall. Interventions to address risk factors:   See goals please     Care Transition Nurse (CTN) contacted the patient/ family by telephone to perform post hospital discharge assessment. Verified name and  with family as identifiers. Provided introduction to self, and explanation of the CTN role.     CTN placed telephone call to the provided phone number for patient. Patient's daughter, Mrs. Luis Alfredo Weston stated that patient was not available and that I could speak with her. Mrs. Tori Hall is listed on the release of information form for patient. Medication reconciliation was attempted  with family, who verbalizes understanding of administration of home medications. Mrs. Luis Alfredo Weston declined review of specific home medications. Mrs. Luis Alfredo Weston stated that medications have not changed. CTN unable to review discharge instructions, medical action plan and red flags with family at this time. Patient's family member states that a physician has already called her to review patient's medications and that she is not being rude but the telephone call is repetitive. Family given an opportunity to ask questions and does not have any further questions or concerns at this time. Referral to Pharm D needed:   Will defer to PCP. Home Health/Outpatient orders at discharge: PT and [address]: [facility]   Date of initial visit:   Unavailable at this time. Durable Medical Equipment ordered at discharge: none noted at this time. [address]:   Durable Medical Equipment received:     Covid Risk Education    Patient has following risk factors of: COPD. Education was attempted to be  provided regarding infection prevention, and signs and symptoms of COVID-19 and when to seek medical attention with family who verbalized understanding. Patient's family member Mrs. Luis Alfredo Weston declined review of Covid-19 information unless there was something new to tell her. CTN related that I was unaware of any new information at this time. Discussed follow-up appointments. Patient's daughter, Mrs. Luis Alfredo Weston stated she will call to schedule PCP follow up appointment.        [facility] follow up appointment(s):         Future Appointments   Date Time Provider Marcelo Flores   9/14/2020 10:00 AM Dedrick Foss NOE, NP [facility] None   10/6/2020 10:30 AM Jazmine Dueñas MD [facility] None     Non-[facility] follow up appointment(s):     None noted at this time. Plan for follow-up call in 10-14 days/ as needed  based on severity of symptoms and risk factors. CTN will attempt to monitor for follow up PCP appointment. CTN provided contact information for future needs. Goals Addressed                 This Visit's Progress     Attends follow-up appointments as directed. Target Date:  10-1-2020    Patient to follow up with PCP appointment.  Prevent complications post hospitalization. Target Date:  10-1-2020    Patient was asked, by discharging physician, to keep a log of blood pressure readings and bring with her to PCP follow up appointment.

## 2020-09-23 DIAGNOSIS — E78.2 MIXED HYPERLIPIDEMIA: ICD-10-CM

## 2020-09-23 DIAGNOSIS — E87.6 HYPOKALEMIA: ICD-10-CM

## 2020-10-06 ENCOUNTER — VIRTUAL VISIT (OUTPATIENT)
Dept: FAMILY MEDICINE CLINIC | Age: 85
End: 2020-10-06
Payer: MEDICARE

## 2020-10-06 DIAGNOSIS — I10 WHITE COAT SYNDROME WITH HYPERTENSION: ICD-10-CM

## 2020-10-06 DIAGNOSIS — E87.6 HYPOKALEMIA: ICD-10-CM

## 2020-10-06 DIAGNOSIS — F03.90 DEMENTIA WITHOUT BEHAVIORAL DISTURBANCE, UNSPECIFIED DEMENTIA TYPE: ICD-10-CM

## 2020-10-06 DIAGNOSIS — E78.2 MIXED HYPERLIPIDEMIA: Primary | ICD-10-CM

## 2020-10-06 PROCEDURE — 1090F PRES/ABSN URINE INCON ASSESS: CPT | Performed by: FAMILY MEDICINE

## 2020-10-06 PROCEDURE — G8427 DOCREV CUR MEDS BY ELIG CLIN: HCPCS | Performed by: FAMILY MEDICINE

## 2020-10-06 PROCEDURE — 99214 OFFICE O/P EST MOD 30 MIN: CPT | Performed by: FAMILY MEDICINE

## 2020-10-06 PROCEDURE — 1101F PT FALLS ASSESS-DOCD LE1/YR: CPT | Performed by: FAMILY MEDICINE

## 2020-10-06 PROCEDURE — G8432 DEP SCR NOT DOC, RNG: HCPCS | Performed by: FAMILY MEDICINE
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
## 2020-10-06 NOTE — PROGRESS NOTES
Gretta Teran presents today for   Chief Complaint   Patient presents with    Hypertension     Follow up    Labs     Follow up    Other     hypokalemia    Dementia     Follow up        Gretta Teran preferred language for health care discussion is english/other. Is someone accompanying this pt? Daughter Kurtis Giraldo    Is the patient using any DME equipment during [address]? No    Depression Screening:  3 most recent PHQ Screens 2/10/2020   PHQ Not Done -   Little interest or pleasure in doing things Not at all   Feeling down, depressed, irritable, or hopeless Not at all   Total Score PHQ 2 0   Trouble falling or staying asleep, or sleeping too much -   Feeling tired or having little energy -   Poor appetite, weight loss, or overeating -   Feeling bad about yourself - or that you are a failure or have let yourself or your family down -   Trouble concentrating on things such as school, work, reading, or watching TV -   Moving or speaking so slowly that other people could have noticed; or the opposite being so fidgety that others notice -   Thoughts of being better off dead, or hurting yourself in some way -   How difficult have these problems made it for you to do your work, take care of your home and get along with others -       Learning Assessment:  Learning Assessment 1/28/2020   PRIMARY LEARNER Patient   HIGHEST LEVEL OF EDUCATION - PRIMARY LEARNER  GRADUATED HIGH SCHOOL OR GED   BARRIERS PRIMARY LEARNER [address] CAREGIVER Yes   CO-LEARNER NAME Daughter   [address]    NEED -   LEARNER PREFERENCE PRIMARY LISTENING     -   LEARNER Leonel 9 -   ANSWERED BY self   RELATIONSHIP SELF       Abuse Screening:  Abuse Screening Questionnaire 8/10/2020   Do you ever feel afraid of your partner?  N   Are you in a relationship with someone who physically or mentally threatens you? N   Is it safe for you to go home? Y       Fall Risk  Fall Risk Assessment, last 12 mths 1/28/2020   Able to walk? Yes   Fall in past 12 months? No   Fall with injury? -   Number of falls in past 12 months -   Fall Risk Score -         Coordination of Care:  1. Have you been to the ER, urgent care clinic since your last visit? Hospitalized since your last visit? No    2. Have you seen or consulted any other health care providers outside of the [address] [place] [address] since your last visit? Include any pap smears or colon screening. No      Advance Directive:  1. Do you have an advance directive in place? Patient Reply. No    2. If not, would you like material regarding how to put one in place?  Patient Reply: No

## 2020-10-18 DIAGNOSIS — E87.6 HYPOKALEMIA: ICD-10-CM

## 2020-10-18 DIAGNOSIS — I10 ESSENTIAL HYPERTENSION, BENIGN: ICD-10-CM

## 2020-10-21 RX ORDER — POTASSIUM CHLORIDE 20 MEQ/1
TABLET, EXTENDED RELEASE ORAL
Qty: 180 TAB | Refills: 4 | Status: SHIPPED | OUTPATIENT
Start: 2020-10-21

## 2020-10-30 NOTE — PROGRESS NOTES
Advance Care Planning       Advance Care Planning (ACP) Physician/NP/PA (Provider) Conversation        Date of ACP Conversation: 11/5/2020    Conversation Conducted with:   Patient with [address] Maker:    Current Designated Health Care Decision Maker:   (If there is a valid [address] named in the [address]" box in the ACP activity, but it is not visible above, be sure to open that field and then select the health care decision maker relationship (ie \"primary\") in the blank space to the right of the name.)    Note: Assess and validate information in current ACP documents, as indicated. If no Authorized Decision Maker has previously been identified, then patient chooses [address]:  \"Who would you like to name as your primary health care decision-maker? \"    Name: Darien Dolan   Relationship: Daughter Phone number: [phone]  Lucile Alexander this person be reached easily? \" YES  \"Who would you like to name as your back-up decision maker? \"   Name: Belinda Villar  Relationship: Daughter Phone number: [phone]  Lucile Alexander this person be reached easily? \" YES    Note: If the relationship of these Decision-Makers to the patient does NOT follow your state's Next of Kin hierarchy, recommend that patient complete ACP document that meets state-specific requirements to allow them to act on the patient's behalf when appropriate. Care Preferences:    Hospitalization: \"If your health worsens and it becomes clear that your chance of recovery is unlikely, what would your preference be regarding hospitalization? \"  The patient would prefer hospitalization. Ventilation: \"If you were in your present state of health and suddenly became very ill and were unable to breathe on your own, what would your preference be about the use of a ventilator (breathing machine) if it was available to you? \"    The patient would desire the use of a ventilator. \"If your health worsens and it becomes clear that your chance of recovery is unlikely, what would your preference be about the use of a ventilator (breathing machine) if it was available to you? \"   no    Resuscitation:  \"CPR works best to restart the heart when there is a sudden event, like a heart attack, in someone who is otherwise healthy. Unfortunately, CPR does not typically restart the heart for people who have serious health conditions or who are very sick. \"    \"In the event your heart stopped as a result of an underlying serious health condition, would you want attempts to be made to restart your heart (answer \"yes\" for attempt to resuscitate) or would you prefer a natural death (answer \"no\" for do not attempt to resuscitate)? \"   Patient prefers natural death    NOTE: If the patient has a valid advance directive AND provides care preference(s) that are inconsistent with that prior directive, advise the patient to consider either: creating a new advance directive that complies with state-specific requirements; or, if that is not possible, orally revoking that prior directive in accordance with state-specific requirements, which must be documented in the EHR.     Conversation Outcomes / Follow-Up Plan:   Advised to complete an Advance Directive      Length of Voluntary ACP Conversation in minutes:  16 minutes      Hoang Gudino NP

## 2020-11-05 ENCOUNTER — VIRTUAL VISIT (OUTPATIENT)
Dept: FAMILY MEDICINE CLINIC | Age: 85
End: 2020-11-05
Payer: MEDICARE

## 2020-11-05 DIAGNOSIS — Z71.89 ACP (ADVANCE CARE PLANNING): ICD-10-CM

## 2020-11-05 DIAGNOSIS — Z13.39 SCREENING FOR ALCOHOLISM: ICD-10-CM

## 2020-11-05 DIAGNOSIS — Z00.00 MEDICARE ANNUAL WELLNESS VISIT, SUBSEQUENT: Primary | ICD-10-CM

## 2020-11-05 PROCEDURE — G0439 PPPS, SUBSEQ VISIT: HCPCS | Performed by: NURSE PRACTITIONER

## 2020-11-05 PROCEDURE — 99497 ADVNCD CARE PLAN 30 MIN: CPT | Performed by: NURSE PRACTITIONER
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
## 2020-11-05 NOTE — PROGRESS NOTES
Brayden Salvador presents today for   Chief Complaint   Patient presents with   [facility] Annual Wellness Visit     Medicare       Is someone accompanying this pt? no    Is the patient using any DME equipment during [address]? no    Depression Screening:  3 most recent PHQ Screens 2/10/2020   PHQ Not Done -   Little interest or pleasure in doing things Not at all   Feeling down, depressed, irritable, or hopeless Not at all   Total Score PHQ 2 0   Trouble falling or staying asleep, or sleeping too much -   Feeling tired or having little energy -   Poor appetite, weight loss, or overeating -   Feeling bad about yourself - or that you are a failure or have let yourself or your family down -   Trouble concentrating on things such as school, work, reading, or watching TV -   Moving or speaking so slowly that other people could have noticed; or the opposite being so fidgety that others notice -   Thoughts of being better off dead, or hurting yourself in some way -   How difficult have these problems made it for you to do your work, take care of your home and get along with others -       Learning Assessment:  Learning Assessment 1/28/2020   PRIMARY LEARNER Patient   HIGHEST LEVEL OF EDUCATION - PRIMARY LEARNER  GRADUATED HIGH SCHOOL OR GED   BARRIERS PRIMARY LEARNER [address] CAREGIVER Yes   CO-LEARNER NAME Daughter   [address]    NEED -   LEARNER [address] -   ANSWERED BY self   RELATIONSHIP SELF       Abuse Screening:  Abuse Screening Questionnaire 8/10/2020   Do you ever feel afraid of your partner? N   Are you in a relationship with someone who physically or mentally threatens you? N   Is it safe for you to go home?  Y       Fall Screening  Fall Risk Assessment, last 12 mths 1/28/2020   Able to walk? Yes   Fall in past 12 months? No   Fall with injury? -   Number of falls in past 12 months -   Fall Risk Score -       Generalized Anxiety  No flowsheet data found. Health Maintenance Due   Topic Date Due    Shingrix Vaccine Age 49> (1 of 2) 11/23/1981    GLAUCOMA SCREENING Q2Y  10/12/2019    Pneumococcal 65+ years (2 of 2 - PPSV23) 02/19/2020    Medicare Yearly Exam  02/28/2020   . Health Maintenance reviewed and discussed and ordered per Provider. Coordination of Care  1. Have you been to the ER, urgent care clinic since your last visit? Hospitalized since your last visit? no    2. Have you seen or consulted any other health care providers outside of the [address] [place] [address] since your last visit? Include any pap smears or colon screening.  no      Advance Directive:  Discussed 1/28/20

## 2020-11-05 NOTE — PATIENT INSTRUCTIONS
Medicare Wellness Visit, Female The best way to live healthy is to have a lifestyle where you eat a well-balanced diet, exercise regularly, limit alcohol use, and quit all forms of tobacco/nicotine, if applicable. Regular preventive services are another way to keep healthy. Preventive services (vaccines, screening tests, monitoring & exams) can help personalize your care plan, which helps you manage your own care. Screening tests can find health problems at the earliest stages, when they are easiest to treat. [facility] follows the current, evidence-based guidelines published by the [facility]vy Ashby ([facility]) when recommending preventive services for our patients. Because we follow these guidelines, sometimes recommendations change over time as research supports it. (For example, mammograms used to be recommended annually. Even though Medicare will still pay for an annual mammogram, the newer guidelines recommend a mammogram every two years for women of average risk). Of course, you and your doctor may decide to screen more often for some diseases, based on your risk and your co-morbidities (chronic disease you are already diagnosed with). Preventive services for you include: - Medicare offers their members a free annual wellness visit, which is time for you and your primary care provider to discuss and plan for your preventive service needs. Take advantage of this benefit every year! 
-All adults over the age of 72 should receive the recommended pneumonia vaccines. Current USPSTF guidelines recommend a series of two vaccines for the best pneumonia protection.  
-All adults should have a flu vaccine yearly and a tetanus vaccine every 10 years.  
-All adults age 48 and older should receive the shingles vaccines (series of two vaccines). -All adults age 38-68 who are overweight should have a diabetes screening test once every three years. -All adults born between 80 and 1965 should be screened once for Hepatitis C. 
-Other screening tests and preventive services for persons with diabetes include: an eye exam to screen for diabetic retinopathy, a kidney function test, a foot exam, and stricter control over your cholesterol.  
-Cardiovascular screening for adults with routine risk involves an electrocardiogram (ECG) at intervals determined by your doctor.  
-Colorectal cancer screenings should be done for adults age 54-65 with no increased risk factors for colorectal cancer. There are a number of acceptable methods of screening for this type of cancer. Each test has its own benefits and drawbacks. Discuss with your doctor what is most appropriate for you during your annual wellness visit. The different tests include: colonoscopy (considered the best screening method), a fecal occult blood test, a fecal DNA test, and sigmoidoscopy. 
 
-A bone mass density test is recommended when a woman turns 65 to screen for osteoporosis. This test is only recommended one time, as a screening. Some providers will use this same test as a disease monitoring tool if you already have osteoporosis. -Breast cancer screenings are recommended every other year for women of normal risk, age 54-69. 
-Cervical cancer screenings for women over age 72 are only recommended with certain risk factors. Here is a list of your current Health Maintenance items (your personalized list of preventive services) with a due date: 
Health Maintenance Due Topic Date Due  Shingles Vaccine (1 of 2) 11/23/1981  Glaucoma Screening   10/12/2019  Pneumococcal Vaccine (2 of 2 - PPSV23) 02/19/2020 Mele Dueñas Annual Well Visit  02/28/2020 Medicare Wellness Visit, Female The best way to live healthy is to have a lifestyle where you eat a well-balanced diet, exercise regularly, limit alcohol use, and quit all forms of tobacco/nicotine, if applicable. Regular preventive services are another way to keep healthy. Preventive services (vaccines, screening tests, monitoring & exams) can help personalize your care plan, which helps you manage your own care. Screening tests can find health problems at the earliest stages, when they are easiest to treat. [facility] follows the current, evidence-based guidelines published by the [facility]by ([facility]) when recommending preventive services for our patients. Because we follow these guidelines, sometimes recommendations change over time as research supports it. (For example, mammograms used to be recommended annually. Even though Medicare will still pay for an annual mammogram, the newer guidelines recommend a mammogram every two years for women of average risk). Of course, you and your doctor may decide to screen more often for some diseases, based on your risk and your co-morbidities (chronic disease you are already diagnosed with). Preventive services for you include: - Medicare offers their members a free annual wellness visit, which is time for you and your primary care provider to discuss and plan for your preventive service needs. Take advantage of this benefit every year! 
-All adults over the age of 72 should receive the recommended pneumonia vaccines. Current USPSTF guidelines recommend a series of two vaccines for the best pneumonia protection.  
-All adults should have a flu vaccine yearly and a tetanus vaccine every 10 years.  
-All adults age 48 and older should receive the shingles vaccines (series of two vaccines).      
-All adults age 38-68 who are overweight should have a diabetes screening test once every three years.  
-All adults born between 80 and 1965 should be screened once for Hepatitis C. 
-Other screening tests and preventive services for persons with diabetes include: an eye exam to screen for diabetic retinopathy, a kidney function test, a foot exam, and stricter control over your cholesterol.  
-Cardiovascular screening for adults with routine risk involves an electrocardiogram (ECG) at intervals determined by your doctor.  
-Colorectal cancer screenings should be done for adults age 54-65 with no increased risk factors for colorectal cancer. There are a number of acceptable methods of screening for this type of cancer. Each test has its own benefits and drawbacks. Discuss with your doctor what is most appropriate for you during your annual wellness visit. The different tests include: colonoscopy (considered the best screening method), a fecal occult blood test, a fecal DNA test, and sigmoidoscopy. 
 
-A bone mass density test is recommended when a woman turns 65 to screen for osteoporosis. This test is only recommended one time, as a screening. Some providers will use this same test as a disease monitoring tool if you already have osteoporosis. -Breast cancer screenings are recommended every other year for women of normal risk, age 54-69. 
-Cervical cancer screenings for women over age 72 are only recommended with certain risk factors. Here is a list of your current Health Maintenance items (your personalized list of preventive services) with a due date: 
Health Maintenance Due Topic Date Due  Shingles Vaccine (1 of 2) 11/23/1981  Glaucoma Screening   10/12/2019  Pneumococcal Vaccine (2 of 2 - PPSV23) 02/19/2020 [address] [place] [address] Annual Well Visit  02/28/2020
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
## 2020-11-12 ENCOUNTER — DOCUMENTATION ONLY (OUTPATIENT)
Dept: PULMONOLOGY | Age: 85
End: 2020-11-12

## 2020-11-12 NOTE — PROGRESS NOTES
Spoke w/ pt daughter - daughter calling to schedule barium swallow & calling back for fu w/ hp - requesting virtual

## 2021-01-01 ENCOUNTER — HOME HEALTH ADMISSION (OUTPATIENT)
Dept: HOME HEALTH SERVICES | Facility: HOME HEALTH | Age: 86
End: 2021-01-01
Payer: MEDICARE

## 2021-01-03 ENCOUNTER — HOME CARE VISIT (OUTPATIENT)
Dept: SCHEDULING | Facility: HOME HEALTH | Age: 86
End: 2021-01-03
Payer: MEDICARE

## 2021-01-03 VITALS
RESPIRATION RATE: 17 BRPM | SYSTOLIC BLOOD PRESSURE: 130 MMHG | TEMPERATURE: 97.6 F | OXYGEN SATURATION: 97 % | HEART RATE: 78 BPM | DIASTOLIC BLOOD PRESSURE: 80 MMHG

## 2021-01-03 PROCEDURE — 400018 HH-NO PAY CLAIM PROCEDURE

## 2021-01-03 PROCEDURE — 400013 HH SOC

## 2021-01-03 PROCEDURE — 3331090002 HH PPS REVENUE DEBIT

## 2021-01-03 PROCEDURE — 3331090001 HH PPS REVENUE CREDIT

## 2021-01-03 PROCEDURE — G0151 HHCP-SERV OF PT,EA 15 MIN: HCPCS

## 2021-01-04 ENCOUNTER — HOME CARE VISIT (OUTPATIENT)
Dept: HOME HEALTH SERVICES | Facility: HOME HEALTH | Age: 86
End: 2021-01-04
Payer: MEDICARE

## 2021-01-04 ENCOUNTER — PATIENT OUTREACH (OUTPATIENT)
Dept: CASE MANAGEMENT | Age: 86
End: 2021-01-04

## 2021-01-04 PROCEDURE — 3331090002 HH PPS REVENUE DEBIT

## 2021-01-04 PROCEDURE — 3331090001 HH PPS REVENUE CREDIT

## 2021-01-04 NOTE — PROGRESS NOTES
Patient was admitted to [facility] on 20 and discharged on 21 for complete heart block s/p pacemaker placement. Outreach made within 2 business days of discharge: Yes    Top Discharge Challenges to be reviewed by the provider   Additional needs identified to be addressed with provider no  none  Discussed COVID-19 related testing which was available at this time. Test results were negative. Patient informed of results, if available? yes   Method of communication with provider : chart routing       Advance Care Planning:   Does patient have an Advance Directive:  not on file    Inpatient Readmission Risk score: 35%  Was this a readmission? no   Patient stated reason for the admission: N/A  Patients top risk factors for readmission: N/A  Interventions to address risk factors: N/A    Care Transition Nurse (CTN) contacted the patient by telephone to perform post hospital discharge assessment. Call answered by Anthony bAdullahi, daughter. Daughter contacted patient. Writer spoke patient and verified name and  with patient as identifiers. Patient provided verbal permission to speak with Anthony Abdullahi during 30 day transition period. Provided introduction to self, and explanation of the CTN role. Daughter voiced patient has bandage over left chest wound with removal date of 21. Per d/c summary, patient able to shower 48 hrs after implant. Daughter advised to contact cardiology for instructions regarding removal of bandage and showering. Daughter verbalized patient does not have arm sling but they are aware of restrictions regarding arm movement (no pulling pushing, lifting or raising arm above head X 2 weeks). CTN reviewed discharge instructions, medical action plan and red flags with family who verbalized understanding. Family given an opportunity to ask questions and does not have any further questions or concerns at this time.  The family agrees to contact the PCP office for questions related to their healthcare. Was unable to perform medication reconciliation. Daughter voiced she does not have medication list on hand but was recently performed by physical therapist.  Verified med rec was performed on 1/3/21. Advised obtaining a 90-day supply of all daily and as-needed medications. Referral to Pharm D needed: no     Home Health/Outpatient orders at discharge: [address]: [facility]  Date of initial visit: 1/3/21    Durable Medical Equipment ordered at discharge: N/A      Covid Risk Education  Patient has following risk factors of: COPD, chronic kidney disease and age. Education provided regarding infection prevention, and signs and symptoms of COVID-19 and when to seek medical attention with family who verbalized understanding. Discussed exposure protocols and quarantine From CDC: Are you at higher risk for severe illness?  and given an opportunity for questions and concerns. The family agrees to contact the COVID-19 hotline [phone] or PCP office for questions related to COVID-19. For more information on steps you can take to protect yourself, see CDC's How to Protect Yourself     Patient/family/caregiver given information for GetWell Loop and agrees to enroll no      Discussed follow-up appointments.  If no appointment was previously scheduled, appointment scheduling offered: N/A  [facility] follow up appointment(s):   Future Appointments   Date Time Provider Marcelo Flores   1/5/2021  1:00 PM Virgene Diesel, PTA [address]   1/6/2021 11:00 AM Jodi Meyer MD [facility]   1/8/2021 To Be Determined Virgene Diesel, PTA [address]   1/12/2021 To Be Determined Virgene Diesel, PTA [facility]   1/14/2021 To Be Determined Virgene Diesel, PTA [facility]   1/19/2021 To Be Determined Virgene Diesel, [facility]   1/21/2021 To Be Determined ELODIA [address]   1/26/2021 To Be Determined Virgene Diesel, PTA [facility] 1/28/2021 To Be Determined Rosanna Lorenzo PTA [facility] HR [facility]   2/2/2021 To Be Determined Dulce Maria Cat PT [facility]     Non-[facility] follow up appointment(s): Family to contact Dr. Kianna Gibson (cardiology) for follow up appt. Contact info provided. Plan for follow-up call in 7-10 days based on severity of symptoms and risk factors. CTN provided contact information for future needs. Goals Addressed                 This Visit's Progress     Attends follow-up appointments as directed. Goal: Patient will attend all appointments scheduled within the next 30 days. Ensure provider appt is scheduled within 7 days post-discharge; PADMA appt has been scheduled with PCP. Daughter to contact cardiology to schedule follow up. Confirm patient attended post-discharge provider appt   Complete post-visit call to confirm attendance and update care needs           Prevent complications post hospitalization. Goal: No admissions post 30 days from discharge of 1/2/21.       Review/educate common or potential \"red flags\" of condition worsening  Consult for polypharmacy (pharmacist or provider)   Evaluate adherence to medications and priority barriers to resolve      Instruct on adherence to medications as ordered and assess for therapeutic response and side-effects       Monitor lab results and symptoms, escalate to provider       Review the Home Visit or Home Health documentation for coordinating care and goals   Assess for health risk behaviors and educate patient/caregivers on reducing risk      Discuss and provide resources for ACP; Not on file; will address at a later time

## 2021-01-05 ENCOUNTER — HOME CARE VISIT (OUTPATIENT)
Dept: SCHEDULING | Facility: HOME HEALTH | Age: 86
End: 2021-01-05
Payer: MEDICARE

## 2021-01-05 VITALS
RESPIRATION RATE: 18 BRPM | HEART RATE: 64 BPM | TEMPERATURE: 97.8 F | OXYGEN SATURATION: 94 % | DIASTOLIC BLOOD PRESSURE: 90 MMHG | SYSTOLIC BLOOD PRESSURE: 120 MMHG

## 2021-01-05 PROCEDURE — 3331090002 HH PPS REVENUE DEBIT

## 2021-01-05 PROCEDURE — 3331090001 HH PPS REVENUE CREDIT

## 2021-01-05 PROCEDURE — G0157 HHC PT ASSISTANT EA 15: HCPCS

## 2021-01-06 ENCOUNTER — VIRTUAL VISIT (OUTPATIENT)
Dept: FAMILY MEDICINE CLINIC | Age: 86
End: 2021-01-06
Payer: MEDICARE

## 2021-01-06 DIAGNOSIS — T17.908S ASPIRATION INTO AIRWAY, SEQUELA: Primary | ICD-10-CM

## 2021-01-06 DIAGNOSIS — Z95.0 S/P PLACEMENT OF CARDIAC PACEMAKER: ICD-10-CM

## 2021-01-06 DIAGNOSIS — E78.2 MIXED HYPERLIPIDEMIA: ICD-10-CM

## 2021-01-06 DIAGNOSIS — I10 ESSENTIAL HYPERTENSION, BENIGN: ICD-10-CM

## 2021-01-06 DIAGNOSIS — Z09 HOSPITAL DISCHARGE FOLLOW-UP: Primary | ICD-10-CM

## 2021-01-06 DIAGNOSIS — F03.90 DEMENTIA WITHOUT BEHAVIORAL DISTURBANCE, UNSPECIFIED DEMENTIA TYPE: ICD-10-CM

## 2021-01-06 DIAGNOSIS — I44.2 COMPLETE HEART BLOCK (HCC): ICD-10-CM

## 2021-01-06 DIAGNOSIS — R13.10 DYSPHAGIA, UNSPECIFIED: ICD-10-CM

## 2021-01-06 DIAGNOSIS — E87.6 HYPOKALEMIA: ICD-10-CM

## 2021-01-06 PROCEDURE — G8427 DOCREV CUR MEDS BY ELIG CLIN: HCPCS | Performed by: FAMILY MEDICINE

## 2021-01-06 PROCEDURE — 3331090002 HH PPS REVENUE DEBIT

## 2021-01-06 PROCEDURE — 3331090001 HH PPS REVENUE CREDIT

## 2021-01-06 PROCEDURE — 99496 TRANSJ CARE MGMT HIGH F2F 7D: CPT | Performed by: FAMILY MEDICINE

## 2021-01-06 PROCEDURE — 1111F DSCHRG MED/CURRENT MED MERGE: CPT | Performed by: FAMILY MEDICINE

## 2021-01-06 NOTE — PROGRESS NOTES
Lynda Webber presents today for   Chief Complaint   Patient presents with    Hypertension     Follow up     Cholesterol Problem     Follow up     Other     Recent pacemaker placement 12/31/2020, at  [facility] Other     hypokalemia follow up   [address] visit    Depression Screening:  3 most recent PHQ Screens 1/6/2021   PHQ Not Done -   Little interest or pleasure in doing things Not at all   Feeling down, depressed, irritable, or hopeless Not at all   Total Score PHQ 2 0   Trouble falling or staying asleep, or sleeping too much -   Feeling tired or having little energy -   Poor appetite, weight loss, or overeating -   Feeling bad about yourself - or that you are a failure or have let yourself or your family down -   Trouble concentrating on things such as school, work, reading, or watching TV -   Moving or speaking so slowly that other people could have noticed; or the opposite being so fidgety that others notice -   Thoughts of being better off dead, or hurting yourself in some way -   How difficult have these problems made it for you to do your work, take care of your home and get along with others -       Learning Assessment:  Learning Assessment 1/28/2020   PRIMARY LEARNER Patient   HIGHEST LEVEL OF EDUCATION - PRIMARY LEARNER  GRADUATED HIGH SCHOOL OR GED   BARRIERS PRIMARY LEARNER [address] CAREGIVER Yes   CO-LEARNER NAME Daughter   [address]    NEED -   LEARNER PREFERENCE PRIMARY LISTENING     -   LEARNER Leonel 9 -   ANSWERED BY self   RELATIONSHIP SELF       Fall Risk  Fall Risk Assessment, last 12 mths 1/6/2021   Able to walk? Yes   Fall in past 12 months? 1   Do you feel unsteady?  1   Are you worried about falling 1   Is the gait abnormal? 0   Number of falls in past 12 months 2   Fall with injury? 0       Travel Screening:   Travel Screening     Question   Response    In the last month, have you been in contact with someone who was confirmed or suspected to have Coronavirus / COVID-19? No / Unsure    Have you had a COVID-19 viral test in the last 14 days? Yes - Negative result    Do you have any of the following new or worsening symptoms? None of these    Have you traveled internationally in the last month? No      Travel History   Travel since 12/06/20     No documented travel since 12/06/20          Health Maintenance reviewed and discussed and ordered per Provider. Health Maintenance Due   Topic Date Due    Shingrix Vaccine Age 49> (1 of 2) 11/23/1981    Pneumococcal 65+ years (2 of 2 - PPSV23) 02/19/2020   . Coordination of Care:  1. Have you been to the ER, urgent care clinic since your last visit? Hospitalized since your last visit? 12/30/20 and 12/31/20, related [address] 2. Have you seen or consulted any other health care providers outside of the [address] [place] [address] since your last visit? Include any pap smears or colon screening.  No

## 2021-01-06 NOTE — PROGRESS NOTES
Order placed for Modified Barium Swallow Study, per Verbal Order from Dr. Deuce Robertson on 1/6/2021. Last office visit: 5/2020  Follow up Visit: TBD    Provider is aware of last office visit and follow up. No further action requested from provider. Last Order dated 5/2020 was discontinued automatically on 11/2020. Had to re-order.

## 2021-01-07 ENCOUNTER — HOME CARE VISIT (OUTPATIENT)
Dept: SCHEDULING | Facility: HOME HEALTH | Age: 86
End: 2021-01-07
Payer: MEDICARE

## 2021-01-07 VITALS
OXYGEN SATURATION: 92 % | RESPIRATION RATE: 16 BRPM | HEART RATE: 62 BPM | SYSTOLIC BLOOD PRESSURE: 105 MMHG | DIASTOLIC BLOOD PRESSURE: 80 MMHG

## 2021-01-07 PROCEDURE — 3331090001 HH PPS REVENUE CREDIT

## 2021-01-07 PROCEDURE — G0157 HHC PT ASSISTANT EA 15: HCPCS

## 2021-01-07 PROCEDURE — 3331090002 HH PPS REVENUE DEBIT

## 2021-01-08 PROCEDURE — 3331090001 HH PPS REVENUE CREDIT

## 2021-01-08 PROCEDURE — 3331090002 HH PPS REVENUE DEBIT

## 2021-01-09 PROCEDURE — 3331090002 HH PPS REVENUE DEBIT

## 2021-01-09 PROCEDURE — 3331090001 HH PPS REVENUE CREDIT

## 2021-01-10 PROCEDURE — 3331090002 HH PPS REVENUE DEBIT

## 2021-01-10 PROCEDURE — 3331090001 HH PPS REVENUE CREDIT

## 2021-01-11 ENCOUNTER — HOME CARE VISIT (OUTPATIENT)
Dept: SCHEDULING | Facility: HOME HEALTH | Age: 86
End: 2021-01-11
Payer: MEDICARE

## 2021-01-11 ENCOUNTER — HOSPITAL ENCOUNTER (OUTPATIENT)
Dept: GENERAL RADIOLOGY | Age: 86
Discharge: HOME | End: 2021-01-11
Attending: INTERNAL MEDICINE
Payer: MEDICARE

## 2021-01-11 VITALS
SYSTOLIC BLOOD PRESSURE: 105 MMHG | TEMPERATURE: 97 F | HEART RATE: 63 BPM | OXYGEN SATURATION: 93 % | DIASTOLIC BLOOD PRESSURE: 60 MMHG | RESPIRATION RATE: 17 BRPM

## 2021-01-11 DIAGNOSIS — T17.908S ASPIRATION INTO AIRWAY, SEQUELA: ICD-10-CM

## 2021-01-11 DIAGNOSIS — R13.13 PHARYNGEAL DYSPHAGIA: ICD-10-CM

## 2021-01-11 DIAGNOSIS — R13.10 DYSPHAGIA, UNSPECIFIED: ICD-10-CM

## 2021-01-11 PROCEDURE — 74011000255 HC RX REV CODE- 255: Performed by: INTERNAL MEDICINE

## 2021-01-11 PROCEDURE — 92611 MOTION FLUOROSCOPY/SWALLOW: CPT

## 2021-01-11 PROCEDURE — G0157 HHC PT ASSISTANT EA 15: HCPCS

## 2021-01-11 PROCEDURE — 74230 X-RAY XM SWLNG FUNCJ C+: CPT

## 2021-01-11 PROCEDURE — 3331090001 HH PPS REVENUE CREDIT

## 2021-01-11 PROCEDURE — 3331090002 HH PPS REVENUE DEBIT

## 2021-01-11 RX ADMIN — BARIUM SULFATE 700 MG: 700 TABLET ORAL at 10:10

## 2021-01-11 RX ADMIN — BARIUM SULFATE 90 ML: 0.81 POWDER, FOR SUSPENSION ORAL at 11:00

## 2021-01-11 RX ADMIN — BARIUM SULFATE 30 ML: 400 PASTE ORAL at 10:11

## 2021-01-11 NOTE — PROGRESS NOTES
[address] [facility]  SPEECH LANGUAGE PATHOLOGY OUTPATIENT MODIFIED BARIUM SWALLOW STUDY    Patient: Candelaria Farrar (80 y.o. female)  Date: 1/11/2021  Primary Diagnosis: Dysphagia, unspecified [R13.10]  Aspiration into airway, sequela [T17.908S]       Precautions: aspiration       Assessment:  Based on the objective data described below, the patient presents with mild pharyngeal dysphagia c/b flash penetration to laryngeal vestibule with thin liquids serial swallows via straw. She tolerated regular solid, puree, thin liquids +/- straw single small sips, and 13 mm Ba pill with thin liquid wash via straw with no aspiration/penetration events. Minimally reduced tongue base retraction resulted in premature spillage of all PO. Min pharyngeal weakness resulted in min vallecular residuals post swallow with all PO; cleared with second volitional swallow. Recommend regular solid diet with thin liquids, single sips, aspiration precautions, oral care TID, and meds as tolerated. Results/recommendations d/w pt and pt's daughter in detail following study with verbalized comprehension. No further skilled SLP services are indicated at this time. Please re-consult if needed. Recommendations:   Regular diet with thin liquids  Aspiration precautions  HOB >45 during po intake, remain >30 for 30-45 minutes after po   Small single bites/sips; alternate liquid/solid with slow feeding rate   Oral care TID  Meds per pt preference     SUBJECTIVE:   Patient stated I don't really think I have trouble swallowing. OBJECTIVE:     Past Medical History:   Diagnosis Date    Abuse 1998    Alcohol    Acute renal failure ([facility] [address] 3/9/2018    Adrenal insufficiency (HCC)     Anxiety     Calculus of kidney 1988    Cardiac echocardiogram 07/31/2012    EF 60-65%. No WMA. Mild conc LVH. Gr 1 DDfx. RVSP 20-25 mmHg. Mild SAGAR. Mild TR,. Mild PAE.      Cardiovascular LE venous duplex 10/08/2012    No venous thrombosis or venous insufficiency in bilateral lower extremities.  Carotid duplex 10/09/2014    Mild < 50% bilateral ICA stenosis.  Chronic lung disease     Contact dermatitis and other eczema, due to unspecified cause     COPD (chronic obstructive pulmonary disease) (HCC)     Degenerative joint disease     Dementia (HCC)     Depression     Hematuria     Hypercholesteremia     Hypertension     Hypothyroidism 1998    Kidney stone     Neuropathy 3/2007    Orthostatic hypotension     Pituitary adenoma ([facility] [address] 2/1998    Pituitary tumor 1998    Pneumonia     Seizures ([facility] [address]     none recently    Severe obstructive sleep apnea 01-15-15     started using Bipap Machine    Stress     Stroke ([facility] [address] 2/2006    no residual    TIA (transient ischemic attack)     Trauma      Past Surgical History:   Procedure Laterality Date    HX APPENDECTOMY      HX INTRAOCULAR LENS PROSTHESIS      HX LITHOTRIPSY  9/14/15    Dr. Sugey Mcgill HX WISDOM TEETH EXTRACTION      x4    RI CYSTOSCOPY,INSERT URETERAL STENT  9/14/15    Dr. Dixon Ren     Prior Level of Swallowing Function/Home Situation: Pt with c/o intermittent coughing with PO (no difference between solid and liquid consistencies). Diet prior to admission: regular solid with thin  Current Diet:  Regular solid with thin    Radiology:  Film Views: Lateral;Fluoro  Patient Position: 90 in fluoro chair    Trial 1: Trial 2:   Consistency Presented: Thin liquid; Solid;Puree;Pill/Tablet Consistency Presented: Thin liquid   How Presented: Self-fed/presented;Cup/sip;Straw;Spoon;Cup/gulp How Presented: Self-fed/presented; Successive swallows;Straw         Bolus Acceptance: No impairment Bolus Acceptance: No impairment   Bolus Formation/Control: Impaired: Premature spillage Bolus Formation/Control: Impaired: Premature spillage   Propulsion: No impairment Propulsion: No impairment   Oral Residue: None Oral Residue: None   Initiation of Swallow: Triggered at vallecula Initiation of Swallow: Triggered at valleculae   Timing: No impairment Timing: No impairment   Penetration: None Penetration: Flash/transient;During swallow   Aspiration/Timing: No evidence of aspiration Aspiration/Timing: No evidence of aspiration   Pharyngeal Clearance: Vallecular residue; Less than 10% Pharyngeal Clearance: Vallecular residue; Less than 10%   Attempted Modifications: Small sips and bites Attempted Modifications: Small sips and bites   Effective Modifications: Small sips and bites Effective Modifications: (small single sips)   Cues for Modifications: Minimal Cues for Modifications: Minimal         Decreased Tongue Base Retraction?: Yes  Laryngeal Elevation: WFL (within functional limits)  Aspiration/Penetration Score: 2 (Penetration/No residue-Contrast enters the airway penetrates, remains above the folds/cords, and is cleared)  Pharyngeal Symmetry: Not assessed  Pharyngeal-Esophageal Segment: No impairment  Pharyngeal Dysfunction: Decreased strength;Decreased tongue base retraction    Oral Phase Severity: No impairment  Pharyngeal Phase Severity: Mild    8-point Penetration-Aspiration Scale: Score 2    PAIN:  Pt reports 0/10 pain or discomfort prior to MBS. Pt reports 0/10 pain or discomfort post MBS. COMMUNICATION/EDUCATION:   [x]  Education provided post diagnostic testing including oropharyngeal anatomy/physiology, MBS results, diet recommendations and        compensatory strategies/positioning. [x]  Video feedback utilized. []  Handout regarding diet recommendations and thickener instructions provided. [x]  Patient/family have participated as able in goal setting and plan of care. []  Patient/family agree to work toward stated goals and plan of care. []  Patient understands intent and goals of therapy, but is neutral about his/her participation. []  Patient is unable to participate in goal setting and plan of care.     Thank you for this referral.    Lesa Chand M.S. CCC-SLP/L  Speech-Language Pathologist

## 2021-01-12 PROCEDURE — 3331090002 HH PPS REVENUE DEBIT

## 2021-01-12 PROCEDURE — 3331090001 HH PPS REVENUE CREDIT

## 2021-01-13 PROCEDURE — 3331090001 HH PPS REVENUE CREDIT

## 2021-01-13 PROCEDURE — 3331090002 HH PPS REVENUE DEBIT

## 2021-01-14 ENCOUNTER — HOME CARE VISIT (OUTPATIENT)
Dept: SCHEDULING | Facility: HOME HEALTH | Age: 86
End: 2021-01-14
Payer: MEDICARE

## 2021-01-14 VITALS
OXYGEN SATURATION: 94 % | RESPIRATION RATE: 18 BRPM | DIASTOLIC BLOOD PRESSURE: 80 MMHG | TEMPERATURE: 91.1 F | SYSTOLIC BLOOD PRESSURE: 140 MMHG | HEART RATE: 63 BPM

## 2021-01-14 PROCEDURE — G0157 HHC PT ASSISTANT EA 15: HCPCS

## 2021-01-14 PROCEDURE — 3331090002 HH PPS REVENUE DEBIT

## 2021-01-14 PROCEDURE — 3331090001 HH PPS REVENUE CREDIT

## 2021-01-15 ENCOUNTER — PATIENT OUTREACH (OUTPATIENT)
Dept: CASE MANAGEMENT | Age: 86
End: 2021-01-15

## 2021-01-15 PROCEDURE — 3331090001 HH PPS REVENUE CREDIT

## 2021-01-15 PROCEDURE — 3331090002 HH PPS REVENUE DEBIT

## 2021-01-15 NOTE — PROGRESS NOTES
Transitions of Care Coordination  Follow-Up    Date/Time:  1/15/2021 2:57 PM     EMR reviewed. CTN (Care Transitions Nurse) contacted patient for Transitions of Care Coordination  follow up. Call answered by Jennifer Armstrong, daughter. Srinivas Pierre voiced patient is at home with her sisters Shikha Christian and Ephraim Awad and provided contact info ([phone]). Srinivas Pierre reported patient had few days of extreme confusion and didn't even realize that Jovanna had come and gone. Srinivas Pierre verbalized she had taken patient to [facility] and left patient in store while she parked the care as she usually does. Srinivas Pierre voiced patient panicked and got scared and when she got in the store patient was tearful. Writer did not provide or discuss health info and discussed only info provided by daughter. Contacted patient at contact number provided. Call answered by Shikha Christian, daughter. Verified 2 patient identifiers (name and ) with patient. Introduced self/role and reason for call. Patient provided verbal permission to speak wit Shikha Christian about health info and patient remained on phone with daughter during entire call. Patient reported:   Left chest area \"is a little sore but I'll be alright\"; 3/10    Asked patient about episode of confusion at [facility]. Patient voiced she has had some confusion at times since Nov. She voiced she did not realize she had forgotten about [place] but remembers now. Shikha Christian, daughter verbalized patient has not complained to her or sister about any pain to chest area. Daughter reported left chest areas looking good; steri-strips almost falling off. Advised daughter she may trim off the pieces that are hanging off but not to pull off the pieces that are still attached to skin/wound. Daughter asked if okay to get them wet. Advised daughter patient may shower and get them wet but not to allow shower water to beat down on area.      Daughter reported that patient has saliva that she gathers in her cheek like tobacco and won't swallow it. Daughter voiced patient lets it drain out side of mouth onto cloth or tissue. Asked patient why she did not want or could not swallow saliva. Patient voiced it's so much. Daughter reported this has been going on for about 6 months. Patient denied coughing, choking while eating or swallowing. Asked if saliva was too thick. Patient denied this. Pertinent negatives:  S/s of infection    Specialist appointments since last outreach? no  If so, specialist and date: N/A    Medications:   New medications since last outreach: no  Does patient need refills on any medications: no  Medication changes since last outreach (dose adjustments or discontinued meds): no    Home Health company: [employer] [facility]  Date of discharge: TBD    Encouraged patient to perform HEP 2 times a day; doing the exercises when she lays down and the exercise when she sits the next time she exercises. Daughter voiced patient is definitely doing better than she did the last time she had therapy. Barriers to care? None      Patient and daughter reminded that there are physicians on call 24 hours a day / 7 days a week (M-F 5pm to 8am and from Friday 5pm until Monday 8a for the weekend) should the patient have questions or concerns. Future Appointments   Date Time Provider Department Center   1/19/2021 To Be Determined [facility]   1/21/2021 To Be Determined Bren Hampotn PT [facility]   1/26/2021 To Be Determined [facility]   1/28/2021 To Be Determined [facility]   2/2/2021 To Be Determined Bren Hampton PT [facility]   2/5/2021 11:15 AM Zion Gutierrez MD [facility]        Other upcoming appointments:  Dr. Cherene Gosselin, cardiology DR KRISTOFER BUTLER [facility]) 1/25/20 @ 10AM     Goals      Attends follow-up appointments as directed. Goal: Patient will attend all appointments scheduled within the next 30 days.       Ensure provider appt is scheduled within 7 days post-discharge; PADMA appt has been scheduled with PCP. Daughter to contact cardiology to schedule follow up. Confirm patient attended post-discharge provider appt   Complete post-visit call to confirm attendance and update care needs           Prevent complications post hospitalization. Goal: No admissions post 30 days from discharge of 1/2/21. Review/educate common or potential \"red flags\" of condition worsening; 1/4: redness, warmth at site(s), swelling, bleeding or pus-like drainage, chills, fever (> 100.5), low body temperature (<97.2), nausea/vomiting that prevents eating/drinking/taking medications, SOB, chest pain/pressure, any other new or concerning s/s  Evaluate adherence to medications and priority barriers to resolve; None   Monitor lab results and symptoms, escalate to provider       Review the Home Visit or Home Health documentation for coordinating care and goals    Discuss and provide resources for ACP; 1/4: Not on file; daughter voiced she plans to get with her  to have documents completed.

## 2021-01-16 PROCEDURE — 3331090001 HH PPS REVENUE CREDIT

## 2021-01-16 PROCEDURE — 3331090002 HH PPS REVENUE DEBIT

## 2021-01-17 PROCEDURE — 3331090001 HH PPS REVENUE CREDIT

## 2021-01-17 PROCEDURE — 3331090002 HH PPS REVENUE DEBIT

## 2021-01-18 PROCEDURE — 3331090002 HH PPS REVENUE DEBIT

## 2021-01-18 PROCEDURE — 3331090001 HH PPS REVENUE CREDIT

## 2021-01-19 ENCOUNTER — HOME CARE VISIT (OUTPATIENT)
Dept: SCHEDULING | Facility: HOME HEALTH | Age: 86
End: 2021-01-19
Payer: MEDICARE

## 2021-01-19 VITALS
TEMPERATURE: 99.3 F | RESPIRATION RATE: 16 BRPM | DIASTOLIC BLOOD PRESSURE: 90 MMHG | OXYGEN SATURATION: 93 % | HEART RATE: 76 BPM | SYSTOLIC BLOOD PRESSURE: 130 MMHG

## 2021-01-19 PROCEDURE — G0157 HHC PT ASSISTANT EA 15: HCPCS

## 2021-01-19 PROCEDURE — 3331090001 HH PPS REVENUE CREDIT

## 2021-01-19 PROCEDURE — 3331090002 HH PPS REVENUE DEBIT

## 2021-01-20 PROCEDURE — 3331090001 HH PPS REVENUE CREDIT

## 2021-01-20 PROCEDURE — 3331090002 HH PPS REVENUE DEBIT

## 2021-01-21 ENCOUNTER — HOME CARE VISIT (OUTPATIENT)
Dept: SCHEDULING | Facility: HOME HEALTH | Age: 86
End: 2021-01-21
Payer: MEDICARE

## 2021-01-21 VITALS
HEART RATE: 64 BPM | TEMPERATURE: 97.7 F | RESPIRATION RATE: 16 BRPM | DIASTOLIC BLOOD PRESSURE: 80 MMHG | SYSTOLIC BLOOD PRESSURE: 158 MMHG

## 2021-01-21 PROCEDURE — 3331090001 HH PPS REVENUE CREDIT

## 2021-01-21 PROCEDURE — G0151 HHCP-SERV OF PT,EA 15 MIN: HCPCS

## 2021-01-21 PROCEDURE — 3331090002 HH PPS REVENUE DEBIT

## 2021-01-22 PROCEDURE — 3331090001 HH PPS REVENUE CREDIT

## 2021-01-22 PROCEDURE — 3331090002 HH PPS REVENUE DEBIT

## 2021-01-23 PROCEDURE — 3331090001 HH PPS REVENUE CREDIT

## 2021-01-23 PROCEDURE — 3331090002 HH PPS REVENUE DEBIT

## 2021-01-24 PROCEDURE — 3331090001 HH PPS REVENUE CREDIT

## 2021-01-24 PROCEDURE — 3331090002 HH PPS REVENUE DEBIT

## 2021-01-25 PROCEDURE — 3331090001 HH PPS REVENUE CREDIT

## 2021-01-25 PROCEDURE — 3331090002 HH PPS REVENUE DEBIT

## 2021-01-26 ENCOUNTER — HOME CARE VISIT (OUTPATIENT)
Dept: SCHEDULING | Facility: HOME HEALTH | Age: 86
End: 2021-01-26
Payer: MEDICARE

## 2021-01-26 VITALS
HEART RATE: 60 BPM | TEMPERATURE: 93.4 F | SYSTOLIC BLOOD PRESSURE: 110 MMHG | OXYGEN SATURATION: 90 % | DIASTOLIC BLOOD PRESSURE: 60 MMHG | RESPIRATION RATE: 16 BRPM

## 2021-01-26 PROCEDURE — 3331090001 HH PPS REVENUE CREDIT

## 2021-01-26 PROCEDURE — G0157 HHC PT ASSISTANT EA 15: HCPCS

## 2021-01-26 PROCEDURE — 3331090002 HH PPS REVENUE DEBIT

## 2021-01-27 PROCEDURE — 3331090001 HH PPS REVENUE CREDIT

## 2021-01-27 PROCEDURE — 3331090002 HH PPS REVENUE DEBIT

## 2021-01-28 ENCOUNTER — HOME CARE VISIT (OUTPATIENT)
Dept: SCHEDULING | Facility: HOME HEALTH | Age: 86
End: 2021-01-28
Payer: MEDICARE

## 2021-01-28 VITALS
SYSTOLIC BLOOD PRESSURE: 130 MMHG | RESPIRATION RATE: 18 BRPM | DIASTOLIC BLOOD PRESSURE: 70 MMHG | HEART RATE: 74 BPM | OXYGEN SATURATION: 96 % | TEMPERATURE: 96.8 F

## 2021-01-28 PROCEDURE — 3331090001 HH PPS REVENUE CREDIT

## 2021-01-28 PROCEDURE — 3331090002 HH PPS REVENUE DEBIT

## 2021-01-28 PROCEDURE — G0157 HHC PT ASSISTANT EA 15: HCPCS

## 2021-01-29 ENCOUNTER — PATIENT OUTREACH (OUTPATIENT)
Dept: CASE MANAGEMENT | Age: 86
End: 2021-01-29

## 2021-01-29 PROBLEM — S69.90XA FINGER INJURY: Status: ACTIVE | Noted: 2020-08-27

## 2021-01-29 PROBLEM — I44.2 COMPLETE HEART BLOCK (HCC): Status: ACTIVE | Noted: 2020-12-30

## 2021-01-29 PROBLEM — S49.90XA SHOULDER INJURY: Status: ACTIVE | Noted: 2020-08-27

## 2021-01-29 PROBLEM — R00.1 BRADYCARDIA: Status: ACTIVE | Noted: 2020-12-31

## 2021-01-29 PROBLEM — I45.9 HEART BLOCK: Status: ACTIVE | Noted: 2020-12-30

## 2021-01-29 PROCEDURE — 3331090001 HH PPS REVENUE CREDIT

## 2021-01-29 PROCEDURE — 3331090002 HH PPS REVENUE DEBIT

## 2021-01-29 NOTE — PROGRESS NOTES
Transitions of Care Coordination  Follow-Up    Date/Time:  2021 12:25 PM     CTN (Care Transitions Nurse) contacted family for Transitions of Care Coordination  follow up. Verified 2 patient identifiers (name and ). Spoke to family. Introduced self/role and reason for call. Family reported:   Doing really good  Was able to walk around stores w/o c/o SOB  Using oxygen at night  Working on doing home exercises    Pertinent negatives:  S/S of infection    Specialist appointments since last outreach? yes  If so, specialist and date: Dr. Christy Kirkpatrick, cardiology    Medications:   New medications since last outreach: no  Does patient need refills on any medications: no  Medication changes since last outreach (dose adjustments or discontinued meds): no    [address]: [facility]  Date of discharge: TBD    Barriers to care? None        Family reminded that there are physicians on call 24 hours a day / 7 days a week (M-F 5pm to 8am and from Friday 5pm until Monday 8a for the weekend) should the patient have questions or concerns. Future Appointments   Date Time Provider Marcelo Flores   2021 10:00 AM Adrianne Ybarra, PT [address] [facility]   2021 11:15 AM Olga Wilson MD [facility] AMB        Other upcoming appointments:  N/A    Goals      Attends follow-up appointments as directed. Goal: Patient will attend all appointments scheduled within the next 30 days. Ensure provider appt is scheduled within 7 days post-discharge; PADMA appt has been scheduled with PCP. Daughter to contact cardiology to schedule follow up. Confirm patient attended post-discharge provider appt; 1/15: Patient attended [facility] appt with PCP on  as scheduled. Complete post-visit call to confirm attendance and update care needs; Done          Prevent complications post hospitalization. Goal: No admissions post 30 days from discharge of 21.       Review/educate common or potential \"red flags\" of condition worsening; 1/4: redness, warmth at site(s), swelling, bleeding or pus-like drainage, chills, fever (> 100.5), low body temperature (<97.2), nausea/vomiting that prevents eating/drinking/taking medications, SOB, chest pain/pressure, any other new or concerning s/s  Evaluate adherence to medications and priority barriers to resolve; None   Monitor lab results and symptoms, escalate to provider       Review the Home Visit or Home Health documentation for coordinating care and goals; Reviewed most recent home health notes; pt progressing with PT    Discuss and provide resources for ACP; 1/4: Not on file; daughter voiced she plans to get with her  to have documents completed.

## 2021-01-30 PROCEDURE — 3331090002 HH PPS REVENUE DEBIT

## 2021-01-30 PROCEDURE — 3331090001 HH PPS REVENUE CREDIT

## 2021-01-31 PROCEDURE — 3331090002 HH PPS REVENUE DEBIT

## 2021-01-31 PROCEDURE — 3331090001 HH PPS REVENUE CREDIT

## 2021-02-01 PROCEDURE — 3331090001 HH PPS REVENUE CREDIT

## 2021-02-01 PROCEDURE — 3331090002 HH PPS REVENUE DEBIT

## 2021-02-02 ENCOUNTER — HOME CARE VISIT (OUTPATIENT)
Dept: HOME HEALTH SERVICES | Facility: HOME HEALTH | Age: 86
End: 2021-02-02
Payer: MEDICARE

## 2021-02-02 PROCEDURE — 400018 HH-NO PAY CLAIM PROCEDURE

## 2021-02-02 PROCEDURE — 400013 HH SOC

## 2021-02-02 PROCEDURE — 3331090003 HH PPS REVENUE ADJ

## 2021-02-02 PROCEDURE — 3331090002 HH PPS REVENUE DEBIT

## 2021-02-02 PROCEDURE — 3331090001 HH PPS REVENUE CREDIT

## 2021-02-03 ENCOUNTER — PATIENT OUTREACH (OUTPATIENT)
Dept: CASE MANAGEMENT | Age: 86
End: 2021-02-03

## 2021-02-03 PROCEDURE — 3331090002 HH PPS REVENUE DEBIT

## 2021-02-03 PROCEDURE — 3331090001 HH PPS REVENUE CREDIT

## 2021-02-03 NOTE — PROGRESS NOTES
Chart opened to resolve episode of care. Patient a current admission at [facility] as pf 1/30/21. Will continue to monitor.

## 2021-02-04 PROCEDURE — 3331090002 HH PPS REVENUE DEBIT

## 2021-02-04 PROCEDURE — 3331090001 HH PPS REVENUE CREDIT

## 2021-02-05 PROCEDURE — 3331090001 HH PPS REVENUE CREDIT

## 2021-02-05 PROCEDURE — 3331090002 HH PPS REVENUE DEBIT

## 2021-02-06 PROCEDURE — 3331090001 HH PPS REVENUE CREDIT

## 2021-02-06 PROCEDURE — 3331090002 HH PPS REVENUE DEBIT

## 2021-02-07 PROCEDURE — 3331090002 HH PPS REVENUE DEBIT

## 2021-02-07 PROCEDURE — 3331090001 HH PPS REVENUE CREDIT

## 2021-02-08 PROCEDURE — 3331090001 HH PPS REVENUE CREDIT

## 2021-02-08 PROCEDURE — 3331090002 HH PPS REVENUE DEBIT

## 2021-02-09 PROCEDURE — 3331090002 HH PPS REVENUE DEBIT

## 2021-02-09 PROCEDURE — 3331090001 HH PPS REVENUE CREDIT

## 2021-02-10 PROCEDURE — 3331090002 HH PPS REVENUE DEBIT

## 2021-02-10 PROCEDURE — 3331090001 HH PPS REVENUE CREDIT

## 2021-02-11 PROCEDURE — 3331090001 HH PPS REVENUE CREDIT

## 2021-02-11 PROCEDURE — 3331090002 HH PPS REVENUE DEBIT

## 2021-02-12 PROCEDURE — 3331090001 HH PPS REVENUE CREDIT

## 2021-02-12 PROCEDURE — 3331090002 HH PPS REVENUE DEBIT

## 2021-02-13 PROCEDURE — 3331090002 HH PPS REVENUE DEBIT

## 2021-02-13 PROCEDURE — 3331090001 HH PPS REVENUE CREDIT

## 2021-02-14 PROCEDURE — 3331090001 HH PPS REVENUE CREDIT

## 2021-02-14 PROCEDURE — 3331090002 HH PPS REVENUE DEBIT

## 2021-02-15 PROCEDURE — 3331090002 HH PPS REVENUE DEBIT

## 2021-02-15 PROCEDURE — 3331090001 HH PPS REVENUE CREDIT

## 2021-02-16 PROCEDURE — 3331090002 HH PPS REVENUE DEBIT

## 2021-02-16 PROCEDURE — 3331090001 HH PPS REVENUE CREDIT

## 2021-02-17 ENCOUNTER — PATIENT OUTREACH (OUTPATIENT)
Dept: CASE MANAGEMENT | Age: 86
End: 2021-02-17

## 2021-02-17 PROCEDURE — 3331090001 HH PPS REVENUE CREDIT

## 2021-02-17 PROCEDURE — 3331090002 HH PPS REVENUE DEBIT
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
## 2021-02-18 PROCEDURE — 3331090001 HH PPS REVENUE CREDIT

## 2021-02-18 PROCEDURE — 3331090002 HH PPS REVENUE DEBIT

## 2021-02-18 NOTE — PROGRESS NOTES
Post Acute Facility Update     *The information contained in this note was received during a weekly care coordination call with the post acute facility*    Patient was admitted to [facility] on 1/30/2021 and discharged to [facility] on 2/4/21     Discharge Diagnosis :    · Lethargy   · AMS  · Bradycardia  · Heart block  · Finger Injury  · Shoulder Injury  · Traumatic hematoma of forehead   · Community acquired pneumonia of right lower lobe of lung  · Sepsis  · Syncope  · Chronic Respiratory failure with hypoxia  · Hypertension  · Hypercholesterolemia  · COPD  · Benign tumor of pituitary gland  · Epilepsy    Current Facility: [address] [place] [address] (SNF)  Anticipated Discharge Date: TBD    Facility Nursing Update: no new orders  Facility Social Work Update: Patient to d/c to [facility] on 2/22/21   ADL's: Moderate Assistance   Current Level of Assistance: Moderate Assistance   Bed Mobility: Moderate Assistance   Transfers:  Moderate Assistance   Ambulation: Moderate Assistance   How far (in feet) is the patient ambulating? unknown  Does patient use an assistive device: yes  If yes, device used: Walker  Barriers to Discharge: Going to shelter on d/c - will use HH that shelter uses   Other: none

## 2021-02-19 PROCEDURE — 3331090002 HH PPS REVENUE DEBIT

## 2021-02-19 PROCEDURE — 3331090001 HH PPS REVENUE CREDIT

## 2021-02-20 PROCEDURE — 3331090001 HH PPS REVENUE CREDIT

## 2021-02-20 PROCEDURE — 3331090002 HH PPS REVENUE DEBIT

## 2021-02-21 PROCEDURE — 3331090001 HH PPS REVENUE CREDIT

## 2021-02-21 PROCEDURE — 3331090002 HH PPS REVENUE DEBIT

## 2021-02-22 PROCEDURE — 3331090002 HH PPS REVENUE DEBIT

## 2021-02-22 PROCEDURE — 3331090001 HH PPS REVENUE CREDIT

## 2021-02-23 PROCEDURE — 3331090001 HH PPS REVENUE CREDIT

## 2021-02-23 PROCEDURE — 3331090002 HH PPS REVENUE DEBIT

## 2021-02-24 PROCEDURE — 3331090002 HH PPS REVENUE DEBIT

## 2021-02-24 PROCEDURE — 3331090001 HH PPS REVENUE CREDIT

## 2021-02-25 PROCEDURE — 3331090001 HH PPS REVENUE CREDIT

## 2021-02-25 PROCEDURE — 3331090002 HH PPS REVENUE DEBIT

## 2021-02-26 PROCEDURE — 3331090001 HH PPS REVENUE CREDIT

## 2021-02-26 PROCEDURE — 3331090002 HH PPS REVENUE DEBIT

## 2021-02-27 PROCEDURE — 3331090002 HH PPS REVENUE DEBIT

## 2021-02-27 PROCEDURE — 3331090001 HH PPS REVENUE CREDIT

## 2021-02-28 PROCEDURE — 3331090002 HH PPS REVENUE DEBIT

## 2021-02-28 PROCEDURE — 3331090001 HH PPS REVENUE CREDIT

## 2021-03-01 PROCEDURE — 3331090001 HH PPS REVENUE CREDIT

## 2021-03-01 PROCEDURE — 3331090002 HH PPS REVENUE DEBIT

## 2021-03-02 PROCEDURE — 3331090001 HH PPS REVENUE CREDIT

## 2021-03-02 PROCEDURE — 3331090002 HH PPS REVENUE DEBIT

## 2021-03-03 PROCEDURE — 3331090001 HH PPS REVENUE CREDIT

## 2021-03-03 PROCEDURE — 3331090002 HH PPS REVENUE DEBIT

## 2021-03-03 PROCEDURE — 3331090003 HH PPS REVENUE ADJ

## 2021-04-28 ENCOUNTER — PATIENT OUTREACH (OUTPATIENT)
Dept: CASE MANAGEMENT | Age: 86
End: 2021-04-28

## 2021-04-28 NOTE — PROGRESS NOTES
Spoke with pt's daughter. Patient has been moved to assisted to living in [place]. No ACM contact needed and episode resolved. Daughter has ACM contact info if needed.

## 2021-05-25 PROBLEM — Z95.0 PACEMAKER: Status: ACTIVE | Noted: 2021-04-28

## 2021-05-25 PROBLEM — R41.82 AMS (ALTERED MENTAL STATUS): Status: ACTIVE | Noted: 2021-01-30

## 2021-05-25 PROBLEM — R53.83 LETHARGY: Status: ACTIVE | Noted: 2021-01-30

## 2021-05-25 PROBLEM — G40.909 EPILEPTIC SEIZURES (HCC): Status: ACTIVE | Noted: 2021-05-25

## 2021-06-01 ENCOUNTER — DOCUMENTATION ONLY (OUTPATIENT)
Dept: PULMONOLOGY | Age: 86
End: 2021-06-01

## 2021-06-02 ENCOUNTER — DOCUMENTATION ONLY (OUTPATIENT)
Dept: PULMONOLOGY | Age: 86
End: 2021-06-02

## 2022-03-18 PROBLEM — J44.0 ACUTE BRONCHITIS WITH CHRONIC OBSTRUCTIVE PULMONARY DISEASE (COPD) (HCC): Status: ACTIVE | Noted: 2018-03-22

## 2022-03-18 PROBLEM — J20.9 ACUTE BRONCHITIS WITH CHRONIC OBSTRUCTIVE PULMONARY DISEASE (COPD) (HCC): Status: ACTIVE | Noted: 2018-03-22

## 2022-03-18 PROBLEM — R53.83 LETHARGY: Status: ACTIVE | Noted: 2021-01-30

## 2022-03-18 PROBLEM — I44.2 COMPLETE HEART BLOCK (HCC): Status: ACTIVE | Noted: 2020-12-30

## 2022-03-18 PROBLEM — G40.909 EPILEPTIC SEIZURES (HCC): Status: ACTIVE | Noted: 2021-05-25

## 2022-03-19 PROBLEM — J18.9 PNEUMONIA OF RIGHT LOWER LOBE DUE TO INFECTIOUS ORGANISM: Status: ACTIVE | Noted: 2020-02-10

## 2022-03-19 PROBLEM — R41.82 AMS (ALTERED MENTAL STATUS): Status: ACTIVE | Noted: 2021-01-30

## 2022-03-19 PROBLEM — M48.061 LUMBAR SPINAL STENOSIS: Status: ACTIVE | Noted: 2017-05-19

## 2022-03-19 PROBLEM — R00.1 BRADYCARDIA: Status: ACTIVE | Noted: 2020-12-31

## 2022-03-19 PROBLEM — Z95.0 PACEMAKER: Status: ACTIVE | Noted: 2021-04-28

## 2022-03-19 PROBLEM — S69.90XA FINGER INJURY: Status: ACTIVE | Noted: 2020-08-27

## 2022-03-19 PROBLEM — E23.0 PANHYPOPITUITARISM (HCC): Status: ACTIVE | Noted: 2018-03-10

## 2022-03-19 PROBLEM — T20.00XA FACE BURNS: Status: ACTIVE | Noted: 2019-01-04

## 2022-03-19 PROBLEM — F03.90 DEMENTIA WITHOUT BEHAVIORAL DISTURBANCE (HCC): Status: ACTIVE | Noted: 2020-01-30

## 2022-03-19 PROBLEM — R56.9 SEIZURES (HCC): Status: ACTIVE | Noted: 2019-07-02

## 2022-03-19 PROBLEM — I45.9 HEART BLOCK: Status: ACTIVE | Noted: 2020-12-30

## 2022-03-19 PROBLEM — I45.2 RBBB (RIGHT BUNDLE BRANCH BLOCK WITH LEFT ANTERIOR FASCICULAR BLOCK): Status: ACTIVE | Noted: 2017-06-01

## 2022-03-19 PROBLEM — G31.84 MILD COGNITIVE IMPAIRMENT: Status: ACTIVE | Noted: 2019-04-02

## 2022-03-19 PROBLEM — J96.11 CHRONIC RESPIRATORY FAILURE WITH HYPOXIA (HCC): Status: ACTIVE | Noted: 2019-06-20

## 2022-03-20 PROBLEM — N18.30 STAGE 3 CHRONIC KIDNEY DISEASE (HCC): Status: ACTIVE | Noted: 2019-01-09

## 2022-03-20 PROBLEM — Z99.81 SUPPLEMENTAL OXYGEN DEPENDENT: Status: ACTIVE | Noted: 2019-04-02

## 2022-03-20 PROBLEM — A41.9 SEPSIS (HCC): Status: ACTIVE | Noted: 2020-01-31

## 2022-03-20 PROBLEM — S49.90XA SHOULDER INJURY: Status: ACTIVE | Noted: 2020-08-27

## 2023-11-28 NOTE — Clinical Note
Pt has been seeing an eye doctor. Not sure if glaucoma screen was done; it does not look like we have any recent notes. Please find out where she has been seen. 145

## 2024-01-31 NOTE — ED TRIAGE NOTES
Per EMS family called for  Unresponsive  States  She was last seen normal  Last pm, on arrival to ER EMS states found pt at home  With altered mental status states  Pt  Alert to  Person only, bs per ems=130
intact
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
## 2025-03-02 NOTE — TELEPHONE ENCOUNTER
Pt's dtr, states pt had an overnight done through [address] [place] [address] around March. They never heard anything about results or if anything else needed to be done. Per dtr, [address] [place] [address] is now calling them today stating they never received results from us and pt needs this to be recerted. Please check on this and call dtr at [phone]. PAST MEDICAL HISTORY:  Bipolar disorder     Borderline personality disorder     Depression     Depression     Migraine     Opiate addiction